# Patient Record
Sex: MALE | Race: WHITE | Employment: OTHER | URBAN - METROPOLITAN AREA
[De-identification: names, ages, dates, MRNs, and addresses within clinical notes are randomized per-mention and may not be internally consistent; named-entity substitution may affect disease eponyms.]

---

## 2017-03-07 ENCOUNTER — APPOINTMENT (OUTPATIENT)
Dept: CT IMAGING | Age: 77
End: 2017-03-07
Attending: PHYSICIAN ASSISTANT
Payer: MEDICARE

## 2017-03-07 ENCOUNTER — HOSPITAL ENCOUNTER (EMERGENCY)
Age: 77
Discharge: HOME | End: 2017-03-07
Attending: EMERGENCY MEDICINE | Admitting: EMERGENCY MEDICINE
Payer: MEDICARE

## 2017-03-07 VITALS
BODY MASS INDEX: 30.17 KG/M2 | OXYGEN SATURATION: 97 % | HEART RATE: 94 BPM | HEIGHT: 69 IN | SYSTOLIC BLOOD PRESSURE: 143 MMHG | WEIGHT: 203.71 LBS | TEMPERATURE: 97.4 F | DIASTOLIC BLOOD PRESSURE: 72 MMHG | RESPIRATION RATE: 16 BRPM

## 2017-03-07 DIAGNOSIS — F10.20 ALCOHOL DEPENDENCE, DAILY USE (HCC): ICD-10-CM

## 2017-03-07 DIAGNOSIS — R10.31 ABDOMINAL PAIN, RLQ: ICD-10-CM

## 2017-03-07 DIAGNOSIS — K85.90 ACUTE PANCREATITIS, UNSPECIFIED COMPLICATION STATUS, UNSPECIFIED PANCREATITIS TYPE: Primary | ICD-10-CM

## 2017-03-07 DIAGNOSIS — I71.40 ABDOMINAL AORTIC ANEURYSM (AAA) WITHOUT RUPTURE: ICD-10-CM

## 2017-03-07 LAB
ALBUMIN SERPL BCP-MCNC: 3.7 G/DL (ref 3.5–5)
ALBUMIN/GLOB SERPL: 1.2 {RATIO} (ref 1.1–2.2)
ALP SERPL-CCNC: 80 U/L (ref 45–117)
ALT SERPL-CCNC: 59 U/L (ref 12–78)
ANION GAP BLD CALC-SCNC: 8 MMOL/L (ref 5–15)
APPEARANCE UR: CLEAR
AST SERPL W P-5'-P-CCNC: 43 U/L (ref 15–37)
BACTERIA URNS QL MICRO: NEGATIVE /HPF
BASOPHILS # BLD AUTO: 0 K/UL (ref 0–0.1)
BASOPHILS # BLD: 0 % (ref 0–1)
BILIRUB SERPL-MCNC: 0.6 MG/DL (ref 0.2–1)
BILIRUB UR QL CFM: NEGATIVE
BUN SERPL-MCNC: 17 MG/DL (ref 6–20)
BUN/CREAT SERPL: 14 (ref 12–20)
CALCIUM SERPL-MCNC: 9 MG/DL (ref 8.5–10.1)
CHLORIDE SERPL-SCNC: 103 MMOL/L (ref 97–108)
CK MB CFR SERPL CALC: 1.4 % (ref 0–2.5)
CK MB SERPL-MCNC: 1.4 NG/ML (ref 5–25)
CK SERPL-CCNC: 102 U/L (ref 39–308)
CO2 SERPL-SCNC: 28 MMOL/L (ref 21–32)
COLOR UR: ABNORMAL
CREAT SERPL-MCNC: 1.25 MG/DL (ref 0.7–1.3)
EOSINOPHIL # BLD: 0.1 K/UL (ref 0–0.4)
EOSINOPHIL NFR BLD: 2 % (ref 0–7)
EPITH CASTS URNS QL MICRO: ABNORMAL /LPF
ERYTHROCYTE [DISTWIDTH] IN BLOOD BY AUTOMATED COUNT: 15.5 % (ref 11.5–14.5)
ETHANOL SERPL-MCNC: <10 MG/DL
GLOBULIN SER CALC-MCNC: 3.2 G/DL (ref 2–4)
GLUCOSE SERPL-MCNC: 196 MG/DL (ref 65–100)
GLUCOSE UR STRIP.AUTO-MCNC: 500 MG/DL
HCT VFR BLD AUTO: 38 % (ref 36.6–50.3)
HGB BLD-MCNC: 12.5 G/DL (ref 12.1–17)
HGB UR QL STRIP: NEGATIVE
KETONES UR QL STRIP.AUTO: ABNORMAL MG/DL
LACTATE SERPL-SCNC: 1.7 MMOL/L (ref 0.4–2)
LEUKOCYTE ESTERASE UR QL STRIP.AUTO: NEGATIVE
LIPASE SERPL-CCNC: 397 U/L (ref 73–393)
LYMPHOCYTES # BLD AUTO: 24 % (ref 12–49)
LYMPHOCYTES # BLD: 1.2 K/UL (ref 0.8–3.5)
MAGNESIUM SERPL-MCNC: 1.7 MG/DL (ref 1.6–2.4)
MCH RBC QN AUTO: 31.6 PG (ref 26–34)
MCHC RBC AUTO-ENTMCNC: 32.9 G/DL (ref 30–36.5)
MCV RBC AUTO: 96.2 FL (ref 80–99)
MONOCYTES # BLD: 0.8 K/UL (ref 0–1)
MONOCYTES NFR BLD AUTO: 15 % (ref 5–13)
NEUTS SEG # BLD: 2.9 K/UL (ref 1.8–8)
NEUTS SEG NFR BLD AUTO: 59 % (ref 32–75)
NITRITE UR QL STRIP.AUTO: NEGATIVE
PH UR STRIP: 5.5 [PH] (ref 5–8)
PLATELET # BLD AUTO: 155 K/UL (ref 150–400)
POTASSIUM SERPL-SCNC: 4.1 MMOL/L (ref 3.5–5.1)
PROT SERPL-MCNC: 6.9 G/DL (ref 6.4–8.2)
PROT UR STRIP-MCNC: NEGATIVE MG/DL
RBC # BLD AUTO: 3.95 M/UL (ref 4.1–5.7)
RBC #/AREA URNS HPF: ABNORMAL /HPF (ref 0–5)
SODIUM SERPL-SCNC: 139 MMOL/L (ref 136–145)
SP GR UR REFRACTOMETRY: 1.03 (ref 1–1.03)
TROPONIN I SERPL-MCNC: <0.04 NG/ML
UA: UC IF INDICATED,UAUC: ABNORMAL
UROBILINOGEN UR QL STRIP.AUTO: 1 EU/DL (ref 0.2–1)
WBC # BLD AUTO: 5 K/UL (ref 4.1–11.1)
WBC URNS QL MICRO: ABNORMAL /HPF (ref 0–4)

## 2017-03-07 PROCEDURE — 74011250636 HC RX REV CODE- 250/636: Performed by: PHYSICIAN ASSISTANT

## 2017-03-07 PROCEDURE — 93005 ELECTROCARDIOGRAM TRACING: CPT

## 2017-03-07 PROCEDURE — 82550 ASSAY OF CK (CPK): CPT | Performed by: PHYSICIAN ASSISTANT

## 2017-03-07 PROCEDURE — 74011250636 HC RX REV CODE- 250/636: Performed by: EMERGENCY MEDICINE

## 2017-03-07 PROCEDURE — 36415 COLL VENOUS BLD VENIPUNCTURE: CPT | Performed by: EMERGENCY MEDICINE

## 2017-03-07 PROCEDURE — 83605 ASSAY OF LACTIC ACID: CPT | Performed by: EMERGENCY MEDICINE

## 2017-03-07 PROCEDURE — 85025 COMPLETE CBC W/AUTO DIFF WBC: CPT | Performed by: EMERGENCY MEDICINE

## 2017-03-07 PROCEDURE — 96361 HYDRATE IV INFUSION ADD-ON: CPT

## 2017-03-07 PROCEDURE — 74176 CT ABD & PELVIS W/O CONTRAST: CPT

## 2017-03-07 PROCEDURE — 83735 ASSAY OF MAGNESIUM: CPT | Performed by: PHYSICIAN ASSISTANT

## 2017-03-07 PROCEDURE — 87086 URINE CULTURE/COLONY COUNT: CPT | Performed by: EMERGENCY MEDICINE

## 2017-03-07 PROCEDURE — 81001 URINALYSIS AUTO W/SCOPE: CPT | Performed by: EMERGENCY MEDICINE

## 2017-03-07 PROCEDURE — 80307 DRUG TEST PRSMV CHEM ANLYZR: CPT | Performed by: PHYSICIAN ASSISTANT

## 2017-03-07 PROCEDURE — 80053 COMPREHEN METABOLIC PANEL: CPT | Performed by: EMERGENCY MEDICINE

## 2017-03-07 PROCEDURE — 83690 ASSAY OF LIPASE: CPT | Performed by: PHYSICIAN ASSISTANT

## 2017-03-07 PROCEDURE — 74011636320 HC RX REV CODE- 636/320: Performed by: PHYSICIAN ASSISTANT

## 2017-03-07 PROCEDURE — 84484 ASSAY OF TROPONIN QUANT: CPT | Performed by: PHYSICIAN ASSISTANT

## 2017-03-07 PROCEDURE — 96374 THER/PROPH/DIAG INJ IV PUSH: CPT

## 2017-03-07 PROCEDURE — 99285 EMERGENCY DEPT VISIT HI MDM: CPT

## 2017-03-07 RX ORDER — ONDANSETRON 2 MG/ML
4 INJECTION INTRAMUSCULAR; INTRAVENOUS
Status: COMPLETED | OUTPATIENT
Start: 2017-03-07 | End: 2017-03-07

## 2017-03-07 RX ORDER — ONDANSETRON 4 MG/1
4 TABLET, ORALLY DISINTEGRATING ORAL
Qty: 10 TAB | Refills: 0 | Status: SHIPPED | OUTPATIENT
Start: 2017-03-07 | End: 2017-05-11

## 2017-03-07 RX ADMIN — DIATRIZOATE MEGLUMINE AND DIATRIZOATE SODIUM 30 ML: 600; 100 SOLUTION ORAL; RECTAL at 16:03

## 2017-03-07 RX ADMIN — ONDANSETRON HYDROCHLORIDE 4 MG: 2 INJECTION, SOLUTION INTRAMUSCULAR; INTRAVENOUS at 19:42

## 2017-03-07 RX ADMIN — SODIUM CHLORIDE 500 ML: 900 INJECTION, SOLUTION INTRAVENOUS at 16:03

## 2017-03-07 NOTE — ED PROVIDER NOTES
HPI Comments: Brissa Knight is a 67 y.o. male with PMhx significant for COPD, GERD, alcohol abuse, HTN, DM, shunt placement and rheumatoid arthritis who presents ambulatory to the ED with spouse cc of decreased appetite, increased fatigue and mid-RLQ abdominal pain x 5-6 days. He is also c/o of intermittent episodes of loose stools x several weeks but states only had 1 BM today and denies recent abx use. He notes his last BM was this AM, loose, non-bloody this morning and reports it was the first in two days. Pt's wife notes hx of chronic fatigue and states he is unable to stand for long periods of time chronically. He went to his PCP this morning, noted to have abd pain and his systolic BP was \"in the 37I\". He denies new meds or changes in his BP meds, and last took his BP meds last night as scheduled. He also states he fell asleep in his car prior to appointment with PCP. Per chart review, pt had colonoscopy and polypectomy in 01/2016 by Dr. Kayla Gill; 9 polops were removed and diverticula were present. Of note he was admitted 03/2016 for influenza, alcohol abuse, and elevated liver functions. Pt denies abd surgeries other than AV shunt placed. Pt denies any changes in medication or taking abx recently. Pt specifically denies any headache, visual changes, head injury, neck pain, chest pain, SOB, numbness/tingling or focal weakness, hematochezia or melena, vomiting, and myalgias. Social: (-) tobacco, (+) EtOH- per records drinks 1/2 gallon of bourbon a week  PCP: Gal Ding MD    There are no other complaints, changes or physical findings at this time. The history is provided by the patient and the spouse.         Past Medical History:   Diagnosis Date    Cancer ([facility]     r shoulder melenoma    COPD     Diabetes ([facility]     Diarrhea 1/22/2016    Emphysema lung (HCC)     GERD (gastroesophageal reflux disease)     Hydrocephalus 2014    shunt    Hypertension     Other ill-defined conditions(799.89)     colon polyps    RA (rheumatoid arthritis) (HCC)     Scarring     Stroke [facility])        Past Surgical History:   Procedure Laterality Date    HX HEENT      bilat cataract    HX OTHER SURGICAL      r shoulder melenoma excision    [facility] COLSC FLX W/RMVL OF TUMOR POLYP LESION SNARE TQ  4/28/2011         [facility] EGD TRANSORAL BIOPSY SINGLE/MULTIPLE  4/28/2011         VASCULAR SURGERY PROCEDURE UNLIST      stent femoral    VASCULAR SURGERY PROCEDURE UNLIST      right AV shunt         History reviewed. No pertinent family history. Social History     Social History    Marital status:      Spouse name: N/A    Number of children: N/A    Years of education: N/A     Occupational History    Not on file. Social History Main Topics    Smoking status: Former Smoker    Smokeless tobacco: Never Used    Alcohol use 3.5 oz/week     7 Shots of liquor per week    Drug use: No    Sexual activity: Not on file     Other Topics Concern    Not on file     Social History Narrative         ALLERGIES: Ceclor [cefaclor]; Floxin [ofloxacin]; and Ivp [fd and c blue no. 1]    Review of Systems   Constitutional: Positive for activity change (decreased energy) and appetite change (decreased ). Negative for chills, fatigue and unexpected weight change. Respiratory: Negative for cough, chest tightness, shortness of breath and wheezing. Cardiovascular: Negative. Negative for chest pain and palpitations. Gastrointestinal: Positive for abdominal pain (RLQ) and diarrhea. Negative for blood in stool, nausea and vomiting. Genitourinary: Negative. Negative for dysuria, flank pain, frequency and hematuria. Musculoskeletal: Negative. Negative for arthralgias, back pain, myalgias, neck pain and neck stiffness. Skin: Positive for color change. Negative for rash. Neurological: Positive for dizziness and weakness. Negative for numbness and headaches. Psychiatric/Behavioral: Negative.   Negative for confusion. All other systems reviewed and are negative. Patient Vitals for the past 12 hrs:   Temp Pulse Resp BP SpO2   03/07/17 1930 - - - 143/72 -   03/07/17 1915 - - - 139/70 -   03/07/17 1845 - - - 150/66 97 %   03/07/17 1830 - - - 144/67 -   03/07/17 1829 - - - - 98 %   03/07/17 1804 - - - - 98 %   03/07/17 1803 - - - 142/64 -   03/07/17 1710 - - - - 100 %   03/07/17 1700 - - - 169/82 -   03/07/17 1659 - - - - 100 %   03/07/17 1645 - - - 143/73 100 %   03/07/17 1630 - - - 136/81 100 %   03/07/17 1615 - - - 136/80 100 %   03/07/17 1600 - - - 130/68 100 %   03/07/17 1555 - - - 127/68 -   03/07/17 1240 97.4 °F (36.3 °C) 94 16 104/57 99 %            Physical Exam   Constitutional: He is oriented to person, place, and time. He appears well-developed and well-nourished. He is active. Non-toxic appearance. No distress. HENT:   Head: Normocephalic and atraumatic. Shunt visible and palpable to R parietal scalp, R lateral neck without bulging or tenderness. Eyes: Conjunctivae are normal. Pupils are equal, round, and reactive to light. Right eye exhibits no discharge. Left eye exhibits no discharge. Neck: Normal range of motion and full passive range of motion without pain. Neck supple. No tracheal tenderness present. Cardiovascular: Normal rate, regular rhythm, normal heart sounds, intact distal pulses and normal pulses. Exam reveals no gallop and no friction rub. No murmur heard. Pulmonary/Chest: Effort normal and breath sounds normal. No respiratory distress. He has no wheezes. He has no rales. He exhibits no tenderness. Abdominal: Soft. Bowel sounds are normal. He exhibits distension. There is no rebound, no guarding and no CVA tenderness. Small linear horizontal RUQ scar. RLQ tenderness with rebound. Musculoskeletal: Normal range of motion. He exhibits no edema or tenderness. Neurological: He is alert and oriented to person, place, and time. He has normal strength.  No cranial nerve deficit or sensory deficit. Coordination normal.   Strength 5/5 throughout   Skin: Skin is warm, dry and intact. No abrasion and no rash noted. He is not diaphoretic. No erythema. Psychiatric: He has a normal mood and affect. His speech is normal and behavior is normal. Cognition and memory are normal.   Nursing note and vitals reviewed. MDM  Number of Diagnoses or Management Options  Abdominal aortic aneurysm (AAA) without rupture [facility]):   Abdominal pain, RLQ:   Acute pancreatitis, unspecified complication status, unspecified pancreatitis type:   Alcohol dependence, daily use [facility]):   Diagnosis management comments: DDx: Small bowel obstruction, appendicitis, colitis, intra-abdominal mass       Amount and/or Complexity of Data Reviewed  Clinical lab tests: ordered and reviewed  Tests in the radiology section of CPT®: ordered and reviewed  Tests in the medicine section of CPT®: ordered and reviewed  Obtain history from someone other than the patient: yes (Spouse)  Review and summarize past medical records: yes  Independent visualization of images, tracings, or specimens: yes    Patient Progress  Patient progress: stable    ED Course       Procedures    PROGRESS NOTE:  2:55 PM  Spoke with Dr. Tayler Blanton, he agrees to CT abd/pelvis with oral contrast only. Written by Brian Lima, ED Scribe, as dictated by Kuponjo Camak. ED EKG interpretation: 15:02  Rhythm: normal sinus rhythm; and regular . Rate (approx.): 85; Axis: normal; TX Interval: normal; QRS interval: RBBB; ST/T wave: non-specific changes; This EKG was interpreted by Wilner Garvey MD,ED Provider. 6:18 PM  Updated patient and spouse on waiting for CT results. Still mild RLQ tenderness, no epigastric or RUQ TTP with re-exam. Tolerated oral contrast without problem and denies pain without palpation. Updated patient and spouse on all available labs / imaging that have resulted thus far.  Discussed clear liquid diet to slowly transition to bland diet as tolerated. Anti-nausea med encouraged if needed. Patient and spouse at bedside agree with care plan. Jamey Patton PA-C    PROGRESS NOTE:  7:16 PM  Discussed labs/imaging findings with Dr. Carol Shelley who agrees patient can f/u with PCP this upcoming week, vascular for follow-up and his GI specialist for further management of his non-emergent issues. I advised pt to decrease alcohol intake. I again instructed pt to do a clear liquid diet for a few days and slowly introduce denser liquids then a bland diet as tolerated. The patient and spouse state that they understand that there were additional findings and that they agree to follow-up as recommended. Written by ROZ Ocampo, as dictated by Kylee Dent. 7 PM  I have reviewed the additional findings with the patient and encouraged them to follow-up with a primary care provider for appropriate outpatient evaluation and treatment. I have encouraged them to see the official results in [facility]\" or to retrieve the specifics of their results from medical records. The patient states that they understand that there were additional findings and that they agree to follow-up as recommended. LABORATORY TESTS:  Recent Results (from the past 12 hour(s))   METABOLIC PANEL, COMPREHENSIVE    Collection Time: 03/07/17  1:22 PM   Result Value Ref Range    Sodium 139 136 - 145 mmol/L    Potassium 4.1 3.5 - 5.1 mmol/L    Chloride 103 97 - 108 mmol/L    CO2 28 21 - 32 mmol/L    Anion gap 8 5 - 15 mmol/L    Glucose 196 (H) 65 - 100 mg/dL    BUN 17 6 - 20 MG/DL    Creatinine 1.25 0.70 - 1.30 MG/DL    BUN/Creatinine ratio 14 12 - 20      GFR est AA >60 >60 ml/min/1.73m2    GFR est non-AA 56 (L) >60 ml/min/1.73m2    Calcium 9.0 8.5 - 10.1 MG/DL    Bilirubin, total 0.6 0.2 - 1.0 MG/DL    ALT (SGPT) 59 12 - 78 U/L    AST (SGOT) 43 (H) 15 - 37 U/L    Alk.  phosphatase 80 45 - 117 U/L    Protein, total 6.9 6.4 - 8.2 g/dL    Albumin 3.7 3.5 - 5.0 g/dL    Globulin 3.2 2.0 - 4.0 g/dL    A-G Ratio 1.2 1.1 - 2.2     CBC WITH AUTOMATED DIFF    Collection Time: 03/07/17  1:22 PM   Result Value Ref Range    WBC 5.0 4.1 - 11.1 K/uL    RBC 3.95 (L) 4.10 - 5.70 M/uL    HGB 12.5 12.1 - 17.0 g/dL    HCT 38.0 36.6 - 50.3 %    MCV 96.2 80.0 - 99.0 FL    MCH 31.6 26.0 - 34.0 PG    MCHC 32.9 30.0 - 36.5 g/dL    RDW 15.5 (H) 11.5 - 14.5 %    PLATELET 554 743 - 799 K/uL    NEUTROPHILS 59 32 - 75 %    LYMPHOCYTES 24 12 - 49 %    MONOCYTES 15 (H) 5 - 13 %    EOSINOPHILS 2 0 - 7 %    BASOPHILS 0 0 - 1 %    ABS. NEUTROPHILS 2.9 1.8 - 8.0 K/UL    ABS. LYMPHOCYTES 1.2 0.8 - 3.5 K/UL    ABS. MONOCYTES 0.8 0.0 - 1.0 K/UL    ABS. EOSINOPHILS 0.1 0.0 - 0.4 K/UL    ABS.  BASOPHILS 0.0 0.0 - 0.1 K/UL   EKG, 12 LEAD, INITIAL    Collection Time: 03/07/17  3:02 PM   Result Value Ref Range    Ventricular Rate 85 BPM    Atrial Rate 85 BPM    P-R Interval 140 ms    QRS Duration 128 ms    Q-T Interval 410 ms    QTC Calculation (Bezet) 487 ms    Calculated P Axis 2 degrees    Calculated R Axis 90 degrees    Calculated T Axis 52 degrees    Diagnosis       Normal sinus rhythm  Right bundle branch block  Abnormal ECG  When compared with ECG of 08-MAR-2016 03:16,  QRS axis shifted right  T wave inversion no longer evident in Inferior leads     URINALYSIS W/ REFLEX CULTURE    Collection Time: 03/07/17  3:29 PM   Result Value Ref Range    Color DARK YELLOW      Appearance CLEAR CLEAR      Specific gravity 1.029 1.003 - 1.030      pH (UA) 5.5 5.0 - 8.0      Protein NEGATIVE  NEG mg/dL    Glucose 500 (A) NEG mg/dL    Ketone TRACE (A) NEG mg/dL    Blood NEGATIVE  NEG      Urobilinogen 1.0 0.2 - 1.0 EU/dL    Nitrites NEGATIVE  NEG      Leukocyte Esterase NEGATIVE  NEG      WBC 5-10 0 - 4 /hpf    RBC 0-5 0 - 5 /hpf    Epithelial cells FEW FEW /lpf    Bacteria NEGATIVE  NEG /hpf    UA:UC IF INDICATED URINE CULTURE ORDERED (A) CNI     LACTIC ACID, PLASMA    Collection Time: 03/07/17  3:29 PM   Result Value Ref Range    Lactic acid 1.7 0.4 - 2.0 MMOL/L   TROPONIN I    Collection Time: 03/07/17  3:29 PM   Result Value Ref Range    Troponin-I, Qt. <0.04 <0.05 ng/mL   CK W/ CKMB & INDEX    Collection Time: 03/07/17  3:29 PM   Result Value Ref Range     39 - 308 U/L    CK - MB 1.4 <3.6 NG/ML    CK-MB Index 1.4 0 - 2.5     ETHYL ALCOHOL    Collection Time: 03/07/17  3:29 PM   Result Value Ref Range    ALCOHOL(ETHYL),SERUM <10 <10 MG/DL   MAGNESIUM    Collection Time: 03/07/17  3:29 PM   Result Value Ref Range    Magnesium 1.7 1.6 - 2.4 mg/dL   LIPASE    Collection Time: 03/07/17  3:29 PM   Result Value Ref Range    Lipase 397 (H) 73 - 393 U/L   BILIRUBIN, CONFIRM    Collection Time: 03/07/17  3:29 PM   Result Value Ref Range    Bilirubin UA, confirm NEGATIVE  NEG         IMAGING RESULTS:  CT ABD PELV WO CONT   Final Result   INDICATION: Right lower quadrant abdominal pain x1 month, diarrhea or  hypotension.     COMPARISON: 9/6/2014     TECHNIQUE:   Thin axial images were obtained through the abdomen and pelvis. Coronal and  sagittal reconstructions were generated. Oral contrast was administered. CT dose  reduction was achieved through use of a standardized protocol tailored for this  examination and automatic exposure control for dose modulation.      The absence of intravenous contrast material reduces the sensitivity for  evaluation of the solid parenchymal organs of the abdomen.      FINDINGS:   LUNG BASES: Clear. Slightly elevated left hemidiaphragm. INCIDENTALLY IMAGED HEART AND MEDIASTINUM: Coronary artery calcification. LIVER: Unremarkable  GALLBLADDER: Unremarkable. SPLEEN: Normal size. PANCREAS: Slightly bulbous head/uncinate process pancreas  ADRENALS: Unremarkable. KIDNEYS: Bilateral intrarenal nonobstructing calculi. No hydronephrosis. STOMACH: Unremarkable. SMALL BOWEL: Normal in caliber. COLON: Scattered sigmoid and left colonic diverticula. APPENDIX: Normal in configuration.   PERITONEUM: No ascites or pneumoperitoneum. RETROPERITONEUM: Atherosclerotic aorta with a 4.1 x 4.1 cm distal abdominal  aortic aneurysm. Atherosclerotic iliac arteries. REPRODUCTIVE ORGANS: Normal size prostate gland  URINARY BLADDER: Unremarkable. BONES: Slight scoliosis and degenerative change spine. No destructive bone  lesion. ADDITIONAL COMMENTS: Right  shunt which extends into the right upper pelvis     IMPRESSION  IMPRESSION:     4.1 x 4.1 cm aneurysm distal abdominal aorta.     Bilateral intrarenal nonobstructing calculi; no hydronephrosis     Sigmoid and left colonic diverticulosis; no ascites or inflammatory  infiltration.     Normal-appearing appendix. MEDICATIONS GIVEN:  Medications   sodium chloride 0.9 % bolus infusion 500 mL (0 mL IntraVENous IV Completed 3/7/17 1703)   diatrizoate meglumine-d.sodium (MD-GASTROVIEW,GASTROGRAFIN) 66-10 % contrast solution 30 mL (30 mL Oral Given 3/7/17 1603)   ondansetron (ZOFRAN) injection 4 mg (4 mg IntraVENous Given 3/7/17 1942)       IMPRESSION:  1. Acute pancreatitis, unspecified complication status, unspecified pancreatitis type    2. Abdominal pain, RLQ    3. Abdominal aortic aneurysm (AAA) without rupture ([facility]    4. Alcohol dependence, daily use ([facility]        PLAN:  1. Discharge home  Discharge Medication List as of 3/7/2017  7:32 PM      START taking these medications    Details   ondansetron (ZOFRAN ODT) 4 mg disintegrating tablet Take 1 Tab by mouth every eight (8) hours as needed for Nausea., Normal, Disp-10 Tab, R-0         CONTINUE these medications which have NOT CHANGED    Details   methotrexate (RHEUMATREX) 2.5 mg tablet Take 10 mg by mouth every Monday and Friday., Historical Med      adalimumab (HUMIRA) 40 mg/0.8 mL injection by SubCUTAneous route once., Historical Med      pravastatin (PRAVACHOL) 80 mg tablet Take 80 mg by mouth nightly. Indications: MIXED HYPERLIPIDEMIA, Historical Med      losartan (COZAAR) 50 mg tablet Take 50 mg by mouth daily. Indications: HYPERTENSION, Historical Med      folic acid (FOLVITE) 1 mg tablet Take 1 mg by mouth daily. , Historical Med      sitagliptin (JANUVIA) 25 mg tablet Take 100 mg by mouth daily. , Historical Med      esomeprazole (NEXIUM) 40 mg capsule Take 40 mg by mouth daily. , Historical Med      aspirin delayed-release 81 mg tablet Take 81 mg by mouth every other day., Historical Med      tiotropium (SPIRIVA WITH HANDIHALER) 18 mcg inhalation capsule Take 1 Cap by inhalation daily. , Historical Med           2. Follow-up Information     Follow up With Details Comments Contact Info    Adam Claire MD Schedule an appointment as soon as possible for a visit FOR FOLLOW-UP THIS WEEK. DO CLEAR LIQUID DIET FOR 24-48 HOURS, THEN MORE DENSE LIQUIDS FOR A FEW DAYS THEN SLOWLY INTRODUCE SOFT SOLIDS. [place] [id number]  [phone]      [facility] MD Flako Schedule an appointment as soon as possible for a visit in 1 week [address]ros[address]posito [phone] [facility] [address] Menard MD  YOUR GI SPECIALIST FOR FOLLOW-UP ON THE ACUTE PANCREATITIS AND FURTHER MANAGEMENT. [address] [place] [address] 83.  [phone]          Return to ED if worse     DISCHARGE NOTE:  7:13 PM  The patient is ready for discharge. The patients signs, symptoms, diagnosis, and instructions for discharge have been discussed and the pt has conveyed their understanding. The patient is to follow up as recommended with PCP or return to the ER should their symptoms worsen. Plan has been discussed and patient has conveyed their agreement. This note is prepared by Neptali Ogden, acting as Scribe for VIPAAR Cj. CASSIDY Valerio: The scribe's documentation has been prepared under my direction and personally reviewed by me in its entirety.  I confirm that the note above accurately reflects all work, treatment, procedures, and medical decision making performed by me.

## 2017-03-08 LAB
ATRIAL RATE: 85 BPM
CALCULATED P AXIS, ECG09: 2 DEGREES
CALCULATED R AXIS, ECG10: 90 DEGREES
CALCULATED T AXIS, ECG11: 52 DEGREES
DIAGNOSIS, 93000: NORMAL
P-R INTERVAL, ECG05: 140 MS
Q-T INTERVAL, ECG07: 410 MS
QRS DURATION, ECG06: 128 MS
QTC CALCULATION (BEZET), ECG08: 487 MS
VENTRICULAR RATE, ECG03: 85 BPM

## 2017-03-08 NOTE — DISCHARGE INSTRUCTIONS
Pancreatitis: Care Instructions  Your Care Instructions    The pancreas is an organ behind the stomach. It makes hormones and enzymes to help your body digest food. But if these enzymes attack the pancreas, it can get inflamed. This is called pancreatitis. Most cases are caused by gallstones or by heavy alcohol use. If you take care of yourself at home, it will help you get better. It will also help you avoid more problems with your pancreas. Follow-up care is a key part of your treatment and safety. Be sure to make and go to all appointments, and call your doctor if you are having problems. It's also a good idea to know your test results and keep a list of the medicines you take. How can you care for yourself at home? · Drink clear liquids and eat bland foods until you feel better. [place] foods include rice, dry toast, and crackers. They also include bananas and applesauce. · Eat a low-fat diet until your doctor says your pancreas is healed. · Do not drink alcohol. Tell your doctor if you need help to quit. Counseling, support groups, and sometimes medicines can help you stay sober. · Be safe with medicines. Read and follow all instructions on the label. ¨ If the doctor gave you a prescription medicine for pain, take it as prescribed. ¨ If you are not taking a prescription pain medicine, ask your doctor if you can take an over-the-counter medicine. · If your doctor prescribed antibiotics, take them as directed. Do not stop taking them just because you feel better. You need to take the full course of antibiotics. · Get extra rest until you feel better. To prevent future problems with your pancreas  · Do not drink alcohol. · Tell your doctors and pharmacist that you've had pancreatitis. They can help you avoid medicines that may cause this problem again. When should you call for help? Call your doctor now or seek immediate medical care if:  · You have new or severe belly pain.   · You have a new or higher fever. · You can't keep fluid or medicines down. Watch closely for changes in your health, and be sure to contact your doctor if:  · The symptoms you had when you first started feeling sick come back. · You do not get better as expected. · You need help to stop drinking alcohol. Where can you learn more? Go to http://yannick-helen.info/. Enter D656 in the search box to learn more about \"Pancreatitis: Care Instructions. \"  Current as of: August 9, 2016  Content Version: 11.1  © 4717-9559 [employer] ([employer]). Care instructions adapted under license by [employer] (which disclaims liability or warranty for this information). If you have questions about a medical condition or this instruction, always ask your healthcare professional. [address] any warranty or liability for your use of this information.

## 2017-03-08 NOTE — ED NOTES
Bedside and Verbal shift change report given to [address] (oncoming nurse) by Day Else (offgoing nurse). Report included the following information SBAR, ED Summary, Intake/Output, MAR and Recent Results.

## 2017-03-08 NOTE — ED NOTES
GILSON Mcfarlane has reviewed discharge instructions with the patient. The patient verbalized understanding. Patient brought to car in wheelchair by RN, appears in NAD.

## 2017-03-09 LAB
BACTERIA SPEC CULT: NORMAL
CC UR VC: NORMAL
SERVICE CMNT-IMP: NORMAL

## 2017-03-30 NOTE — PERIOP NOTES
[facility]  Ambulatory Surgery Unit  Pre-operative Instructions for Endo Procedures    Procedure Date  4/4/17            Tentative Arrival Time 0326      1. On the day of your procedure, please report to the Ambulatory Surgery Unit Registration Desk and sign in at your designated time. The Ambulatory Surgery Unit is located in [facility] on the [facility] side of the [facility] across from the [address] [place] [address]. Please have all of your health insurance cards and a photo ID. 2. You must have someone with you to drive you home, as you should not drive a car for 24 hours following anesthesia. Please make arrangements for a responsible adult friend or family member to stay with you for at least the first 24 hours after your procedure. 3. Do not have anything to eat or drink (including water, gum, mints, coffee, juice) after midnight   4/3/17. This may not apply to medications prescribed by your physician. (Please note below the special instructions with medications to take the morning of your procedure.)    4. If applicable, follow the clear liquid diet and bowel prep instructions provided by your physician's office. If you do not have this information, or have any questions, please contact your physician's office. 5. We recommend you do not drink any alcoholic beverages for 24 hours before and after your procedure. 6. Stop all Aspirin, non-steroidal anti-inflammatory drugs (i.e. Advil, Aleve), vitamins, and supplements as directed by your surgeon's office. **If you are currently taking Plavix, Coumadin, or other blood-thinning agents, contact your surgeon for instructions. **    7. In an effort to help prevent surgical site infection, we ask that you shower with an anti-bacterial soap (i.e. Dial or Safeguard) on the morning of your procedure. Do not apply any lotions, powders, or deodorants after showering. 8. Wear comfortable clothes. Wear glasses instead of contacts.  Do not bring any jewelry or money (other than copays or fees as instructed). Do not wear make-up, particularly mascara, the morning of your procedure. Wear your hair loose or down, no ponytails, buns, dante pins or clips. All body piercings must be removed. 9. You should understand that if you do not follow these instructions your procedure may be cancelled. If your physical condition changes (i.e. fever, cold or flu) please contact your surgeon as soon as possible. 10. It is important that you be on time. If a situation occurs where you may be late, or if you have any questions or problems, please call [phone]. 11. Your procedure time may be subject to change. You will receive a phone call the day prior to confirm your arrival time. Special Instructions:    MEDICATIONS TO TAKE THE MORNING OF SURGERY WITH A SIP OF WATER: inhaler      I understand a pre-operative phone call will be made to verify my procedure time. In the event that I am not available, I give permission for a message to be left on my answering service and/or with another person?       Yes     (instructions given verbally during phone assessment- pt voiced understanding)     ___________________      ___________________      ___________________  (Signature of Patient)          (Witness)                   (Date and Time)

## 2017-03-31 ENCOUNTER — HOSPITAL ENCOUNTER (OUTPATIENT)
Dept: GENERAL RADIOLOGY | Age: 77
Discharge: HOME | End: 2017-03-31
Attending: SPECIALIST
Payer: MEDICARE

## 2017-03-31 DIAGNOSIS — R15.9 INCONTINENCE OF FECES: ICD-10-CM

## 2017-03-31 DIAGNOSIS — R79.9 BLOOD CHEMISTRY ABNORMALITY: ICD-10-CM

## 2017-03-31 DIAGNOSIS — R19.7 DIARRHEA: ICD-10-CM

## 2017-03-31 DIAGNOSIS — R14.2 ERUCTATION: ICD-10-CM

## 2017-03-31 PROCEDURE — 74220 X-RAY XM ESOPHAGUS 1CNTRST: CPT

## 2017-04-03 ENCOUNTER — ANESTHESIA EVENT (OUTPATIENT)
Dept: SURGERY | Age: 77
End: 2017-04-03
Payer: MEDICARE

## 2017-04-04 ENCOUNTER — ANESTHESIA (OUTPATIENT)
Dept: SURGERY | Age: 77
End: 2017-04-04
Payer: MEDICARE

## 2017-04-04 ENCOUNTER — SURGERY (OUTPATIENT)
Age: 77
End: 2017-04-04

## 2017-04-04 ENCOUNTER — HOSPITAL ENCOUNTER (OUTPATIENT)
Age: 77
Setting detail: OUTPATIENT SURGERY
Discharge: HOME | End: 2017-04-04
Attending: SPECIALIST | Admitting: SPECIALIST
Payer: MEDICARE

## 2017-04-04 VITALS
TEMPERATURE: 98.6 F | DIASTOLIC BLOOD PRESSURE: 72 MMHG | SYSTOLIC BLOOD PRESSURE: 132 MMHG | HEART RATE: 69 BPM | RESPIRATION RATE: 15 BRPM | WEIGHT: 199.2 LBS | OXYGEN SATURATION: 100 % | HEIGHT: 69 IN | BODY MASS INDEX: 29.51 KG/M2

## 2017-04-04 PROBLEM — R13.10 DYSPHAGIA: Status: ACTIVE | Noted: 2017-04-04

## 2017-04-04 PROBLEM — R93.5 ABNORMAL X-RAY OF ABDOMEN: Status: ACTIVE | Noted: 2017-04-04

## 2017-04-04 LAB
GLUCOSE BLD STRIP.AUTO-MCNC: 169 MG/DL (ref 65–100)
H PYLORI FROM TISSUE: NEGATIVE
KIT LOT NO., HCLOLOT: NORMAL
NEGATIVE CONTROL: NEGATIVE
POSITIVE CONTROL: POSITIVE
SERVICE CMNT-IMP: ABNORMAL

## 2017-04-04 PROCEDURE — 76210000040 HC AMBSU PH I REC FIRST 0.5 HR: Performed by: SPECIALIST

## 2017-04-04 PROCEDURE — 87077 CULTURE AEROBIC IDENTIFY: CPT | Performed by: SPECIALIST

## 2017-04-04 PROCEDURE — 76210000046 HC AMBSU PH II REC FIRST 0.5 HR: Performed by: SPECIALIST

## 2017-04-04 PROCEDURE — 74011000250 HC RX REV CODE- 250

## 2017-04-04 PROCEDURE — 88305 TISSUE EXAM BY PATHOLOGIST: CPT | Performed by: SPECIALIST

## 2017-04-04 PROCEDURE — 77030019988 HC FCPS ENDOSC DISP BSC -B: Performed by: SPECIALIST

## 2017-04-04 PROCEDURE — 77030020255 HC SOL INJ LR 1000ML BG: Performed by: SPECIALIST

## 2017-04-04 PROCEDURE — 76030000002 HC AMB SURG OR TIME FIRST 0.: Performed by: SPECIALIST

## 2017-04-04 PROCEDURE — 74011250636 HC RX REV CODE- 250/636

## 2017-04-04 PROCEDURE — 82962 GLUCOSE BLOOD TEST: CPT

## 2017-04-04 PROCEDURE — 76060000073 HC AMB SURG ANES FIRST 0.5 HR: Performed by: SPECIALIST

## 2017-04-04 RX ORDER — SODIUM CHLORIDE, SODIUM LACTATE, POTASSIUM CHLORIDE, CALCIUM CHLORIDE 600; 310; 30; 20 MG/100ML; MG/100ML; MG/100ML; MG/100ML
25 INJECTION, SOLUTION INTRAVENOUS CONTINUOUS
Status: DISCONTINUED | OUTPATIENT
Start: 2017-04-04 | End: 2017-04-04 | Stop reason: HOSPADM

## 2017-04-04 RX ORDER — PROPOFOL 10 MG/ML
INJECTION, EMULSION INTRAVENOUS AS NEEDED
Status: DISCONTINUED | OUTPATIENT
Start: 2017-04-04 | End: 2017-04-04 | Stop reason: HOSPADM

## 2017-04-04 RX ORDER — LIDOCAINE HYDROCHLORIDE 20 MG/ML
INJECTION, SOLUTION EPIDURAL; INFILTRATION; INTRACAUDAL; PERINEURAL AS NEEDED
Status: DISCONTINUED | OUTPATIENT
Start: 2017-04-04 | End: 2017-04-04 | Stop reason: HOSPADM

## 2017-04-04 RX ORDER — SODIUM CHLORIDE 0.9 % (FLUSH) 0.9 %
5-10 SYRINGE (ML) INJECTION AS NEEDED
Status: DISCONTINUED | OUTPATIENT
Start: 2017-04-04 | End: 2017-04-04 | Stop reason: HOSPADM

## 2017-04-04 RX ORDER — DIPHENHYDRAMINE HYDROCHLORIDE 50 MG/ML
12.5 INJECTION, SOLUTION INTRAMUSCULAR; INTRAVENOUS AS NEEDED
Status: DISCONTINUED | OUTPATIENT
Start: 2017-04-04 | End: 2017-04-04 | Stop reason: HOSPADM

## 2017-04-04 RX ORDER — INSULIN LISPRO 100 [IU]/ML
INJECTION, SOLUTION INTRAVENOUS; SUBCUTANEOUS
COMMUNITY
End: 2017-05-11

## 2017-04-04 RX ORDER — FENTANYL CITRATE 50 UG/ML
25 INJECTION, SOLUTION INTRAMUSCULAR; INTRAVENOUS
Status: DISCONTINUED | OUTPATIENT
Start: 2017-04-04 | End: 2017-04-04 | Stop reason: HOSPADM

## 2017-04-04 RX ORDER — LIDOCAINE HYDROCHLORIDE 10 MG/ML
0.1 INJECTION, SOLUTION EPIDURAL; INFILTRATION; INTRACAUDAL; PERINEURAL AS NEEDED
Status: DISCONTINUED | OUTPATIENT
Start: 2017-04-04 | End: 2017-04-04 | Stop reason: HOSPADM

## 2017-04-04 RX ORDER — ONDANSETRON 2 MG/ML
4 INJECTION INTRAMUSCULAR; INTRAVENOUS AS NEEDED
Status: DISCONTINUED | OUTPATIENT
Start: 2017-04-04 | End: 2017-04-04 | Stop reason: HOSPADM

## 2017-04-04 RX ORDER — SODIUM CHLORIDE 0.9 % (FLUSH) 0.9 %
5-10 SYRINGE (ML) INJECTION EVERY 8 HOURS
Status: DISCONTINUED | OUTPATIENT
Start: 2017-04-04 | End: 2017-04-04 | Stop reason: HOSPADM

## 2017-04-04 RX ADMIN — LIDOCAINE HYDROCHLORIDE 40 MG: 20 INJECTION, SOLUTION EPIDURAL; INFILTRATION; INTRACAUDAL; PERINEURAL at 14:56

## 2017-04-04 RX ADMIN — PROPOFOL 100 MG: 10 INJECTION, EMULSION INTRAVENOUS at 15:02

## 2017-04-04 RX ADMIN — PROPOFOL 70 MG: 10 INJECTION, EMULSION INTRAVENOUS at 15:05

## 2017-04-04 RX ADMIN — PROPOFOL 100 MG: 10 INJECTION, EMULSION INTRAVENOUS at 14:56

## 2017-04-04 NOTE — PERIOP NOTES
[facility]  1940  [id number]    Situation:  Verbal report given from: NIRANJAN Sinclair CRNA and   RICK Gunderson RN  Procedure: Procedure(s):  ESOPHAGOGASTRODUODENOSCOPY (EGD)  ESOPHAGEAL DILATION WITH FLUOROSCOPY  ESOPHAGOGASTRODUODENAL (EGD) BIOPSY    Background:    Preoperative diagnosis: BELCHING     Postoperative diagnosis: Schatzi's Ring, Hiatal Hernia, Successful Dilation    :  Dr. Lincoln Collier    Assistant(s): Circ-1: Jonas Barton RN  Circ-2: Rosana Restrepo RN  Radiology Technician: Nicholas Menchaca RT  Scrub Tech-1: Pushpa Freeman    Specimens:   ID Type Source Tests Collected by Time Destination   1 : Mid-Esophagus Biopsy Preservative   James Hathaway MD 4/4/2017 1500 Pathology       Assessment:  Intra-procedure medications         Anesthesia gave intra-procedure sedation and medications, see anesthesia flow sheet     Intravenous fluids: LR@ KVO     Vital signs stable       Recommendation:    Permission to share finding with family or friend yes

## 2017-04-04 NOTE — PROCEDURES
Esophagogastroduodenoscopy    Indications:  Dysphagia  Abnormal barium esophagram    Medications:  See anesthesia form    Post procedure diagnosis:  Schatzi's Ring, Hiatal Hernia, Successful Dilation    Description of Procedure:    Prior to the procedure its objectives, risks, consequences and alternatives were discussed with the patient who then elected to proceed. The Olympus video endoscope was inserted under direct vision into the mouth and then into the esophagus. The esophagus looked normal to the z line. There was an intermittent Schatzki ring (acquired) at the z line. The z-line was located at 40cm. There was a sliding type hiatal hernia with the diaphragm pinch at 42 cm. There were no diagnostic abnormalities of the body, fundus, antrum, cardia and incisura of the stomach. This included direct and retroflexion examination. The first and second portion of the duodenum appeared normal.  I took biopsies of the stomach for hp rapid. I took biopsies of the mid esophagus. I placed a spring tipped guide wire in the lumen of the duodenum and confirmed it fluoroscopically. I removed the scope. I then passed an [employer] dilator size 54Fr over the wire. Using active fluoroscopy I saw that the widest portion of the dilator passed the radiographic G-E junction. A brief repeat egd showed a mucosal tear at the proximal esophagus, just below the crico.        Additional diagnostic FPGY([id number]) Radiiological supervision and interpretation         Complications: There were no apparent complications and the patient tolerated the procedure well.         Estimated Blood Loss:  none  Specimens Removed:  Stomach hp rapid  Esophagus, mid  Impressions:  Successful dilation to 47 FR  Acquired esophageal ring  Hiatal hernia      Signed By: Patrick Kelley MD                        April 4, 2017     3:11 PM

## 2017-04-04 NOTE — ANESTHESIA POSTPROCEDURE EVALUATION
Post-Anesthesia Evaluation and Assessment    Patient: Renetta Valencia MRN: [id number]  SSN: [id number]    YOB: 1940  Age: 67 y.o. Sex: male       Cardiovascular Function/Vital Signs  Visit Vitals    /72 (BP 1 Location: Right arm, BP Patient Position: At rest)    Pulse 69    Temp 37 °C (98.6 °F)    Resp 15    Ht 5' 9\" (1.753 m)    Wt 90.4 kg (199 lb 3.2 oz)    SpO2 100%    BMI 29.42 kg/m2       Patient is status post total IV anesthesia, MAC anesthesia for Procedure(s):  ESOPHAGOGASTRODUODENOSCOPY (EGD)  ESOPHAGEAL DILATION WITH FLUOROSCOPY  ESOPHAGOGASTRODUODENAL (EGD) BIOPSY. Nausea/Vomiting: None    Postoperative hydration reviewed and adequate. Pain:  Pain Scale 1: Numeric (0 - 10) (04/04/17 1540)  Pain Intensity 1: 0 (04/04/17 1540)   Managed    Neurological Status:   Neuro (WDL): Exceptions to WDL (04/04/17 1515)  Neuro  Neurologic State: Alert (04/04/17 1540)  LLE Motor Response: Numbness;Tingling (chronic left foot) (04/04/17 1247)  RLE Motor Response: Numbness;Tingling (chronic right foot) (04/04/17 1247)   At baseline    Mental Status and Level of Consciousness: Arousable    Pulmonary Status:   O2 Device: Room air (04/04/17 1540)   Adequate oxygenation and airway patent    Complications related to anesthesia: None    Post-anesthesia assessment completed.  No concerns    Signed By: Bhakti Rebollar MD     April 4, 2017

## 2017-04-04 NOTE — ANESTHESIA PREPROCEDURE EVALUATION
Anesthetic History   No history of anesthetic complications            Review of Systems / Medical History  Patient summary reviewed, nursing notes reviewed and pertinent labs reviewed    Pulmonary    COPD (no recent problems)               Neuro/Psych       CVA (left arm weakness)      Comments: hydrocephalus with right-sided shunt  Memory problems Cardiovascular    Hypertension ( taken off med)              Exercise tolerance: >4 METS     GI/Hepatic/Renal     GERD: well controlled           Endo/Other    Diabetes: using insulin    Arthritis (Rheumatoid; affects hips & neck) and cancer (melanoma)     Other Findings   Comments: H/o ETOH abuse           Physical Exam    Airway  Mallampati: III  TM Distance: 4 - 6 cm  Neck ROM: decreased range of motion   Mouth opening: Normal     Cardiovascular    Rhythm: regular  Rate: normal      Pertinent negatives: No murmur   Dental    Dentition: Upper partial plate and Full lower dentures     Pulmonary  Breath sounds clear to auscultation               Abdominal  GI exam deferred       Other Findings            Anesthetic Plan    ASA: 3  Anesthesia type: general and total IV anesthesia          Induction: Intravenous  Anesthetic plan and risks discussed with: Patient      preop glucose 169.

## 2017-04-04 NOTE — DISCHARGE INSTRUCTIONS
Darlene Sid  [id number]  1940              Procedure  Discharge Instructions:      Discomfort:  Redness at IV site- apply warm compress to area; if redness or soreness persist- contact your physician  There may be a slight amount of blood passed from the rectum  Gaseous discomfort- walking, belching will help relieve any discomfort  You may not operate a vehicle for 12 hours  You may not engage in an occupation involving machinery or appliances for rest of today  You may not drink alcoholic beverages for at least 12 hours  Avoid making any critical decisions for at least 24 hour  DIET:   You may resume your normal diet today. You should not overeat or \"feast\" today as your abdomen may become distended or uncomfortable. MEDICATIONS:   I reconciled this list from the list you gave us when you came today for the procedure. Please clarify with me, your primary care physician and the nurse who is discharging you if we have any discrepancies. Aspirin and or non-steroidal medication (Ibuprofen, Motrin, naproxen, etc.) is ok in limited quantities. ACTIVITY:  You may resume your normal daily activities it is recommended that you spend the remainder of the day resting -  avoid any strenuous activity. CALL M.D. ANY SIGN OF:  Increasing pain, nausea, vomiting  Abdominal distension (swelling)  New increased bleeding (oral or rectal)  Fever (chills)  Pain in chest area  Bloody discharge from nose or mouth  Shortness of breath          Follow-up Instructions:   Call Dr. Aria Castellano for the results of  biopsy in approximately one week  Telephone #  [phone]  Follow up visit as previously scheduled. You may be a little sore at the back of your throat for a day or so. Use some Maalox or chloroseptic. If it is bothersome, please call for a lidocaine swallow solution. Arabella Wynne MD  3:14 PM  4/4/2017   DO NOT TAKE TYLENOL/ACETAMINOPHEN WITH PERCOCET, [address], [address] [address] [facility].     TAKE NARCOTIC PAIN MEDICATIONS WITH FOOD   Narcotics tend to be constipating, we suggest taking a stool softener such as Colace or Miralax (follow package instructions). DO NOT DRIVE WHILE TAKING NARCOTIC PAIN MEDICATIONS. DO NOT TAKE SLEEPING MEDICATIONS OR ANTIANXIETY MEDICATIONS WHILE TAKING NARCOTIC PAIN MEDICATIONS,  ESPECIALLY THE NIGHT OF ANESTHESIA. CPAP PATIENTS BE SURE TO WEAR MACHINE WHENEVER NAPPING OR SLEEPING. DISCHARGE SUMMARY from Nurse    The following personal items collected during your admission are returned to you:   Dental Appliance: Dental Appliances: Uppers, Lowers, Partials (partial upper, lower plate in labeled cup in PACU)  Vision: Visual Aid: Glasses, At home  Hearing Aid:    Jewelry:    Clothing:    Other Valuables:    Valuables sent to safe:        PATIENT INSTRUCTIONS:    After General Anesthesia or Intravenous Sedation, for 24 hours or while taking prescription Narcotics:        Someone should be with you for the next 24 hours. For your own safety, a responsible adult must drive you home. · Limit your activities  · Recommended activity: Rest today, up with assistance today. Do not climb stairs or shower unattended for the next 24 hours. · Please start with a soft bland diet and advance as tolerated (no nausea) to regular diet. · If you have a sore throat you should try the following: fluids, warm salt water gargles, or throat lozenges. If it does not improve after several days please follow up with your primary physician. · Do not drive and operate hazardous machinery  · Do not make important personal or business decisions  · Do  not drink alcoholic beverages  · If you have not urinated within 8 hours after discharge, please contact your surgeon on call.     Report the following to your surgeon:  · Excessive pain, swelling, redness or odor of or around the surgical area  · Temperature over 100.5  · Nausea and vomiting lasting longer than 4 hours or if unable to take medications  · Any signs of decreased circulation or nerve impairment to extremity: change in color, persistent  numbness, tingling, coldness or increase pain      · You will receive a Post Operative Call from one of the Recovery Room Nurses on the day after your surgery to check on you. It is very important for us to know how you are recovering after your surgery. If you have an issue or need to speak with someone, please call your surgeon, do not wait for the post operative call. · You may receive an e-mail or letter in the mail from [facility] regarding your experience with us in the Ambulatory Surgery Unit. Your feedback is valuable to us and we appreciate your participation in the survey. · If the above instructions are not adequate, please contact Bailee Kingsley RN, [facility] anesthesia Nurse Manager or our Anesthesiologist, at [phone]. If you are having problems after your surgery, call the physician at his office number. · We wish you a speedy recovery ? What to do at Home:      *  Please give a list of your current medications to your Primary Care Provider. *  Please update this list whenever your medications are discontinued, doses are      changed, or new medications (including over-the-counter products) are added. *  Please carry medication information at all times in case of emergency situations. These are general instructions for a healthy lifestyle:    No smoking/ No tobacco products/ Avoid exposure to second hand smoke    Surgeon General's Warning:  Quitting smoking now greatly reduces serious risk to your health.     Obesity, smoking, and sedentary lifestyle greatly increases your risk for illness    A healthy diet, regular physical exercise & weight monitoring are important for maintaining a healthy lifestyle    You may be retaining fluid if you have a history of heart failure or if you experience any of the following symptoms:  Weight gain of 3 pounds or more overnight or 5 pounds in a week, increased swelling in our hands or feet or shortness of breath while lying flat in bed. Please call your doctor as soon as you notice any of these symptoms; do not wait until your next office visit. Recognize signs and symptoms of STROKE:    F-face looks uneven    A-arms unable to move or move even    S-speech slurred or non-existent    T-time-call 911 as soon as signs and symptoms begin-DO NOT go       Back to bed or wait to see if you get better-TIME IS BRAIN. If you have not received your influenza and/or pneumococcal vaccine, please follow up with your primary care physician. The discharge information has been reviewed with the patient and caregiver. The patient and caregiver verbalized understanding.

## 2017-04-04 NOTE — H&P
Pre-endoscopy H and P    The patient was seen and examined in the [facility] pre op. The airway was assessed and docuemented. The problem list, past medical history, and medications were reviewed. The history is:  Mr Naif Restrepo presented to Dr Elizabeth Shi office with anorexia and hypotension. He was seen in the office, sent to the ED and discharged with a dx of acute pancreatitis. He did a liquid diet for 72 hours and gradually increased to a soft diet. There was a little ruq pain. He still has gall bladder. He was drinking 1/2 gallon a week per the wife prior. NO etoh since march 7. The states he drank as much as he wanted (sometimes a little, sometimes a lot). he is not keeping a bottle in the vehicle or the garage at this time. diarrhea and incontinence persist.     His appetitie is poor. He does not eat the average \"small dinner\" per the wife. There have been stomach problems x years. Belching and burping. He fills up early. No weight loss. No dysphagia. Last egd 2011. Shatzki ring. Bas showed:       IMPRESSION  IMPRESSION: Distal esophageal dysmotility. Normal exam otherwise.       Imaging          Patient Active Problem List   Diagnosis Code    History of benign neoplasm of colon Z86.018    GERD (gastroesophageal reflux disease) K21.9    Colon polyp K63.5    Diverticulosis of colon K57.30    Internal hemorrhoid K64.8    Schatzki's ring K22.2    Hiatal hernia K44.9    Diarrhea R19.7    Sepsis ([address] A41.9    Influenza J11.1    Fall W19. Mamie Bread     Social History     Social History    Marital status:      Spouse name: N/A    Number of children: N/A    Years of education: N/A     Occupational History    Not on file.      Social History Main Topics    Smoking status: Former Smoker    Smokeless tobacco: Never Used    Alcohol use 3.5 oz/week     7 Shots of liquor per week    Drug use: No    Sexual activity: Not on file     Other Topics Concern    Not on file     Social History Narrative Past Medical History:   Diagnosis Date    Cancer ([facility]     r shoulder melanoma    COPD     Diabetes ([facility]     Diarrhea 1/22/2016    Emphysema lung ([facility]     GERD (gastroesophageal reflux disease)     Hydrocephalus 2014    shunt    Hypertension     no longer on meds    Other ill-defined conditions     colon polyps    RA (rheumatoid arthritis) ([facility]     Scarring     Stroke ([facility] 2014    L arm weakness     The patient has a family history of na    Prior to Admission Medications   Prescriptions Last Dose Informant Patient Reported? Taking? adalimumab (HUMIRA) 40 mg/0.8 mL injection 3/27/2017  Yes Yes   Sig: by SubCUTAneous route every fourteen (14) days. aspirin delayed-release 81 mg tablet 4/3/2017 at Unknown time  Yes Yes   Sig: Take 81 mg by mouth every other day.   esomeprazole (NEXIUM) 40 mg capsule 4/3/2017 at Unknown time  Yes Yes   Sig: Take 40 mg by mouth daily. folic acid (FOLVITE) 1 mg tablet 4/3/2017 at Unknown time  Yes Yes   Sig: Take 1 mg by mouth daily. losartan (COZAAR) 50 mg tablet Not Taking at Unknown time  Yes No   Sig: Take 50 mg by mouth daily. Indications: HYPERTENSION   methotrexate (RHEUMATREX) 2.5 mg tablet 4/3/2017 at Unknown time  Yes Yes   Sig: Take 10 mg by mouth every Monday and Friday. ondansetron (ZOFRAN ODT) 4 mg disintegrating tablet Not Taking at Unknown time  No No   Sig: Take 1 Tab by mouth every eight (8) hours as needed for Nausea. pravastatin (PRAVACHOL) 80 mg tablet Not Taking at Unknown time  Yes No   Sig: Take 80 mg by mouth nightly. Indications: MIXED HYPERLIPIDEMIA   sitagliptin (JANUVIA) 25 mg tablet Not Taking at Unknown time  Yes No   Sig: Take 100 mg by mouth daily. tiotropium (SPIRIVA WITH HANDIHALER) 18 mcg inhalation capsule 4/3/2017 at Unknown time  Yes Yes   Sig: Take 1 Cap by inhalation daily.       Facility-Administered Medications: None           The review of systems is:  negative for shortness of breath or chest pain      The heart, lungs, and mental status were satisfactory for the administration of anesthesia sedation and for the procedure. I discussed with the patient the objectives, risks, consequences and alternatives to the procedure.       Lara Oropeza MD  4/4/2017  12:52 PM

## 2017-04-04 NOTE — IP AVS SNAPSHOT
[address] [facility] 
[phone] Patient: Elvia Akbar MRN: [id number] NHW:[birth year] You are allergic to the following Allergen Reactions Ceclor (Cefaclor) Swelling Floxin (Ofloxacin) Hives Ivp (Fd And C Blue No.1) Itching Recent Documentation Height Weight BMI Smoking Status 1.753 m 90.4 kg 29.42 kg/m2 Former Smoker Emergency Contacts Name Discharge Info Relation Home Work [employer] [address] [employer] CAREGIVER [3] Spouse [3] [phone] Jennifer Aguilera0  Child [2]   [phone] About your hospitalization You were admitted on:  April 4, 2017 You last received care in the:  [facility] PACU You were discharged on:  April 4, 2017 Unit phone number:  [phone] Why you were hospitalized Your primary diagnosis was:  Not on File Your diagnoses also included:  Dysphagia, Abnormal X-Ray Of Abdomen Providers Seen During Your Hospitalizations Provider Role Specialty Primary office phone Winnie Mckenzie MD Attending Provider Gastroenterology [phone] Your Primary Care Physician (PCP) Primary Care Physician Office Phone Office Fax Rakanjoseph [place] [phone] Follow-up Information Follow up With Details Comments Contact Info Christopher De Jesus MD   [facility] [address] [facility] 
[phone] Current Discharge Medication List  
  
CONTINUE these medications which have NOT CHANGED Dose & Instructions Dispensing Information Comments Morning Noon Evening Bedtime  
 aspirin delayed-release 81 mg tablet Your last dose was: Your next dose is:    
   
   
 Dose:  81 mg Take 81 mg by mouth every other day. Refills:  0  
     
   
   
   
  
 folic acid 1 mg tablet Commonly known as:  Antonia Dixon  
   
 Your last dose was: Your next dose is:    
   
   
 Dose:  1 mg Take 1 mg by mouth daily. Refills:  0 HumaLOG 100 unit/mL injection Generic drug:  insulin lispro Your last dose was: Your next dose is:    
   
   
 by SubCUTAneous route. Indications: sliding scale Refills:  0  
     
   
   
   
  
 HUMIRA 40 mg/0.8 mL injection Generic drug:  adalimumab Your last dose was: Your next dose is:    
   
   
 by SubCUTAneous route every fourteen (14) days. Refills:  0 JANUVIA 25 mg tablet Generic drug:  SITagliptin Your last dose was: Your next dose is:    
   
   
 Dose:  100 mg Take 100 mg by mouth daily. Refills:  0  
     
   
   
   
  
 losartan 50 mg tablet Commonly known as:  COZAAR Your last dose was: Your next dose is:    
   
   
 Dose:  50 mg Take 50 mg by mouth daily. Indications: HYPERTENSION Refills:  0  
     
   
   
   
  
 methotrexate 2.5 mg tablet Commonly known as:  Vicci Reek Your last dose was: Your next dose is:    
   
   
 Dose:  10 mg Take 10 mg by mouth Every Thursday. Refills:  0 NexIUM 40 mg capsule Generic drug:  esomeprazole Your last dose was: Your next dose is:    
   
   
 Dose:  40 mg Take 40 mg by mouth daily. Refills:  0  
     
   
   
   
  
 ondansetron 4 mg disintegrating tablet Commonly known as:  ZOFRAN ODT Your last dose was: Your next dose is:    
   
   
 Dose:  4 mg Take 1 Tab by mouth every eight (8) hours as needed for Nausea. Quantity:  10 Tab Refills:  0  
     
   
   
   
  
 pravastatin 80 mg tablet Commonly known as:  PRAVACHOL Your last dose was: Your next dose is:    
   
   
 Dose:  80 mg Take 80 mg by mouth nightly. Indications: MIXED HYPERLIPIDEMIA Refills:  0 SPIRIVA WITH HANDIHALER 18 mcg inhalation capsule Generic drug:  tiotropium Your last dose was: Your next dose is:    
   
   
 Dose:  1 Cap Take 1 Cap by inhalation daily. Refills:  0 Discharge Instructions Aram Sullivan [id number] 
1940 Procedure  Discharge Instructions:   
 
Discomfort: 
Redness at IV site- apply warm compress to area; if redness or soreness persist- contact your physician There may be a slight amount of blood passed from the rectum Gaseous discomfort- walking, belching will help relieve any discomfort You may not operate a vehicle for 12 hours You may not engage in an occupation involving machinery or appliances for rest of today You may not drink alcoholic beverages for at least 12 hours Avoid making any critical decisions for at least 24 hour DIET: 
 You may resume your normal diet today. You should not overeat or \"feast\" today as your abdomen may become distended or uncomfortable. MEDICATIONS: 
 I reconciled this list from the list you gave us when you came today for the procedure. Please clarify with me, your primary care physician and the nurse who is discharging you if we have any discrepancies. Aspirin and or non-steroidal medication (Ibuprofen, Motrin, naproxen, etc.) is ok in limited quantities. ACTIVITY: 
You may resume your normal daily activities it is recommended that you spend the remainder of the day resting -  avoid any strenuous activity. CALL M.D. ANY SIGN OF: Increasing pain, nausea, vomiting Abdominal distension (swelling) New increased bleeding (oral or rectal) Fever (chills) Pain in chest area Bloody discharge from nose or mouth Shortness of breath Follow-up Instructions: 
 Call Dr. Jessica Mueller for the results of  biopsy in approximately one week Telephone #  [phone] Follow up visit as previously scheduled.   You may be a little sore at the back of your throat for a day or so. Use some Maalox or chloroseptic. If it is bothersome, please call for a lidocaine swallow solution. James Hathaway MD 
3:14 PM 
4/4/2017 DO NOT TAKE TYLENOL/ACETAMINOPHEN WITH PERCOCET, [facility], [address] TAKE NARCOTIC PAIN MEDICATIONS WITH FOOD Narcotics tend to be constipating, we suggest taking a stool softener such as Colace or Miralax (follow package instructions). DO NOT DRIVE WHILE TAKING NARCOTIC PAIN MEDICATIONS. DO NOT TAKE SLEEPING MEDICATIONS OR ANTIANXIETY MEDICATIONS WHILE TAKING NARCOTIC PAIN MEDICATIONS,  ESPECIALLY THE NIGHT OF ANESTHESIA. CPAP PATIENTS BE SURE TO WEAR MACHINE WHENEVER NAPPING OR SLEEPING. DISCHARGE SUMMARY from Nurse The following personal items collected during your admission are returned to you:  
Dental Appliance: Dental Appliances: Uppers, Lowers, Partials (partial upper, lower plate in labeled cup in PACU) Vision: Visual Aid: Glasses, At home Hearing Aid:   
Jewelry:   
Clothing:   
Other Valuables:   
Valuables sent to safe:   
 
 
PATIENT INSTRUCTIONS: 
 
 
F-face looks uneven A-arms unable to move or move even S-speech slurred or non-existent T-time-call 911 as soon as signs and symptoms begin-DO NOT go Back to bed or wait to see if you get better-TIME IS BRAIN. If you have not received your influenza and/or pneumococcal vaccine, please follow up with your primary care physician. The discharge information has been reviewed with the patient and caregiver. The patient and caregiver verbalized understanding. Discharge Orders None Introducing [facility]! Lorene Reyes introduces [employer] patient portal. Now you can access parts of your medical record, email your doctor's office, and request medication refills online. 1. In your internet browser, go to [email or web address] 2. Click on the First Time User? Click Here link in the Sign In box. You will see the New Member Sign Up page. 3. Enter your [employer] Access Code exactly as it appears below. You will not need to use this code after youve completed the sign-up process. If you do not sign up before the expiration date, you must request a new code. · [employer] Access Code: [email or web address] Expires: 6/5/2017  2:30 PM 
 
4.  Enter the last four digits of your Social Security Number (xxxx) and Date of Birth (mm/dd/yyyy) as indicated and click Submit. You will be taken to the next sign-up page. 5. Create a [employer] ID. This will be your [employer] login ID and cannot be changed, so think of one that is secure and easy to remember. 6. Create a [employer] password. You can change your password at any time. 7. Enter your Password Reset Question and Answer. This can be used at a later time if you forget your password. 8. Enter your e-mail address. You will receive e-mail notification when new information is available in [address] 9. Click Sign Up. You can now view and download portions of your medical record. 10. Click the Download Summary menu link to download a portable copy of your medical information. If you have questions, please visit the Frequently Asked Questions section of the [employer] website. Remember, [employer] is NOT to be used for urgent needs. For medical emergencies, dial 911. Now available from your iPhone and Android! General Information Please provide this summary of care documentation to your next provider. Patient Signature:  ____________________________________________________________ Date:  ____________________________________________________________  
  
Freida Mckeon Provider Signature:  ____________________________________________________________ Date:  ____________________________________________________________

## 2017-04-12 ENCOUNTER — HOSPITAL ENCOUNTER (EMERGENCY)
Age: 77
Discharge: HOME | End: 2017-04-12
Attending: EMERGENCY MEDICINE | Admitting: EMERGENCY MEDICINE
Payer: MEDICARE

## 2017-04-12 VITALS
DIASTOLIC BLOOD PRESSURE: 88 MMHG | TEMPERATURE: 98.3 F | HEART RATE: 86 BPM | RESPIRATION RATE: 18 BRPM | WEIGHT: 196.21 LBS | HEIGHT: 69 IN | BODY MASS INDEX: 29.06 KG/M2 | SYSTOLIC BLOOD PRESSURE: 166 MMHG | OXYGEN SATURATION: 96 %

## 2017-04-12 DIAGNOSIS — R19.7 DIARRHEA, UNSPECIFIED TYPE: ICD-10-CM

## 2017-04-12 DIAGNOSIS — R10.13 ABDOMINAL PAIN, EPIGASTRIC: Primary | ICD-10-CM

## 2017-04-12 LAB
ALBUMIN SERPL BCP-MCNC: 3.9 G/DL (ref 3.5–5)
ALBUMIN/GLOB SERPL: 1.1 {RATIO} (ref 1.1–2.2)
ALP SERPL-CCNC: 81 U/L (ref 45–117)
ALT SERPL-CCNC: 58 U/L (ref 12–78)
ANION GAP BLD CALC-SCNC: 9 MMOL/L (ref 5–15)
APPEARANCE UR: CLEAR
AST SERPL W P-5'-P-CCNC: 37 U/L (ref 15–37)
BACTERIA URNS QL MICRO: NEGATIVE /HPF
BASOPHILS # BLD AUTO: 0 K/UL (ref 0–0.1)
BASOPHILS # BLD: 0 % (ref 0–1)
BILIRUB SERPL-MCNC: 0.8 MG/DL (ref 0.2–1)
BILIRUB UR QL: NEGATIVE
BUN SERPL-MCNC: 13 MG/DL (ref 6–20)
BUN/CREAT SERPL: 13 (ref 12–20)
CALCIUM SERPL-MCNC: 9.1 MG/DL (ref 8.5–10.1)
CHLORIDE SERPL-SCNC: 103 MMOL/L (ref 97–108)
CK SERPL-CCNC: 89 U/L (ref 39–308)
CO2 SERPL-SCNC: 24 MMOL/L (ref 21–32)
COLOR UR: ABNORMAL
CREAT SERPL-MCNC: 0.98 MG/DL (ref 0.7–1.3)
DIFFERENTIAL METHOD BLD: ABNORMAL
EOSINOPHIL # BLD: 0 K/UL (ref 0–0.4)
EOSINOPHIL NFR BLD: 0 % (ref 0–7)
EPITH CASTS URNS QL MICRO: ABNORMAL /LPF
ERYTHROCYTE [DISTWIDTH] IN BLOOD BY AUTOMATED COUNT: 14.1 % (ref 11.5–14.5)
GLOBULIN SER CALC-MCNC: 3.7 G/DL (ref 2–4)
GLUCOSE SERPL-MCNC: 234 MG/DL (ref 65–100)
GLUCOSE UR STRIP.AUTO-MCNC: >1000 MG/DL
HCT VFR BLD AUTO: 38.5 % (ref 36.6–50.3)
HGB BLD-MCNC: 12.7 G/DL (ref 12.1–17)
HGB UR QL STRIP: ABNORMAL
HYALINE CASTS URNS QL MICRO: ABNORMAL /LPF (ref 0–5)
KETONES UR QL STRIP.AUTO: 40 MG/DL
LACTATE SERPL-SCNC: 2 MMOL/L (ref 0.4–2)
LEUKOCYTE ESTERASE UR QL STRIP.AUTO: NEGATIVE
LIPASE SERPL-CCNC: 175 U/L (ref 73–393)
LYMPHOCYTES # BLD AUTO: 6 % (ref 12–49)
LYMPHOCYTES # BLD: 0.6 K/UL (ref 0.8–3.5)
MCH RBC QN AUTO: 30.5 PG (ref 26–34)
MCHC RBC AUTO-ENTMCNC: 33 G/DL (ref 30–36.5)
MCV RBC AUTO: 92.3 FL (ref 80–99)
MONOCYTES # BLD: 0.8 K/UL (ref 0–1)
MONOCYTES NFR BLD AUTO: 8 % (ref 5–13)
NEUTS SEG # BLD: 8.7 K/UL (ref 1.8–8)
NEUTS SEG NFR BLD AUTO: 86 % (ref 32–75)
NITRITE UR QL STRIP.AUTO: NEGATIVE
PH UR STRIP: 5.5 [PH] (ref 5–8)
PLATELET # BLD AUTO: 179 K/UL (ref 150–400)
POTASSIUM SERPL-SCNC: 3.9 MMOL/L (ref 3.5–5.1)
PROT SERPL-MCNC: 7.6 G/DL (ref 6.4–8.2)
PROT UR STRIP-MCNC: 30 MG/DL
RBC # BLD AUTO: 4.17 M/UL (ref 4.1–5.7)
RBC #/AREA URNS HPF: ABNORMAL /HPF (ref 0–5)
RBC MORPH BLD: ABNORMAL
SODIUM SERPL-SCNC: 136 MMOL/L (ref 136–145)
SP GR UR REFRACTOMETRY: 1.02 (ref 1–1.03)
TROPONIN I SERPL-MCNC: <0.04 NG/ML
UROBILINOGEN UR QL STRIP.AUTO: 0.2 EU/DL (ref 0.2–1)
WBC # BLD AUTO: 10.1 K/UL (ref 4.1–11.1)
WBC URNS QL MICRO: ABNORMAL /HPF (ref 0–4)

## 2017-04-12 PROCEDURE — 82550 ASSAY OF CK (CPK): CPT | Performed by: PHYSICIAN ASSISTANT

## 2017-04-12 PROCEDURE — 83690 ASSAY OF LIPASE: CPT | Performed by: PHYSICIAN ASSISTANT

## 2017-04-12 PROCEDURE — 83605 ASSAY OF LACTIC ACID: CPT | Performed by: PHYSICIAN ASSISTANT

## 2017-04-12 PROCEDURE — 81001 URINALYSIS AUTO W/SCOPE: CPT | Performed by: EMERGENCY MEDICINE

## 2017-04-12 PROCEDURE — 84484 ASSAY OF TROPONIN QUANT: CPT | Performed by: PHYSICIAN ASSISTANT

## 2017-04-12 PROCEDURE — 99283 EMERGENCY DEPT VISIT LOW MDM: CPT

## 2017-04-12 PROCEDURE — 93005 ELECTROCARDIOGRAM TRACING: CPT

## 2017-04-12 PROCEDURE — 74011250636 HC RX REV CODE- 250/636: Performed by: PHYSICIAN ASSISTANT

## 2017-04-12 PROCEDURE — 96361 HYDRATE IV INFUSION ADD-ON: CPT

## 2017-04-12 PROCEDURE — 85025 COMPLETE CBC W/AUTO DIFF WBC: CPT | Performed by: EMERGENCY MEDICINE

## 2017-04-12 PROCEDURE — 80053 COMPREHEN METABOLIC PANEL: CPT | Performed by: EMERGENCY MEDICINE

## 2017-04-12 PROCEDURE — 36415 COLL VENOUS BLD VENIPUNCTURE: CPT | Performed by: EMERGENCY MEDICINE

## 2017-04-12 PROCEDURE — 96374 THER/PROPH/DIAG INJ IV PUSH: CPT

## 2017-04-12 RX ORDER — MORPHINE SULFATE 4 MG/ML
4 INJECTION, SOLUTION INTRAMUSCULAR; INTRAVENOUS ONCE
Status: COMPLETED | OUTPATIENT
Start: 2017-04-12 | End: 2017-04-12

## 2017-04-12 RX ORDER — SODIUM CHLORIDE 0.9 % (FLUSH) 0.9 %
5-10 SYRINGE (ML) INJECTION EVERY 8 HOURS
Status: DISCONTINUED | OUTPATIENT
Start: 2017-04-12 | End: 2017-04-12 | Stop reason: HOSPADM

## 2017-04-12 RX ORDER — DICYCLOMINE HYDROCHLORIDE 20 MG/1
20 TABLET ORAL EVERY 6 HOURS
Qty: 20 TAB | Refills: 0 | Status: SHIPPED | OUTPATIENT
Start: 2017-04-12 | End: 2017-04-17

## 2017-04-12 RX ORDER — SODIUM CHLORIDE 0.9 % (FLUSH) 0.9 %
5-10 SYRINGE (ML) INJECTION AS NEEDED
Status: DISCONTINUED | OUTPATIENT
Start: 2017-04-12 | End: 2017-04-12 | Stop reason: HOSPADM

## 2017-04-12 RX ADMIN — SODIUM CHLORIDE 1000 ML: 900 INJECTION, SOLUTION INTRAVENOUS at 19:08

## 2017-04-12 RX ADMIN — MORPHINE SULFATE 4 MG: 4 INJECTION, SOLUTION INTRAMUSCULAR; INTRAVENOUS at 19:08

## 2017-04-12 NOTE — ED PROVIDER NOTES
HPI Comments: Rhina Mauricio is a 67 y.o. male, pmhx significant for dementia, alcohol abuse, COPD, GERD, colon polyps, RA, HTN, stroke, and melanoma, who presents ambulatory with wife to the ED c/o constant 7/10 epigastric and RLQ pain with intermittent episodes of pain radiating to his back x 2 days. Pt additionally c/o intermittent episodes of loose stools and watery diarrhea x 1 week, but report no diarrhea today or recent abx use. Pt states that his pain is dull and cramping in nature, and similar to pain he had in 03/2017. Pt called Dr. Angely Perdomo office today, but no appointments were available and they were referred to the ED. Per wife, pt had a CT with oral contrast on 3/31/17 per Dr. Angely Perdomo orders. He then had an endoscopy on 4/4/17 where they stretched his esophagus, and took out some lesions were were benign. Wife notes that pt has been well hydrated, stating that last time they brought him to the ED, his urine had been \"the color of iced tea,\" but now his urine is clear. Pt denies urinary sxs, fever, chills, blood in stool, CP, SOB, nausea, or vomiting. PCP: Daniel Ibanez MD  Gastroenterology: Kendal Moss MD      PSHx: Significant for removal of polyp, stent in left femoral, right AV shunt  Social Hx: - tobacco (former), + EtOH (weekly), - Illicit Drugs    There are no other complaints, changes, or physical findings at this time. The history is provided by the patient and the spouse. No  was used.         Past Medical History:   Diagnosis Date    Cancer ([address]     r shoulder melanoma    COPD     Diabetes ([facility]     Diarrhea 1/22/2016    Dysphagia 4/4/2017    Emphysema lung (HCC)     GERD (gastroesophageal reflux disease)     Hydrocephalus 2014    shunt    Hypertension     no longer on meds    Ill-defined condition     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, arms    Other ill-defined conditions     colon polyps    RA (rheumatoid arthritis) [facility]     Scarring     Stroke ([facility] 2014    L arm weakness       Past Surgical History:   Procedure Laterality Date    HX HEENT      bilat cataract    HX OTHER SURGICAL      r shoulder melenoma excision    CT COLSC FLX W/RMVL OF TUMOR POLYP LESION SNARE TQ  4/28/2011         CT EGD TRANSORAL BIOPSY SINGLE/MULTIPLE  4/28/2011         VASCULAR SURGERY PROCEDURE UNLIST      stent femoral-left leg    VASCULAR SURGERY PROCEDURE UNLIST      right AV shunt         History reviewed. No pertinent family history. Social History     Social History    Marital status:      Spouse name: N/A    Number of children: N/A    Years of education: N/A     Occupational History    Not on file. Social History Main Topics    Smoking status: Former Smoker    Smokeless tobacco: Never Used    Alcohol use 3.5 oz/week     7 Shots of liquor per week    Drug use: No    Sexual activity: Not on file     Other Topics Concern    Not on file     Social History Narrative         ALLERGIES: Ceclor [cefaclor]; Contrast agent [iodine]; Floxin [ofloxacin]; and Ivp [fd and c blue no. 1]    Review of Systems   Constitutional: Negative. Negative for activity change, appetite change, chills, diaphoresis, fever and unexpected weight change. HENT: Negative for congestion, hearing loss, rhinorrhea, sinus pressure, sneezing, sore throat and trouble swallowing. Eyes: Negative for pain, redness, itching and visual disturbance. Respiratory: Negative for cough, shortness of breath and wheezing. Cardiovascular: Negative for chest pain, palpitations and leg swelling. Gastrointestinal: Positive for abdominal pain (epigastric) and diarrhea (loose, watery). Negative for blood in stool, constipation, nausea and vomiting. Genitourinary: Negative for dysuria. Musculoskeletal: Negative for arthralgias, back pain, gait problem and myalgias. Skin: Negative for color change, pallor, rash and wound.    Neurological: Negative for tremors, weakness, light-headedness, numbness and headaches. All other systems reviewed and are negative. Vitals:    04/12/17 1639 04/12/17 2023   BP: (!) 167/100 166/88   Pulse: 76 86   Resp: 18 18   Temp: 98.3 °F (36.8 °C)    SpO2: 97% 96%   Weight: 89 kg (196 lb 3.4 oz)    Height: 5' 9\" (1.753 m)             Physical Exam   Constitutional: He is oriented to person, place, and time. He appears well-developed and well-nourished. No distress. 67 y.o.  male in NAD  Communicates appropriately and in full sentences     HENT:   Head: Normocephalic and atraumatic. Right Ear: External ear normal.   Left Ear: External ear normal.   Mouth/Throat: Oropharynx is clear and moist. No oropharyngeal exudate. Eyes: Conjunctivae are normal. Pupils are equal, round, and reactive to light. Right eye exhibits no discharge. Left eye exhibits no discharge. No scleral icterus. Neck: Normal range of motion. Neck supple. No tracheal deviation present. Cardiovascular: Normal rate, regular rhythm, normal heart sounds and intact distal pulses. Exam reveals no gallop and no friction rub. No murmur heard. Pulmonary/Chest: Effort normal and breath sounds normal. No stridor. No respiratory distress. He has no wheezes. He has no rales. He exhibits no tenderness. Abdominal: Soft. Bowel sounds are normal. He exhibits no distension and no mass. There is no rebound and no guarding. Protuberant abdomen  Epigastric tenderness  Pain elicited with deep and light palpation  No pulsatile mass   No abdominal scars  Normoactive bowel sounds x 4  moderate tenderness in the epigastrium with light and deep palpation  minimal grimacing throughout entirety of abdominal exam  No rebound, guarding, or peritoneal signs     Musculoskeletal: Normal range of motion. He exhibits no edema, tenderness or deformity. No neurologic, motor, vascular, or compartment embarrassment observed on exam. No focal neurologic deficits.    Lymphadenopathy:     He has no cervical adenopathy. Neurological: He is alert and oriented to person, place, and time. He has normal strength. No cranial nerve deficit or sensory deficit. He exhibits normal muscle tone. Coordination normal. GCS eye subscore is 4. GCS verbal subscore is 5. GCS motor subscore is 6. Skin: Skin is warm and dry. No rash noted. He is not diaphoretic. No erythema. No pallor. Psychiatric: He has a normal mood and affect. Slow to answer questions, but does so appropriately   Nursing note and vitals reviewed. MDM  Number of Diagnoses or Management Options  Abdominal pain, epigastric:   Diarrhea, unspecified type:   Diagnosis management comments: DDx: hepatitis, pancreatitis, cholecystitis, appendicitis, diverticulitis, obstruction, UTI, pyelonephritis, gastroenteritis, gastritis, SBO, colitis, IBD, mesenteric ischemia, AAA, ureteral stone, constipation. Though some of these considerations can be excluded by history and physical exam, others may require additional testing such as laboratory and imaging. Will begin by assessing a CBC, CMP, UA and reevaluating patient to perform serial abdominal exams to determine whether a CT is indicated. Upon re-examination, the patient has no focal abdominal tenderness to palpation. Upon chart review, he has been diagnosed with a 4.4 cm abdominal aneurysm distally, but his pain is epigastricin nature. The patient has a normal WBC and signs and symptoms are consistent with a non-surgical cause of abdominal pain. The patient has been instructed to return to the ER if the abdominal pain has not improved within 24 hours or if they have any further change in condition for a CT scan of the abdomen and pelvis. The diagnosis, test results, medications, return instructions and follow up have been discussed with the patient. The patient has been given the opportunity to ask questions. The patient agrees and expresses understanding of the diagnosis, follow up and return instructions. The patient expresses understanding that although testing today is negative that a surgical issue could still develop and that follow up for a CT scan is essential if the symptoms have not improved in 24 hours. Amount and/or Complexity of Data Reviewed  Clinical lab tests: reviewed and ordered  Tests in the radiology section of CPT®: ordered and reviewed  Tests in the medicine section of CPT®: reviewed and ordered  Decide to obtain previous medical records or to obtain history from someone other than the patient: yes  Obtain history from someone other than the patient: yes (Wife)  Review and summarize past medical records: yes  Discuss the patient with other providers: yes (Attending)  Independent visualization of images, tracings, or specimens: yes    Patient Progress  Patient progress: stable    ED Course       Procedures    EKG interpretation: (Preliminary) 4:46 PM  Rhythm: normal sinus rhythm and RBBB; and regular . Rate (approx.): 67; Axis: normal; SC interval: normal; QRS interval: normal ; ST/T wave: normal  Written by Sky Zamudio ED Scribe, as dictated by Chad Chambers MD.     Progress Note:  5:16 PM  Discussed case with Dr. Jhon Talbot who agrees with plan to hydrate patient, provide analgesia, and to abstain from imaging currently. Will encourage close follow-up with Dr. Nisa Weller. Will continue to monitor. PROGRESS NOTE   7:08 PM  Pt has received their first dose of medications that was ordered for him. Will reevaluate the patient in 20-30 minutes to monitor their symptoms and the efficacy of the treatment they have received thus far. Written by ROZ Abreuibjoseph, as dictated by Ben Reyes PA-C. PROGRESS NOTE   7:56 PM  Spoke at length with pt and his wife regarding pt's labs results. Pt and wife expressed understanding and are requesting copies of the labs to bring to Dr. Trisha Escobar office for their f/u tomorrow.  Pt states that his pain is a 1/10 at this time. Upon re-examination, pt's abdominal exam remains benign. Written by Lacey Diamond ED Scribjoseph, as dictated by [email or web address].     DISCHARGE NOTE:  8:12 PM  Sintia Gonzalez's  results have been reviewed with him. He has been counseled regarding his diagnosis. He verbally conveys understanding and agreement of the signs, symptoms, diagnosis, treatment and prognosis and additionally agrees to follow up as recommended with Dr. Joel Honeycutt MD in 24 - 48 hours. He also agrees with the care-plan and conveys that all of his questions have been answered. I have also put together some discharge instructions for him that include: 1) educational information regarding their diagnosis, 2) how to care for their diagnosis at home, as well a 3) list of reasons why they would want to return to the ED prior to their follow-up appointment, should their condition change. He and/or family's questions have been answered. I have encouraged them to see the official results in [facility]\" or to retrieve the specifics of their results from medical records.        LABS COMPLETED AND REVIEWED:  Recent Results (from the past 12 hour(s))   EKG, 12 LEAD, INITIAL    Collection Time: 04/12/17  4:46 PM   Result Value Ref Range    Ventricular Rate 67 BPM    Atrial Rate 67 BPM    P-R Interval 142 ms    QRS Duration 130 ms    Q-T Interval 418 ms    QTC Calculation (Bezet) 441 ms    Calculated P Axis 57 degrees    Calculated R Axis -16 degrees    Calculated T Axis -3 degrees    Diagnosis       Normal sinus rhythm  Right bundle branch block  When compared with ECG of 07-MAR-2017 15:02,  Questionable change in QRS axis  T wave inversion now evident in Inferior leads     CBC WITH AUTOMATED DIFF    Collection Time: 04/12/17  4:56 PM   Result Value Ref Range    WBC 10.1 4.1 - 11.1 K/uL    RBC 4.17 4.10 - 5.70 M/uL    HGB 12.7 12.1 - 17.0 g/dL    HCT 38.5 36.6 - 50.3 %    MCV 92.3 80.0 - 99.0 FL    MCH 30.5 26.0 - 34.0 PG MCHC 33.0 30.0 - 36.5 g/dL    RDW 14.1 11.5 - 14.5 %    PLATELET 970 346 - 720 K/uL    NEUTROPHILS 86 (H) 32 - 75 %    LYMPHOCYTES 6 (L) 12 - 49 %    MONOCYTES 8 5 - 13 %    EOSINOPHILS 0 0 - 7 %    BASOPHILS 0 0 - 1 %    ABS. NEUTROPHILS 8.7 (H) 1.8 - 8.0 K/UL    ABS. LYMPHOCYTES 0.6 (L) 0.8 - 3.5 K/UL    ABS. MONOCYTES 0.8 0.0 - 1.0 K/UL    ABS. EOSINOPHILS 0.0 0.0 - 0.4 K/UL    ABS. BASOPHILS 0.0 0.0 - 0.1 K/UL    DF SMEAR SCANNED      RBC COMMENTS NORMOCYTIC, NORMOCHROMIC     METABOLIC PANEL, COMPREHENSIVE    Collection Time: 04/12/17  4:56 PM   Result Value Ref Range    Sodium 136 136 - 145 mmol/L    Potassium 3.9 3.5 - 5.1 mmol/L    Chloride 103 97 - 108 mmol/L    CO2 24 21 - 32 mmol/L    Anion gap 9 5 - 15 mmol/L    Glucose 234 (H) 65 - 100 mg/dL    BUN 13 6 - 20 MG/DL    Creatinine 0.98 0.70 - 1.30 MG/DL    BUN/Creatinine ratio 13 12 - 20      GFR est AA >60 >60 ml/min/1.73m2    GFR est non-AA >60 >60 ml/min/1.73m2    Calcium 9.1 8.5 - 10.1 MG/DL    Bilirubin, total 0.8 0.2 - 1.0 MG/DL    ALT (SGPT) 58 12 - 78 U/L    AST (SGOT) 37 15 - 37 U/L    Alk.  phosphatase 81 45 - 117 U/L    Protein, total 7.6 6.4 - 8.2 g/dL    Albumin 3.9 3.5 - 5.0 g/dL    Globulin 3.7 2.0 - 4.0 g/dL    A-G Ratio 1.1 1.1 - 2.2     LIPASE    Collection Time: 04/12/17  4:56 PM   Result Value Ref Range    Lipase 175 73 - 393 U/L   CK W/ REFLX CKMB    Collection Time: 04/12/17  4:56 PM   Result Value Ref Range    CK 89 39 - 308 U/L   TROPONIN I    Collection Time: 04/12/17  4:56 PM   Result Value Ref Range    Troponin-I, Qt. <0.04 <0.05 ng/mL   LACTIC ACID, PLASMA    Collection Time: 04/12/17  6:52 PM   Result Value Ref Range    Lactic acid 2.0 0.4 - 2.0 MMOL/L   URINALYSIS W/ RFLX MICROSCOPIC    Collection Time: 04/12/17  7:37 PM   Result Value Ref Range    Color YELLOW/STRAW      Appearance CLEAR CLEAR      Specific gravity 1.025 1.003 - 1.030      pH (UA) 5.5 5.0 - 8.0      Protein 30 (A) NEG mg/dL    Glucose >1000 (A) NEG mg/dL Ketone 40 (A) NEG mg/dL    Bilirubin NEGATIVE  NEG      Blood TRACE (A) NEG      Urobilinogen 0.2 0.2 - 1.0 EU/dL    Nitrites NEGATIVE  NEG      Leukocyte Esterase NEGATIVE  NEG      WBC 0-4 0 - 4 /hpf    RBC 0-5 0 - 5 /hpf    Epithelial cells FEW FEW /lpf    Bacteria NEGATIVE  NEG /hpf    Hyaline cast 0-2 0 - 5 /lpf       CLINICAL IMPRESSION:  1. Abdominal pain, epigastric    2. Diarrhea, unspecified type      Plan:  1. Return precautions  2. Medications as prescribed  3. Follow-ups as discussed    Discharge Medication List as of 4/12/2017  8:11 PM      CONTINUE these medications which have NOT CHANGED    Details   insulin lispro (HUMALOG) 100 unit/mL injection by SubCUTAneous route. Indications: sliding scale, Historical Med      methotrexate (RHEUMATREX) 2.5 mg tablet Take 10 mg by mouth Every Thursday., Historical Med      adalimumab (HUMIRA) 40 mg/0.8 mL injection by SubCUTAneous route every fourteen (14) days. , Historical Med      pravastatin (PRAVACHOL) 80 mg tablet Take 80 mg by mouth nightly. Indications: MIXED HYPERLIPIDEMIA, Historical Med      losartan (COZAAR) 50 mg tablet Take 50 mg by mouth daily. Indications: HYPERTENSION, Historical Med      folic acid (FOLVITE) 1 mg tablet Take 1 mg by mouth daily. , Historical Med      sitagliptin (JANUVIA) 25 mg tablet Take 100 mg by mouth daily. , Historical Med      esomeprazole (NEXIUM) 40 mg capsule Take 40 mg by mouth daily. , Historical Med      aspirin delayed-release 81 mg tablet Take 81 mg by mouth every other day., Historical Med      tiotropium (SPIRIVA WITH HANDIHALER) 18 mcg inhalation capsule Take 1 Cap by inhalation daily. , Historical Med      ondansetron (ZOFRAN ODT) 4 mg disintegrating tablet Take 1 Tab by mouth every eight (8) hours as needed for Nausea., Normal, Disp-10 Tab, R-0           Follow-up Information     Follow up With Details Comments Contact Info    Cathryn Patel MD Schedule an appointment as soon as possible for a visit in 2 days As needed, If symptoms worsen, Possible further evaluation and treatment [address] [facility].  [phone]      [facility] EMERGENCY DEPT Go to As needed, If symptoms worsen [address] [facility] [address]   [id number] Callum Gonzales MD Schedule an appointment as soon as possible for a visit in 1 day As needed, If symptoms worsen, Possible further evaluation and treatment [address] [place] [address] 21           Return to the closest emergency room or follow up sooner for any deterioration      This note will not be viewable in MyChart.

## 2017-04-12 NOTE — ED NOTES
Pt comes to ED for diffuse abd pain upper > lower x midnight last night Per wife pt with episodes of diarrhea as well. Pt reports 1 episode of vomiting around 1030 this am. Pt denies any other complaints. Pt with hx of esophageal stretching. Pt alert oriented x 4 Skin warm dry intact   Pt in POC on stretcher Updated on plan with pending evaluation by PA.

## 2017-04-13 ENCOUNTER — OFFICE VISIT (OUTPATIENT)
Dept: INTERNAL MEDICINE CLINIC | Age: 77
End: 2017-04-13

## 2017-04-13 VITALS
DIASTOLIC BLOOD PRESSURE: 76 MMHG | WEIGHT: 196 LBS | BODY MASS INDEX: 29.03 KG/M2 | HEART RATE: 112 BPM | HEIGHT: 69 IN | RESPIRATION RATE: 18 BRPM | OXYGEN SATURATION: 98 % | SYSTOLIC BLOOD PRESSURE: 135 MMHG | TEMPERATURE: 98 F

## 2017-04-13 DIAGNOSIS — G91.2 NPH (NORMAL PRESSURE HYDROCEPHALUS) (HCC): Chronic | ICD-10-CM

## 2017-04-13 DIAGNOSIS — E11.65 TYPE 2 DIABETES MELLITUS WITH HYPERGLYCEMIA, WITH LONG-TERM CURRENT USE OF INSULIN (HCC): Chronic | ICD-10-CM

## 2017-04-13 DIAGNOSIS — R10.84 GENERALIZED ABDOMINAL PAIN: ICD-10-CM

## 2017-04-13 DIAGNOSIS — R19.7 DIARRHEA, UNSPECIFIED TYPE: ICD-10-CM

## 2017-04-13 DIAGNOSIS — Z79.4 TYPE 2 DIABETES MELLITUS WITH HYPERGLYCEMIA, WITH LONG-TERM CURRENT USE OF INSULIN (HCC): Chronic | ICD-10-CM

## 2017-04-13 DIAGNOSIS — K86.89 MASS OF PANCREAS: Primary | ICD-10-CM

## 2017-04-13 PROBLEM — F10.90 ALCOHOL USE DISORDER: Chronic | Status: ACTIVE | Noted: 2017-04-13

## 2017-04-13 LAB
ATRIAL RATE: 67 BPM
CALCULATED P AXIS, ECG09: 57 DEGREES
CALCULATED R AXIS, ECG10: -16 DEGREES
CALCULATED T AXIS, ECG11: -3 DEGREES
DIAGNOSIS, 93000: NORMAL
P-R INTERVAL, ECG05: 142 MS
Q-T INTERVAL, ECG07: 418 MS
QRS DURATION, ECG06: 130 MS
QTC CALCULATION (BEZET), ECG08: 441 MS
VENTRICULAR RATE, ECG03: 67 BPM

## 2017-04-13 RX ORDER — TAMSULOSIN HYDROCHLORIDE 0.4 MG/1
0.4 CAPSULE ORAL DAILY
COMMUNITY
Start: 2017-03-10 | End: 2017-09-25 | Stop reason: SDUPTHER

## 2017-04-13 RX ORDER — ADALIMUMAB 40MG/0.8ML
KIT SUBCUTANEOUS
COMMUNITY
Start: 2017-03-01 | End: 2017-04-13 | Stop reason: SDUPTHER

## 2017-04-13 NOTE — MR AVS SNAPSHOT
Visit Information Date & Time Provider Department Dept. Phone Encounter #  
 4/13/2017  2:00 PM [facility], [address] [place] [address],[address] [phone] [id number] Follow-up Instructions Return in about 2 months (around 6/13/2017). Upcoming Health Maintenance Date Due DTaP/Tdap/Td series (1 - Tdap) 4/30/1960 ZOSTER VACCINE AGE 60> 4/30/1999 GLAUCOMA SCREENING Q2Y 4/30/2004 MEDICARE YEARLY EXAM 4/30/2004 Pneumococcal 65+ Low/Medium Risk (2 of 2 - PPSV23) 1/1/2021 Allergies as of 4/13/2017  Review Complete On: 4/13/2017 By: Luli Martinez III,  Severity Noted Reaction Type Reactions Ceclor [Cefaclor]  04/28/2011    Swelling Contrast Agent [Iodine]  04/12/2017    Unknown (comments) Floxin [Ofloxacin]  04/28/2011    Hives Ivp [Fd And C Blue No.1]  04/28/2011    Itching Current Immunizations  Reviewed on 3/8/2016 Name Date Influenza Vaccine 10/8/2015 Pneumococcal Vaccine (Unspecified Type) 1/1/2016 Not reviewed this visit You Were Diagnosed With   
  
 Codes Comments Mass of pancreas    -  Primary ICD-10-CM: K86.9 ICD-9-CM: 577.9 Generalized abdominal pain     ICD-10-CM: R10.84 ICD-9-CM: 789.07 Type 2 diabetes mellitus with hyperglycemia, with long-term current use of insulin (HCC)     ICD-10-CM: E11.65, Z79.4 ICD-9-CM: 250.00, 790.29, V58.67   
 NPH (normal pressure hydrocephalus)     ICD-10-CM: G91.2 ICD-9-CM: 331.5 Alcohol use disorder ([facility] [address]     ICD-10-CM: F10.99 ICD-9-CM: 305.00 Diarrhea, unspecified type     ICD-10-CM: R19.7 ICD-9-CM: 787.91 Vitals BP Pulse Temp Resp Height(growth percentile) Weight(growth percentile) 135/76 (BP 1 Location: Right arm, BP Patient Position: Sitting) (!) 112 98 °F (36.7 °C) (Oral) 18 5' 9\" (1.753 m) 196 lb (88.9 kg) SpO2 BMI Smoking Status 98% 28.94 kg/m2 Former Smoker Vitals History BMI and BSA Data Body Mass Index Body Surface Area  
 28.94 kg/m 2 2.08 m 2 Preferred Pharmacy Pharmacy Name Phone [address], [facility] [phone] Your Updated Medication List  
  
   
This list is accurate as of: 4/13/17  3:14 PM.  Always use your most recent med list.  
  
  
  
  
 aspirin delayed-release 81 mg tablet Take 81 mg by mouth every other day. dicyclomine 20 mg tablet Commonly known as:  BENTYL Take 1 Tab by mouth every six (6) hours for 20 doses. folic acid 1 mg tablet Commonly known as:  Google Take 1 mg by mouth daily. HumaLOG 100 unit/mL injection Generic drug:  insulin lispro  
by SubCUTAneous route. Indications: sliding scale HUMIRA 40 mg/0.8 mL injection Generic drug:  adalimumab  
by SubCUTAneous route every fourteen (14) days. losartan 50 mg tablet Commonly known as:  COZAAR Take 50 mg by mouth daily. Indications: HYPERTENSION  
  
 methotrexate 2.5 mg tablet Commonly known as:  Patricia Forge Take 10 mg by mouth Every Thursday. NexIUM 40 mg capsule Generic drug:  esomeprazole Take 40 mg by mouth daily. ondansetron 4 mg disintegrating tablet Commonly known as:  ZOFRAN ODT Take 1 Tab by mouth every eight (8) hours as needed for Nausea. pravastatin 80 mg tablet Commonly known as:  PRAVACHOL Take 80 mg by mouth nightly. Indications: MIXED HYPERLIPIDEMIA SPIRIVA WITH HANDIHALER 18 mcg inhalation capsule Generic drug:  tiotropium Take 1 Cap by inhalation daily. tamsulosin 0.4 mg capsule Commonly known as:  FLOMAX Follow-up Instructions Return in about 2 months (around 6/13/2017). To-Do List   
 04/13/2017 Imaging:  MRI ABD WO CONT Patient Instructions Learning About Diabetes Food Guidelines Your Care Instructions Meal planning is important to manage diabetes.  It helps keep your blood sugar at a target level (which you set with your doctor). You don't have to eat special foods. You can eat what your family eats, including sweets once in a while. But you do have to pay attention to how often you eat and how much you eat of certain foods. You may want to work with a dietitian or a certified diabetes educator (CDE) to help you plan meals and snacks. A dietitian or CDE can also help you lose weight if that is one of your goals. What should you know about eating carbs? Managing the amount of carbohydrate (carbs) you eat is an important part of healthy meals when you have diabetes. Carbohydrate is found in many foods. · Learn which foods have carbs. And learn the amounts of carbs in different foods. ¨ Bread, cereal, pasta, and rice have about 15 grams of carbs in a serving. A serving is 1 slice of bread (1 ounce), ½ cup of cooked cereal, or 1/3 cup of cooked pasta or rice. ¨ Fruits have 15 grams of carbs in a serving. A serving is 1 small fresh fruit, such as an apple or orange; ½ of a banana; ½ cup of cooked or canned fruit; ½ cup of fruit juice; 1 cup of melon or raspberries; or 2 tablespoons of dried fruit. ¨ Milk and no-sugar-added yogurt have 15 grams of carbs in a serving. A serving is 1 cup of milk or 2/3 cup of no-sugar-added yogurt. ¨ Starchy vegetables have 15 grams of carbs in a serving. A serving is ½ cup of mashed potatoes or sweet potato; 1 cup winter squash; ½ of a small baked potato; ½ cup of cooked beans; or ½ cup cooked corn or green peas. · Learn how much carbs to eat each day and at each meal. A dietitian or CDE can teach you how to keep track of the amount of carbs you eat. This is called carbohydrate counting. · If you are not sure how to count carbohydrate grams, use the Plate Method to plan meals. It is a good, quick way to make sure that you have a balanced meal. It also helps you spread carbs throughout the day. ¨ Divide your plate by types of foods. Put non-starchy vegetables on half the plate, meat or other protein food on one-quarter of the plate, and a grain or starchy vegetable in the final quarter of the plate. To this you can add a small piece of fruit and 1 cup of milk or yogurt, depending on how many carbs you are supposed to eat at a meal. 
· Try to eat about the same amount of carbs at each meal. Do not \"save up\" your daily allowance of carbs to eat at one meal. 
· Proteins have very little or no carbs per serving. Examples of proteins are beef, chicken, turkey, fish, eggs, tofu, cheese, cottage cheese, and peanut butter. A serving size of meat is 3 ounces, which is about the size of a deck of cards. Examples of meat substitute serving sizes (equal to 1 ounce of meat) are 1/4 cup of cottage cheese, 1 egg, 1 tablespoon of peanut butter, and ½ cup of tofu. How can you eat out and still eat healthy? · Learn to estimate the serving sizes of foods that have carbohydrate. If you measure food at home, it will be easier to estimate the amount in a serving of restaurant food. · If the meal you order has too much carbohydrate (such as potatoes, corn, or baked beans), ask to have a low-carbohydrate food instead. Ask for a salad or green vegetables. · If you use insulin, check your blood sugar before and after eating out to help you plan how much to eat in the future. · If you eat more carbohydrate at a meal than you had planned, take a walk or do other exercise. This will help lower your blood sugar. What else should you know? · Limit saturated fat, such as the fat from meat and dairy products. This is a healthy choice because people who have diabetes are at higher risk of heart disease. So choose lean cuts of meat and nonfat or low-fat dairy products. Use olive or canola oil instead of butter or shortening when cooking. · Don't skip meals.  Your blood sugar may drop too low if you skip meals and take insulin or certain medicines for diabetes. · Check with your doctor before you drink alcohol. Alcohol can cause your blood sugar to drop too low. Alcohol can also cause a bad reaction if you take certain diabetes medicines. Follow-up care is a key part of your treatment and safety. Be sure to make and go to all appointments, and call your doctor if you are having problems. It's also a good idea to know your test results and keep a list of the medicines you take. Where can you learn more? Go to http://yannick-helen.info/. Enter K286 in the search box to learn more about \"Learning About Diabetes Food Guidelines. \" Current as of: May 23, 2016 Content Version: 11.2 © 7128-4546 [employer]. Care instructions adapted under license by [employer] (which disclaims liability or warranty for this information). If you have questions about a medical condition or this instruction, always ask your healthcare professional. [address] any warranty or liability for your use of this information. Introducing \[facility] [place] [facility]\"" & HEALTH SERVICES! Phil Reed introduces SimilarSites.[email or web address] patient portal. Now you can access parts of your medical record, email your doctor's office, and request medication refills online. 1. In your internet browser, go to [email or web address]. [email or web address]/IDEA SPHERE 2. Click on the First Time User? Click Here link in the Sign In box. You will see the New Member Sign Up page. 3. Enter your Similar[employer]ites[employer] Access Code exactly as it appears below. You will not need to use this code after youve completed the sign-up process. If you do not sign up before the expiration date, you must request a new code. · [email or web address] Access Code: [email or web address] Expires: 6/5/2017  2:30 PM 
 
4. Enter the last four digits of your Social Security Number (xxxx) and Date of Birth (mm/dd/yyyy) as indicated and click Submit. You will be taken to the next sign-up page. 5. Create a Pumant ID. This will be your Pumant login ID and cannot be changed, so think of one that is secure and easy to remember. 6. Create a Pumant password. You can change your password at any time. 7. Enter your Password Reset Question and Answer. This can be used at a later time if you forget your password. 8. Enter your e-mail address. You will receive e-mail notification when new information is available in [address] 9. Click Sign Up. You can now view and download portions of your medical record. 10. Click the Download Summary menu link to download a portable copy of your medical information. If you have questions, please visit the Frequently Asked Questions section of the Pumant website. Remember, Pumant is NOT to be used for urgent needs. For medical emergencies, dial 911. Now available from your iPhone and Android! Please provide this summary of care documentation to your next provider. Your primary care clinician is listed as Dellia Bloch. If you have any questions after today's visit, please call [phone].

## 2017-04-13 NOTE — PROGRESS NOTES
Reviewed record in preparation for visit and have obtained necessary documentation. Identified pt with two pt identifiers(name and ). Chief Complaint   Patient presents with   [facility] Follow Up       Health Maintenance Due   Topic Date Due    DTaP/Tdap/Td series (1 - Tdap) 1960    ZOSTER VACCINE AGE 60>  1999    GLAUCOMA SCREENING Q2Y  2004    MEDICARE YEARLY EXAM  2004       Mr. Dwaine Kennedy has a reminder for a \"due or due soon\" health maintenance. I have asked that he discuss health maintenance topic(s) due with His  primary care provider. Coordination of Care Questionnaire:  :     1) Have you been to an emergency room, urgent care clinic since your last visit? yes   Hospitalized since your last visit? No              2) Have you seen or consulted any other health care providers outside of [address] [place] [address] since your last visit? no  (Include any pap smears or colon screenings in this section.)      Patient is accompanied by self I have received verbal consent from Doc Pierre to discuss any/all medical information while they are present in the room.

## 2017-04-13 NOTE — PATIENT INSTRUCTIONS

## 2017-04-13 NOTE — DISCHARGE INSTRUCTIONS
Abdominal Pain: Care Instructions  Your Care Instructions    Abdominal pain has many possible causes. Some aren't serious and get better on their own in a few days. Others need more testing and treatment. If your pain continues or gets worse, you need to be rechecked and may need more tests to find out what is wrong. You may need surgery to correct the problem. Don't ignore new symptoms, such as fever, nausea and vomiting, urination problems, pain that gets worse, and dizziness. These may be signs of a more serious problem. Your doctor may have recommended a follow-up visit in the next 8 to 12 hours. If you are not getting better, you may need more tests or treatment. The doctor has checked you carefully, but problems can develop later. If you notice any problems or new symptoms, get medical treatment right away. Follow-up care is a key part of your treatment and safety. Be sure to make and go to all appointments, and call your doctor if you are having problems. It's also a good idea to know your test results and keep a list of the medicines you take. How can you care for yourself at home? · Rest until you feel better. · To prevent dehydration, drink plenty of fluids, enough so that your urine is light yellow or clear like water. Choose water and other caffeine-free clear liquids until you feel better. If you have kidney, heart, or liver disease and have to limit fluids, talk with your doctor before you increase the amount of fluids you drink. · If your stomach is upset, eat mild foods, such as rice, dry toast or crackers, bananas, and applesauce. Try eating several small meals instead of two or three large ones. · Wait until 48 hours after all symptoms have gone away before you have spicy foods, alcohol, and drinks that contain caffeine. · Do not eat foods that are high in fat. · Avoid anti-inflammatory medicines such as aspirin, ibuprofen (Advil, Motrin), and naproxen (Aleve).  These can cause stomach upset. Talk to your doctor if you take daily aspirin for another health problem. When should you call for help? Call 911 anytime you think you may need emergency care. For example, call if:  · You passed out (lost consciousness). · You pass maroon or very bloody stools. · You vomit blood or what looks like coffee grounds. · You have new, severe belly pain. Call your doctor now or seek immediate medical care if:  · Your pain gets worse, especially if it becomes focused in one area of your belly. · You have a new or higher fever. · Your stools are black and look like tar, or they have streaks of blood. · You have unexpected vaginal bleeding. · You have symptoms of a urinary tract infection. These may include:  ¨ Pain when you urinate. ¨ Urinating more often than usual.  ¨ Blood in your urine. · You are dizzy or lightheaded, or you feel like you may faint. Watch closely for changes in your health, and be sure to contact your doctor if:  · You are not getting better after 1 day (24 hours). Where can you learn more? Go to http://yannickGrono.nethelen.info/. Enter L779 in the search box to learn more about \"Abdominal Pain: Care Instructions. \"  Current as of: May 27, 2016  Content Version: 11.2  © 5230-6909 [employer]. Care instructions adapted under license by [employer] (which disclaims liability or warranty for this information). If you have questions about a medical condition or this instruction, always ask your healthcare professional. [place] [address] any warranty or liability for your use of this information. Diarrhea: Care Instructions  Your Care Instructions    Diarrhea is loose, watery stools (bowel movements). The exact cause is often hard to find. Sometimes diarrhea is your body's way of getting rid of what caused an upset stomach. Viruses, food poisoning, and many medicines can cause diarrhea.  Some people get diarrhea in response to emotional stress, anxiety, or certain foods. Almost everyone has diarrhea now and then. It usually isn't serious, and your stools will return to normal soon. The important thing to do is replace the fluids you have lost, so you can prevent dehydration. The doctor has checked you carefully, but problems can develop later. If you notice any problems or new symptoms, get medical treatment right away. Follow-up care is a key part of your treatment and safety. Be sure to make and go to all appointments, and call your doctor if you are having problems. It's also a good idea to know your test results and keep a list of the medicines you take. How can you care for yourself at home? · Watch for signs of dehydration, which means your body has lost too much water. Dehydration is a serious condition and should be treated right away. Signs of dehydration are:  ¨ Increasing thirst and dry eyes and mouth. ¨ Feeling faint or lightheaded. ¨ Darker urine, and a smaller amount of urine than normal.  · To prevent dehydration, drink plenty of fluids, enough so that your urine is light yellow or clear like water. Choose water and other caffeine-free clear liquids until you feel better. If you have kidney, heart, or liver disease and have to limit fluids, talk with your doctor before you increase the amount of fluids you drink. · Begin eating small amounts of mild foods the next day, if you feel like it. ¨ Try yogurt that has live cultures of Lactobacillus. (Check the label.)  ¨ Avoid spicy foods, fruits, alcohol, and caffeine until 48 hours after all symptoms are gone. ¨ Avoid chewing gum that contains sorbitol. ¨ Avoid dairy products (except for yogurt with Lactobacillus) while you have diarrhea and for 3 days after symptoms are gone. · The doctor may recommend that you take over-the-counter medicine, such as loperamide (Imodium), if you still have diarrhea after 6 hours.  Read and follow all instructions on the label. Do not use this medicine if you have bloody diarrhea, a high fever, or other signs of serious illness. Call your doctor if you think you are having a problem with your medicine. When should you call for help? Call 911 anytime you think you may need emergency care. For example, call if:  · You passed out (lost consciousness). · Your stools are maroon or very bloody. Call your doctor now or seek immediate medical care if:  · You are dizzy or lightheaded, or you feel like you may faint. · Your stools are black and look like tar, or they have streaks of blood. · You have new or worse belly pain. · You have symptoms of dehydration, such as:  ¨ Dry eyes and a dry mouth. ¨ Passing only a little dark urine. ¨ Feeling thirstier than usual.  · You have a new or higher fever. Watch closely for changes in your health, and be sure to contact your doctor if:  · Your diarrhea is getting worse. · You see pus in the diarrhea. · You are not getting better after 2 days (48 hours). Where can you learn more? Go to http://yannick-helen.info/. Enter L009 in the search box to learn more about \"Diarrhea: Care Instructions. \"  Current as of: May 27, 2016  Content Version: 11.2  © 4687-0138 [employer]. Care instructions adapted under license by [employer] (which disclaims liability or warranty for this information). If you have questions about a medical condition or this instruction, always ask your healthcare professional. [place] [address] any warranty or liability for your use of this information.

## 2017-04-13 NOTE — PROGRESS NOTES
Wilmar Hassan is a 67 y.o. male who presents for evaluation of new pt visit. Used to see dr Robin Ashton, last saw him last month. Has had few visits to [facility] ed in past few weeks, including yesterday. Had ct abd/pelvis done march 17, ? Pancreatic mass. Had egd done April 4 with dilation by dr Skyla Trevino. Also had visit to [facility] ed on April 12 for abd pain. Has had only 2 etoh drinks in past few weeks. Continues to have loose stools, no blood. Not much interest in doing things any longer, no energy.       ROS:  Constitutional: negative for fevers, chills, anorexia and weight loss  Eyes:   negative for visual disturbance and irritation  ENT:   negative for tinnitus,sore throat,nasal congestion,ear pain,hoarseness  Respiratory:  negative for cough, hemoptysis, dyspnea,wheezing  CV:   negative for chest pain, palpitations, lower extremity edema  GI:   negative for nausea, vomiting, diarrhea, abdominal pain,melena  Genitourinary: negative for frequency, dysuria and hematuria  Musculoskel: negative for myalgias, arthralgias, back pain, muscle weakness, joint pain  Neurological:  negative for headaches, dizziness, focal weakness, numbness  Psychiatric:     Negative for depression or anxiety      Past Medical History:   Diagnosis Date    Cancer ([address]     r shoulder melanoma    COPD     Diabetes ([address]     Diarrhea 1/22/2016    Dysphagia 4/4/2017    Emphysema lung ([address]     GERD (gastroesophageal reflux disease)     Hydrocephalus 2014    shunt    Hypertension     no longer on meds    Ill-defined condition     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 No pertinent family history. Social History     Social History    Marital status:      Spouse name: N/A    Number of children: N/A    Years of education: N/A     Occupational History    Not on file. Social History Main Topics    Smoking status: Former Smoker    Smokeless tobacco: Never Used    Alcohol use 3.5 oz/week     7 Shots of liquor per week    Drug use: No    Sexual activity: Not on file     Other Topics Concern    Not on file     Social History Narrative            Visit Vitals    /76 (BP 1 Location: Right arm, BP Patient Position: Sitting)    Pulse (!) 112    Temp 98 °F (36.7 °C) (Oral)    Resp 18    Ht 5' 9\" (1.753 m)    Wt 196 lb (88.9 kg)    SpO2 98%    BMI 28.94 kg/m2       Physical Examination:   General - elderly appearing male. Sort of blank stare  HEENT - PERRL, TM no erythema/opacification, normal nasal turbinates, no oropharyngeal erythema or exudate, MMM  Neck - supple, no bruits, no thyroidomegaly, no lymphadenopathy  Pulm - clear to auscultation bilaterally  Cardio - RRR, normal S1 S2, no murmur  Abd - soft, nontender, no masses, no HSM  Extrem - no edema, +2 distal pulses  Neuro-  No focal deficits, CN intact     Assessment/Plan:    1.  ? Pancreatic mass--check mrcp. Already follows with dr Nicolasa Youssef. 2.  nph with shunt--has appt with [facility]sx at [facility] in a few weeks. Asked wife to call to see if he can be seen sooner. Probably needs some imaging. 3.  RA--on humira and mtx. Both lower resistance, but no f/c or other systemic symptoms, and wbc normal.  4.  Dm, type 2--on humalog with meals only now.  Debbrah Winthrop was stopped due to possibly causing gi issues (which have not gotten any better since stopping). 5.  Copd--on spiriva  6. bph--on flomax  7.   Hx regular and heavy etoh use--minimal recently, but cumulative effect could be causing some of his symptoms    Get records from dr Paula Iverson, recent a1c, urine micro, flp    rtc 2 months        Codyda [employer] III, DO

## 2017-04-13 NOTE — ED NOTES
Discharge instructions reviewed with pt. Discharge instructions given to pt per PA. Pt able to return/verbalize discharge instructions. Copy of discharge instructions given. Pt condition stable, respiratory status within normal limits, neuro status intact. Pt out of ER via [facility]ATH[facility] Rankin with RN, accompanied by wife.

## 2017-05-03 ENCOUNTER — HOSPITAL ENCOUNTER (OUTPATIENT)
Age: 77
Discharge: STILL ADMITTED OR ADMINISTRATIVE | End: 2017-05-10
Attending: PHYSICAL MEDICINE & REHABILITATION | Admitting: PHYSICAL MEDICINE & REHABILITATION

## 2017-05-03 VITALS — WEIGHT: 182 LBS | BODY MASS INDEX: 26.96 KG/M2 | HEIGHT: 69 IN

## 2017-05-03 LAB
APPEARANCE UR: CLEAR
BACTERIA URNS QL MICRO: NEGATIVE /HPF
BILIRUB UR QL: NEGATIVE
COLOR UR: ABNORMAL
EPITH CASTS URNS QL MICRO: ABNORMAL /LPF
GLUCOSE UR STRIP.AUTO-MCNC: NEGATIVE MG/DL
HGB UR QL STRIP: NEGATIVE
KETONES UR QL STRIP.AUTO: NEGATIVE MG/DL
LEUKOCYTE ESTERASE UR QL STRIP.AUTO: NEGATIVE
NITRITE UR QL STRIP.AUTO: NEGATIVE
PH UR STRIP: 7.5 [PH] (ref 5–8)
PROT UR STRIP-MCNC: ABNORMAL MG/DL
RBC #/AREA URNS HPF: ABNORMAL /HPF (ref 0–5)
SP GR UR REFRACTOMETRY: 1.02 (ref 1–1.03)
UROBILINOGEN UR QL STRIP.AUTO: 0.2 EU/DL (ref 0.2–1)
WBC URNS QL MICRO: ABNORMAL /HPF (ref 0–4)

## 2017-05-03 PROCEDURE — 87186 SC STD MICRODIL/AGAR DIL: CPT | Performed by: PHYSICAL MEDICINE & REHABILITATION

## 2017-05-03 PROCEDURE — 87077 CULTURE AEROBIC IDENTIFY: CPT | Performed by: PHYSICAL MEDICINE & REHABILITATION

## 2017-05-03 PROCEDURE — 81001 URINALYSIS AUTO W/SCOPE: CPT | Performed by: PHYSICAL MEDICINE & REHABILITATION

## 2017-05-03 PROCEDURE — 87086 URINE CULTURE/COLONY COUNT: CPT | Performed by: PHYSICAL MEDICINE & REHABILITATION

## 2017-05-03 PROCEDURE — 74011250636 HC RX REV CODE- 250/636: Performed by: PHYSICAL MEDICINE & REHABILITATION

## 2017-05-03 PROCEDURE — 74011250637 HC RX REV CODE- 250/637: Performed by: PHYSICAL MEDICINE & REHABILITATION

## 2017-05-03 RX ORDER — ONDANSETRON 4 MG/1
4 TABLET, ORALLY DISINTEGRATING ORAL
Status: DISCONTINUED | OUTPATIENT
Start: 2017-05-03 | End: 2017-05-10 | Stop reason: HOSPADM

## 2017-05-03 RX ORDER — LANOLIN ALCOHOL/MO/W.PET/CERES
3 CREAM (GRAM) TOPICAL
Status: DISCONTINUED | OUTPATIENT
Start: 2017-05-03 | End: 2017-05-10 | Stop reason: HOSPADM

## 2017-05-03 RX ORDER — INSULIN LISPRO 100 [IU]/ML
INJECTION, SOLUTION INTRAVENOUS; SUBCUTANEOUS
Status: DISCONTINUED | OUTPATIENT
Start: 2017-05-03 | End: 2017-05-10 | Stop reason: HOSPADM

## 2017-05-03 RX ORDER — ALBUTEROL SULFATE 0.83 MG/ML
2.5 SOLUTION RESPIRATORY (INHALATION)
Status: DISCONTINUED | OUTPATIENT
Start: 2017-05-03 | End: 2017-05-10 | Stop reason: HOSPADM

## 2017-05-03 RX ORDER — ACETAMINOPHEN 500 MG
500 TABLET ORAL
Status: DISCONTINUED | OUTPATIENT
Start: 2017-05-03 | End: 2017-05-06

## 2017-05-03 RX ORDER — ADHESIVE BANDAGE
30 BANDAGE TOPICAL DAILY PRN
Status: DISCONTINUED | OUTPATIENT
Start: 2017-05-03 | End: 2017-05-10 | Stop reason: HOSPADM

## 2017-05-03 RX ORDER — OXYCODONE HYDROCHLORIDE 5 MG/1
10 TABLET ORAL
Status: DISCONTINUED | OUTPATIENT
Start: 2017-05-03 | End: 2017-05-10 | Stop reason: HOSPADM

## 2017-05-03 RX ORDER — AMOXICILLIN 250 MG
1 CAPSULE ORAL 2 TIMES DAILY
Status: DISCONTINUED | OUTPATIENT
Start: 2017-05-03 | End: 2017-05-10

## 2017-05-03 RX ORDER — TAMSULOSIN HYDROCHLORIDE 0.4 MG/1
0.4 CAPSULE ORAL
Status: DISCONTINUED | OUTPATIENT
Start: 2017-05-03 | End: 2017-05-10

## 2017-05-03 RX ORDER — ACETAMINOPHEN 325 MG/1
650 TABLET ORAL
Status: DISCONTINUED | OUTPATIENT
Start: 2017-05-03 | End: 2017-05-10 | Stop reason: HOSPADM

## 2017-05-03 RX ORDER — DICYCLOMINE HYDROCHLORIDE 20 MG/1
20 TABLET ORAL
Status: DISCONTINUED | OUTPATIENT
Start: 2017-05-03 | End: 2017-05-10 | Stop reason: HOSPADM

## 2017-05-03 RX ORDER — DILTIAZEM HYDROCHLORIDE 120 MG/1
120 CAPSULE, COATED, EXTENDED RELEASE ORAL DAILY
Status: DISCONTINUED | OUTPATIENT
Start: 2017-05-04 | End: 2017-05-10 | Stop reason: HOSPADM

## 2017-05-03 RX ORDER — PRAVASTATIN SODIUM 40 MG/1
40 TABLET ORAL
Status: DISCONTINUED | OUTPATIENT
Start: 2017-05-03 | End: 2017-05-10 | Stop reason: HOSPADM

## 2017-05-03 RX ORDER — ENOXAPARIN SODIUM 100 MG/ML
40 INJECTION SUBCUTANEOUS EVERY 12 HOURS
Status: DISCONTINUED | OUTPATIENT
Start: 2017-05-03 | End: 2017-05-09

## 2017-05-03 RX ORDER — PANTOPRAZOLE SODIUM 40 MG/1
40 TABLET, DELAYED RELEASE ORAL
Status: DISCONTINUED | OUTPATIENT
Start: 2017-05-04 | End: 2017-05-10 | Stop reason: HOSPADM

## 2017-05-03 RX ORDER — OXYCODONE HYDROCHLORIDE 5 MG/1
5 TABLET ORAL
Status: DISCONTINUED | OUTPATIENT
Start: 2017-05-03 | End: 2017-05-10 | Stop reason: HOSPADM

## 2017-05-03 RX ORDER — FACIAL-BODY WIPES
10 EACH TOPICAL DAILY PRN
Status: DISCONTINUED | OUTPATIENT
Start: 2017-05-03 | End: 2017-05-10 | Stop reason: HOSPADM

## 2017-05-03 RX ORDER — NITROGLYCERIN 0.4 MG/1
0.4 TABLET SUBLINGUAL
Status: DISCONTINUED | OUTPATIENT
Start: 2017-05-03 | End: 2017-05-10 | Stop reason: HOSPADM

## 2017-05-03 RX ORDER — METHOTREXATE 2.5 MG/1
10 TABLET ORAL
Status: DISCONTINUED | OUTPATIENT
Start: 2017-05-11 | End: 2017-05-10

## 2017-05-03 RX ORDER — FOLIC ACID 1 MG/1
1 TABLET ORAL DAILY
Status: DISCONTINUED | OUTPATIENT
Start: 2017-05-04 | End: 2017-05-10 | Stop reason: HOSPADM

## 2017-05-03 RX ADMIN — DOCUSATE SODIUM AND SENNOSIDES 1 TABLET: 8.6; 5 TABLET, FILM COATED ORAL at 18:24

## 2017-05-03 RX ADMIN — TAMSULOSIN HYDROCHLORIDE 0.4 MG: 0.4 CAPSULE ORAL at 22:00

## 2017-05-03 RX ADMIN — ENOXAPARIN SODIUM 40 MG: 40 INJECTION SUBCUTANEOUS at 18:24

## 2017-05-03 RX ADMIN — PRAVASTATIN SODIUM 40 MG: 40 TABLET ORAL at 22:00

## 2017-05-04 LAB
ANION GAP BLD CALC-SCNC: 8 MMOL/L (ref 5–15)
BUN SERPL-MCNC: 9 MG/DL (ref 6–20)
BUN/CREAT SERPL: 12 (ref 12–20)
CALCIUM SERPL-MCNC: 8.2 MG/DL (ref 8.5–10.1)
CHLORIDE SERPL-SCNC: 108 MMOL/L (ref 97–108)
CO2 SERPL-SCNC: 24 MMOL/L (ref 21–32)
CREAT SERPL-MCNC: 0.74 MG/DL (ref 0.7–1.3)
ERYTHROCYTE [DISTWIDTH] IN BLOOD BY AUTOMATED COUNT: 15.4 % (ref 11.5–14.5)
GLUCOSE SERPL-MCNC: 165 MG/DL (ref 65–100)
HCT VFR BLD AUTO: 33.1 % (ref 36.6–50.3)
HGB BLD-MCNC: 10.3 G/DL (ref 12.1–17)
MCH RBC QN AUTO: 29.7 PG (ref 26–34)
MCHC RBC AUTO-ENTMCNC: 31.1 G/DL (ref 30–36.5)
MCV RBC AUTO: 95.4 FL (ref 80–99)
PLATELET # BLD AUTO: 151 K/UL (ref 150–400)
POTASSIUM SERPL-SCNC: 3.9 MMOL/L (ref 3.5–5.1)
RBC # BLD AUTO: 3.47 M/UL (ref 4.1–5.7)
SODIUM SERPL-SCNC: 140 MMOL/L (ref 136–145)
WBC # BLD AUTO: 4.9 K/UL (ref 4.1–11.1)

## 2017-05-04 PROCEDURE — 36415 COLL VENOUS BLD VENIPUNCTURE: CPT | Performed by: PHYSICAL MEDICINE & REHABILITATION

## 2017-05-04 PROCEDURE — 74011250637 HC RX REV CODE- 250/637: Performed by: PHYSICAL MEDICINE & REHABILITATION

## 2017-05-04 PROCEDURE — 85027 COMPLETE CBC AUTOMATED: CPT | Performed by: PHYSICAL MEDICINE & REHABILITATION

## 2017-05-04 PROCEDURE — 80048 BASIC METABOLIC PNL TOTAL CA: CPT | Performed by: PHYSICAL MEDICINE & REHABILITATION

## 2017-05-04 PROCEDURE — 74011250636 HC RX REV CODE- 250/636: Performed by: PHYSICAL MEDICINE & REHABILITATION

## 2017-05-04 RX ADMIN — ENOXAPARIN SODIUM 40 MG: 40 INJECTION SUBCUTANEOUS at 17:14

## 2017-05-04 RX ADMIN — TAMSULOSIN HYDROCHLORIDE 0.4 MG: 0.4 CAPSULE ORAL at 21:30

## 2017-05-04 RX ADMIN — DOCUSATE SODIUM AND SENNOSIDES 1 TABLET: 8.6; 5 TABLET, FILM COATED ORAL at 08:18

## 2017-05-04 RX ADMIN — ENOXAPARIN SODIUM 40 MG: 40 INJECTION SUBCUTANEOUS at 06:06

## 2017-05-04 RX ADMIN — DOCUSATE SODIUM AND SENNOSIDES 1 TABLET: 8.6; 5 TABLET, FILM COATED ORAL at 17:14

## 2017-05-04 RX ADMIN — DILTIAZEM HYDROCHLORIDE 120 MG: 120 CAPSULE, EXTENDED RELEASE ORAL at 08:17

## 2017-05-04 RX ADMIN — PRAVASTATIN SODIUM 40 MG: 40 TABLET ORAL at 21:29

## 2017-05-04 RX ADMIN — PANTOPRAZOLE SODIUM 40 MG: 40 TABLET, DELAYED RELEASE ORAL at 06:05

## 2017-05-04 RX ADMIN — UMECLIDINIUM 1 PUFF: 62.5 AEROSOL, POWDER ORAL at 08:18

## 2017-05-04 RX ADMIN — FOLIC ACID 1 MG: 1 TABLET ORAL at 08:18

## 2017-05-05 PROCEDURE — 74011250636 HC RX REV CODE- 250/636: Performed by: PHYSICAL MEDICINE & REHABILITATION

## 2017-05-05 PROCEDURE — 74011636637 HC RX REV CODE- 636/637: Performed by: PHYSICAL MEDICINE & REHABILITATION

## 2017-05-05 PROCEDURE — 74011250637 HC RX REV CODE- 250/637: Performed by: PHYSICAL MEDICINE & REHABILITATION

## 2017-05-05 RX ADMIN — DILTIAZEM HYDROCHLORIDE 120 MG: 120 CAPSULE, EXTENDED RELEASE ORAL at 09:32

## 2017-05-05 RX ADMIN — DOCUSATE SODIUM AND SENNOSIDES 1 TABLET: 8.6; 5 TABLET, FILM COATED ORAL at 17:12

## 2017-05-05 RX ADMIN — ENOXAPARIN SODIUM 40 MG: 40 INJECTION SUBCUTANEOUS at 05:50

## 2017-05-05 RX ADMIN — PANTOPRAZOLE SODIUM 40 MG: 40 TABLET, DELAYED RELEASE ORAL at 05:51

## 2017-05-05 RX ADMIN — DOCUSATE SODIUM AND SENNOSIDES 1 TABLET: 8.6; 5 TABLET, FILM COATED ORAL at 09:32

## 2017-05-05 RX ADMIN — FOLIC ACID 1 MG: 1 TABLET ORAL at 09:32

## 2017-05-05 RX ADMIN — TAMSULOSIN HYDROCHLORIDE 0.4 MG: 0.4 CAPSULE ORAL at 22:36

## 2017-05-05 RX ADMIN — INSULIN LISPRO 4 UNITS: 100 INJECTION, SOLUTION INTRAVENOUS; SUBCUTANEOUS at 13:19

## 2017-05-05 RX ADMIN — ENOXAPARIN SODIUM 40 MG: 40 INJECTION SUBCUTANEOUS at 17:12

## 2017-05-05 RX ADMIN — UMECLIDINIUM 1 PUFF: 62.5 AEROSOL, POWDER ORAL at 09:33

## 2017-05-05 RX ADMIN — PRAVASTATIN SODIUM 40 MG: 40 TABLET ORAL at 22:42

## 2017-05-06 LAB
BACTERIA SPEC CULT: ABNORMAL
BACTERIA SPEC CULT: ABNORMAL
CC UR VC: ABNORMAL
SERVICE CMNT-IMP: ABNORMAL

## 2017-05-06 PROCEDURE — 74011250636 HC RX REV CODE- 250/636: Performed by: PHYSICAL MEDICINE & REHABILITATION

## 2017-05-06 PROCEDURE — 74011250637 HC RX REV CODE- 250/637: Performed by: PHYSICAL MEDICINE & REHABILITATION

## 2017-05-06 RX ADMIN — ENOXAPARIN SODIUM 40 MG: 40 INJECTION SUBCUTANEOUS at 06:16

## 2017-05-06 RX ADMIN — FOLIC ACID 1 MG: 1 TABLET ORAL at 09:55

## 2017-05-06 RX ADMIN — ENOXAPARIN SODIUM 40 MG: 40 INJECTION SUBCUTANEOUS at 18:04

## 2017-05-06 RX ADMIN — PRAVASTATIN SODIUM 40 MG: 40 TABLET ORAL at 22:37

## 2017-05-06 RX ADMIN — TAMSULOSIN HYDROCHLORIDE 0.4 MG: 0.4 CAPSULE ORAL at 22:37

## 2017-05-06 RX ADMIN — PANTOPRAZOLE SODIUM 40 MG: 40 TABLET, DELAYED RELEASE ORAL at 06:16

## 2017-05-06 RX ADMIN — DILTIAZEM HYDROCHLORIDE 120 MG: 120 CAPSULE, EXTENDED RELEASE ORAL at 09:55

## 2017-05-06 RX ADMIN — UMECLIDINIUM 1 PUFF: 62.5 AEROSOL, POWDER ORAL at 09:55

## 2017-05-07 PROCEDURE — 74011250636 HC RX REV CODE- 250/636: Performed by: PHYSICAL MEDICINE & REHABILITATION

## 2017-05-07 PROCEDURE — 74011250637 HC RX REV CODE- 250/637: Performed by: PHYSICAL MEDICINE & REHABILITATION

## 2017-05-07 RX ADMIN — FOLIC ACID 1 MG: 1 TABLET ORAL at 08:36

## 2017-05-07 RX ADMIN — PANTOPRAZOLE SODIUM 40 MG: 40 TABLET, DELAYED RELEASE ORAL at 06:03

## 2017-05-07 RX ADMIN — DILTIAZEM HYDROCHLORIDE 120 MG: 120 CAPSULE, EXTENDED RELEASE ORAL at 08:35

## 2017-05-07 RX ADMIN — ENOXAPARIN SODIUM 40 MG: 40 INJECTION SUBCUTANEOUS at 06:03

## 2017-05-07 RX ADMIN — ENOXAPARIN SODIUM 40 MG: 40 INJECTION SUBCUTANEOUS at 17:35

## 2017-05-07 RX ADMIN — PRAVASTATIN SODIUM 40 MG: 40 TABLET ORAL at 21:53

## 2017-05-07 RX ADMIN — TAMSULOSIN HYDROCHLORIDE 0.4 MG: 0.4 CAPSULE ORAL at 21:53

## 2017-05-07 RX ADMIN — UMECLIDINIUM 1 PUFF: 62.5 AEROSOL, POWDER ORAL at 08:35

## 2017-05-08 PROCEDURE — 74011250637 HC RX REV CODE- 250/637: Performed by: PHYSICAL MEDICINE & REHABILITATION

## 2017-05-08 PROCEDURE — 74011250636 HC RX REV CODE- 250/636: Performed by: PHYSICAL MEDICINE & REHABILITATION

## 2017-05-08 RX ADMIN — FOLIC ACID 1 MG: 1 TABLET ORAL at 08:26

## 2017-05-08 RX ADMIN — DOCUSATE SODIUM AND SENNOSIDES 1 TABLET: 8.6; 5 TABLET, FILM COATED ORAL at 08:25

## 2017-05-08 RX ADMIN — PANTOPRAZOLE SODIUM 40 MG: 40 TABLET, DELAYED RELEASE ORAL at 05:51

## 2017-05-08 RX ADMIN — DILTIAZEM HYDROCHLORIDE 120 MG: 120 CAPSULE, EXTENDED RELEASE ORAL at 08:25

## 2017-05-08 RX ADMIN — PRAVASTATIN SODIUM 40 MG: 40 TABLET ORAL at 21:08

## 2017-05-08 RX ADMIN — MELATONIN 3 MG ORAL TABLET 3 MG: 3 TABLET ORAL at 21:08

## 2017-05-08 RX ADMIN — TAMSULOSIN HYDROCHLORIDE 0.4 MG: 0.4 CAPSULE ORAL at 21:05

## 2017-05-08 RX ADMIN — ENOXAPARIN SODIUM 40 MG: 40 INJECTION SUBCUTANEOUS at 05:52

## 2017-05-08 RX ADMIN — ENOXAPARIN SODIUM 40 MG: 40 INJECTION SUBCUTANEOUS at 17:04

## 2017-05-08 RX ADMIN — UMECLIDINIUM 1 PUFF: 62.5 AEROSOL, POWDER ORAL at 08:30

## 2017-05-09 PROCEDURE — 74011250636 HC RX REV CODE- 250/636: Performed by: PHYSICAL MEDICINE & REHABILITATION

## 2017-05-09 PROCEDURE — 74011250637 HC RX REV CODE- 250/637: Performed by: PHYSICAL MEDICINE & REHABILITATION

## 2017-05-09 RX ORDER — ENOXAPARIN SODIUM 100 MG/ML
40 INJECTION SUBCUTANEOUS DAILY
Status: DISCONTINUED | OUTPATIENT
Start: 2017-05-10 | End: 2017-05-10 | Stop reason: HOSPADM

## 2017-05-09 RX ADMIN — TAMSULOSIN HYDROCHLORIDE 0.4 MG: 0.4 CAPSULE ORAL at 20:55

## 2017-05-09 RX ADMIN — ENOXAPARIN SODIUM 40 MG: 40 INJECTION SUBCUTANEOUS at 05:57

## 2017-05-09 RX ADMIN — DOCUSATE SODIUM AND SENNOSIDES 1 TABLET: 8.6; 5 TABLET, FILM COATED ORAL at 08:04

## 2017-05-09 RX ADMIN — PANTOPRAZOLE SODIUM 40 MG: 40 TABLET, DELAYED RELEASE ORAL at 05:58

## 2017-05-09 RX ADMIN — DOCUSATE SODIUM AND SENNOSIDES 1 TABLET: 8.6; 5 TABLET, FILM COATED ORAL at 18:04

## 2017-05-09 RX ADMIN — DILTIAZEM HYDROCHLORIDE 120 MG: 120 CAPSULE, EXTENDED RELEASE ORAL at 08:04

## 2017-05-09 RX ADMIN — FOLIC ACID 1 MG: 1 TABLET ORAL at 08:04

## 2017-05-09 RX ADMIN — UMECLIDINIUM 1 PUFF: 62.5 AEROSOL, POWDER ORAL at 08:05

## 2017-05-09 RX ADMIN — PRAVASTATIN SODIUM 40 MG: 40 TABLET ORAL at 20:55

## 2017-05-10 ENCOUNTER — APPOINTMENT (OUTPATIENT)
Dept: GENERAL RADIOLOGY | Age: 77
End: 2017-05-10
Attending: PHYSICAL MEDICINE & REHABILITATION

## 2017-05-10 ENCOUNTER — APPOINTMENT (OUTPATIENT)
Dept: CT IMAGING | Age: 77
DRG: 481 | End: 2017-05-10
Attending: EMERGENCY MEDICINE
Payer: MEDICARE

## 2017-05-10 ENCOUNTER — HOSPITAL ENCOUNTER (INPATIENT)
Age: 77
LOS: 5 days | Discharge: NURSING, REHAB OR LONG-TERM CARE | DRG: 481 | End: 2017-05-16
Attending: EMERGENCY MEDICINE | Admitting: INTERNAL MEDICINE
Payer: MEDICARE

## 2017-05-10 DIAGNOSIS — S72.002A CLOSED LEFT HIP FRACTURE, INITIAL ENCOUNTER (HCC): Primary | ICD-10-CM

## 2017-05-10 LAB
ALBUMIN SERPL BCP-MCNC: 2.9 G/DL (ref 3.5–5)
ALBUMIN/GLOB SERPL: 0.7 {RATIO} (ref 1.1–2.2)
ALP SERPL-CCNC: 122 U/L (ref 45–117)
ALT SERPL-CCNC: 37 U/L (ref 12–78)
ANION GAP BLD CALC-SCNC: 7 MMOL/L (ref 5–15)
APPEARANCE UR: CLEAR
APTT PPP: 25.8 SEC (ref 22.1–32.5)
AST SERPL W P-5'-P-CCNC: 40 U/L (ref 15–37)
BACTERIA URNS QL MICRO: NEGATIVE /HPF
BASOPHILS # BLD AUTO: 0 K/UL (ref 0–0.1)
BASOPHILS # BLD: 0 % (ref 0–1)
BILIRUB SERPL-MCNC: 0.4 MG/DL (ref 0.2–1)
BILIRUB UR QL CFM: NEGATIVE
BUN SERPL-MCNC: 9 MG/DL (ref 6–20)
BUN/CREAT SERPL: 10 (ref 12–20)
CALCIUM SERPL-MCNC: 8.5 MG/DL (ref 8.5–10.1)
CHLORIDE SERPL-SCNC: 104 MMOL/L (ref 97–108)
CO2 SERPL-SCNC: 27 MMOL/L (ref 21–32)
COLOR UR: ABNORMAL
CREAT SERPL-MCNC: 0.91 MG/DL (ref 0.7–1.3)
EOSINOPHIL # BLD: 0.1 K/UL (ref 0–0.4)
EOSINOPHIL NFR BLD: 2 % (ref 0–7)
EPITH CASTS URNS QL MICRO: ABNORMAL /LPF
ERYTHROCYTE [DISTWIDTH] IN BLOOD BY AUTOMATED COUNT: 14.2 % (ref 11.5–14.5)
GLOBULIN SER CALC-MCNC: 4 G/DL (ref 2–4)
GLUCOSE SERPL-MCNC: 189 MG/DL (ref 65–100)
GLUCOSE UR STRIP.AUTO-MCNC: NEGATIVE MG/DL
HCT VFR BLD AUTO: 33.4 % (ref 36.6–50.3)
HGB BLD-MCNC: 11 G/DL (ref 12.1–17)
HGB UR QL STRIP: NEGATIVE
INR PPP: 1.1 (ref 0.9–1.1)
KETONES UR QL STRIP.AUTO: NEGATIVE MG/DL
LEUKOCYTE ESTERASE UR QL STRIP.AUTO: NEGATIVE
LYMPHOCYTES # BLD AUTO: 25 % (ref 12–49)
LYMPHOCYTES # BLD: 2 K/UL (ref 0.8–3.5)
MCH RBC QN AUTO: 30.4 PG (ref 26–34)
MCHC RBC AUTO-ENTMCNC: 32.9 G/DL (ref 30–36.5)
MCV RBC AUTO: 92.3 FL (ref 80–99)
MONOCYTES # BLD: 0.9 K/UL (ref 0–1)
MONOCYTES NFR BLD AUTO: 11 % (ref 5–13)
NEUTS SEG # BLD: 5 K/UL (ref 1.8–8)
NEUTS SEG NFR BLD AUTO: 62 % (ref 32–75)
NITRITE UR QL STRIP.AUTO: NEGATIVE
PH UR STRIP: 5.5 [PH] (ref 5–8)
PLATELET # BLD AUTO: 114 K/UL (ref 150–400)
POTASSIUM SERPL-SCNC: 3.7 MMOL/L (ref 3.5–5.1)
PROT SERPL-MCNC: 6.9 G/DL (ref 6.4–8.2)
PROT UR STRIP-MCNC: ABNORMAL MG/DL
PROTHROMBIN TIME: 11.3 SEC (ref 9–11.1)
RBC # BLD AUTO: 3.62 M/UL (ref 4.1–5.7)
RBC #/AREA URNS HPF: ABNORMAL /HPF (ref 0–5)
SODIUM SERPL-SCNC: 138 MMOL/L (ref 136–145)
SP GR UR REFRACTOMETRY: 1.02 (ref 1–1.03)
THERAPEUTIC RANGE,PTTT: NORMAL SECS (ref 58–77)
UROBILINOGEN UR QL STRIP.AUTO: 0.2 EU/DL (ref 0.2–1)
WBC # BLD AUTO: 8 K/UL (ref 4.1–11.1)
WBC URNS QL MICRO: ABNORMAL /HPF (ref 0–4)
YEAST URNS QL MICRO: PRESENT

## 2017-05-10 PROCEDURE — 74011250637 HC RX REV CODE- 250/637: Performed by: PHYSICAL MEDICINE & REHABILITATION

## 2017-05-10 PROCEDURE — 87077 CULTURE AEROBIC IDENTIFY: CPT | Performed by: PHYSICAL MEDICINE & REHABILITATION

## 2017-05-10 PROCEDURE — 73560 X-RAY EXAM OF KNEE 1 OR 2: CPT

## 2017-05-10 PROCEDURE — 81001 URINALYSIS AUTO W/SCOPE: CPT | Performed by: PHYSICAL MEDICINE & REHABILITATION

## 2017-05-10 PROCEDURE — 99283 EMERGENCY DEPT VISIT LOW MDM: CPT

## 2017-05-10 PROCEDURE — 74011250636 HC RX REV CODE- 250/636: Performed by: PHYSICAL MEDICINE & REHABILITATION

## 2017-05-10 PROCEDURE — 70450 CT HEAD/BRAIN W/O DYE: CPT

## 2017-05-10 PROCEDURE — 96375 TX/PRO/DX INJ NEW DRUG ADDON: CPT

## 2017-05-10 PROCEDURE — 73502 X-RAY EXAM HIP UNI 2-3 VIEWS: CPT

## 2017-05-10 PROCEDURE — 85730 THROMBOPLASTIN TIME PARTIAL: CPT | Performed by: EMERGENCY MEDICINE

## 2017-05-10 PROCEDURE — 85610 PROTHROMBIN TIME: CPT | Performed by: EMERGENCY MEDICINE

## 2017-05-10 PROCEDURE — 87186 SC STD MICRODIL/AGAR DIL: CPT | Performed by: PHYSICAL MEDICINE & REHABILITATION

## 2017-05-10 PROCEDURE — 36415 COLL VENOUS BLD VENIPUNCTURE: CPT | Performed by: EMERGENCY MEDICINE

## 2017-05-10 PROCEDURE — 80053 COMPREHEN METABOLIC PANEL: CPT | Performed by: EMERGENCY MEDICINE

## 2017-05-10 PROCEDURE — 96374 THER/PROPH/DIAG INJ IV PUSH: CPT

## 2017-05-10 PROCEDURE — 87086 URINE CULTURE/COLONY COUNT: CPT | Performed by: PHYSICAL MEDICINE & REHABILITATION

## 2017-05-10 PROCEDURE — 85025 COMPLETE CBC W/AUTO DIFF WBC: CPT | Performed by: EMERGENCY MEDICINE

## 2017-05-10 RX ORDER — AMOXICILLIN 250 MG
1 CAPSULE ORAL DAILY
Status: DISCONTINUED | OUTPATIENT
Start: 2017-05-10 | End: 2017-05-10

## 2017-05-10 RX ORDER — AMOXICILLIN 250 MG
1 CAPSULE ORAL
Status: DISCONTINUED | OUTPATIENT
Start: 2017-05-10 | End: 2017-05-10 | Stop reason: HOSPADM

## 2017-05-10 RX ORDER — ONDANSETRON 2 MG/ML
4 INJECTION INTRAMUSCULAR; INTRAVENOUS
Status: COMPLETED | OUTPATIENT
Start: 2017-05-10 | End: 2017-05-11

## 2017-05-10 RX ORDER — TAMSULOSIN HYDROCHLORIDE 0.4 MG/1
0.4 CAPSULE ORAL DAILY
Status: DISCONTINUED | OUTPATIENT
Start: 2017-05-11 | End: 2017-05-10 | Stop reason: HOSPADM

## 2017-05-10 RX ORDER — AMOXICILLIN 250 MG
1 CAPSULE ORAL
Status: DISCONTINUED | OUTPATIENT
Start: 2017-05-10 | End: 2017-05-10

## 2017-05-10 RX ORDER — MORPHINE SULFATE 2 MG/ML
2 INJECTION, SOLUTION INTRAMUSCULAR; INTRAVENOUS
Status: COMPLETED | OUTPATIENT
Start: 2017-05-10 | End: 2017-05-11

## 2017-05-10 RX ORDER — METHOTREXATE 2.5 MG/1
10 TABLET ORAL
Status: DISCONTINUED | OUTPATIENT
Start: 2017-05-10 | End: 2017-05-10

## 2017-05-10 RX ORDER — METHOTREXATE 2.5 MG/1
10 TABLET ORAL
Status: DISCONTINUED | OUTPATIENT
Start: 2017-05-10 | End: 2017-05-10 | Stop reason: HOSPADM

## 2017-05-10 RX ADMIN — ENOXAPARIN SODIUM 40 MG: 40 INJECTION SUBCUTANEOUS at 09:40

## 2017-05-10 RX ADMIN — METHOTREXATE 10 MG: 2.5 TABLET ORAL at 14:45

## 2017-05-10 RX ADMIN — ACETAMINOPHEN 650 MG: 325 TABLET ORAL at 20:28

## 2017-05-10 RX ADMIN — PANTOPRAZOLE SODIUM 40 MG: 40 TABLET, DELAYED RELEASE ORAL at 06:17

## 2017-05-10 RX ADMIN — FOLIC ACID 1 MG: 1 TABLET ORAL at 09:40

## 2017-05-10 RX ADMIN — UMECLIDINIUM 1 PUFF: 62.5 AEROSOL, POWDER ORAL at 09:41

## 2017-05-10 RX ADMIN — DILTIAZEM HYDROCHLORIDE 120 MG: 120 CAPSULE, EXTENDED RELEASE ORAL at 09:40

## 2017-05-10 NOTE — IP AVS SNAPSHOT
[address] [facility] 
[phone] Patient: Carol Wen MRN: [id number] GIJ:4/12/2208 You are allergic to the following Allergen Reactions Ceclor (Cefaclor) Swelling Contrast Agent (Iodine) Unknown (comments) Floxin (Ofloxacin) Hives Ivp (Fd And C Blue No.1) Itching Recent Documentation Height Weight BMI Smoking Status 1.753 m 81.6 kg 26.58 kg/m2 Former Smoker Emergency Contacts Name Discharge Info Relation Home Work Mobile [address] CAREGIVER [3] Spouse [3] [phone] Jennifer Aguilera0  Child [2]   [phone] About your hospitalization You were admitted on:  May 11, 2017 You last received care in the:  hospitals 3 ORTHOPEDICS You were discharged on:  May 16, 2017 Unit phone number:  [phone] Why you were hospitalized Your primary diagnosis was:  Not on File Your diagnoses also included:  Hip Fracture (Hcc) Providers Seen During Your Hospitalizations Provider Role Specialty Primary office phone Hayder Fraser MD Attending Provider Emergency Medicine [phone] Dejah Grant MD Attending Provider Internal Medicine [phone] Helena Contreras MD Attending Provider Hospitalist [phone] Your Primary Care Physician (PCP) Primary Care Physician Office Phone Office Fax Joaquín RANGEL [phone] Follow-up Information Follow up With Details Comments Contact Info Dominic Wick DO In 2 weeks  [facility] [address] [facility] 7 [id number] [phone] Tori Tavares DO In 1 week  [address] [facility] [address] [facility] 
[phone] Your Appointments Wednesday June 14, 2017 11:30 AM EDT ROUTINE CARE with Livier Soto III, DO  
 [facility]) [address] [address] [place]  
[phone] Current Discharge Medication List  
  
START taking these medications Dose & Instructions Dispensing Information Comments Morning Noon Evening Bedtime  
 enoxaparin 40 mg/0.4 mL Commonly known as:  LOVENOX Your last dose was: Your next dose is:    
   
   
 Dose:  40 mg  
0.4 mL by SubCUTAneous route every twenty-four (24) hours for 16 days. Quantity:  6.4 mL Refills:  0  
     
   
   
   
  
 oxyCODONE IR 5 mg immediate release tablet Commonly known as:  Lulu Carrow Your last dose was: Your next dose is:    
   
   
 Dose:  2.5-5 mg Take 0.5-1 Tabs by mouth every four (4) hours as needed. Max Daily Amount: 30 mg.  
 Quantity:  10 Tab Refills:  0  
     
   
   
   
  
 polyethylene glycol 17 gram packet Commonly known as:  Clora Oddi Your last dose was: Your next dose is:    
   
   
 Dose:  17 g Take 1 Packet by mouth daily as needed (constipation) for up to 15 days. Quantity:  15 Packet Refills:  0  
     
   
   
   
  
 senna-docusate 8.6-50 mg per tablet Commonly known as:  Guillaume Bumps Your last dose was: Your next dose is:    
   
   
 Dose:  1 Tab Take 1 Tab by mouth daily. Quantity:  30 Tab Refills:  0 CONTINUE these medications which have NOT CHANGED Dose & Instructions Dispensing Information Comments Morning Noon Evening Bedtime  
 acetaminophen 500 mg tablet Commonly known as:  TYLENOL Your last dose was: Your next dose is:    
   
   
 Dose:  500 mg Take 500 mg by mouth every six (6) hours as needed for Pain. Refills:  0  
     
   
   
   
  
 albuterol sulfate 2.5 mg/0.5 mL Nebu nebulizer solution Commonly known as:  PROVENTIL;VENTOLIN Your last dose was: Your next dose is: Dose:  2.5 mg  
2.5 mg by Nebulization route every four (4) hours as needed for Wheezing. Refills:  0  
     
   
   
   
  
 dilTIAZem  mg XR capsule Commonly known as:  DILACOR XR Your last dose was: Your next dose is:    
   
   
 Dose:  120 mg Take 120 mg by mouth daily. Refills:  0  
     
   
   
   
  
 folic acid 1 mg tablet Commonly known as:  Google Your last dose was: Your next dose is:    
   
   
 Dose:  1 mg Take 1 mg by mouth daily. Refills:  0  
     
   
   
   
  
 HUMIRA 40 mg/0.8 mL injection Generic drug:  adalimumab Your last dose was: Your next dose is:    
   
   
 by SubCUTAneous route every fourteen (14) days. Refills:  0  
     
   
   
   
  
 methotrexate 2.5 mg tablet Commonly known as:  [place] Your last dose was: Your next dose is:    
   
   
 Dose:  10 mg Take 10 mg by mouth Every Thursday. Refills:  0 NexIUM 40 mg capsule Generic drug:  esomeprazole Your last dose was: Your next dose is:    
   
   
 Dose:  40 mg Take 40 mg by mouth daily. Refills:  0 PRAVACHOL 40 mg tablet Generic drug:  pravastatin Your last dose was: Your next dose is:    
   
   
 Dose:  40 mg Take 40 mg by mouth nightly. Refills:  0 SPIRIVA WITH HANDIHALER 18 mcg inhalation capsule Generic drug:  tiotropium Your last dose was: Your next dose is:    
   
   
 Dose:  1 Cap Take 1 Cap by inhalation daily. Refills:  0  
     
   
   
   
  
 tamsulosin 0.4 mg capsule Commonly known as:  FLOMAX Your last dose was: Your next dose is:    
   
   
  Refills:  0 STOP taking these medications   
 dicyclomine 20 mg tablet Commonly known as:  BENTYL Where to Get Your Medications Information on where to get these meds will be given to you by the nurse or doctor. ! Ask your nurse or doctor about these medications  
  enoxaparin 40 mg/0.4 mL  
 oxyCODONE IR 5 mg immediate release tablet  
 polyethylene glycol 17 gram packet  
 senna-docusate 8.6-50 mg per tablet Discharge Instructions Follow up appointments Call Mike Singer at [phone] to schedule follow up appt with Dr. Martha Hernandez in  10 - 14 days. When to call your Orthopaedic Surgeon: 
-unrelieved pain 
-Signs of infection-if your incision is red; continues to have drainage; drainage has a foul odor or if you have a persistent fever over 101 degrees 
-Signs of a blood clot in your leg-calf pain, tenderness, redness, swelling of lower leg When to call your Primary Care Physician: 
-Concerns about medical conditions such as diabetes, high blood pressure, asthma, congestive heart failure 
-Call if blood sugars are elevated, persistent headache or dizziness, coughing or congestion, constipation or diarrhea, burning with urination, abnormal heart rate When to call [phone] go to the nearest emergency room 
-acute onset of chest pain, shortness of breath, difficulty breathing Activity - may weight bear as tolerated on left leg Incision Care - keep dressing clean and dry, change daily, may remove and shower in 3 days Preventing blood clots - Lovenox 40 mg daily for 21 days after surgery. Also continue home medication of Aspirin every other day Pain management 
-take pain medication as prescribed; decrease the amount you use as your pain lessens 
-avoid alcoholic beverages while taking pain medication 
-You may place an ice bag on your affected extremity Diet 
-resume usual diet; drink plenty of fluids; eat foods high in fiber 
-you may want to take a stool softener (such as Senokot-S or Colace) to prevent constipation while you are taking pain medication.   If constipation occurs, take a laxative (such as Dulcolax tablets, Milk of Magnesia, or a suppository) Physical Therapy-per physicians order Weight bearing status: Weight  Bearing as tolerated Physical Therapy 
-assessment and evaluation-bed mobility; functional transfers (bed, chair, bathroom, stairs); ambulation with equipment, safety and ability to get out of house in the event of an emergency 
-review and maintain weight bearing status 
-discuss pain management 
-review how to do ADLs HOSPITALIST DISCHARGE INSTRUCTIONS 
 
NAME: Safia Wilder :  1940 MRN:  [id number] Date/Time:  2017 1:12 PM 
 
ADMIT DATE: 5/10/2017 DISCHARGE DATE: 2017 Attending Physician: Keith Najera MD 
 
DISCHARGE DIAGNOSIS: 
 
Left hip fracture s/p repair  
Hematuria- improved BPH with urine retention - watkins in place. Voiding trial as able per SNF. Urology saw while inpatient. Constipation- 2/2 narcotics. Continue bowel regimen. Suppository today. May need enema if no BM with suppository. Hx Kidney stones Thrombocytopenia -stopped ASA, remains on lovenox for dvt prophylaxis per ortho Hx A flutter -continue cardizem. Holding ASA due to hematuria. DM (HgA1c 6.8)-SSI prn. COPD-Continue spiriva and prn nebs RA -holding MTX. He has been off humira since hospitalization in April. continue folate Moderate malnutrition-related to recent prolonged hospitalization for sepsis / ruptured GB 
S/P  shunt for NPH 
AAA-per CT (3/2017) 4.1 x 4.1 cm aneurysm distal abdominal aorta. OP follow up Medications: Per above medication reconciliation. Pain Management: per above medications Recommended diet: Diabetic Diet Recommended activity: PT/OT Eval and Treat Wound care: See surgical/procedure care instructions Indwelling devices: Watkins catheter, voiding trial per SNF with care per routine Supplemental Oxygen: None Required Lab work: Per SNF routine and Weekly CBC Glucose management:  Accucheck ACHS with sliding scale per SNF protocol Code status: Full Outside physician follow up: Follow-up Information Follow up With Details Comments Contact Info Jaziel Daniels, DO In 2 weeks  [address] [place] [address] 
[phone] Mildred Wagoner, DO In 1 week  [address] [facility] 
[phone] Skilled nursing facility/ SNF MD responsible for above on discharge. Information obtained by : 
I understand that if any problems occur once I am at home I am to contact my physician. I understand and acknowledge receipt of the instructions indicated above. Physician's or R.N.'s Signature                                                                  Date/Time Patient or Repres Discharge Orders None Introducing hospitals & [facility] SERVICES! Steffi Minaya introduces [employer] patient portal. Now you can access parts of your medical record, email your doctor's office, and request medication refills online. 1. In your internet browser, go to [email or web address] 2. Click on the First Time User? Click Here link in the Sign In box. You will see the New Member Sign Up page. 3. Enter your [employer] Access Code exactly as it appears below. You will not need to use this code after youve completed the sign-up process. If you do not sign up before the expiration date, you must request a new code. · [employer] Access Code: [email or web address] Expires: 6/5/2017  2:30 PM 
 
 4. Enter the last four digits of your Social Security Number (xxxx) and Date of Birth (mm/dd/yyyy) as indicated and click Submit. You will be taken to the next sign-up page. 5. Create a [employer] ID. This will be your [employer] login ID and cannot be changed, so think of one that is secure and easy to remember. 6. Create a [employer] password. You can change your password at any time. 7. Enter your Password Reset Question and Answer. This can be used at a later time if you forget your password. 8. Enter your e-mail address. You will receive e-mail notification when new information is available in [address] 9. Click Sign Up. You can now view and download portions of your medical record. 10. Click the Download Summary menu link to download a portable copy of your medical information. If you have questions, please visit the Frequently Asked Questions section of the [employer] website. Remember, [employer] is NOT to be used for urgent needs. For medical emergencies, dial 911. Now available from your iPhone and Android! General Information Please provide this summary of care documentation to your next provider. Patient Signature:  ____________________________________________________________ Date:  ____________________________________________________________  
  
Elio Search Provider Signature:  ____________________________________________________________ Date:  ____________________________________________________________

## 2017-05-10 NOTE — IP AVS SNAPSHOT
Summary of Care Report The Summary of Care report has been created to help improve care coordination. Users with access to [email or web address] or [employer] [facility] (Web-based application) may access additional patient information including the Discharge Summary. If you are not currently a [address] user and need more information, please call the number listed below in the [place] section and ask to be connected with Medical Records. Facility Information Name Address Phone [address] [address] [phone] Patient Information Patient Name Sex  Rehana Brennan ([id number]) Male 1940 Discharge Information Admitting Provider Service Area Unit Lakshmi Barnett MD / [phone] [address] [facility] 3 Orthopedics  [phone] Discharge Provider Discharge Date/Time Discharge Disposition Destination (none) 2017 (Pending) ALEXANDRA (none) Patient Language Language ENGLISH [13] Hospital Problems as of 2017  Reviewed: 2017 12:50 PM by Jeff Leija MD  
  
  
  
 Class Noted - Resolved Last Modified POA Active Problems Hip fracture ([facility] [address]  2017 - Present 2017 by Lakshmi Barnett MD Unknown Entered by Lakshmi Barnett MD  
  
Non-Hospital Problems as of 2017  Reviewed: 2017 12:50 PM by Jeff Leija MD  
  
  
  
 Class Noted - Resolved Last Modified Active Problems History of benign neoplasm of colon  2011 - Present 2016 by Destini Quinteros MD  
  Entered by Destini Quinteros MD  
  Overview Signed 2016  9:48 AM by Destini Quinteros MD  
   Tubular adenoma  GERD (gastroesophageal reflux disease)  2011 - Present 2011 Entered by Destini Quinteros MD  
  Colon polyp  2011 - Present 2011   Entered by Destini Quinteros MD  
 Overview Signed 4/28/2011  9:04 AM by Gonzalo Plata MD  
   Colonoscopy 4/28/2011; due repeat 4/2016 Diverticulosis of colon  4/28/2011 - Present 4/28/2011 Entered by Gonzalo Plata MD  
  Internal hemorrhoid  4/28/2011 - Present 4/28/2011 Entered by Gonzalo Plata MD  
  Schatzki's ring  4/28/2011 - Present 4/28/2011 Entered by Gonzalo , MD  
  Hiatal hernia  4/28/2011 - Present 4/28/2011 Entered by Gonzalo , MD  
  Diarrhea  1/22/2016 - Present 1/22/2016 by Gonzalo , MD  
  Entered by Gonzalo , MD  
  Influenza  3/8/2016 - Present 3/9/2016 by Arville Krabbe, MD  
  Entered by Arville Krabbe, MD  
  Fall  3/8/2016 - Present 3/8/2016 by Arville Krabbe, MD  
  Entered by Arville Krabbe, MD  
  Dysphagia  4/4/2017 - Present 4/4/2017 by Gonzalo Plata, MD  
  Entered by Gonzalo Plata MD  
  Abnormal x-ray of abdomen  4/4/2017 - Present 4/4/2017 by Gonzalo Plata, MD  
  Entered by Gonzalo Plata MD  
  Overview Signed 4/4/2017  1:32 PM by Gonzalo Plata MD  
   IMPRESSION IMPRESSION: Distal esophageal dysmotility. Normal exam otherwise.  
   
Imaging Type 2 diabetes mellitus with hyperglycemia, with long-term current use of insulin ([address] (Chronic)  4/13/2017 - Present 4/13/2017 by Darrius Erica III, DO Entered by Darrius Maldonado III, DO  
  NPH (normal pressure hydrocephalus) (Chronic)  4/13/2017 - Present 4/13/2017 by Darrius Maldonado III, DO Entered by Darrius Maldonado III, DO Alcohol use disorder ([address] (Chronic)  4/13/2017 - Present 4/13/2017 by Darrius Erica III, DO Entered by Darrius Erica III, DO You are allergic to the following Allergen Reactions Ceclor (Cefaclor) Swelling Contrast Agent (Iodine) Unknown (comments) Floxin (Ofloxacin) Hives Ivp (Fd And C Blue No.1) Itching Current Discharge Medication List  
  
START taking these medications Dose & Instructions Dispensing Information Comments  
 enoxaparin 40 mg/0.4 mL Commonly known as:  LOVENOX Dose:  40 mg  
0.4 mL by SubCUTAneous route every twenty-four (24) hours for 16 days. Quantity:  6.4 mL Refills:  0  
   
 oxyCODONE IR 5 mg immediate release tablet Commonly known as:  Surya Duke Dose:  2.5-5 mg Take 0.5-1 Tabs by mouth every four (4) hours as needed. Max Daily Amount: 30 mg.  
 Quantity:  10 Tab Refills:  0  
   
 polyethylene glycol 17 gram packet Commonly known as:  Esequiel Aus Dose:  17 g Take 1 Packet by mouth daily as needed (constipation) for up to 15 days. Quantity:  15 Packet Refills:  0  
   
 senna-docusate 8.6-50 mg per tablet Commonly known as:  Yinka Fang Dose:  1 Tab Take 1 Tab by mouth daily. Quantity:  30 Tab Refills:  0 CONTINUE these medications which have NOT CHANGED Dose & Instructions Dispensing Information Comments  
 acetaminophen 500 mg tablet Commonly known as:  TYLENOL Dose:  500 mg Take 500 mg by mouth every six (6) hours as needed for Pain. Refills:  0  
   
 albuterol sulfate 2.5 mg/0.5 mL Nebu nebulizer solution Commonly known as:  PROVENTIL;VENTOLIN Dose:  2.5 mg  
2.5 mg by Nebulization route every four (4) hours as needed for Wheezing. Refills:  0  
   
 dilTIAZem  mg XR capsule Commonly known as:  DILACOR XR Dose:  120 mg Take 120 mg by mouth daily. Refills:  0  
   
 folic acid 1 mg tablet Commonly known as:  [place] Dose:  1 mg Take 1 mg by mouth daily. Refills:  0  
   
 HUMIRA 40 mg/0.8 mL injection Generic drug:  adalimumab  
 by SubCUTAneous route every fourteen (14) days. Refills:  0  
   
 methotrexate 2.5 mg tablet Commonly known as:  Laurel Kimmy Dose:  10 mg Take 10 mg by mouth Every Thursday. Refills:  0 NexIUM 40 mg capsule Generic drug:  esomeprazole Dose:  40 mg Take 40 mg by mouth daily. Refills:  0 PRAVACHOL 40 mg tablet Generic drug:  pravastatin Dose:  40 mg Take 40 mg by mouth nightly. Refills:  0 SPIRIVA WITH HANDIHALER 18 mcg inhalation capsule Generic drug:  tiotropium Dose:  1 Cap Take 1 Cap by inhalation daily. Refills:  0  
   
 tamsulosin 0.4 mg capsule Commonly known as:  FLOMAX Refills:  0 STOP taking these medications Comments  
 dicyclomine 20 mg tablet Commonly known as:  BENTYL Current Immunizations Name Date Influenza Vaccine 10/8/2015 Pneumococcal Vaccine (Unspecified Type) 1/1/2016 Surgery Information ID Date/Time Status Primary Surgeon All Procedures Location [id number] 5/11/2017 Gustavo 37 HitchcockDO FEMORAL INTERTROCHANTERIC NAIL INSERTION [facility] MAIN OR    
 FEMORAL INTERTROCHANTERIC NAIL INSERTION:  left hip intramedullary nailing (Synthes TFNA; fx table, fluro) Follow-up Information Follow up With Details Comments Contact Info Puneet Myers, DO In 2 weeks  [facility] [address] [facility] 7 [address] 
[phone] Kenyatta Jarrett DO In 1 week  [address] [place] [address] [address] [facility] 
[phone] Discharge Instructions Follow up appointments Call Mike  at [phone] to schedule follow up appt with Dr. Petra Dillon in  10 - 14 days. When to call your Orthopaedic Surgeon: 
-unrelieved pain 
-Signs of infection-if your incision is red; continues to have drainage; drainage has a foul odor or if you have a persistent fever over 101 degrees 
-Signs of a blood clot in your leg-calf pain, tenderness, redness, swelling of lower leg When to call your Primary Care Physician: 
-Concerns about medical conditions such as diabetes, high blood pressure, asthma, congestive heart failure 
-Call if blood sugars are elevated, persistent headache or dizziness, coughing or congestion, constipation or diarrhea, burning with urination, abnormal heart rate When to call 911and go to the nearest emergency room 
-acute onset of chest pain, shortness of breath, difficulty breathing Activity - may weight bear as tolerated on left leg Incision Care - keep dressing clean and dry, change daily, may remove and shower in 3 days Preventing blood clots - Lovenox 40 mg daily for 21 days after surgery. Also continue home medication of Aspirin every other day Pain management 
-take pain medication as prescribed; decrease the amount you use as your pain lessens 
-avoid alcoholic beverages while taking pain medication 
-You may place an ice bag on your affected extremity Diet 
-resume usual diet; drink plenty of fluids; eat foods high in fiber 
-you may want to take a stool softener (such as Senokot-S or Colace) to prevent constipation while you are taking pain medication. If constipation occurs, take a laxative (such as Dulcolax tablets, Milk of Magnesia, or a suppository) Physical Therapy-per physicians order Weight bearing status: Weight  Bearing as tolerated Physical Therapy 
-assessment and evaluation-bed mobility; functional transfers (bed, chair, bathroom, stairs); ambulation with equipment, safety and ability to get out of house in the event of an emergency 
-review and maintain weight bearing status 
-discuss pain management 
-review how to do ADLs HOSPITALIST DISCHARGE INSTRUCTIONS 
 
NAME: [place] :  1940 MRN:  [id number] Date/Time:  2017 1:12 PM 
 
ADMIT DATE: 5/10/2017 DISCHARGE DATE: 2017 Attending Physician: Alanna Nelson MD 
 
DISCHARGE DIAGNOSIS: 
 
Left hip fracture s/p repair  
Hematuria- improved BPH with urine retention - watkins in place. Voiding trial as able per SNF. Urology saw while inpatient. Constipation- 2/2 narcotics. Continue bowel regimen. Suppository today. May need enema if no BM with suppository. Hx Kidney stones Thrombocytopenia -stopped ASA, remains on lovenox for dvt prophylaxis per ortho Hx A flutter -continue cardizem. Holding ASA due to hematuria. DM (HgA1c 6.8)-SSI prn. COPD-Continue spiriva and prn nebs RA -holding MTX. He has been off humira since hospitalization in April. continue folate Moderate malnutrition-related to recent prolonged hospitalization for sepsis / ruptured GB 
S/P  shunt for NPH 
AAA-per CT (3/2017) 4.1 x 4.1 cm aneurysm distal abdominal aorta. OP follow up Medications: Per above medication reconciliation. Pain Management: per above medications Recommended diet: Diabetic Diet Recommended activity: PT/OT Eval and Treat Wound care: See surgical/procedure care instructions Indwelling devices: Lawrence catheter, voiding trial per SNF with care per routine Supplemental Oxygen: None Required Lab work: Per SNF routine and Weekly CBC Glucose management:  Accucheck ACHS with sliding scale per SNF protocol Code status: Full Outside physician follow up: Follow-up Information Follow up With Details Comments Contact Info Balaji Kilpatrickew, DO In 2 weeks  [address] [facility] [address] 
[phone] Georgina Romero, DO In 1 week  1966 [facility] [address] [facility] [address] [phone] Skilled nursing facility/ SNF MD responsible for above on discharge. Information obtained by : 
I understand that if any problems occur once I am at home I am to contact my physician. I understand and acknowledge receipt of the instructions indicated above. Physician's or R.N.'s Signature                                                                  Date/Time Patient or Repres Chart Review Routing History Recipient Method Report Sent By Luz Waite MD  
Fax: [phone] Phone: [phone] Fax IP Auto Routed Sal Garcia MD [[id number]] 2/3/2016  4:23 PM 02/03/2016 Wellington Waite MD  
Fax: [phone] Phone: [phone] Fax Justin Oates MD NOTES AUTO ROUTING REPORT Brenda Lee MD [[id number]] 3/8/2016 12:42 PM 03/08/2016 Wellington Waite MD  
Fax: [phone] Phone: [phone] Fax Justin Oates MD NOTES AUTO ROUTING REPORT Brenda Lee MD [[id number]] 3/9/2016  4:10 AM 03/09/2016 Wellington Waite MD  
Fax: [phone] Phone: [phone] Fax Justin Oates MD NOTES AUTO ROUTING REPORT Batool Burgess MD [[id number]] 3/20/2016 11:46 PM 03/20/2016 Jared Guy III, DO Phone: [phone] In Basket IP Auto Routed Notes Khari Ruff MD [[id number]] 5/11/2017  1:46 AM 05/11/2017

## 2017-05-10 NOTE — IP AVS SNAPSHOT
Current Discharge Medication List  
  
START taking these medications Dose & Instructions Dispensing Information Comments Morning Noon Evening Bedtime  
 enoxaparin 40 mg/0.4 mL Commonly known as:  LOVENOX Your last dose was: Your next dose is:    
   
   
 Dose:  40 mg  
0.4 mL by SubCUTAneous route every twenty-four (24) hours for 16 days. Quantity:  6.4 mL Refills:  0  
     
   
   
   
  
 oxyCODONE IR 5 mg immediate release tablet Commonly known as:  Thressa Chucky Your last dose was: Your next dose is:    
   
   
 Dose:  2.5-5 mg Take 0.5-1 Tabs by mouth every four (4) hours as needed. Max Daily Amount: 30 mg.  
 Quantity:  10 Tab Refills:  0  
     
   
   
   
  
 polyethylene glycol 17 gram packet Commonly known as:  Azell Beans Your last dose was: Your next dose is:    
   
   
 Dose:  17 g Take 1 Packet by mouth daily as needed (constipation) for up to 15 days. Quantity:  15 Packet Refills:  0  
     
   
   
   
  
 senna-docusate 8.6-50 mg per tablet Commonly known as:  Lyndsey Shea Your last dose was: Your next dose is:    
   
   
 Dose:  1 Tab Take 1 Tab by mouth daily. Quantity:  30 Tab Refills:  0 CONTINUE these medications which have NOT CHANGED Dose & Instructions Dispensing Information Comments Morning Noon Evening Bedtime  
 acetaminophen 500 mg tablet Commonly known as:  TYLENOL Your last dose was: Your next dose is:    
   
   
 Dose:  500 mg Take 500 mg by mouth every six (6) hours as needed for Pain. Refills:  0  
     
   
   
   
  
 albuterol sulfate 2.5 mg/0.5 mL Nebu nebulizer solution Commonly known as:  PROVENTIL;VENTOLIN Your last dose was: Your next dose is:    
   
   
 Dose:  2.5 mg  
2.5 mg by Nebulization route every four (4) hours as needed for Wheezing. Refills:  0 dilTIAZem  mg XR capsule Commonly known as:  DILACOR XR Your last dose was: Your next dose is:    
   
   
 Dose:  120 mg Take 120 mg by mouth daily. Refills:  0  
     
   
   
   
  
 folic acid 1 mg tablet Commonly known as:  Google Your last dose was: Your next dose is:    
   
   
 Dose:  1 mg Take 1 mg by mouth daily. Refills:  0  
     
   
   
   
  
 HUMIRA 40 mg/0.8 mL injection Generic drug:  adalimumab Your last dose was: Your next dose is:    
   
   
 by SubCUTAneous route every fourteen (14) days. Refills:  0  
     
   
   
   
  
 methotrexate 2.5 mg tablet Commonly known as:  Valencia Boothe Your last dose was: Your next dose is:    
   
   
 Dose:  10 mg Take 10 mg by mouth Every Thursday. Refills:  0 NexIUM 40 mg capsule Generic drug:  esomeprazole Your last dose was: Your next dose is:    
   
   
 Dose:  40 mg Take 40 mg by mouth daily. Refills:  0 PRAVACHOL 40 mg tablet Generic drug:  pravastatin Your last dose was: Your next dose is:    
   
   
 Dose:  40 mg Take 40 mg by mouth nightly. Refills:  0 SPIRIVA WITH HANDIHALER 18 mcg inhalation capsule Generic drug:  tiotropium Your last dose was: Your next dose is:    
   
   
 Dose:  1 Cap Take 1 Cap by inhalation daily. Refills:  0  
     
   
   
   
  
 tamsulosin 0.4 mg capsule Commonly known as:  FLOMAX Your last dose was: Your next dose is:    
   
   
  Refills:  0 STOP taking these medications   
 dicyclomine 20 mg tablet Commonly known as:  BENTYL Where to Get Your Medications Information on where to get these meds will be given to you by the nurse or doctor. !  Ask your nurse or doctor about these medications  
  enoxaparin 40 mg/0.4 mL  
 oxyCODONE IR 5 mg immediate release tablet  
 polyethylene glycol 17 gram packet  
 senna-docusate 8.6-50 mg per tablet

## 2017-05-11 ENCOUNTER — ANESTHESIA (OUTPATIENT)
Dept: SURGERY | Age: 77
DRG: 481 | End: 2017-05-11
Payer: MEDICARE

## 2017-05-11 ENCOUNTER — APPOINTMENT (OUTPATIENT)
Dept: GENERAL RADIOLOGY | Age: 77
DRG: 481 | End: 2017-05-11
Attending: ORTHOPAEDIC SURGERY
Payer: MEDICARE

## 2017-05-11 ENCOUNTER — ANESTHESIA EVENT (OUTPATIENT)
Dept: SURGERY | Age: 77
DRG: 481 | End: 2017-05-11
Payer: MEDICARE

## 2017-05-11 PROBLEM — S72.009A HIP FRACTURE (HCC): Status: ACTIVE | Noted: 2017-05-11

## 2017-05-11 LAB
ATRIAL RATE: 78 BPM
CALCULATED P AXIS, ECG09: 87 DEGREES
CALCULATED R AXIS, ECG10: -29 DEGREES
CALCULATED T AXIS, ECG11: 13 DEGREES
DIAGNOSIS, 93000: NORMAL
GLUCOSE BLD STRIP.AUTO-MCNC: 173 MG/DL (ref 65–100)
P-R INTERVAL, ECG05: 152 MS
Q-T INTERVAL, ECG07: 406 MS
QRS DURATION, ECG06: 122 MS
QTC CALCULATION (BEZET), ECG08: 462 MS
SERVICE CMNT-IMP: ABNORMAL
VENTRICULAR RATE, ECG03: 78 BPM

## 2017-05-11 PROCEDURE — 0QS736Z REPOSITION LEFT UPPER FEMUR WITH INTRAMEDULLARY INTERNAL FIXATION DEVICE, PERCUTANEOUS APPROACH: ICD-10-PCS | Performed by: ORTHOPAEDIC SURGERY

## 2017-05-11 PROCEDURE — 74011250637 HC RX REV CODE- 250/637: Performed by: INTERNAL MEDICINE

## 2017-05-11 PROCEDURE — 77030028224 HC PDNG CST BSNM -A: Performed by: ORTHOPAEDIC SURGERY

## 2017-05-11 PROCEDURE — 74011250636 HC RX REV CODE- 250/636

## 2017-05-11 PROCEDURE — 74011000272 HC RX REV CODE- 272: Performed by: ORTHOPAEDIC SURGERY

## 2017-05-11 PROCEDURE — 93005 ELECTROCARDIOGRAM TRACING: CPT

## 2017-05-11 PROCEDURE — 77030018846 HC SOL IRR STRL H20 ICUM -A: Performed by: ORTHOPAEDIC SURGERY

## 2017-05-11 PROCEDURE — 82962 GLUCOSE BLOOD TEST: CPT

## 2017-05-11 PROCEDURE — 77030026438 HC STYL ET INTUB CARD -A: Performed by: ANESTHESIOLOGY

## 2017-05-11 PROCEDURE — 65270000029 HC RM PRIVATE

## 2017-05-11 PROCEDURE — 77030019908 HC STETH ESOPH SIMS -A: Performed by: ANESTHESIOLOGY

## 2017-05-11 PROCEDURE — 77030020061 HC IV BLD WRMR ADMIN SET 3M -B: Performed by: ANESTHESIOLOGY

## 2017-05-11 PROCEDURE — 77030031139 HC SUT VCRL2 J&J -A: Performed by: ORTHOPAEDIC SURGERY

## 2017-05-11 PROCEDURE — 77030035168: Performed by: ORTHOPAEDIC SURGERY

## 2017-05-11 PROCEDURE — 77030008467 HC STPLR SKN COVD -B: Performed by: ORTHOPAEDIC SURGERY

## 2017-05-11 PROCEDURE — 74011000250 HC RX REV CODE- 250: Performed by: ORTHOPAEDIC SURGERY

## 2017-05-11 PROCEDURE — 74011250636 HC RX REV CODE- 250/636: Performed by: ORTHOPAEDIC SURGERY

## 2017-05-11 PROCEDURE — C1713 ANCHOR/SCREW BN/BN,TIS/BN: HCPCS | Performed by: ORTHOPAEDIC SURGERY

## 2017-05-11 PROCEDURE — 76010000153 HC OR TIME 1.5 TO 2 HR: Performed by: ORTHOPAEDIC SURGERY

## 2017-05-11 PROCEDURE — 77030032490 HC SLV COMPR SCD KNE COVD -B: Performed by: ORTHOPAEDIC SURGERY

## 2017-05-11 PROCEDURE — 77030021678 HC GLIDESCP STAT DISP VERT -B: Performed by: ANESTHESIOLOGY

## 2017-05-11 PROCEDURE — 74011000250 HC RX REV CODE- 250

## 2017-05-11 PROCEDURE — 77030018836 HC SOL IRR NACL ICUM -A: Performed by: ORTHOPAEDIC SURGERY

## 2017-05-11 PROCEDURE — 77030013079 HC BLNKT BAIR HGGR 3M -A: Performed by: ANESTHESIOLOGY

## 2017-05-11 PROCEDURE — 76001 XR FLUOROSCOPY OVER 60 MINUTES: CPT

## 2017-05-11 PROCEDURE — 73502 X-RAY EXAM HIP UNI 2-3 VIEWS: CPT

## 2017-05-11 PROCEDURE — 77030020788: Performed by: ORTHOPAEDIC SURGERY

## 2017-05-11 PROCEDURE — 76060000034 HC ANESTHESIA 1.5 TO 2 HR: Performed by: ORTHOPAEDIC SURGERY

## 2017-05-11 PROCEDURE — 77030008684 HC TU ET CUF COVD -B: Performed by: ANESTHESIOLOGY

## 2017-05-11 PROCEDURE — 74011000258 HC RX REV CODE- 258: Performed by: INTERNAL MEDICINE

## 2017-05-11 PROCEDURE — 77030014405 HC GD ROD RMR SYNT -C: Performed by: ORTHOPAEDIC SURGERY

## 2017-05-11 PROCEDURE — 74011250636 HC RX REV CODE- 250/636: Performed by: EMERGENCY MEDICINE

## 2017-05-11 PROCEDURE — C1769 GUIDE WIRE: HCPCS | Performed by: ORTHOPAEDIC SURGERY

## 2017-05-11 PROCEDURE — 77030011640 HC PAD GRND REM COVD -A: Performed by: ORTHOPAEDIC SURGERY

## 2017-05-11 PROCEDURE — 76210000000 HC OR PH I REC 2 TO 2.5 HR: Performed by: ORTHOPAEDIC SURGERY

## 2017-05-11 RX ORDER — ONDANSETRON 4 MG/1
4 TABLET, ORALLY DISINTEGRATING ORAL
Status: CANCELLED | OUTPATIENT
Start: 2017-05-11

## 2017-05-11 RX ORDER — LIDOCAINE HYDROCHLORIDE 10 MG/ML
0.1 INJECTION, SOLUTION EPIDURAL; INFILTRATION; INTRACAUDAL; PERINEURAL AS NEEDED
Status: CANCELLED | OUTPATIENT
Start: 2017-05-11

## 2017-05-11 RX ORDER — FENTANYL CITRATE 50 UG/ML
INJECTION, SOLUTION INTRAMUSCULAR; INTRAVENOUS AS NEEDED
Status: DISCONTINUED | OUTPATIENT
Start: 2017-05-11 | End: 2017-05-11 | Stop reason: HOSPADM

## 2017-05-11 RX ORDER — DILTIAZEM HYDROCHLORIDE 120 MG/1
120 CAPSULE, EXTENDED RELEASE ORAL DAILY
COMMUNITY
End: 2017-09-20 | Stop reason: ALTCHOICE

## 2017-05-11 RX ORDER — DICYCLOMINE HYDROCHLORIDE 20 MG/1
20 TABLET ORAL EVERY 6 HOURS
COMMUNITY
End: 2017-05-16

## 2017-05-11 RX ORDER — AMOXICILLIN 250 MG
1 CAPSULE ORAL 2 TIMES DAILY
Status: CANCELLED | OUTPATIENT
Start: 2017-05-11

## 2017-05-11 RX ORDER — VANCOMYCIN/0.9 % SOD CHLORIDE 1.5G/250ML
1500 PLASTIC BAG, INJECTION (ML) INTRAVENOUS ONCE
Status: COMPLETED | OUTPATIENT
Start: 2017-05-12 | End: 2017-05-12

## 2017-05-11 RX ORDER — ENOXAPARIN SODIUM 100 MG/ML
40 INJECTION SUBCUTANEOUS DAILY
Status: CANCELLED | OUTPATIENT
Start: 2017-05-12

## 2017-05-11 RX ORDER — ONDANSETRON 2 MG/ML
4 INJECTION INTRAMUSCULAR; INTRAVENOUS
Status: DISCONTINUED | OUTPATIENT
Start: 2017-05-11 | End: 2017-05-16 | Stop reason: HOSPADM

## 2017-05-11 RX ORDER — ONDANSETRON 2 MG/ML
4 INJECTION INTRAMUSCULAR; INTRAVENOUS
Status: CANCELLED | OUTPATIENT
Start: 2017-05-11 | End: 2017-05-12

## 2017-05-11 RX ORDER — TAMSULOSIN HYDROCHLORIDE 0.4 MG/1
0.4 CAPSULE ORAL DAILY
Status: DISCONTINUED | OUTPATIENT
Start: 2017-05-11 | End: 2017-05-16 | Stop reason: HOSPADM

## 2017-05-11 RX ORDER — ALBUTEROL SULFATE 2.5 MG/.5ML
2.5 SOLUTION RESPIRATORY (INHALATION)
COMMUNITY

## 2017-05-11 RX ORDER — SODIUM CHLORIDE 0.9 % (FLUSH) 0.9 %
5-10 SYRINGE (ML) INJECTION AS NEEDED
Status: DISCONTINUED | OUTPATIENT
Start: 2017-05-11 | End: 2017-05-16 | Stop reason: HOSPADM

## 2017-05-11 RX ORDER — FACIAL-BODY WIPES
10 EACH TOPICAL DAILY PRN
Status: CANCELLED | OUTPATIENT
Start: 2017-05-13

## 2017-05-11 RX ORDER — FERROUS SULFATE, DRIED 160(50) MG
1 TABLET, EXTENDED RELEASE ORAL
Status: CANCELLED | OUTPATIENT
Start: 2017-05-12

## 2017-05-11 RX ORDER — NALOXONE HYDROCHLORIDE 0.4 MG/ML
0.2 INJECTION, SOLUTION INTRAMUSCULAR; INTRAVENOUS; SUBCUTANEOUS ONCE
Status: COMPLETED | OUTPATIENT
Start: 2017-05-11 | End: 2017-05-11

## 2017-05-11 RX ORDER — ALBUTEROL SULFATE 0.83 MG/ML
2.5 SOLUTION RESPIRATORY (INHALATION)
Status: DISCONTINUED | OUTPATIENT
Start: 2017-05-11 | End: 2017-05-16 | Stop reason: HOSPADM

## 2017-05-11 RX ORDER — SODIUM CHLORIDE 0.9 % (FLUSH) 0.9 %
5-10 SYRINGE (ML) INJECTION AS NEEDED
Status: CANCELLED | OUTPATIENT
Start: 2017-05-11

## 2017-05-11 RX ORDER — ACETAMINOPHEN 500 MG
500 TABLET ORAL
COMMUNITY

## 2017-05-11 RX ORDER — SODIUM CHLORIDE 9 MG/ML
125 INJECTION, SOLUTION INTRAVENOUS CONTINUOUS
Status: DISPENSED | OUTPATIENT
Start: 2017-05-11 | End: 2017-05-12

## 2017-05-11 RX ORDER — NALOXONE HYDROCHLORIDE 0.4 MG/ML
0.4 INJECTION, SOLUTION INTRAMUSCULAR; INTRAVENOUS; SUBCUTANEOUS AS NEEDED
Status: CANCELLED | OUTPATIENT
Start: 2017-05-11

## 2017-05-11 RX ORDER — DILTIAZEM HYDROCHLORIDE 120 MG/1
120 CAPSULE, COATED, EXTENDED RELEASE ORAL DAILY
Status: DISCONTINUED | OUTPATIENT
Start: 2017-05-11 | End: 2017-05-16 | Stop reason: HOSPADM

## 2017-05-11 RX ORDER — PRAVASTATIN SODIUM 40 MG/1
40 TABLET ORAL
Status: DISCONTINUED | OUTPATIENT
Start: 2017-05-11 | End: 2017-05-16 | Stop reason: HOSPADM

## 2017-05-11 RX ORDER — POLYETHYLENE GLYCOL 3350 17 G/17G
17 POWDER, FOR SOLUTION ORAL DAILY
Status: CANCELLED | OUTPATIENT
Start: 2017-05-12

## 2017-05-11 RX ORDER — MORPHINE SULFATE 10 MG/ML
2 INJECTION, SOLUTION INTRAMUSCULAR; INTRAVENOUS
Status: DISCONTINUED | OUTPATIENT
Start: 2017-05-11 | End: 2017-05-11 | Stop reason: HOSPADM

## 2017-05-11 RX ORDER — MIDAZOLAM HYDROCHLORIDE 1 MG/ML
INJECTION, SOLUTION INTRAMUSCULAR; INTRAVENOUS AS NEEDED
Status: DISCONTINUED | OUTPATIENT
Start: 2017-05-11 | End: 2017-05-11 | Stop reason: HOSPADM

## 2017-05-11 RX ORDER — SODIUM CHLORIDE 0.9 % (FLUSH) 0.9 %
5-10 SYRINGE (ML) INJECTION EVERY 8 HOURS
Status: CANCELLED | OUTPATIENT
Start: 2017-05-11

## 2017-05-11 RX ORDER — SODIUM CHLORIDE 0.9 % (FLUSH) 0.9 %
5-10 SYRINGE (ML) INJECTION EVERY 8 HOURS
Status: DISCONTINUED | OUTPATIENT
Start: 2017-05-11 | End: 2017-05-16 | Stop reason: HOSPADM

## 2017-05-11 RX ORDER — PRAVASTATIN SODIUM 40 MG/1
40 TABLET ORAL
COMMUNITY
End: 2017-10-04

## 2017-05-11 RX ORDER — PROPOFOL 10 MG/ML
INJECTION, EMULSION INTRAVENOUS AS NEEDED
Status: DISCONTINUED | OUTPATIENT
Start: 2017-05-11 | End: 2017-05-11 | Stop reason: HOSPADM

## 2017-05-11 RX ORDER — LIDOCAINE HYDROCHLORIDE 20 MG/ML
INJECTION, SOLUTION EPIDURAL; INFILTRATION; INTRACAUDAL; PERINEURAL AS NEEDED
Status: DISCONTINUED | OUTPATIENT
Start: 2017-05-11 | End: 2017-05-11 | Stop reason: HOSPADM

## 2017-05-11 RX ORDER — ACETAMINOPHEN 325 MG/1
650 TABLET ORAL 4 TIMES DAILY
Status: DISCONTINUED | OUTPATIENT
Start: 2017-05-11 | End: 2017-05-16 | Stop reason: HOSPADM

## 2017-05-11 RX ORDER — SODIUM CHLORIDE 0.9 % (FLUSH) 0.9 %
5-10 SYRINGE (ML) INJECTION EVERY 8 HOURS
Status: CANCELLED | OUTPATIENT
Start: 2017-05-12

## 2017-05-11 RX ORDER — NALOXONE HYDROCHLORIDE 0.4 MG/ML
INJECTION, SOLUTION INTRAMUSCULAR; INTRAVENOUS; SUBCUTANEOUS
Status: COMPLETED
Start: 2017-05-11 | End: 2017-05-11

## 2017-05-11 RX ORDER — OXYCODONE HYDROCHLORIDE 5 MG/1
2.5 TABLET ORAL
Status: CANCELLED | OUTPATIENT
Start: 2017-05-11

## 2017-05-11 RX ORDER — ONDANSETRON 2 MG/ML
INJECTION INTRAMUSCULAR; INTRAVENOUS AS NEEDED
Status: DISCONTINUED | OUTPATIENT
Start: 2017-05-11 | End: 2017-05-11 | Stop reason: HOSPADM

## 2017-05-11 RX ORDER — SODIUM CHLORIDE, SODIUM LACTATE, POTASSIUM CHLORIDE, CALCIUM CHLORIDE 600; 310; 30; 20 MG/100ML; MG/100ML; MG/100ML; MG/100ML
25 INJECTION, SOLUTION INTRAVENOUS CONTINUOUS
Status: DISCONTINUED | OUTPATIENT
Start: 2017-05-11 | End: 2017-05-11 | Stop reason: HOSPADM

## 2017-05-11 RX ORDER — HYDROMORPHONE HYDROCHLORIDE 1 MG/ML
.2-.5 INJECTION, SOLUTION INTRAMUSCULAR; INTRAVENOUS; SUBCUTANEOUS
Status: DISCONTINUED | OUTPATIENT
Start: 2017-05-11 | End: 2017-05-11 | Stop reason: HOSPADM

## 2017-05-11 RX ORDER — FOLIC ACID 1 MG/1
1 TABLET ORAL DAILY
Status: DISCONTINUED | OUTPATIENT
Start: 2017-05-11 | End: 2017-05-16 | Stop reason: HOSPADM

## 2017-05-11 RX ORDER — SODIUM CHLORIDE 450 MG/100ML
100 INJECTION, SOLUTION INTRAVENOUS CONTINUOUS
Status: DISCONTINUED | OUTPATIENT
Start: 2017-05-11 | End: 2017-05-12

## 2017-05-11 RX ORDER — SODIUM CHLORIDE, SODIUM LACTATE, POTASSIUM CHLORIDE, CALCIUM CHLORIDE 600; 310; 30; 20 MG/100ML; MG/100ML; MG/100ML; MG/100ML
INJECTION, SOLUTION INTRAVENOUS
Status: DISCONTINUED | OUTPATIENT
Start: 2017-05-11 | End: 2017-05-11 | Stop reason: HOSPADM

## 2017-05-11 RX ORDER — MORPHINE SULFATE 10 MG/ML
INJECTION, SOLUTION INTRAMUSCULAR; INTRAVENOUS AS NEEDED
Status: DISCONTINUED | OUTPATIENT
Start: 2017-05-11 | End: 2017-05-11 | Stop reason: HOSPADM

## 2017-05-11 RX ORDER — SODIUM CHLORIDE 0.9 % (FLUSH) 0.9 %
5-10 SYRINGE (ML) INJECTION AS NEEDED
Status: DISCONTINUED | OUTPATIENT
Start: 2017-05-11 | End: 2017-05-11 | Stop reason: HOSPADM

## 2017-05-11 RX ORDER — SUCCINYLCHOLINE CHLORIDE 20 MG/ML
INJECTION INTRAMUSCULAR; INTRAVENOUS AS NEEDED
Status: DISCONTINUED | OUTPATIENT
Start: 2017-05-11 | End: 2017-05-11 | Stop reason: HOSPADM

## 2017-05-11 RX ORDER — DOCUSATE SODIUM 100 MG/1
100 CAPSULE, LIQUID FILLED ORAL 2 TIMES DAILY
Status: DISCONTINUED | OUTPATIENT
Start: 2017-05-11 | End: 2017-05-16 | Stop reason: HOSPADM

## 2017-05-11 RX ORDER — OXYCODONE HYDROCHLORIDE 5 MG/1
5 TABLET ORAL
Status: CANCELLED | OUTPATIENT
Start: 2017-05-11

## 2017-05-11 RX ORDER — FENTANYL CITRATE 50 UG/ML
25 INJECTION, SOLUTION INTRAMUSCULAR; INTRAVENOUS
Status: DISCONTINUED | OUTPATIENT
Start: 2017-05-11 | End: 2017-05-11 | Stop reason: HOSPADM

## 2017-05-11 RX ORDER — ROCURONIUM BROMIDE 10 MG/ML
INJECTION, SOLUTION INTRAVENOUS AS NEEDED
Status: DISCONTINUED | OUTPATIENT
Start: 2017-05-11 | End: 2017-05-11 | Stop reason: HOSPADM

## 2017-05-11 RX ORDER — SODIUM CHLORIDE, SODIUM LACTATE, POTASSIUM CHLORIDE, CALCIUM CHLORIDE 600; 310; 30; 20 MG/100ML; MG/100ML; MG/100ML; MG/100ML
25 INJECTION, SOLUTION INTRAVENOUS CONTINUOUS
Status: CANCELLED | OUTPATIENT
Start: 2017-05-11 | End: 2017-05-12

## 2017-05-11 RX ORDER — ACETAMINOPHEN 325 MG/1
650 TABLET ORAL EVERY 6 HOURS
Status: CANCELLED | OUTPATIENT
Start: 2017-05-11 | End: 2017-05-25

## 2017-05-11 RX ORDER — DIPHENHYDRAMINE HYDROCHLORIDE 50 MG/ML
12.5 INJECTION, SOLUTION INTRAMUSCULAR; INTRAVENOUS
Status: DISCONTINUED | OUTPATIENT
Start: 2017-05-11 | End: 2017-05-11 | Stop reason: HOSPADM

## 2017-05-11 RX ORDER — MORPHINE SULFATE 2 MG/ML
2 INJECTION, SOLUTION INTRAMUSCULAR; INTRAVENOUS
Status: DISCONTINUED | OUTPATIENT
Start: 2017-05-11 | End: 2017-05-16 | Stop reason: HOSPADM

## 2017-05-11 RX ADMIN — MORPHINE SULFATE 3 MG: 10 INJECTION, SOLUTION INTRAMUSCULAR; INTRAVENOUS at 19:18

## 2017-05-11 RX ADMIN — Medication 10 ML: at 05:31

## 2017-05-11 RX ADMIN — MIDAZOLAM HYDROCHLORIDE 1 MG: 1 INJECTION, SOLUTION INTRAMUSCULAR; INTRAVENOUS at 18:24

## 2017-05-11 RX ADMIN — SODIUM CHLORIDE 125 ML/HR: 900 INJECTION, SOLUTION INTRAVENOUS at 22:37

## 2017-05-11 RX ADMIN — PROPOFOL 150 MG: 10 INJECTION, EMULSION INTRAVENOUS at 18:28

## 2017-05-11 RX ADMIN — MIDAZOLAM HYDROCHLORIDE 1 MG: 1 INJECTION, SOLUTION INTRAMUSCULAR; INTRAVENOUS at 18:28

## 2017-05-11 RX ADMIN — ONDANSETRON HYDROCHLORIDE 4 MG: 2 INJECTION, SOLUTION INTRAMUSCULAR; INTRAVENOUS at 00:09

## 2017-05-11 RX ADMIN — Medication 10 ML: at 13:28

## 2017-05-11 RX ADMIN — SODIUM CHLORIDE 100 ML/HR: 450 INJECTION, SOLUTION INTRAVENOUS at 05:31

## 2017-05-11 RX ADMIN — DOCUSATE SODIUM 100 MG: 100 CAPSULE, LIQUID FILLED ORAL at 09:21

## 2017-05-11 RX ADMIN — MORPHINE SULFATE 3 MG: 10 INJECTION, SOLUTION INTRAMUSCULAR; INTRAVENOUS at 19:43

## 2017-05-11 RX ADMIN — SODIUM CHLORIDE, SODIUM LACTATE, POTASSIUM CHLORIDE, CALCIUM CHLORIDE: 600; 310; 30; 20 INJECTION, SOLUTION INTRAVENOUS at 19:53

## 2017-05-11 RX ADMIN — ACETAMINOPHEN 650 MG: 325 TABLET, FILM COATED ORAL at 09:21

## 2017-05-11 RX ADMIN — Medication 2 MG: at 00:13

## 2017-05-11 RX ADMIN — FENTANYL CITRATE 100 MCG: 50 INJECTION, SOLUTION INTRAMUSCULAR; INTRAVENOUS at 18:28

## 2017-05-11 RX ADMIN — FOLIC ACID 1 MG: 1 TABLET ORAL at 09:22

## 2017-05-11 RX ADMIN — SODIUM CHLORIDE, SODIUM LACTATE, POTASSIUM CHLORIDE, CALCIUM CHLORIDE: 600; 310; 30; 20 INJECTION, SOLUTION INTRAVENOUS at 18:27

## 2017-05-11 RX ADMIN — LIDOCAINE HYDROCHLORIDE 40 MG: 20 INJECTION, SOLUTION EPIDURAL; INFILTRATION; INTRACAUDAL; PERINEURAL at 18:28

## 2017-05-11 RX ADMIN — Medication 2 MG: at 07:02

## 2017-05-11 RX ADMIN — NALOXONE HYDROCHLORIDE 0.2 MG: 0.4 INJECTION, SOLUTION INTRAMUSCULAR; INTRAVENOUS; SUBCUTANEOUS at 21:58

## 2017-05-11 RX ADMIN — UMECLIDINIUM 1 PUFF: 62.5 AEROSOL, POWDER ORAL at 09:22

## 2017-05-11 RX ADMIN — ONDANSETRON 4 MG: 2 INJECTION INTRAMUSCULAR; INTRAVENOUS at 19:42

## 2017-05-11 RX ADMIN — MORPHINE SULFATE 4 MG: 10 INJECTION, SOLUTION INTRAMUSCULAR; INTRAVENOUS at 19:47

## 2017-05-11 RX ADMIN — TAMSULOSIN HYDROCHLORIDE 0.4 MG: 0.4 CAPSULE ORAL at 09:21

## 2017-05-11 RX ADMIN — DILTIAZEM HYDROCHLORIDE 120 MG: 120 CAPSULE, COATED, EXTENDED RELEASE ORAL at 09:21

## 2017-05-11 RX ADMIN — ROCURONIUM BROMIDE 10 MG: 10 INJECTION, SOLUTION INTRAVENOUS at 18:28

## 2017-05-11 RX ADMIN — SUCCINYLCHOLINE CHLORIDE 140 MG: 20 INJECTION INTRAMUSCULAR; INTRAVENOUS at 18:28

## 2017-05-11 RX ADMIN — SODIUM CHLORIDE 100 ML/HR: 450 INJECTION, SOLUTION INTRAVENOUS at 14:42

## 2017-05-11 RX ADMIN — ACETAMINOPHEN 650 MG: 325 TABLET, FILM COATED ORAL at 13:28

## 2017-05-11 NOTE — PROGRESS NOTES
Pt c/o 10/10 pain with transfer/repotitioning. Patient noted to have tylenol 650 po qid. Hospitalist on call paged for prn pain med/one time order for pain med. Deferred to orthopedics for pain med order. Will attempt to page oncall ortho md for pain med order.

## 2017-05-11 NOTE — PROGRESS NOTES
Patient discussed with Dr. Leandra Sanford. Should be npo after midnight planning for L hip cephalomedullary nailing on 5/11. Bedrest for now. Will follow medical clearance. Formal consult to follow.     Starr Montano DO

## 2017-05-11 NOTE — ED NOTES
Patient medicated for left hip pain 2/10. He remains alert and oriented x 4. Will continue to monitor.

## 2017-05-11 NOTE — H&P
Hospitalist Admission Note    NAME: Darlene Lau   :  1940   MRN:  [id number]     Date/Time:  2017 12:28 AM    Patient PCP: Joel Honeycutt MD  ________________________________________________________________________    My assessment of this patient's clinical condition and my plan of care is as follows. Assessment / Plan:  Preoperative cardiac evaluation in setting of L hip fracture: fall when getting out of wheelchair at [facility]. Recent surgery at [facility] for perforated GB with abscess  complicated by post-op aflutter (resolved) and prolonged intubation in setting of infection (from abscess)  - xray L hip with acute intertrochanteric left proximal femur fracture  - EKG sinus, RBBB. No h/o CHF or CAD per Pt and his wife. - Pre-op cardiac risk assessment:  Pt evaluated using revised cardiac risk index and is felt to be low cardiovascular risk for intermediate risk surgery with a 0.4% risk for major complications based on these criteria. This risk has been discussed with the patient and his wife and pt wishes to proceed. Plan for surgery without further cardiac testing if this risk is acceptable per surgery and anesthesia. Further risk reduction will involve medical management of other comorbid conditions in the perioperative period. - tylenol ordered for pain mgmt.   Further meds and anticoagulation per orthopedics.  - IVF ordered  Rheumatoid arthritis:  - received MTX yesterday, will hold until cleared to continue by surgery  - has been off humira since hospitalization in April  - con't folate  COPD without acute exacerbation:  - con't outpt spiriva  - nebs prn  BPH: con't flomax  Hyperlipidemia: con't statin  Atrial flutter: per notes isolated post-op in setting of perforated GB with abscess  - con't diltiazem  - not on anticoagulation per records  Moderate protein calorie malnutrition: 20lb weight loss per Pt's wife due to abdominal pain and recent hospitalization  - will need supplements when cleared for po    Code Status: Full  Surrogate Decision Maker: wife  DVT Prophylaxis: per orthopedic surgery    Baseline: debilitated by recent prolonged hospitalization but ambulated with walker x 100 feet the day prior to this admission        Subjective:   CHIEF COMPLAINT: fall    HISTORY OF PRESENT ILLNESS:     Justin Vinson is a 65 y.o.  male who presents with above. Pt brought over from [address] [place] [address] after a fall. Pt apparently was rolled back to his room after dinner. He needed to go to the bathroom and tried to get out of the wheelchair unassisted but wheels were not locked so when he stood up he slid and fell. Pt with severe pain with movement so brought to the ER. Pt admitted to [facility] on 5/3 after prolonged hospitalization at [facility] 4/14-5/3 for perforated gallbladder with abscess. His shunt for hydrocephalus had to be relocated due to his intraabdominal infection. Additionally, he was intubated and had aflutter post op. Pt denies recent fever, cough or URI sx. He denies CP or SOB. He has been eating better since going to [facility] (wife reports 20 lbs weight loss over the last couple months before the GB issue was discovered). Pt denies constipation. No new edema. No focal weakness before his fall tonight. We were asked to admit for work up and evaluation of the above problems.      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, arms    Other ill-defined conditions     colon polyps    RA (rheumatoid arthritis) ([address]     Scarring     Stroke ([address] 2014    L arm weakness        Past Surgical History:   Procedure Laterality Date    HX CHOLECYSTECTOMY  04/18/2017    HX HEENT bilat cataract    HX OTHER SURGICAL      r shoulder melenoma excision    [place] COLSC FLX W/RMVL OF TUMOR POLYP LESION SNARE TQ  4/28/2011         [place] EGD TRANSORAL BIOPSY SINGLE/MULTIPLE  4/28/2011         VASCULAR SURGERY PROCEDURE UNLIST      stent femoral-left leg    VASCULAR SURGERY PROCEDURE UNLIST      right AV shunt       Social History   Substance Use Topics    Smoking status: Former Smoker    Smokeless tobacco: Never Used    Alcohol use 3.5 oz/week     7 Shots of liquor per week        History reviewed. Pt grew up in foster care and says that he does not know any medical information about his family. Allergies   Allergen Reactions    Ceclor [Cefaclor] Swelling    Contrast Agent [Iodine] Unknown (comments)    Floxin [Ofloxacin] Hives    Ivp [Fd And C Blue No.1] Itching        Prior to Admission medications    Medication Sig Start Date End Date Taking? Authorizing Provider   tamsulosin (FLOMAX) 0.4 mg capsule  3/10/17   Historical Provider   insulin lispro (HUMALOG) 100 unit/mL injection by SubCUTAneous route. Indications: sliding scale    Historical Provider   ondansetron (ZOFRAN ODT) 4 mg disintegrating tablet Take 1 Tab by mouth every eight (8) hours as needed for Nausea. 3/7/17   Buddy Kline PA-C   methotrexate (RHEUMATREX) 2.5 mg tablet Take 10 mg by mouth Every Thursday. Historical Provider   adalimumab (HUMIRA) 40 mg/0.8 mL injection by SubCUTAneous route every fourteen (14) days. Historical Provider   pravastatin (PRAVACHOL) 80 mg tablet Take 80 mg by mouth nightly. Indications: MIXED HYPERLIPIDEMIA    Historical Provider   folic acid (FOLVITE) 1 mg tablet Take 1 mg by mouth daily. Historical Provider   esomeprazole (NEXIUM) 40 mg capsule Take 40 mg by mouth daily. Historical Provider   aspirin delayed-release 81 mg tablet Take 81 mg by mouth every other day.     Historical Provider   tiotropium (SPIRIVA WITH HANDIHALER) 18 mcg inhalation capsule Take 1 Cap by inhalation daily. Historical Provider       REVIEW OF SYSTEMS:     I am not able to complete the review of systems because: The patient is intubated and sedated    The patient has altered mental status due to his acute medical problems    The patient has baseline aphasia from prior stroke(s)    The patient has baseline dementia and is not reliable historian    The patient is in acute medical distress and unable to provide information           Total of 12 systems reviewed as follows:       POSITIVE= underlined text  Negative = text not underlined  General:  fever, chills, sweats, generalized weakness, weight loss/gain,      loss of appetite   Eyes:    blurred vision, eye pain, loss of vision, double vision  ENT:    rhinorrhea, pharyngitis   Respiratory:   cough, sputum production, SOB, SILVER, wheezing, pleuritic pain   Cardiology:   chest pain, palpitations, orthopnea, PND, edema, syncope   Gastrointestinal:  abdominal pain , N/V, diarrhea, dysphagia, constipation, bleeding   Genitourinary:  frequency, urgency, dysuria, hematuria, incontinence   Muskuloskeletal :  arthralgia, myalgia, back pain  Hematology:  easy bruising, nose or gum bleeding, lymphadenopathy   Dermatological: rash, ulceration, pruritis, color change / jaundice  Endocrine:   hot flashes or polydipsia   Neurological:  headache, dizziness, confusion, focal weakness, paresthesia,     Speech difficulties, memory loss, gait difficulty  Psychological: Feelings of anxiety, depression, agitation    Objective:   VITALS:    Visit Vitals    /56    Pulse 84    Temp 98.4 °F (36.9 °C)    Resp 16    Ht 5' 9\" (1.753 m)    Wt 81.6 kg (180 lb)    SpO2 95%    BMI 26.58 kg/m2       PHYSICAL EXAM:    General:    Alert, cooperative, no distress, appears stated age.      HEENT: Atraumatic, anicteric sclerae, pink conjunctivae     No oral ulcers, mucosa moist, throat clear, dentition fair  Neck:  Supple, symmetrical,  thyroid: non tender  Lungs:   Clear to auscultation bilaterally. No Wheezing or Rhonchi. No rales. Chest wall:  No tenderness  No Accessory muscle use. Heart:   Regular  rhythm,  No  murmur   No edema  Abdomen:   Soft, non-tender. Not distended. Bowel sounds normal  Extremities: No cyanosis. No clubbing, LLE externally rotated    Skin turgor normal, Capillary refill normal, Radial dial pulse 2+  Skin:     Pale. Not Jaundiced  No rashes   Psych:  Fair insight. Not depressed. Not anxious or agitated. Neurologic: EOMs intact. No facial asymmetry. No aphasia or slurred speech. Cannot assess strength due to fracture. Sensation grossly intact. Alert and oriented X 4.     _______________________________________________________________________  Care Plan discussed with:    Comments   Patient x    Family  x wife   RN x    Care Manager                    Consultant:      _______________________________________________________________________  Expected  Disposition:   Home with Family    HH/PT/OT/RN    SNF/LTC    AMBAR x   ________________________________________________________________________  TOTAL TIME:  54 Minutes    Critical Care Provided     Minutes non procedure based      Comments    x Reviewed previous records   >50% of visit spent in counseling and coordination of care x Discussion with patient and/or family and questions answered       ________________________________________________________________________  Signed: Durene Winsted, MD    Procedures: see electronic medical records for all procedures/Xrays and details which were not copied into this note but were reviewed prior to creation of Plan.     LAB DATA REVIEWED:    Recent Results (from the past 24 hour(s))   URINALYSIS W/MICROSCOPIC    Collection Time: 05/10/17  2:54 PM   Result Value Ref Range    Color DARK YELLOW      Appearance CLEAR CLEAR      Specific gravity 1.021 1.003 - 1.030      pH (UA) 5.5 5.0 - 8.0      Protein TRACE (A) NEG mg/dL    Glucose NEGATIVE  NEG mg/dL    Ketone NEGATIVE NEG mg/dL    Blood NEGATIVE  NEG      Urobilinogen 0.2 0.2 - 1.0 EU/dL    Nitrites NEGATIVE  NEG      Leukocyte Esterase NEGATIVE  NEG      WBC 0-4 0 - 4 /hpf    RBC 5-10 0 - 5 /hpf    Epithelial cells FEW FEW /lpf    Bacteria NEGATIVE  NEG /hpf    Yeast PRESENT (A) NEG     BILIRUBIN, CONFIRM    Collection Time: 05/10/17  2:54 PM   Result Value Ref Range    Bilirubin UA, confirm NEGATIVE  NEG     CBC WITH AUTOMATED DIFF    Collection Time: 05/10/17  9:54 PM   Result Value Ref Range    WBC 8.0 4.1 - 11.1 K/uL    RBC 3.62 (L) 4.10 - 5.70 M/uL    HGB 11.0 (L) 12.1 - 17.0 g/dL    HCT 33.4 (L) 36.6 - 50.3 %    MCV 92.3 80.0 - 99.0 FL    MCH 30.4 26.0 - 34.0 PG    MCHC 32.9 30.0 - 36.5 g/dL    RDW 14.2 11.5 - 14.5 %    PLATELET 239 (L) 237 - 400 K/uL    NEUTROPHILS 62 32 - 75 %    LYMPHOCYTES 25 12 - 49 %    MONOCYTES 11 5 - 13 %    EOSINOPHILS 2 0 - 7 %    BASOPHILS 0 0 - 1 %    ABS. NEUTROPHILS 5.0 1.8 - 8.0 K/UL    ABS. LYMPHOCYTES 2.0 0.8 - 3.5 K/UL    ABS. MONOCYTES 0.9 0.0 - 1.0 K/UL    ABS. EOSINOPHILS 0.1 0.0 - 0.4 K/UL    ABS. BASOPHILS 0.0 0.0 - 0.1 K/UL   METABOLIC PANEL, COMPREHENSIVE    Collection Time: 05/10/17  9:54 PM   Result Value Ref Range    Sodium 138 136 - 145 mmol/L    Potassium 3.7 3.5 - 5.1 mmol/L    Chloride 104 97 - 108 mmol/L    CO2 27 21 - 32 mmol/L    Anion gap 7 5 - 15 mmol/L    Glucose 189 (H) 65 - 100 mg/dL    BUN 9 6 - 20 MG/DL    Creatinine 0.91 0.70 - 1.30 MG/DL    BUN/Creatinine ratio 10 (L) 12 - 20      GFR est AA >60 >60 ml/min/1.73m2    GFR est non-AA >60 >60 ml/min/1.73m2    Calcium 8.5 8.5 - 10.1 MG/DL    Bilirubin, total 0.4 0.2 - 1.0 MG/DL    ALT (SGPT) 37 12 - 78 U/L    AST (SGOT) 40 (H) 15 - 37 U/L    Alk.  phosphatase 122 (H) 45 - 117 U/L    Protein, total 6.9 6.4 - 8.2 g/dL    Albumin 2.9 (L) 3.5 - 5.0 g/dL    Globulin 4.0 2.0 - 4.0 g/dL    A-G Ratio 0.7 (L) 1.1 - 2.2     PROTHROMBIN TIME + INR    Collection Time: 05/10/17  9:54 PM   Result Value Ref Range    INR 1.1 0.9 - 1.1      Prothrombin time 11.3 (H) 9.0 - 11.1 sec   PTT    Collection Time: 05/10/17  9:54 PM   Result Value Ref Range    aPTT 25.8 22.1 - 32.5 sec    aPTT, therapeutic range     58.0 - 77.0 SECS

## 2017-05-11 NOTE — ED NOTES
Changed patient'd depends as he was incontinent of urine. Dr. Inge Man in to speak with patient and his wife.

## 2017-05-11 NOTE — ANESTHESIA PREPROCEDURE EVALUATION
Anesthetic History   No history of anesthetic complications            Review of Systems / Medical History  Patient summary reviewed, nursing notes reviewed and pertinent labs reviewed    Pulmonary    COPD (no recent problems): mild      Smoker      Comments: Former smoker   Neuro/Psych       CVA (left arm weakness)  TIA    Comments: hydrocephalus with right-sided shunt  Memory problems Cardiovascular    Hypertension ( taken off med): well controlled              Exercise tolerance: <4 METS     GI/Hepatic/Renal     GERD: well controlled           Endo/Other    Diabetes: well controlled, type 2, using insulin    Arthritis (Rheumatoid; affects hips & neck), cancer (melanoma) and anemia     Other Findings   Comments: H/o ETOH abuse         Physical Exam    Airway  Mallampati: III  TM Distance: 4 - 6 cm  Neck ROM: decreased range of motion   Mouth opening: Normal     Cardiovascular    Rhythm: regular  Rate: normal      Pertinent negatives: No murmur   Dental    Dentition: Upper partial plate and Full lower dentures     Pulmonary  Breath sounds clear to auscultation               Abdominal  GI exam deferred       Other Findings            Anesthetic Plan    ASA: 3  Anesthesia type: general    Monitoring Plan: BIS      Induction: Intravenous  Anesthetic plan and risks discussed with: Patient      preop glucose 169.

## 2017-05-11 NOTE — ROUTINE PROCESS
Patient: Aram Sullivan MRN: [id number]  SSN: [id number]   YOB: 1940  Age: 65 y.o. Sex: male     Patient is status post Procedure(s) with comments:  FEMORAL INTERTROCHANTERIC NAIL INSERTION - left hip intramedullary nailing (Synthes TFNA; fx table, fluro). Surgeon(s) and Role:     Altaf Velazquez DO - Primary                      Peripheral IV 05/10/17 Left Hand (Active)   Site Assessment Clean, dry, & intact 5/11/2017  4:28 PM   Phlebitis Assessment 0 5/11/2017  4:28 PM   Infiltration Assessment 0 5/11/2017  4:28 PM   Dressing Status Clean, dry, & intact 5/11/2017  4:28 PM   Dressing Type Tape;Transparent 5/11/2017  4:28 PM   Hub Color/Line Status Pink; Infusing;Flushed 5/11/2017  4:28 PM   Action Taken Blood drawn 5/11/2017  3:09 AM            Airway - Endotracheal Tube Oral (Active)                   Dressing/Packing:  Wound Chest Lateral;Right;Upper-DRESSING TYPE: Adhesive wound closure strips (Steri-Strips) (05/11/17 0905)  Wound Hip Left-DRESSING TYPE:  (HONEYCOMB) (05/11/17 1919)  Splint/Cast:  ]

## 2017-05-11 NOTE — ED NOTES
Assumed care of patient from [place] as he was brought to ED via stretcher from [address] [place] [address] post fall earlier today. Patient alert and oriented x 4. He reports pain in left hip 1/10. He has a confirmed hip fracture from fall. He was getting up out of wheelchair which was not locked causing him to fall. He has steri strips present to right collar bone area and right rib area from relocation of his ventricular cephalic shunt. He has healing abdominal scar from recent cholecystectomy. VSS on bedside monitor x 3. Wife at bedside and call bell in reach. Will continue to monitor.

## 2017-05-11 NOTE — CONSULTS
ORTHOPEDIC CONSULT    Subjective:     Date of Consultation:  May 11, 2017    Referring Physician:  Jaclyn Yady is a 65 y.o.  male who presented to the ER with L hip pain after a fall in rehab. He was found to have a left IT hip fx. Denies any other injuries. Describes sharp aching pain with movement of hip. Unable to wb.     Patient Active Problem List    Diagnosis Date Noted    Hip fracture ([facility] [address] 05/11/2017    Type 2 diabetes mellitus with hyperglycemia, with long-term current use of insulin ([address] 04/13/2017    NPH (normal pressure hydrocephalus) 04/13/2017    Alcohol use disorder ([address] 04/13/2017    Dysphagia 04/04/2017    Abnormal x-ray of abdomen 04/04/2017    Influenza 03/08/2016    Fall 03/08/2016    Diarrhea 01/22/2016    History of benign neoplasm of colon 04/28/2011    GERD (gastroesophageal reflux disease) 04/28/2011    Colon polyp 04/28/2011    Diverticulosis of colon 04/28/2011    Internal hemorrhoid 04/28/2011    Schatzki's ring 04/28/2011    Hiatal hernia 04/28/2011     Family History   Problem Relation Age of Onset    Adopted: Yes      Social History   Substance Use Topics    Smoking status: Former Smoker    Smokeless tobacco: Never Used    Alcohol use 3.5 oz/week     7 Shots of liquor per week     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, arms    Other ill-defined conditions     colon polyps    RA (rheumatoid arthritis) ([address]     Scarring     Stroke ([address] 2014    L arm weakness      Past Surgical History:   Procedure Laterality Date    HX CHOLECYSTECTOMY  04/18/2017    HX HEENT      bilat cataract    HX OTHER SURGICAL      r shoulder melenoma excision    [place] COLSC FLX W/RMVL OF TUMOR POLYP LESION SNARE TQ  4/28/2011         [place] EGD TRANSORAL BIOPSY SINGLE/MULTIPLE  4/28/2011         VASCULAR SURGERY PROCEDURE UNLIST      stent femoral-left leg    VASCULAR SURGERY PROCEDURE UNLIST      right AV shunt      Prior to Admission medications    Medication Sig Start Date End Date Taking? Authorizing Provider   acetaminophen (TYLENOL) 500 mg tablet Take 500 mg by mouth every six (6) hours as needed for Pain. Yes Historical Provider   albuterol sulfate (PROVENTIL;VENTOLIN) 2.5 mg/0.5 mL nebu nebulizer solution 2.5 mg by Nebulization route every four (4) hours as needed for Wheezing. Yes Historical Provider   dicyclomine (BENTYL) 20 mg tablet Take 20 mg by mouth every six (6) hours. Indications: Irritable Bowel Syndrome   Yes Historical Provider   dilTIAZem XR (DILACOR XR) 120 mg XR capsule Take 120 mg by mouth daily. Yes Historical Provider   pravastatin (PRAVACHOL) 40 mg tablet Take 40 mg by mouth nightly. Yes Historical Provider   tamsulosin (FLOMAX) 0.4 mg capsule  3/10/17  Yes Historical Provider   methotrexate (RHEUMATREX) 2.5 mg tablet Take 10 mg by mouth Every Thursday. Yes Historical Provider   folic acid (FOLVITE) 1 mg tablet Take 1 mg by mouth daily. Yes Historical Provider   esomeprazole (NEXIUM) 40 mg capsule Take 40 mg by mouth daily. Yes Historical Provider   tiotropium (SPIRIVA WITH HANDIHALER) 18 mcg inhalation capsule Take 1 Cap by inhalation daily. Yes Historical Provider   adalimumab (HUMIRA) 40 mg/0.8 mL injection by SubCUTAneous route every fourteen (14) days.     Historical Provider     Current Facility-Administered Medications   Medication Dose Route Frequency    acetaminophen (TYLENOL) tablet 650 mg  650 mg Oral QID    albuterol (PROVENTIL VENTOLIN) nebulizer solution 2.5 mg  2.5 mg Nebulization Q4H PRN    dilTIAZem CD (CARDIZEM CD) capsule 120 mg  120 mg Oral DAILY    folic acid (FOLVITE) tablet 1 mg  1 mg Oral DAILY    pravastatin (PRAVACHOL) tablet 40 mg  40 mg Oral QHS    tamsulosin (FLOMAX) capsule 0.4 mg  0.4 mg Oral DAILY    umeclidinium (INCRUSE ELLIPTA) 62.5 mcg/actuation  1 Puff Inhalation DAILY    sodium chloride (NS) flush 5-10 mL  5-10 mL IntraVENous Q8H    sodium chloride (NS) flush 5-10 mL  5-10 mL IntraVENous PRN    ondansetron (ZOFRAN) injection 4 mg  4 mg IntraVENous Q4H PRN    docusate sodium (COLACE) capsule 100 mg  100 mg Oral BID    0.45% sodium chloride infusion  100 mL/hr IntraVENous CONTINUOUS    morphine injection 2 mg  2 mg IntraVENous Q3H PRN     Allergies   Allergen Reactions    Ceclor [Cefaclor] Swelling    Contrast Agent [Iodine] Unknown (comments)    Floxin [Ofloxacin] Hives    Ivp [Fd And C Blue No.1] Itching        Review of Systems: denies f/c/n/v/cp/sob    Objective:     Patient Vitals for the past 8 hrs:   BP Temp Pulse Resp SpO2   17 0410 134/60 97.8 °F (36.6 °C) 73 16 93 %   17 0300 135/62 - 78 16 92 %   17 0215 121/54 - 77 16 93 %   17 0001 126/56 - - 15 95 %     Temp (24hrs), Av.1 °F (36.7 °C), Min:97.8 °F (36.6 °C), Max:98.4 °F (36.9 °C)        Physical Exam:  General:     Alert and oriented. No acute distress. Chest:     Respirations unlabored. Abdomen:     Extremities:   Soft, non-tender. No evidence of cyanosis. Pulses palpable in both upper and lower extremities. Destiny's sign negative in bilateral lower extremities. Moving all extremities but has pain with attempted ROM of left hip.  +logroll and stinchfield       Neurologic:  No motor deficits. Sensation stable. Neurovascular exam within normal limits.                  Data Review   Recent Results (from the past 24 hour(s))   URINALYSIS W/MICROSCOPIC    Collection Time: 05/10/17  2:54 PM   Result Value Ref Range    Color DARK YELLOW      Appearance CLEAR CLEAR      Specific gravity 1.021 1.003 - 1.030      pH (UA) 5.5 5.0 - 8.0      Protein TRACE (A) NEG mg/dL    Glucose NEGATIVE  NEG mg/dL    Ketone NEGATIVE  NEG mg/dL    Blood NEGATIVE  NEG      Urobilinogen 0.2 0.2 - 1.0 EU/dL    Nitrites NEGATIVE  NEG      Leukocyte Esterase NEGATIVE  NEG      WBC 0-4 0 - 4 /hpf    RBC 5-10 0 - 5 /hpf    Epithelial cells FEW FEW /lpf    Bacteria NEGATIVE  NEG /hpf    Yeast PRESENT (A) NEG     BILIRUBIN, CONFIRM    Collection Time: 05/10/17  2:54 PM   Result Value Ref Range    Bilirubin UA, confirm NEGATIVE  NEG     CBC WITH AUTOMATED DIFF    Collection Time: 05/10/17  9:54 PM   Result Value Ref Range    WBC 8.0 4.1 - 11.1 K/uL    RBC 3.62 (L) 4.10 - 5.70 M/uL    HGB 11.0 (L) 12.1 - 17.0 g/dL    HCT 33.4 (L) 36.6 - 50.3 %    MCV 92.3 80.0 - 99.0 FL    MCH 30.4 26.0 - 34.0 PG    MCHC 32.9 30.0 - 36.5 g/dL    RDW 14.2 11.5 - 14.5 %    PLATELET 846 (L) 169 - 400 K/uL    NEUTROPHILS 62 32 - 75 %    LYMPHOCYTES 25 12 - 49 %    MONOCYTES 11 5 - 13 %    EOSINOPHILS 2 0 - 7 %    BASOPHILS 0 0 - 1 %    ABS. NEUTROPHILS 5.0 1.8 - 8.0 K/UL    ABS. LYMPHOCYTES 2.0 0.8 - 3.5 K/UL    ABS. MONOCYTES 0.9 0.0 - 1.0 K/UL    ABS. EOSINOPHILS 0.1 0.0 - 0.4 K/UL    ABS. BASOPHILS 0.0 0.0 - 0.1 K/UL   METABOLIC PANEL, COMPREHENSIVE    Collection Time: 05/10/17  9:54 PM   Result Value Ref Range    Sodium 138 136 - 145 mmol/L    Potassium 3.7 3.5 - 5.1 mmol/L    Chloride 104 97 - 108 mmol/L    CO2 27 21 - 32 mmol/L    Anion gap 7 5 - 15 mmol/L    Glucose 189 (H) 65 - 100 mg/dL    BUN 9 6 - 20 MG/DL    Creatinine 0.91 0.70 - 1.30 MG/DL    BUN/Creatinine ratio 10 (L) 12 - 20      GFR est AA >60 >60 ml/min/1.73m2    GFR est non-AA >60 >60 ml/min/1.73m2    Calcium 8.5 8.5 - 10.1 MG/DL    Bilirubin, total 0.4 0.2 - 1.0 MG/DL    ALT (SGPT) 37 12 - 78 U/L    AST (SGOT) 40 (H) 15 - 37 U/L    Alk.  phosphatase 122 (H) 45 - 117 U/L    Protein, total 6.9 6.4 - 8.2 g/dL    Albumin 2.9 (L) 3.5 - 5.0 g/dL    Globulin 4.0 2.0 - 4.0 g/dL    A-G Ratio 0.7 (L) 1.1 - 2.2     PROTHROMBIN TIME + INR    Collection Time: 05/10/17  9:54 PM Result Value Ref Range    INR 1.1 0.9 - 1.1      Prothrombin time 11.3 (H) 9.0 - 11.1 sec   PTT    Collection Time: 05/10/17  9:54 PM   Result Value Ref Range    aPTT 25.8 22.1 - 32.5 sec    aPTT, therapeutic range     58.0 - 77.0 SECS   EKG, 12 LEAD, INITIAL    Collection Time: 05/11/17  1:27 AM   Result Value Ref Range    Ventricular Rate 78 BPM    Atrial Rate 78 BPM    P-R Interval 152 ms    QRS Duration 122 ms    Q-T Interval 406 ms    QTC Calculation (Bezet) 462 ms    Calculated P Axis 87 degrees    Calculated R Axis -29 degrees    Calculated T Axis 13 degrees    Diagnosis       Normal sinus rhythm  Right bundle branch block  Abnormal ECG  When compared with ECG of 12-APR-2017 16:46,  No significant change was found           Assessment/Plan:     66 yo male with L intertroch hip fx  -pain control  -bedrest  -npo today for L hip IMN  -admitted by medical team for optimization and clearance        Kerry Deluna DO

## 2017-05-11 NOTE — BRIEF OP NOTE
BRIEF OPERATIVE NOTE    Date of Procedure: 5/11/2017   Preoperative Diagnosis: left hip fracture  Postoperative Diagnosis: left hip fracture    Procedure(s): FEMORAL INTERTROCHANTERIC NAIL INSERTION  Surgeon(s) and Role:     * Medina Mejia DO - Primary         Assistant Staff:       Surgical Staff:  Circ-1: Alfredo Branch RN  Circ-Relief: Elana Maurer RN  Scrub Tech-1: Lj Bryan  Scrub Tech-Relief: Jordon Jones  Surg Asst-1: [facility]iel [facility]  [facility] Staff: Crystal Lan RN  Event Time In   Incision Start 1859   Incision Close      Anesthesia: General   Estimated Blood Loss: 200cc  Specimens: * No specimens in log *   Findings: it fx   Complications: none  Implants:   Implant Name Type Inv.  Item Serial No.  Lot No. LRB No. Used Action   11MM/130 DEG TI ANGEL TFNA 400MM/LEFT   [id number] DEPUY ORTHO [id number] Left 1 Implanted   TFNA HELICAL BLADE [id number]      [id number] DEPUY ORTHO [id number] Left 1 Implanted

## 2017-05-11 NOTE — ED NOTES
Attempted to call report to Ortho. Receiving RN, Usman Cunningham, will call back. Patient asleep at this time with respirations even and non-labored. VSS.

## 2017-05-11 NOTE — PROGRESS NOTES
Bedside and Verbal shift change report given to Moses Heath (oncoming nurse) by Marcos Mahmood (offgoing nurse). Report included the following information SBAR, Kardex, ED Summary, Procedure Summary, Intake/Output, MAR, Accordion, Recent Results and Cardiac Rhythm +bbb.

## 2017-05-11 NOTE — ED PROVIDER NOTES
HPI Comments: Laura Vick, 65 y.o. male, presents via EMS to ED [facility] ED with cc of acute onset of L hip pain s/p fall out of his unlocked wheelchair x [phone]. He states he is not in pain when he remains still. Patient states he hit the back of his head when he fell. He denies any other injury, neck pain, or LOC. He reports he last took PO around 1730. Patient is currently in rehabilitation s/p shunt revision last month. He does not currently take blood thinning medications other than a daily aspirin but was on blood thinners during his last admission. PCP: Ani Bedolla MD    PMHx significant for: COPD, GERD, colon polyps, DM, RA, diarrhea, emphysema, HTN, dysphagia, cancer, hydrocephalus, stroke  PSHx significant for: bilateral cataracts, femoral stent, R shoulder melanoma excision, R AV shunt, cholecystectomy, colonoscopy with tumor polyp removal  Social history significant for: - (former) Tobacco, + EtOH, - Illicit Drug Use    There are no other complaints, changes, or physical findings at this time. Written by ROZ Renteria, as dictated by Marcos Leiva. Graciela Campbell MD.     The history is provided by the patient, the spouse and a relative. No  was used.         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, arms    Other ill-defined conditions     colon polyps    RA (rheumatoid arthritis) ([address]     Scarring     Stroke ([address] 2014    L arm weakness       Past Surgical History:   Procedure Laterality Date    HX CHOLECYSTECTOMY  04/18/2017    HX HEENT      bilat cataract    HX OTHER SURGICAL      r shoulder melenoma excision    [place] COLSC FLX W/RMVL OF TUMOR POLYP LESION SNARE TQ  4/28/2011         [place] EGD TRANSORAL BIOPSY SINGLE/MULTIPLE  4/28/2011         VASCULAR SURGERY PROCEDURE UNLIST      stent femoral-left leg    VASCULAR SURGERY PROCEDURE UNLIST      right AV shunt         History reviewed. No pertinent family history. Social History     Social History    Marital status:      Spouse name: N/A    Number of children: N/A    Years of education: N/A     Occupational History    Not on file. Social History Main Topics    Smoking status: Former Smoker    Smokeless tobacco: Never Used    Alcohol use 3.5 oz/week     7 Shots of liquor per week    Drug use: No    Sexual activity: Not on file     Other Topics Concern    Not on file     Social History Narrative         ALLERGIES: Ceclor [cefaclor]; Contrast agent [iodine]; Floxin [ofloxacin]; and Ivp [fd and c blue no. 1]    Review of Systems   Constitutional: Negative. Negative for activity change, appetite change, chills, fatigue, fever and unexpected weight change. HENT: Negative. Negative for congestion, hearing loss, rhinorrhea, sneezing and voice change. Eyes: Negative. Negative for pain and visual disturbance. Respiratory: Negative. Negative for apnea, cough, choking, chest tightness and shortness of breath. Cardiovascular: Negative. Negative for chest pain and palpitations. Gastrointestinal: Negative. Negative for abdominal distention, abdominal pain, blood in stool, diarrhea, nausea and vomiting. Genitourinary: Negative. Negative for difficulty urinating, flank pain, frequency and urgency. No discharge   Musculoskeletal: Positive for arthralgias. Negative for back pain, myalgias, neck pain and neck stiffness. Positive for fall, head trauma    Skin: Negative. Negative for color change and rash. Neurological: Negative. Negative for dizziness, seizures, syncope, speech difficulty, weakness, numbness and headaches.         Negative for LOC   Hematological: Negative for adenopathy. Psychiatric/Behavioral: Negative. Negative for agitation, behavioral problems, dysphoric mood and suicidal ideas. The patient is not nervous/anxious. Patient Vitals for the past 12 hrs:   Temp Pulse Resp BP SpO2   05/11/17 0001 - - - 126/56 95 %   05/10/17 2315 - - - 118/51 97 %   05/10/17 2230 - - - 139/70 94 %   05/10/17 2145 98.4 °F (36.9 °C) - - 145/60 96 %   05/10/17 2140 - - - 137/64 -   05/10/17 2129 - 84 16 141/72 96 %        Physical Exam   Nursing note and vital signs reviewed. Physical Examination: General appearance - WDWN, in no apparent distress  Head - NC/AT  Eyes - pupils equal, round  and reactive, extraocular eye movements intact, conj/sclera clear, anicteric  Mouth - mucous membranes moist, pharynx normal without lesions  Nose/Ears - nares clear, Tms & canals clear  Neck - supple, no significant adenopathy, trachea midline, no crepitus, c spine diffusely non-tender, no step offs  Chest - Normal respiratory effort, clear to auscultation bilaterally, no wheezes/rales/rhonchi  Heart - normal rate and regular rhythm, S1 and S2 normal, no murmurs, gallops, or rubs  Abdomen - soft, nontender, nondistended, nabs, no masses, guarding, rebound or rigidity  Neurological - alert, oriented, normal speech, cranial nerves intact, no focal motor findings, motor & sensory diffusely intact  Extremities/MS - peripheral pulses normal, no pedal edema, shortened and externally rotated L hip with tenderness, spine diffusely non-tender, extremities warm to touch  Skin - normal coloration and turgor, no rashes, no lesions or lacerations  Written by ROZ Renteriaibjoseph, as dictated by Kianna Sterling.  Graciela Campbell MD.       MDM  Number of Diagnoses or Management Options  Closed left hip fracture, initial encounter [facility]):   Diagnosis management comments:   Ddx: hip fx, head injury, bleed       Amount and/or Complexity of Data Reviewed  Clinical lab tests: ordered and reviewed  Tests in the radiology section of CPT®: ordered and reviewed  Review and summarize past medical records: yes  Discuss the patient with other providers: yes (Orthopedics, hospitalist)    Patient Progress  Patient progress: stable    ED Course       Procedures    Progress Note:  11:41 PM  [facility] states they are not taking consults for new patients and is suggesting a call to [facility]. Written by ROZ Greer, as dictated by Curtis Damon. Linda Moreno MD.     Consult Note:  11:50 PM  Nanette Moreno MD spoke with Dr. Balta Lozada,  Specialty: hospitalist  Discussed pt's hx, disposition, and available diagnostic and imaging results. Reviewed care plans. Consultant agrees with plans as outlined. Dr. Eveline Dangelo accepts the patient for hospitalization and is requesting a call to ortho. Written by ROZ Greeribjoseph, as dictated by Curtis Damon. Linda Moreno MD.     Consult Note:  11:57 PM  Nanette Moreno MD spoke with Dr. Roney Ghosh,   Specialty: orthopedics  Discussed pt's hx, disposition, and available diagnostic and imaging results. Reviewed care plans. Consultant agrees with plans as outlined. Dr. Roney Ghosh states he will consult the patient in the morning to discuss the treatment plan. Written by ROZ Greeribjoseph, as dictated by Crutis Damon. Linda Moreno MD.    Progress Note:  11:59 PM  Updated patient and family members on plans for admission and consults. Addressed questions. Written by ROZ Greer, as dictated by Curtis Damon.  Linda Moreno MD.     LABORATORY TESTS:  Recent Results (from the past 12 hour(s))   URINALYSIS W/MICROSCOPIC    Collection Time: 05/10/17  2:54 PM   Result Value Ref Range    Color DARK YELLOW      Appearance CLEAR CLEAR      Specific gravity 1.021 1.003 - 1.030      pH (UA) 5.5 5.0 - 8.0      Protein TRACE (A) NEG mg/dL    Glucose NEGATIVE  NEG mg/dL    Ketone NEGATIVE  NEG mg/dL    Blood NEGATIVE  NEG      Urobilinogen 0.2 0.2 - 1.0 EU/dL Nitrites NEGATIVE  NEG      Leukocyte Esterase NEGATIVE  NEG      WBC 0-4 0 - 4 /hpf    RBC 5-10 0 - 5 /hpf    Epithelial cells FEW FEW /lpf    Bacteria NEGATIVE  NEG /hpf    Yeast PRESENT (A) NEG     BILIRUBIN, CONFIRM    Collection Time: 05/10/17  2:54 PM   Result Value Ref Range    Bilirubin UA, confirm NEGATIVE  NEG     CBC WITH AUTOMATED DIFF    Collection Time: 05/10/17  9:54 PM   Result Value Ref Range    WBC 8.0 4.1 - 11.1 K/uL    RBC 3.62 (L) 4.10 - 5.70 M/uL    HGB 11.0 (L) 12.1 - 17.0 g/dL    HCT 33.4 (L) 36.6 - 50.3 %    MCV 92.3 80.0 - 99.0 FL    MCH 30.4 26.0 - 34.0 PG    MCHC 32.9 30.0 - 36.5 g/dL    RDW 14.2 11.5 - 14.5 %    PLATELET 322 (L) 422 - 400 K/uL    NEUTROPHILS 62 32 - 75 %    LYMPHOCYTES 25 12 - 49 %    MONOCYTES 11 5 - 13 %    EOSINOPHILS 2 0 - 7 %    BASOPHILS 0 0 - 1 %    ABS. NEUTROPHILS 5.0 1.8 - 8.0 K/UL    ABS. LYMPHOCYTES 2.0 0.8 - 3.5 K/UL    ABS. MONOCYTES 0.9 0.0 - 1.0 K/UL    ABS. EOSINOPHILS 0.1 0.0 - 0.4 K/UL    ABS. BASOPHILS 0.0 0.0 - 0.1 K/UL   METABOLIC PANEL, COMPREHENSIVE    Collection Time: 05/10/17  9:54 PM   Result Value Ref Range    Sodium 138 136 - 145 mmol/L    Potassium 3.7 3.5 - 5.1 mmol/L    Chloride 104 97 - 108 mmol/L    CO2 27 21 - 32 mmol/L    Anion gap 7 5 - 15 mmol/L    Glucose 189 (H) 65 - 100 mg/dL    BUN 9 6 - 20 MG/DL    Creatinine 0.91 0.70 - 1.30 MG/DL    BUN/Creatinine ratio 10 (L) 12 - 20      GFR est AA >60 >60 ml/min/1.73m2    GFR est non-AA >60 >60 ml/min/1.73m2    Calcium 8.5 8.5 - 10.1 MG/DL    Bilirubin, total 0.4 0.2 - 1.0 MG/DL    ALT (SGPT) 37 12 - 78 U/L    AST (SGOT) 40 (H) 15 - 37 U/L    Alk.  phosphatase 122 (H) 45 - 117 U/L    Protein, total 6.9 6.4 - 8.2 g/dL    Albumin 2.9 (L) 3.5 - 5.0 g/dL    Globulin 4.0 2.0 - 4.0 g/dL    A-G Ratio 0.7 (L) 1.1 - 2.2     PROTHROMBIN TIME + INR    Collection Time: 05/10/17  9:54 PM   Result Value Ref Range    INR 1.1 0.9 - 1.1      Prothrombin time 11.3 (H) 9.0 - 11.1 sec   PTT    Collection Time: 05/10/17  9:54 PM   Result Value Ref Range    aPTT 25.8 22.1 - 32.5 sec    aPTT, therapeutic range     58.0 - 77.0 SECS       IMAGING RESULTS:  CT HEAD WO CONT   Final Result   CT Results  (Last 48 hours)               05/10/17 2133  CT HEAD WO CONT Final result    Impression:  IMPRESSION: No acute findings. Sinus disease. Narrative:  EXAM:  CT HEAD WO CONT       INDICATION:   head trauma, fall       COMPARISON: 2016. TECHNIQUE: Unenhanced CT of the head was performed using 5 mm images. Brain and   bone windows were generated. CT dose reduction was achieved through use of a   standardized protocol tailored for this examination and automatic exposure   control for dose modulation. FINDINGS:   The ventricles and sulci are enlarged. The ventriculoperitoneal shunt is   unchanged. There is no significant white matter disease. There is no   intracranial hemorrhage, extra-axial collection, mass, mass effect or midline   shift. The basilar cisterns are open. No acute infarct is identified. The bone   windows demonstrate no abnormalities. There is mucosal thickening of the   sphenoid sinuses and ethmoid sinuses. MEDICATIONS GIVEN:  Medications   morphine injection 2 mg (2 mg IntraVENous Given 5/11/17 0013)   ondansetron (ZOFRAN) injection 4 mg (4 mg IntraVENous Given 5/11/17 0009)       IMPRESSION:  1. Closed left hip fracture, initial encounter ([facility]        PLAN:  1. Admit to Dr. Jennifer Ayon, hospitalist.    Admit Note:  11:59 PM  Pt is being admitted by Dr. Jennifer Ayon, hospitalist. The results of their tests and reason(s) for their admission have been discussed with pt and/or available family. They convey agreement and understanding for the need to be admitted and for admission diagnosis. This note is prepared by Ruby Villanueva, acting as a Scribe for [employer]. Opal Gomez, [address] [place] [address].  Opal Gomez MD: The scribe's documentation has been prepared under my direction and personally reviewed by me in its entirety. I confirm that the notes above accurately reflects all work, treatment, procedures, and medical decision making performed by me.

## 2017-05-11 NOTE — ED NOTES
Assisted patient with use of urinal.     TRANSFER - OUT REPORT:    Verbal report given to Giuseppe Aguilar RN on Rhina Mauricio  being transferred to Ortho (unit) for routine progression of care       Report consisted of patients Situation, Background, Assessment and   Recommendations(SBAR). Information from the following report(s) SBAR, ED Summary, Procedure Summary, Intake/Output, MAR and Recent Results was reviewed with the receiving nurse. Lines:       Opportunity for questions and clarification was provided.       Patient transported with:   [place]

## 2017-05-11 NOTE — PROGRESS NOTES
Primary Nurse Klaudia Tinoco and Kalee Etienne RN performed a dual skin assessment on this patient No impairment noted  Patrick score is 18

## 2017-05-11 NOTE — PROGRESS NOTES
Reviewed history and plan of care as outlined by Dr Eveline Dangelo. VS stable this AM.  No new complaints. For surgery later today.

## 2017-05-12 LAB
ANION GAP BLD CALC-SCNC: 7 MMOL/L (ref 5–15)
APPEARANCE UR: ABNORMAL
BACTERIA SPEC CULT: ABNORMAL
BACTERIA SPEC CULT: ABNORMAL
BACTERIA URNS QL MICRO: NEGATIVE /HPF
BILIRUB UR QL: NEGATIVE
BUN SERPL-MCNC: 6 MG/DL (ref 6–20)
BUN/CREAT SERPL: 9 (ref 12–20)
CALCIUM SERPL-MCNC: 8.4 MG/DL (ref 8.5–10.1)
CC UR VC: ABNORMAL
CHLORIDE SERPL-SCNC: 104 MMOL/L (ref 97–108)
CO2 SERPL-SCNC: 25 MMOL/L (ref 21–32)
COLOR UR: ABNORMAL
CREAT SERPL-MCNC: 0.69 MG/DL (ref 0.7–1.3)
EPITH CASTS URNS QL MICRO: ABNORMAL /LPF
ERYTHROCYTE [DISTWIDTH] IN BLOOD BY AUTOMATED COUNT: 14.3 % (ref 11.5–14.5)
GLUCOSE BLD STRIP.AUTO-MCNC: 160 MG/DL (ref 65–100)
GLUCOSE BLD STRIP.AUTO-MCNC: 175 MG/DL (ref 65–100)
GLUCOSE BLD STRIP.AUTO-MCNC: 183 MG/DL (ref 65–100)
GLUCOSE BLD STRIP.AUTO-MCNC: 214 MG/DL (ref 65–100)
GLUCOSE SERPL-MCNC: 190 MG/DL (ref 65–100)
GLUCOSE UR STRIP.AUTO-MCNC: 100 MG/DL
HCT VFR BLD AUTO: 33.4 % (ref 36.6–50.3)
HGB BLD-MCNC: 10.2 G/DL (ref 12.1–17)
HGB UR QL STRIP: NEGATIVE
KETONES UR QL STRIP.AUTO: NEGATIVE MG/DL
LEUKOCYTE ESTERASE UR QL STRIP.AUTO: NEGATIVE
MCH RBC QN AUTO: 28.8 PG (ref 26–34)
MCHC RBC AUTO-ENTMCNC: 30.5 G/DL (ref 30–36.5)
MCV RBC AUTO: 94.4 FL (ref 80–99)
NITRITE UR QL STRIP.AUTO: NEGATIVE
PH UR STRIP: 5.5 [PH] (ref 5–8)
PLATELET # BLD AUTO: 148 K/UL (ref 150–400)
POTASSIUM SERPL-SCNC: 4.7 MMOL/L (ref 3.5–5.1)
PROT UR STRIP-MCNC: NEGATIVE MG/DL
RBC # BLD AUTO: 3.54 M/UL (ref 4.1–5.7)
RBC #/AREA URNS HPF: ABNORMAL /HPF (ref 0–5)
SERVICE CMNT-IMP: ABNORMAL
SODIUM SERPL-SCNC: 136 MMOL/L (ref 136–145)
SP GR UR REFRACTOMETRY: 1.02 (ref 1–1.03)
UA: UC IF INDICATED,UAUC: ABNORMAL
UROBILINOGEN UR QL STRIP.AUTO: 0.2 EU/DL (ref 0.2–1)
WBC # BLD AUTO: 11.2 K/UL (ref 4.1–11.1)
WBC URNS QL MICRO: ABNORMAL /HPF (ref 0–4)
YEAST URNS QL MICRO: PRESENT

## 2017-05-12 PROCEDURE — 74011250636 HC RX REV CODE- 250/636: Performed by: ORTHOPAEDIC SURGERY

## 2017-05-12 PROCEDURE — 65270000029 HC RM PRIVATE

## 2017-05-12 PROCEDURE — G8979 MOBILITY GOAL STATUS: HCPCS

## 2017-05-12 PROCEDURE — 82962 GLUCOSE BLOOD TEST: CPT

## 2017-05-12 PROCEDURE — 74011250637 HC RX REV CODE- 250/637: Performed by: INTERNAL MEDICINE

## 2017-05-12 PROCEDURE — 97165 OT EVAL LOW COMPLEX 30 MIN: CPT | Performed by: OCCUPATIONAL THERAPIST

## 2017-05-12 PROCEDURE — 87086 URINE CULTURE/COLONY COUNT: CPT | Performed by: NURSE PRACTITIONER

## 2017-05-12 PROCEDURE — 36415 COLL VENOUS BLD VENIPUNCTURE: CPT | Performed by: INTERNAL MEDICINE

## 2017-05-12 PROCEDURE — 85027 COMPLETE CBC AUTOMATED: CPT | Performed by: INTERNAL MEDICINE

## 2017-05-12 PROCEDURE — 74011250637 HC RX REV CODE- 250/637: Performed by: NURSE PRACTITIONER

## 2017-05-12 PROCEDURE — 77030034849

## 2017-05-12 PROCEDURE — 97530 THERAPEUTIC ACTIVITIES: CPT

## 2017-05-12 PROCEDURE — 81001 URINALYSIS AUTO W/SCOPE: CPT | Performed by: NURSE PRACTITIONER

## 2017-05-12 PROCEDURE — G8978 MOBILITY CURRENT STATUS: HCPCS

## 2017-05-12 PROCEDURE — 51798 US URINE CAPACITY MEASURE: CPT

## 2017-05-12 PROCEDURE — 74011636637 HC RX REV CODE- 636/637: Performed by: INTERNAL MEDICINE

## 2017-05-12 PROCEDURE — 77010033678 HC OXYGEN DAILY

## 2017-05-12 PROCEDURE — G8988 SELF CARE GOAL STATUS: HCPCS | Performed by: OCCUPATIONAL THERAPIST

## 2017-05-12 PROCEDURE — 97162 PT EVAL MOD COMPLEX 30 MIN: CPT

## 2017-05-12 PROCEDURE — 97535 SELF CARE MNGMENT TRAINING: CPT | Performed by: OCCUPATIONAL THERAPIST

## 2017-05-12 PROCEDURE — 80048 BASIC METABOLIC PNL TOTAL CA: CPT | Performed by: INTERNAL MEDICINE

## 2017-05-12 PROCEDURE — G8987 SELF CARE CURRENT STATUS: HCPCS | Performed by: OCCUPATIONAL THERAPIST

## 2017-05-12 RX ORDER — INSULIN LISPRO 100 [IU]/ML
INJECTION, SOLUTION INTRAVENOUS; SUBCUTANEOUS
Status: DISCONTINUED | OUTPATIENT
Start: 2017-05-12 | End: 2017-05-16 | Stop reason: HOSPADM

## 2017-05-12 RX ORDER — ENOXAPARIN SODIUM 100 MG/ML
40 INJECTION SUBCUTANEOUS EVERY 24 HOURS
Status: DISCONTINUED | OUTPATIENT
Start: 2017-05-13 | End: 2017-05-16 | Stop reason: HOSPADM

## 2017-05-12 RX ORDER — OXYCODONE HYDROCHLORIDE 5 MG/1
2.5-5 TABLET ORAL
Status: DISCONTINUED | OUTPATIENT
Start: 2017-05-12 | End: 2017-05-16 | Stop reason: HOSPADM

## 2017-05-12 RX ORDER — MAGNESIUM SULFATE 100 %
4 CRYSTALS MISCELLANEOUS AS NEEDED
Status: DISCONTINUED | OUTPATIENT
Start: 2017-05-12 | End: 2017-05-16 | Stop reason: HOSPADM

## 2017-05-12 RX ORDER — DEXTROSE 50 % IN WATER (D50W) INTRAVENOUS SYRINGE
12.5-25 AS NEEDED
Status: DISCONTINUED | OUTPATIENT
Start: 2017-05-12 | End: 2017-05-15

## 2017-05-12 RX ADMIN — DILTIAZEM HYDROCHLORIDE 120 MG: 120 CAPSULE, COATED, EXTENDED RELEASE ORAL at 10:11

## 2017-05-12 RX ADMIN — ACETAMINOPHEN 650 MG: 325 TABLET, FILM COATED ORAL at 10:09

## 2017-05-12 RX ADMIN — INSULIN LISPRO 3 UNITS: 100 INJECTION, SOLUTION INTRAVENOUS; SUBCUTANEOUS at 12:57

## 2017-05-12 RX ADMIN — VANCOMYCIN HYDROCHLORIDE 1500 MG: 10 INJECTION, POWDER, LYOPHILIZED, FOR SOLUTION INTRAVENOUS at 05:28

## 2017-05-12 RX ADMIN — INSULIN LISPRO 2 UNITS: 100 INJECTION, SOLUTION INTRAVENOUS; SUBCUTANEOUS at 16:57

## 2017-05-12 RX ADMIN — OXYCODONE HYDROCHLORIDE 5 MG: 5 TABLET ORAL at 10:41

## 2017-05-12 RX ADMIN — DOCUSATE SODIUM 100 MG: 100 CAPSULE, LIQUID FILLED ORAL at 18:03

## 2017-05-12 RX ADMIN — Medication 10 ML: at 15:37

## 2017-05-12 RX ADMIN — DOCUSATE SODIUM 100 MG: 100 CAPSULE, LIQUID FILLED ORAL at 10:11

## 2017-05-12 RX ADMIN — OXYCODONE HYDROCHLORIDE 2.5 MG: 5 TABLET ORAL at 21:17

## 2017-05-12 RX ADMIN — FOLIC ACID 1 MG: 1 TABLET ORAL at 10:11

## 2017-05-12 RX ADMIN — ACETAMINOPHEN 650 MG: 325 TABLET, FILM COATED ORAL at 15:34

## 2017-05-12 RX ADMIN — SODIUM CHLORIDE 125 ML/HR: 900 INJECTION, SOLUTION INTRAVENOUS at 19:41

## 2017-05-12 RX ADMIN — SODIUM CHLORIDE 125 ML/HR: 900 INJECTION, SOLUTION INTRAVENOUS at 11:42

## 2017-05-12 RX ADMIN — UMECLIDINIUM 1 PUFF: 62.5 AEROSOL, POWDER ORAL at 10:13

## 2017-05-12 RX ADMIN — PRAVASTATIN SODIUM 40 MG: 40 TABLET ORAL at 21:17

## 2017-05-12 RX ADMIN — TAMSULOSIN HYDROCHLORIDE 0.4 MG: 0.4 CAPSULE ORAL at 10:11

## 2017-05-12 RX ADMIN — ACETAMINOPHEN 650 MG: 325 TABLET, FILM COATED ORAL at 21:17

## 2017-05-12 NOTE — OP NOTES
[address], [facility]   OP NOTE       Name:  Patel Zheng   MR#:  [id number]   :  1940   Account #:  [de-identified]    Surgery Date:  2017   Date of Adm:  05/10/2017       PREOPERATIVE DIAGNOSIS:  Left intertrochanteric hip fracture. POSTOPERATIVE DIAGNOSIS:  Left intertrochanteric hip fracture. PROCEDURES PERFORMED:  Left hip cephalomedullary nailing   using a Synthes TFNA device. ESTIMATED BLOOD LOSS: 200 mL. SPECIMENS REMOVED: None. ANESTHESIA:  General.    SURGEON: Mani Le MD    ASSISTANT: None. COMPLICATIONS: None. DISPOSITION: Stable to PACU. INDICATIONS FOR PROCEDURE: The patient is a 26-year-old male   who presented to [facility] ER after a fall at a rehab   facility on 05/10/2017. Workup in the ER revealed evidence of a   intertrochanteric left fracture. He was unable to bear weight and had   significant left hip pain. Orthopedics was consulted for management of   this fracture, while Medicine admitted the patient to the hospital for   optimization and clearance. We discussed risks and benefits of left hip   cephalomedullary nailing with the patient and family. Risks of infection,   blood loss, neurovascular injury, anesthesia risk, and risks secondary   to the patient's comorbidities were explained. Informed consent was   obtained. The patient was medically optimized and cleared prior to   surgery. The patient was seen in the preoperative holding area on   2017. He had been n.p.o. and kept at bed rest. His operative   extremity was marked by Orthopedics. DESCRIPTION OF PROCEDURE: The patient was taken to the   operating room, and transferred to the [facility] table after he was given   sedation and intubated by Anesthesia. Once then positioned on the   [place] table, all prominences were carefully padded.  We used x-ray to   confirm appropriate positioning and reduction of the fracture. Once   appropriate reduction of the fracture was obtained on this table, we   performed sterile prepping and draping. After sterile prepping and   draping, a time-out was called. The patient was identified by name,   date of birth, operative extremity, and operative procedure. After all   were in agreement that the left hip was the operative extremity, an   incision was made just proximal to the tip of the greater trochanter. Careful dissection down to the tip of the greater trochanter was   performed. We placed a guide pin on AP and lateral x-ray views for   guidance to confirm appropriate entry into the femoral canal from the   tip of the greater trochanter. Once the guide pin was adequately   positioned, an opening reamer was used to breech the cortex starting   at the tip of the greater trochanter and going into the canal. We placed   a ball-tipped guidewire down the length of the femur and confirmed on   AP and lateral x-rays centered positioning. We then measured the   appropriate length, 400 mm nail would be chosen. We then began by   reaming, starting with a size 8.5 reamer and reaming up to a size 12.5. This gave us adequate chatter, therefore, a size 11 nail was chosen. The size 11 nail x 400 mm was inserted over the ball-tipped guidewire. We confirmed its positioning on AP and lateral x-rays. We then placed   the aiming arm and made an incision at the lateral border of the thigh   for placement of the helical blade. The guide pin was initially placed on   AP and lateral views to confirm a tip to apex distance of less than 25   mm. Once this was confirmed with guidewire positioning, we over-  drilled and over-reamed. We measured the helical blade leg to be 100   mm in length. This helical blade was placed without difficulty and   confirmed a tip to apex distance of less than 25 mm on AP and lateral   views. We then performed final x-rays.  As this was a simple   intertrochanteric hip fracture, there was no need for distal locking   screws. We locked the helical blade into place and removed the aiming   arm. Thorough irrigation of the wound was performed. We used a   combination of 0 Vicryl, 2-0 Vicryl, and staples to close the wounds. Sterile dressings were applied, and the patient was awakened by   Anesthesia. He was transferred to the PACU in stable condition. He will be followed closely in the hospital. He will likely be returning to   rehab versus nursing facility after his hospital stay.         Darell Shepard DO DD / Percy.Ser   D:  05/11/2017   19:56   T:  05/11/2017   23:53   Job #:  [id number]

## 2017-05-12 NOTE — ANESTHESIA POSTPROCEDURE EVALUATION
Post-Anesthesia Evaluation and Assessment    Patient: Darlene Lau MRN: [id number]  SSN: [id number]    YOB: 1940  Age: 65 y.o. Sex: male       Cardiovascular Function/Vital Signs  Visit Vitals    /66 (BP 1 Location: Right arm, BP Patient Position: At rest)    Pulse (!) 122    Temp 36.9 °C (98.5 °F)    Resp 24    Ht 5' 9\" (1.753 m)    Wt 81.6 kg (180 lb)    SpO2 97%    BMI 26.58 kg/m2       Patient is status post general anesthesia for Procedure(s): FEMORAL INTERTROCHANTERIC NAIL INSERTION. Nausea/Vomiting: None    Postoperative hydration reviewed and adequate. Pain:  Pain Scale 1: Visual (05/11/17 2115)  Pain Intensity 1: 0 (05/11/17 2115)   Managed    Neurological Status:   Neuro (WDL): Exceptions to WDL (05/11/17 2015)  Neuro  Neurologic State: Drowsy; Eyes open to stimulus (05/11/17 2015)  Orientation Level: Unable to verbalize (05/11/17 2015)  Cognition: No command following (05/11/17 2015)  Speech: Other (comment) (not verbalizing) (05/11/17 2015)  Assessment L Pupil: Round;Pinpointed (05/11/17 2015)  Size L Pupil (mm): 2 (05/11/17 2015)  Assessment R Pupil: Pinpointed;Round (05/11/17 2015)  Size R Pupil (mm): 2 (05/11/17 2015)  LUE Motor Response: Weak (05/11/17 2015)  LLE Motor Response: Weak (05/11/17 2015)  RUE Motor Response: Weak (05/11/17 2015)  RLE Motor Response: Weak (05/11/17 2015)   At baseline    Mental Status and Level of Consciousness: Arousable    Pulmonary Status:   O2 Device: Non-rebreather mask (05/11/17 2115)   Adequate oxygenation and airway patent    Complications related to anesthesia: None    Post-anesthesia assessment completed.  No concerns    Signed By: Pam King MD     May 11, 2017

## 2017-05-12 NOTE — PERIOP NOTES
Handoff Report from Operating Room to PACU    Report received from YOHAN Godinez and Benson Suggs CRNA regarding Randolph Juan. Surgeon(s):  Jaziel Daniels DO  And Procedure(s) (LRB):  FEMORAL INTERTROCHANTERIC NAIL INSERTION (Left)  confirmed   with allergies and dressings discussed. Anesthesia type, drugs, patient history, complications, estimated blood loss, vital signs, intake and output, and last pain medication, lines, reversal medications and temperature were reviewed.

## 2017-05-12 NOTE — PERIOP NOTES
Dr. Beka Abreu asked to come to PACU and assess patient secondary to continued decreased command following and responsiveness    2155  Dr. Beka Abreu reports okay to give 0.2 milligrams IV narcan    2200  Patient more responsive, able to state name and follow commands    2220  TRANSFER - OUT REPORT:    Verbal report given to Frank Tang on Skyla Garcia  being transferred to [facility], [phone] for routine post - op       Report consisted of patients Situation, Background, Assessment and   Recommendations(SBAR). Information from the following report(s) SBAR, Kardex, OR Summary, Intake/Output, MAR and Cardiac Rhythm sinus arrhythmia was reviewed with the receiving nurse. Lines:   Peripheral IV 05/10/17 Left Hand (Active)   Site Assessment Clean, dry, & intact 5/11/2017 10:34 PM   Phlebitis Assessment 0 5/11/2017 10:20 PM   Infiltration Assessment 0 5/11/2017 10:20 PM   Dressing Status Clean, dry, & intact 5/11/2017 10:20 PM   Dressing Type Tape;Transparent 5/11/2017 10:20 PM   Hub Color/Line Status Pink; Infusing 5/11/2017 10:20 PM   Action Taken Blood drawn 5/11/2017  3:09 AM        Opportunity for questions and clarification was provided.       Patient transported with:   Monitor  O2 @ 5 liters  Registered Nurse     Patient's family in patient's room

## 2017-05-12 NOTE — PROGRESS NOTES
Hospitalist Progress Note    NAME: Pepper Organ   :  1940   MRN:  [id number]     Interim Hospital Summary: 65 y.o. male whom presented on 5/10/2017 with      Assessment / Plan:    Hx A flutter  -some post op tachycardia, now resolved  -continue cardizem. Restart baby ASA every other day once okay with ortho    DM  -Will follow BG with SSI. COPD  -stable. Continue spiriva and prn nebs    RA  -holding MTX. He has been off humira since hospitalization in April  -continue folate    BPH with urine retention   -continue flomax.   -watkins inserted today  (1000cc)    Moderate malnutrition with recent weight loss and hypoalbuminemia  -related to recent prolonged hospitalization for sepsis / ruptured GB  -BMI 26  -supplements / dietary consult    S/P  shunt for NPH    Left hip fracture  -s/p femoral intertrochanteric nail insertion 17 Dr Hilary Manning  -DVT prophylaxis : Lovenox  -PT OT eval and treat         Subjective:     Chief Complaint / Reason for Physician Visit  Follow up of RA, COPD, aflutter, BPH  Chart reviewed in detail. Discussed with RN events overnight. Retained urine today (1000cc reported). Met wife at bedside    Review of Systems:  Symptom Y/N Comments  Symptom Y/N Comments   Fever/Chills n   Chest Pain n    Poor Appetite    Edema     Cough    Abdominal Pain n    Sputum    Joint Pain     SOB/SILVER n   Pruritis/Rash     Nausea/vomit    Tolerating PT/OT     Diarrhea    Tolerating Diet     Constipation    Other       Could NOT obtain due to:      PO intake: No data found. Objective:     VITALS:   Last 24hrs VS reviewed since prior progress note.  Most recent are:  Patient Vitals for the past 24 hrs:   Temp Pulse Resp BP SpO2   17 0755 98.7 °F (37.1 °C) 97 20 150/81 98 %   17 0239 - (!) 105 20 138/69 97 %   17 0055 98.3 °F (36.8 °C) (!) 110 20 136/77 97 %   17 0030 - (!) 112 20 130/72 98 %   17 0000 - (!) 111 20 113/79 97 %   05/11/17 2300 - (!) 107 20 146/78 97 %   05/11/17 2235 97.7 °F (36.5 °C) 90 20 131/73 96 %   05/11/17 2224 98.2 °F (36.8 °C) (!) 109 20 - 96 %   05/11/17 2215 - (!) 114 23 132/62 91 %   05/11/17 2200 - (!) 122 25 148/67 99 %   05/11/17 2145 - (!) 125 22 152/64 99 %   05/11/17 2130 - (!) 126 25 155/67 99 %   05/11/17 2115 - (!) 122 24 159/66 97 %   05/11/17 2100 - (!) 115 23 154/76 98 %   05/11/17 2045 - (!) 120 14 124/70 97 %   05/11/17 2030 - (!) 111 16 127/61 99 %   05/11/17 2025 - (!) 111 16 120/60 100 %   05/11/17 2020 - (!) 111 16 127/56 100 %   05/11/17 2015 - (!) 112 17 138/55 100 %   05/11/17 2010 - (!) 108 17 140/55 100 %   05/11/17 2004 98.5 °F (36.9 °C) (!) 107 16 133/53 100 %   05/11/17 1626 97.5 °F (36.4 °C) 92 18 135/63 94 %   05/11/17 1552 97.6 °F (36.4 °C) 84 18 141/67 98 %   05/11/17 1111 98.6 °F (37 °C) 82 16 137/72 97 %       Intake/Output Summary (Last 24 hours) at 05/12/17 1016  Last data filed at 05/12/17 0900   Gross per 24 hour   Intake             1000 ml   Output              450 ml   Net              550 ml        PHYSICAL EXAM:  General: WD, WN., cooperative, no acute distress    EENT:  EOMI. Anicteric sclerae. MMM  Resp:  CTA bilaterally, no wheezing or rales. No accessory muscle use  CV:  Regular  rhythm,  trace edema  GI:  Soft, Non distended, Non tender.  +Bowel sounds  Neurologic:  Alert and follows simple commands, normal speech,  Grossly intact sensation   Psych:   Fair insight. Not anxious nor agitated  Skin:  No rashes.   No jaundice    Reviewed most current lab test results and cultures  YES  Reviewed most current radiology test results   YES  Review and summation of old records today    NO  Reviewed patient's current orders and MAR    YES  PMH/SH reviewed - no change compared to H&P  ________________________________________________________________________  Care Plan discussed with:    Comments   Patient x    Family      RN     Care Manager     Consultant Multidiciplinary team rounds were held today with , nursing, pharmacist and clinical coordinator. Patient's plan of care was discussed; medications were reviewed and discharge planning was addressed. ________________________________________________________________________  Total NON critical care TIME:  25   Minutes    Total CRITICAL CARE TIME Spent:   Minutes non procedure based      Comments   >50% of visit spent in counseling and coordination of care x     This includes time during multidisciplinary rounds if indicated above   ________________________________________________________________________  Deepa Carney MD     Procedures: see electronic medical records for all procedures/Xrays and details which were not copied into this note but were reviewed prior to creation of Plan. LABS:  I reviewed today's most current labs and imaging studies.   Pertinent labs include:  Recent Labs      05/12/17   0413  05/10/17   2154   WBC  11.2*  8.0   HGB  10.2*  11.0*   HCT  33.4*  33.4*   PLT  148*  114*     Recent Labs      05/12/17   0413  05/10/17   2154   NA  136  138   K  4.7  3.7   CL  104  104   CO2  25  27   GLU  190*  189*   BUN  6  9   CREA  0.69*  0.91   CA  8.4*  8.5   ALB   --   2.9*   TBILI   --   0.4   SGOT   --   40*   ALT   --   37   INR   --   1.1

## 2017-05-12 NOTE — PROGRESS NOTES
Ortho / Neurosurgery NP Note    POD# 1  s/p FEMORAL INTERTROCHANTERIC NAIL INSERTION     Pt resting in bed. Wife at bedside. She is an excellent historian. History - : Ruptured infected gall bladder; treated at [facility] April 2017 with surgical removal and abx. Had  shunt that required rerouting from abd to chest due to abd infection during [place] [facility] Has been recovering at [facility] since 5/6. On 5/11 he fell out of wheelchair in his room and fractured his left hip. Unwitnessed fall. Today patient with complaints of \"feeling wiped\". He states he is generally weak, denies, CP, SOB, dizziness, lightheadedness. Wife reporting increased weakness/ lethargy since his admission at [facility]. She also reports 20 lb weight loss since April. VSS Afebrile. Mild tachycardia overnight is improving. Voiding status: Voiding small amounts (drops) in urinary or frequent urge. Some incontinence in brief reported overnight, but of course unable to measure volume voided. No UA on admission. No watkins this admission. Wife reports [place] wat[place] [facility]. Bladder scan shows >996. Watkins inserted with immediate return of 1000 cc. Watkins clamped and will unclamp un 1 hour. Urine reflex obtained at this time. Labs  Lab Results   Component Value Date/Time    HGB 10.2 05/12/2017 04:13 AM      Lab Results   Component Value Date/Time    INR 1.1 05/10/2017 09:54 PM        Body mass index is 26.58 kg/(m^2). : A BMI >30 is classified as Obesity. Left hip Opsite Dressing c.d.i  Cryotherapy in place over incision  Calves soft and supple; No pain with passive stretch  Sensation and motor intact  SCDs for mechanical DVT proph while in bed     PLAN:  1) PT BID, WBAT; OT eval ordered as well  2) Lovenox for DVT Prophylaxis; pt with hx of CVA and was taking ASA 81 every other day. Dr. Sushma Saldana ok with resumption of that - Dr. Armando Topete aware and will order. 3) Pain control - low dose Oxy 2.5-5 mg PO ordered.     4) Weakess/ mentation - could be related to immediate post-op state. Dr. Demarco Amador aware and may order MRI if persists over the weekend. 5) DM - SSI ordered. DM Diet  6) Urinary retention:  Inserted indwelling watkins at 10 am on 5/12. Clamped after 1000 cc return, nurse aware to unclamp in one hour. Will leave watkins in until more mobile. Maybe d/c and repeat void trial in a couple of days (Sat vs Sunday). Urine reflex ordered  7) Plan d/c back to [facility] when medically stable.     Calros Burt NP

## 2017-05-12 NOTE — PROGRESS NOTES
Initial Nutrition Assessment:    INTERVENTIONS/RECOMMENDATIONS:   · Continue current diet  · Glucerna TID  · Encourage protein and ONS consumption     ASSESSMENT:   Mr. Krystal Ramos, 64 yo male medically noted for s/p hip fracture repair, DM, COPD. Pt has experienced an 8% wt loss in the past month (severe) due to recent prolonged hospitalization for ruptured gallbladder. Pt wife was present during visit. We discussed the importance of nutrition, especially protein, for healing. Will send glucerna TID. Diet Order: Consistent carb  % Eaten:  No data found. Pertinent Medications: [x]Reviewed []Other (NS, colace, folic acid, insulin)  Pertinent Labs: [x]Reviewed []Other ()  Food Allergies: [x]None []Other   Last BM: 5/10   []Active     []Hyperactive  [x]Hypoactive       [] Absent BS  Skin:    [] Intact   [x] Incision  [] Breakdown  [] Other:    Anthropometrics:   Height: 5' 9\" (175.3 cm) Weight: 81.6 kg (180 lb)   IBW (%IBW):   ( ) UBW (%UBW):   (  %)   Last Weight Metrics:  Weight Loss Metrics 5/10/2017 5/3/2017 4/13/2017 4/12/2017 4/4/2017 3/7/2017 9/8/2016   Today's Wt 180 lb 182 lb 196 lb 196 lb 3.4 oz 199 lb 3.2 oz 203 lb 11.3 oz 196 lb   BMI 26.58 kg/m2 26.88 kg/m2 28.94 kg/m2 28.98 kg/m2 29.42 kg/m2 30.08 kg/m2 28.94 kg/m2       BMI: Body mass index is 26.58 kg/(m^2). This BMI is indicative of:   []Underweight    []Normal    [x]Overweight    [] Obesity   [] Extreme Obesity (BMI>40)     Estimated Nutrition Needs (Based on):   1975 Kcals/day (MSJ: 1525 x 1.3) , 105 g (1.3 g/kg) Protein  Carbohydrate:  At Least 130 g/day  Fluids: 1975 mL/day (1ml/kcal)    NUTRITION DIAGNOSES:   Problem:  Inadequate oral food/beverage intake Increased protein needs    Etiology: related to prolong hospitalization from ruptured galbladder wound healing    Signs/Symptoms: as evidenced by 8% wt loss in one month hip fracture s/p repair    NUTRITION INTERVENTIONS:  Meals/Snacks: General/healthful diet   Supplements: Commercial supplement              GOAL:   consume >75% of meals and ONS in 2-4 days    LEARNING NEEDS (Diet, Food/Nutrient-Drug Interaction):    [] None Identified   [x] Identified and Education Provided/Documented   [] Identified and Pt declined/was not appropriate     Cultureal, Yazdanism, OR Ethnic Dietary Needs:    [x] None Identified   [] Identified and Addressed     [x] Interdisciplinary Care Plan Reviewed/Documented    [x] Discharge Planning:  Consistent carb diet with adequate protein     MONITORING /EVALUATION:      Food/Nutrient Intake Outcomes:  Total energy intake  Physical Signs/Symptoms Outcomes: Weight/weight change, Electrolyte and renal profile, Glucose profile, GI    NUTRITION RISK:    [x] High     to         [x] Moderate           []  Low  []  Minimal/Uncompromised    PT SEEN FOR:    [x]  MD Consult: []Calorie Count      []Diabetic Diet Education        []Diet Education     []Electrolyte Management     [x]General Nutrition Management and Supplements     []Management of Tube Feeding     []TPN Recommendations    []  RN Referral:  []MST score >=2     []Enteral/Parenteral Nutrition PTA     []Pregnant: Gestational DM or Multigestation     []Pressure Ulcer/Wound Care needs        []  Low BMI  []  DTR Referral       Harris Kinney RDN  Pager [phone]  Weekend Pager [phone]

## 2017-05-12 NOTE — PROGRESS NOTES
Interdisciplinary Rounds:    Patient's course of treatment, hospital stay and discharge planning were discussed by the interdisciplinary team which includes, nursing, nurse manager, nurse practitioner, care management, rehab therapy representative, rehab liason, and pharmacy. Daily Pt goes, Geometric LOS, Discharge plan and barriers discussed. S/P gallbladder surgery with complications, admitted from [facility] after fall out of wheelchair resulting shelly fx hip. Patient having urinary retention and watkins placed. Dietary consulted for low albumin and poor intake.

## 2017-05-12 NOTE — DISCHARGE INSTRUCTIONS
Follow up appointments  Call Mike Singer at [phone] to schedule follow up appt with Dr. Martha Hernandez in  10 - 14 days. When to call your Orthopaedic Surgeon:  -unrelieved pain  -Signs of infection-if your incision is red; continues to have drainage; drainage has a foul odor or if you have a persistent fever over 101 degrees  -Signs of a blood clot in your leg-calf pain, tenderness, redness, swelling of lower leg  When to call your Primary Care Physician:  -Concerns about medical conditions such as diabetes, high blood pressure, asthma, congestive heart failure  -Call if blood sugars are elevated, persistent headache or dizziness, coughing or congestion, constipation or diarrhea, burning with urination, abnormal heart rate  When to call 911and go to the nearest emergency room  -acute onset of chest pain, shortness of breath, difficulty breathing    Activity - may weight bear as tolerated on left leg    Incision Care - keep dressing clean and dry, change daily, may remove and shower in 3 days    Preventing blood clots - Lovenox 40 mg daily for 21 days after surgery. Also continue home medication of Aspirin every other day    Pain management  -take pain medication as prescribed; decrease the amount you use as your pain lessens  -avoid alcoholic beverages while taking pain medication  -You may place an ice bag on your affected extremity     Diet  -resume usual diet; drink plenty of fluids; eat foods high in fiber  -you may want to take a stool softener (such as Senokot-S or Colace) to prevent constipation while you are taking pain medication.   If constipation occurs, take a laxative (such as Dulcolax tablets, Milk of Magnesia, or a suppository)      Physical Therapy-per physicians order  Weight bearing status: Weight  Bearing as tolerated      Physical Therapy  -assessment and evaluation-bed mobility; functional transfers (bed, chair, bathroom, stairs); ambulation with equipment, safety and ability to get out of house in the event of an emergency  -review and maintain weight bearing status  -discuss pain management  -review how to do ADLs                            HOSPITALIST DISCHARGE INSTRUCTIONS    NAME: Remer Kocher   :  1940   MRN:  [id number]     Date/Time:  2017 1:12 PM    ADMIT DATE: 5/10/2017   DISCHARGE DATE: 2017     Attending Physician: Alanis Sprague MD    DISCHARGE DIAGNOSIS:    Left hip fracture s/p repair   Hematuria- improved  BPH with urine retention - watkins in place. Voiding trial as able per SNF. Urology saw while inpatient. Constipation- 2/2 narcotics. Continue bowel regimen. Suppository today. May need enema if no BM with suppository. Hx Kidney stones  Thrombocytopenia -stopped ASA, remains on lovenox for dvt prophylaxis per ortho  Hx A flutter -continue cardizem. Holding ASA due to hematuria. DM (HgA1c 6.8)-SSI prn. COPD-Continue spiriva and prn nebs  RA -holding MTX. He has been off humira since hospitalization in April. 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 OP follow up        Medications: Per above medication reconciliation. Pain Management: per above medications    Recommended diet: Diabetic Diet    Recommended activity: PT/OT Eval and Treat    Wound care: See surgical/procedure care instructions    Indwelling devices: Watkins catheter, voiding trial per SNF with care per routine    Supplemental Oxygen: None    Required Lab work: Per SNF routine and Weekly CBC    Glucose management:  Accucheck ACHS with sliding scale per SNF protocol    Code status: Full        Outside physician follow up:     Follow-up Information     Follow up With Details Comments Contact Info    Behzad Sullivan, DO In 2 weeks  Ann Mcdermott [address] III, DO In 1 week [address]  [phone]                 Skilled nursing facility/ SNF MD responsible for above on discharge. Information obtained by :  I understand that if any problems occur once I am at home I am to contact my physician. I understand and acknowledge receipt of the instructions indicated above.                                                                                                                                            Physician's or R.N.'s Signature                                                                  Date/Time                                                                                                                                              Patient or Repres

## 2017-05-12 NOTE — PROGRESS NOTES
Problem: Mobility Impaired (Adult and Pediatric)  Goal: *Acute Goals and Plan of Care (Insert Text)  Physical Therapy Goals  Initiated 2017  1. Patient will move from supine to sit and sit to supine in bed with minimal assistance/contact guard assist within 7 day(s). 2. Patient will transfer from bed to chair and chair to bed with maximal assistance using the least restrictive device within 7 day(s). 3. Patient will perform sit to stand with minimal assistance within 7 day(s). 4. Patient will ambulate with moderate assistance for 10 feet with the least restrictive device within 7 day(s). PHYSICAL THERAPY EVALUATION  Patient: Kash Dee (34 y.o. male)  Date: 2017  Primary Diagnosis: Hip fracture (HCC)  left hip fracture  Procedure(s) (LRB):  FEMORAL INTERTROCHANTERIC NAIL INSERTION (Left) 1 Day Post-Op   Precautions:   Fall, WBAT      ASSESSMENT :  Based on the objective data described below, the patient presents with increased drowsiness, generalized weakness, decreased activity tolerance, increased pain, impaired balance. PTA pt was at [facility] and fell out of wheelchair and returned to hospital with fx. Pt reported that he was doing well with therapy and using a RW. He was received in supine and cleared by nursing to mobilize, pt on 2L of supplemental oxygen and VSS. He required mod A to come to EOB and scooting. He needed several cues and manual assistance for rocking technique for transfers. Noted slight posterior lean at times. Sit<>stand was performed 1x with RW and then 1x with HHA and mod A x 2 for both or max A x1. He needed his feet to be blocked and encouragement to ease his anxiety of falling. BP dropped and pt felt dizzy. He was returned to supine and scooted up in bed. Nursing aware of session. Recommending return to IP at discharge.       Supine:154/80  Sittin/69  Standin/82  Supine:143/73        Patient will benefit from skilled intervention to address the above impairments. Patients rehabilitation potential is considered to be Good  Factors which may influence rehabilitation potential include:   [ ]         None noted  [ ]         Mental ability/status  [ ]         Medical condition  [ ]         Home/family situation and support systems  [ ]         Safety awareness  [X]         Pain tolerance/management  [ ]         Other:        PLAN :  Recommendations and Planned Interventions:  [X]           Bed Mobility Training             [ ]    Neuromuscular Re-Education  [X]           Transfer Training                   [ ]    Orthotic/Prosthetic Training  [X]           Gait Training                         [ ]    Modalities  [X]           Therapeutic Exercises           [ ]    Edema Management/Control  [X]           Therapeutic Activities            [X]    Patient and Family Training/Education  [ ]           Other (comment):     Frequency/Duration: Patient will be followed by physical therapy  daily to address goals. Discharge Recommendations: Inpatient Rehab  Further Equipment Recommendations for Discharge: TBD       SUBJECTIVE:   Patient stated I was doing so well over there. 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, arms    Other ill-defined conditions       colon polyps    RA (rheumatoid arthritis) ([facility]      Scarring      Stroke ([facility] 2014     L arm weakness     Past Surgical History:   Procedure Laterality Date    HX CHOLECYSTECTOMY   04/18/2017    HX HEENT         bilat cataract    HX OTHER SURGICAL         r shoulder melenoma excision    MI COLSC FLX W/RMVL OF TUMOR POLYP LESION SNARE TQ   4/28/2011  [place] EGD TRANSORAL BIOPSY SINGLE/MULTIPLE   4/28/2011          VASCULAR SURGERY PROCEDURE UNLIST         stent femoral-left leg    VASCULAR SURGERY PROCEDURE UNLIST         right AV shunt     Prior Level of Function/Home Situation: came from [facility] where he was walking with RW  Personal factors and/or comorbidities impacting plan of care:      Home Situation  Home Environment: Rehabilitation facility [facility])  # Steps to Enter: 0  One/Two Story Residence: One story  Living Alone: No  Support Systems: Spouse/Significant Other/Partner  Patient Expects to be Discharged to[de-identified] Unknown  Current DME Used/Available at Home: None     EXAMINATION/PRESENTATION/DECISION MAKING:   Critical Behavior:  Neurologic State: Alert, Confused  Orientation Level: Oriented to person  Cognition: Impaired decision making, Decreased attention/concentration, Follows commands  Safety/Judgement: Decreased awareness of need for safety  Hearing: Auditory  Auditory Impairment: None  Skin:  Dressing intact  Edema: WDL  Range Of Motion:  AROM: Generally decreased, functional (grossly nonfunction for LLE 2/2 pain)                       Strength:    Strength: Generally decreased, functional (grossly nonfunction for LLE 2/2 pain)                    Tone & Sensation:   Tone: Normal              Sensation: Intact               Coordination:  Coordination: Generally decreased, functional  Vision:   Acuity: Within Defined Limits  Functional Mobility:  Bed Mobility:     Supine to Sit: Minimum assistance  Sit to Supine: Moderate assistance  Scooting: Moderate assistance  Transfers:  Sit to Stand: Maximum assistance; Additional time;Assist x1 (pulling on stabilized RW)  Stand to Sit: Moderate assistance                       Balance:   Sitting: Impaired  Sitting - Static: Good (unsupported)  Sitting - Dynamic: Fair (occasional)  Standing: Impaired  Standing - Static: Poor  Ambulation/Gait Training:   unable at this time                       Functional Measure:  Barthel Index:      Bathin  Bladder: 0  Bowels: 5  Groomin  Dressin  Feedin  Mobility: 0  Stairs: 0  Toilet Use: 0  Transfer (Bed to Chair and Back): 5  Total: 10         Barthel and G-code impairment scale:  Percentage of impairment CH  0% CI  1-19% CJ  20-39% CK  40-59% CL  60-79% CM  80-99% CN  100%   Barthel Score 0-100 100 99-80 79-60 59-40 20-39 1-19    0   Barthel Score 0-20 20 17-19 13-16 9-12 5-8 1-4 0      The Barthel ADL Index: Guidelines  1. The index should be used as a record of what a patient does, not as a record of what a patient could do. 2. The main aim is to establish degree of independence from any help, physical or verbal, however minor and for whatever reason. 3. The need for supervision renders the patient not independent. 4. A patient's performance should be established using the best available evidence. Asking the patient, friends/relatives and nurses are the usual sources, but direct observation and common sense are also important. However direct testing is not needed. 5. Usually the patient's performance over the preceding 24-48 hours is important, but occasionally longer periods will be relevant. 6. Middle categories imply that the patient supplies over 50 per cent of the effort. 7. Use of aids to be independent is allowed. Latrell Clarke., BarthelDAVID. ([id number]). Functional evaluation: the Barthel Index. [address] [facility] (14)2. XOCHITL Murguia, Jimenez Arthur., Zaire Pearce., [place], [address] [place] [address] (). Measuring the change indisability after inpatient rehabilitation; comparison of the responsiveness of the Barthel Index and Functional [place] Measure. Journal of Neurology, Neurosurgery, and Psychiatry, 66(4), 900-544. Lyle Danielle, N.J.A, ERIKA Damian, & Kiel Nelson MPaulinoA. (2004.) Assessment of post-stroke quality of life in cost-effectiveness studies: The usefulness of the Barthel Index and the EuroQoL-5D.  Quality of Life Research, 13, 427-43            G codes: In compliance with CMSs Claims Based Outcome Reporting, the following G-code set was chosen for this patient based on their primary functional limitation being treated: The outcome measure chosen to determine the severity of the functional limitation was the barthel with a score of 10/100 which was correlated with the impairment scale. · Mobility - Walking and Moving Around:               - CURRENT STATUS:    CM - 80%-99% impaired, limited or restricted               - GOAL STATUS:           CL - 60%-79% impaired, limited or restricted               - D/C STATUS:                       ---------------To be determined---------------      Physical Therapy Evaluation Charge Determination   History Examination Presentation Decision-Making   MEDIUM  Complexity : 1-2 comorbidities / personal factors will impact the outcome/ POC  HIGH Complexity : 4+ Standardized tests and measures addressing body structure, function, activity limitation and / or participation in recreation  MEDIUM Complexity : Evolving with changing characteristics  HIGH Complexity : FOTO score of 1- 25       Based on the above components, the patient evaluation is determined to be of the following complexity level: MEDIUM     Pain:  Pain Scale 1: Visual  Pain Intensity 1: 0  Pain Location 1: Hip  Pain Orientation 1: Left  Pain Description 1: Aching  Pain Intervention(s) 1: Medication (see MAR); Cold pack; Distraction;Ice;Rest;Repositioned  Activity Tolerance:   fair  Please refer to the flowsheet for vital signs taken during this treatment.   After treatment:   [ ]         Patient left in no apparent distress sitting up in chair  [X]         Patient left in no apparent distress in bed  [X]         Call bell left within reach  [X]         Nursing notified  [ ]         Caregiver present  [ ]         Bed alarm activated      COMMUNICATION/EDUCATION:   The patients plan of care was discussed with: Occupational Therapist and Registered Nurse.  [X]         Fall prevention education was provided and the patient/caregiver indicated understanding. [ ]         Patient/family have participated as able in goal setting and plan of care. [X]         Patient/family agree to work toward stated goals and plan of care. [ ]         Patient understands intent and goals of therapy, but is neutral about his/her participation. [ ]         Patient is unable to participate in goal setting and plan of care.      Thank you for this referral.  Ivan iL, PT, DPT   Time Calculation: 31 mins

## 2017-05-12 NOTE — PROGRESS NOTES
Occupational Therapy Goals  Initiated 5/12/2017  1. Patient will perform grooming standing with minimal assistance/contact guard assist within 7 day(s). 2.  Patient will perform upper body dressing with supervision/set-up, in sitting, within 7 day(s). 3.  Patient will perform lower body dressing with moderate assistance, using AE PRN,  within 7 day(s). 4.  Patient will perform toilet/BSC transfers with minimal assistance/contact guard assist and support of RW, within 7 day(s). 5.  Patient will perform all aspects of toileting with moderate assistance  within 7 day(s). 6.  Patient will perform sponge bathing with minimal assistance/contact guard assist within 7 day(s). Occupational Therapy EVALUATION  Patient: Meek Boyer (45 y.o. male)  Date: 5/12/2017  Primary Diagnosis: Hip fracture (HCC)  left hip fracture  Procedure(s) (LRB):  FEMORAL INTERTROCHANTERIC NAIL INSERTION (Left) 1 Day Post-Op   Precautions:  Fall, WBAT    ASSESSMENT :  Based on the objective data described below, the patient presents with mild confusion, fear of falling, L hip pain and decreased ROM, GW, decreased activity tolerance, decreased safety awareness and decreased balance which is impairing his functional independence. He was independent prior to 3/2017, but has experienced a steady functional decline since becoming ill, [place] [facility] [place] for a perforated gall bladder, complicated by sepsis and respiratory failure requiring a stay in the ICU on a ventilator. [place] [facility] [facility] for rehab, where he fell attempting to get up by himself and sustained the L hip fracture. He is now at a min A to total A level for ADLs, and is min to max A for functional mobility. Patient will benefit from skilled intervention to address the above impairments, and will need to return to [facility] for further rehab after discharge.    Patients rehabilitation potential is considered to be Good  Factors which may influence rehabilitation potential include:   []             None noted  []             Mental ability/status  []             Medical condition  []             Home/family situation and support systems  [x]             Safety awareness  []             Pain tolerance/management  []             Other:      PLAN :  Recommendations and Planned Interventions:  [x]               Self Care Training                  []        Therapeutic Activities  [x]               Functional Mobility Training    []        Cognitive Retraining  [x]               Therapeutic Exercises           [x]        Endurance Activities  [x]               Balance Training                   []        Neuromuscular Re-Education  []               Visual/Perceptual Training     [x]   Home Safety Training  [x]               Patient Education                 [x]        Family Training/Education  []               Other (comment):    Frequency/Duration: Patient will be followed by occupational therapy 5 times a week to address goals.   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, arms    Other ill-defined conditions     colon polyps    RA (rheumatoid arthritis) ([facility]     Scarring     Stroke ([facility] 2014    L arm weakness     Past Surgical History:   Procedure Laterality Date    HX CHOLECYSTECTOMY  04/18/2017    HX HEENT      bilat cataract    HX OTHER SURGICAL      r shoulder melenoma excision    VT COLSC FLX W/RMVL OF TUMOR POLYP LESION SNARE TQ  4/28/2011         VT EGD TRANSORAL BIOPSY SINGLE/MULTIPLE  4/28/2011         VASCULAR SURGERY PROCEDURE UNLIST      stent femoral-left leg    VASCULAR SURGERY PROCEDURE UNLIST      right AV shunt     Prior Level of Function/Home Situation: He was independent prior to 3/2017, but has experienced a steady functional decline since becoming ill, [place] [facility] [place] for a perforated gall bladder, complicated by sepsis and respiratory failure requiring a stay in the ICU on a ventilator. [place] [facility] [place] [facility] [place]    Expanded or extensive additional review of patient history:     Home Situation  Home Environment: Private residence  One/Two Story Residence: One story  Living Alone: No  Support Systems: Spouse/Significant Other/Partner  Patient Expects to be Discharged to[de-identified] Unknown  Current DME Used/Available at Home: None  [x]  Right hand dominant   []  Left hand dominant  Cognitive/Behavioral Status:  Neurologic State: Alert;Confused  Orientation Level: Oriented to person  Cognition: Impaired decision making;Decreased attention/concentration; Follows commands        Safety/Judgement: Decreased awareness of need for safety    Vision/Perceptual:                           Acuity: Within Defined Limits       Range of Motion:  AROM: Generally decreased, functional (grossly nonfunction for LLE 2/2 pain)     Strength:  Strength: Generally decreased, functional (grossly nonfunction for LLE 2/2 pain)  Coordination:  Coordination: Generally decreased, functional          Tone & Sensation:  Tone: Normal  Balance:  Sitting: Impaired  Sitting - Static: Good (unsupported)  Sitting - Dynamic: Fair (occasional)  Standing: Impaired  Standing - Static: Poor  Functional Mobility and Transfers for ADLs:  Bed Mobility:     Supine to Sit: Minimum assistance  Sit to Supine: Moderate assistance  Scooting: Moderate assistance  Transfers:  Sit to Stand: Maximum assistance; Additional time;Assist x1 (pulling on stabilized RW) ADL Assessment:  Feeding: Minimum assistance    Oral Facial Hygiene/Grooming: Minimum assistance    Bathing: Moderate assistance    Upper Body Dressing: Minimum assistance    Lower Body Dressing: Total assistance    Toileting: Total assistance                Functional Measure:  Barthel Index:    Bathin  Bladder: 0  Bowels: 5  Groomin  Dressin  Feedin  Mobility: 0  Stairs: 0  Toilet Use: 0  Transfer (Bed to Chair and Back): 5  Total: 10       Barthel and G-code impairment scale:  Percentage of impairment CH  0% CI  1-19% CJ  20-39% CK  40-59% CL  60-79% CM  80-99% CN  100%   Barthel Score 0-100 100 99-80 79-60 59-40 20-39 1-19   0   Barthel Score 0-20 20 17-19 13-16 9-12 5-8 1-4 0      The Barthel ADL Index: Guidelines  1. The index should be used as a record of what a patient does, not as a record of what a patient could do. 2. The main aim is to establish degree of independence from any help, physical or verbal, however minor and for whatever reason. 3. The need for supervision renders the patient not independent. 4. A patient's performance should be established using the best available evidence. Asking the patient, friends/relatives and nurses are the usual sources, but direct observation and common sense are also important. However direct testing is not needed. 5. Usually the patient's performance over the preceding 24-48 hours is important, but occasionally longer periods will be relevant. 6. Middle categories imply that the patient supplies over 50 per cent of the effort. 7. Use of aids to be independent is allowed. Nikki Romero., Barthel, D.W. (7287). Functional evaluation: the Barthel Index. [facility] (14)2. XOCHITL Crawley, Jonathan Langley., Tammi Roberson., Counts include 234 beds at the [facility], [address] [place] [address] (). Measuring the change indisability after inpatient rehabilitation; comparison of the responsiveness of the Barthel Index and Functional [place] Measure.  Journal of Neurology, Neurosurgery, and Psychiatry, 664), 783-793. VANESSA Lewis, ERIKA Damian, & Nadege Cardona M.A. (2004.) Assessment of post-stroke quality of life in cost-effectiveness studies: The usefulness of the Barthel Index and the EuroQoL-5D. Quality of Life Research, 13, 427-43       In compliance with CMSs Claims Based Outcome Reporting, the following G-code set was chosen for this patient based on their primary functional limitation being treated: The outcome measure chosen to determine the severity of the functional limitation was the Barthel Index with a score of 10/100 which was correlated with the impairment scale. ? Self Care:     - CURRENT STATUS: CM - 80%-99% impaired, limited or restricted    - GOAL STATUS:  CL - 60%-79% impaired, limited or restricted    - D/C STATUS:  ---------------To be determined---------------       ADL Intervention and task modifications:  Initiated bed mobility, dressing ADL, seated grooming, transfer and safety training. Pain:  Pain Scale 1: Visual  Pain Intensity 1: 0  Pain Location 1: Hip  Pain Orientation 1: Left  Pain Description 1: Aching  Pain Intervention(s) 1: Medication (see MAR); Cold pack; Distraction;Ice;Rest;Repositioned    After treatment:   [] Patient left in no apparent distress sitting up in chair  [x] Patient left in no apparent distress in bed  [x] Call bell left within reach  [x] Nursing notified  [] Caregiver present  [] Bed alarm activated    COMMUNICATION/EDUCATION:   The patients plan of care was discussed with: Physical Therapist and Registered Nurse. [x] Home safety education was provided and the patient/caregiver indicated understanding. [x] Patient/family have participated as able in goal setting and plan of care. [x] Patient/family agree to work toward stated goals and plan of care. [] Patient understands intent and goals of therapy, but is neutral about his/her participation.   [] Patient is unable to participate in goal setting and plan of care. This patients plan of care is appropriate for delegation to LUCILLE.     Thank you for this referral.  Blayne Cramer, OTR/L  Time Calculation: 36 mins

## 2017-05-12 NOTE — PROGRESS NOTES
Bedside and Verbal shift change report given to Ivett Edwards RN (oncoming nurse) by Viridiana Maciel (offgoing nurse). Report included the following information SBAR, Kardex, OR Summary, Intake/Output, MAR, Accordion and Recent Results.

## 2017-05-12 NOTE — PROGRESS NOTES
POD 1 Day Post-Op    Procedure:  Procedure(s) with comments:  FEMORAL INTERTROCHANTERIC NAIL INSERTION - left hip intramedullary nailing (Synthes TFNA; fx table, fluro)    Subjective:     Patient doing well, complaining of appropriate post-op pain. Working with pt/ot this afternoon. Objective:     Blood pressure (P) 143/73, pulse (P) 97, temperature 97.9 °F (36.6 °C), resp. rate 20, height 5' 9\" (1.753 m), weight 81.6 kg (180 lb), SpO2 (P) 96 %. Temp (24hrs), Av.1 °F (36.7 °C), Min:97.5 °F (36.4 °C), Max:98.7 °F (37.1 °C)      Physical Exam:  Examination of the left hip reveals that the incision is clean, dry and intact. Sensation is intact to light touch.  mild swelling. No calf pain.   NVSI    Labs:   Lab Results   Component Value Date/Time    HGB 10.2 2017 04:13 AM         Assessment:     Active Problems:    Hip fracture ([address] (2017)        Procedure(s) with comments:  FEMORAL INTERTROCHANTERIC NAIL INSERTION - left hip intramedullary nailing (Synthes TFNA; fx table, fluro)    Plan/Recommendations/Medical Decision Making:     - pain control - oxy  - ice   - pt/ot - wbat LLE  - dvt prophylaxis - lovenox   - d/c planning - likely snf vs rehab        Sanaz Quijano DO

## 2017-05-13 LAB
EST. AVERAGE GLUCOSE BLD GHB EST-MCNC: 148 MG/DL
GLUCOSE BLD STRIP.AUTO-MCNC: 173 MG/DL (ref 65–100)
GLUCOSE BLD STRIP.AUTO-MCNC: 174 MG/DL (ref 65–100)
GLUCOSE BLD STRIP.AUTO-MCNC: 184 MG/DL (ref 65–100)
GLUCOSE BLD STRIP.AUTO-MCNC: 207 MG/DL (ref 65–100)
HBA1C MFR BLD: 6.8 % (ref 4.2–6.3)
SERVICE CMNT-IMP: ABNORMAL

## 2017-05-13 PROCEDURE — 97530 THERAPEUTIC ACTIVITIES: CPT

## 2017-05-13 PROCEDURE — 74011250636 HC RX REV CODE- 250/636: Performed by: ORTHOPAEDIC SURGERY

## 2017-05-13 PROCEDURE — 74011250637 HC RX REV CODE- 250/637: Performed by: INTERNAL MEDICINE

## 2017-05-13 PROCEDURE — 74011250637 HC RX REV CODE- 250/637: Performed by: NURSE PRACTITIONER

## 2017-05-13 PROCEDURE — 82962 GLUCOSE BLOOD TEST: CPT

## 2017-05-13 PROCEDURE — 83036 HEMOGLOBIN GLYCOSYLATED A1C: CPT | Performed by: INTERNAL MEDICINE

## 2017-05-13 PROCEDURE — 77010033678 HC OXYGEN DAILY

## 2017-05-13 PROCEDURE — 65270000029 HC RM PRIVATE

## 2017-05-13 PROCEDURE — 97110 THERAPEUTIC EXERCISES: CPT

## 2017-05-13 PROCEDURE — 74011636637 HC RX REV CODE- 636/637: Performed by: INTERNAL MEDICINE

## 2017-05-13 PROCEDURE — 36415 COLL VENOUS BLD VENIPUNCTURE: CPT | Performed by: INTERNAL MEDICINE

## 2017-05-13 RX ORDER — GUAIFENESIN 100 MG/5ML
81 LIQUID (ML) ORAL
Status: DISCONTINUED | OUTPATIENT
Start: 2017-05-13 | End: 2017-05-14

## 2017-05-13 RX ADMIN — TAMSULOSIN HYDROCHLORIDE 0.4 MG: 0.4 CAPSULE ORAL at 08:03

## 2017-05-13 RX ADMIN — INSULIN LISPRO 3 UNITS: 100 INJECTION, SOLUTION INTRAVENOUS; SUBCUTANEOUS at 17:40

## 2017-05-13 RX ADMIN — DILTIAZEM HYDROCHLORIDE 120 MG: 120 CAPSULE, COATED, EXTENDED RELEASE ORAL at 08:03

## 2017-05-13 RX ADMIN — FOLIC ACID 1 MG: 1 TABLET ORAL at 08:04

## 2017-05-13 RX ADMIN — ACETAMINOPHEN 650 MG: 325 TABLET, FILM COATED ORAL at 17:40

## 2017-05-13 RX ADMIN — INSULIN LISPRO 2 UNITS: 100 INJECTION, SOLUTION INTRAVENOUS; SUBCUTANEOUS at 08:02

## 2017-05-13 RX ADMIN — DOCUSATE SODIUM 100 MG: 100 CAPSULE, LIQUID FILLED ORAL at 08:04

## 2017-05-13 RX ADMIN — UMECLIDINIUM 1 PUFF: 62.5 AEROSOL, POWDER ORAL at 09:50

## 2017-05-13 RX ADMIN — Medication 10 ML: at 15:39

## 2017-05-13 RX ADMIN — OXYCODONE HYDROCHLORIDE 2.5 MG: 5 TABLET ORAL at 11:44

## 2017-05-13 RX ADMIN — PRAVASTATIN SODIUM 40 MG: 40 TABLET ORAL at 23:21

## 2017-05-13 RX ADMIN — ACETAMINOPHEN 650 MG: 325 TABLET, FILM COATED ORAL at 23:21

## 2017-05-13 RX ADMIN — DOCUSATE SODIUM 100 MG: 100 CAPSULE, LIQUID FILLED ORAL at 17:40

## 2017-05-13 RX ADMIN — ASPIRIN 81 MG: 81 TABLET, CHEWABLE ORAL at 08:03

## 2017-05-13 RX ADMIN — Medication 10 ML: at 23:21

## 2017-05-13 RX ADMIN — INSULIN LISPRO 2 UNITS: 100 INJECTION, SOLUTION INTRAVENOUS; SUBCUTANEOUS at 12:42

## 2017-05-13 RX ADMIN — ACETAMINOPHEN 650 MG: 325 TABLET, FILM COATED ORAL at 12:43

## 2017-05-13 RX ADMIN — ENOXAPARIN SODIUM 40 MG: 40 INJECTION SUBCUTANEOUS at 08:02

## 2017-05-13 RX ADMIN — ACETAMINOPHEN 650 MG: 325 TABLET, FILM COATED ORAL at 08:03

## 2017-05-13 NOTE — PROGRESS NOTES
Problem: Mobility Impaired (Adult and Pediatric)  Goal: *Acute Goals and Plan of Care (Insert Text)  Physical Therapy Goals  Initiated 5/12/2017  1. Patient will move from supine to sit and sit to supine in bed with minimal assistance/contact guard assist within 7 day(s). 2. Patient will transfer from bed to chair and chair to bed with maximal assistance using the least restrictive device within 7 day(s). 3. Patient will perform sit to stand with minimal assistance within 7 day(s). 4. Patient will ambulate with moderate assistance for 10 feet with the least restrictive device within 7 day(s). PHYSICAL THERAPY TREATMENT  Patient: Kael Holly (55 y.o. male)  Date: 5/13/2017  Diagnosis: Hip fracture ([address]  left hip fracture <principal problem not specified>  Procedure(s) (LRB):  FEMORAL INTERTROCHANTERIC NAIL INSERTION (Left) 2 Days Post-Op  Precautions: Fall, WBAT  Chart, physical therapy assessment, plan of care and goals were reviewed. ASSESSMENT:  Pt waiting for mobility team to transfer him back and becoming upset. Pt required total assist to transfer chair>bed via SPT. On initial stand pt holding onto chair and we could not complete transfer. No initiation of movement from pt. Pt assisted into bed and positioned for comfort. Recommend IPR VS SNF upon discharge,   Progression toward goals:  [ ]      Improving appropriately and progressing toward goals  [X]      Improving slowly and progressing toward goals  [ ]      Not making progress toward goals and plan of care will be adjusted       PLAN:  Patient continues to benefit from skilled intervention to address the above impairments. Continue treatment per established plan of care.   Discharge Recommendations:  Inpatient Rehab VS Skilled Nursing Facility  Further Equipment Recommendations for Discharge:  rolling walker       SUBJECTIVE:   Patient stated .      OBJECTIVE DATA SUMMARY:   Critical Behavior:  Neurologic State: Alert, Confused  Orientation Level: Oriented to person, Oriented to place, Oriented to situation, Disoriented to time  Cognition: Follows commands  Safety/Judgement: Decreased awareness of need for safety  Functional Mobility Training:  Bed Mobility:     Supine to Sit: Moderate assistance;Assist x2     Scooting: Moderate assistance;Assist x1                    Transfers:  Sit to Stand: Maximum assistance;Assist x2  Stand to Sit: Maximum assistance;Assist x2        Bed to Chair: Maximum assistance;Assist x2                    Balance:  Sitting: Impaired  Sitting - Static: Good (unsupported)  Sitting - Dynamic: Fair (occasional)  Standing: Impaired  Standing - Static: Poor  Standing - Dynamic : Poor  Ambulation/Gait Training:                 Pain:  Pain Scale 1: Numeric (0 - 10)  Pain Intensity 1: 2              Activity Tolerance:   poor  Please refer to the flowsheet for vital signs taken during this treatment.   After treatment:   [ ] Patient left in no apparent distress sitting up in chair  [X] Patient left in no apparent distress in bed  [X] Call bell left within reach  [X] Nursing notified  [ ] Caregiver present  [ ] Bed alarm activated      COMMUNICATION/COLLABORATION:   The patients plan of care was discussed with: Registered Nurse     Damien Kulkarni PTA   Time Calculation: 10 mins

## 2017-05-13 NOTE — PROGRESS NOTES
Hospitalist Progress Note    NAME: Amber Rubio   :  1940   MRN:  [id number]     Interim Hospital Summary: 65 y.o. male whom presented on 5/10/2017 with      Assessment / Plan:    Hx A flutter  -continue cardizem. Restart baby ASA every other day. BP and HR stable    DM  -SSI prn. A1c pending     COPD  -some mild rhonchi. Not SOB. Continue spiriva and prn nebs    RA  -holding MTX. He has been off humira since hospitalization in April  -continue folate    BPH with urine retention   -continue flomax.   -watkins in place. Voiding trial once more mobile or per ortho    Moderate malnutrition with recent weight loss and hypoalbuminemia  -related to recent prolonged hospitalization for sepsis / ruptured GB  -BMI 26  -Glucerna supplements / dietary input appreciated    S/P  shunt for NPH    Left hip fracture  -s/p femoral intertrochanteric nail insertion 17 Dr Darien Slater  -DVT prophylaxis : Lovenox  -PT OT eval and treat. DC planning to SNF vs IR         Subjective:     Chief Complaint / Reason for Physician Visit  Follow up of RA, COPD, aflutter, BPH  Chart reviewed in detail. Discussed with RN events overnight. Feels okay. No new complaints    Review of Systems:  Symptom Y/N Comments  Symptom Y/N Comments   Fever/Chills n   Chest Pain n    Poor Appetite    Edema     Cough    Abdominal Pain n    Sputum    Joint Pain     SOB/SILVER n   Pruritis/Rash     Nausea/vomit    Tolerating PT/OT     Diarrhea    Tolerating Diet     Constipation    Other       Could NOT obtain due to:      PO intake: No data found. Objective:     VITALS:   Last 24hrs VS reviewed since prior progress note.  Most recent are:  Patient Vitals for the past 24 hrs:   Temp Pulse Resp BP SpO2   17 0430 97.9 °F (36.6 °C) 92 18 118/68 99 %   17 1918 98.8 °F (37.1 °C) 93 18 109/60 97 %   17 1537 - - - - 95 %   17 1514 97.6 °F (36.4 °C) 93 18 139/61 95 %   17 1210 - 97 - 143/73 96 %   05/12/17 1148 - - - - 96 %   05/12/17 1141 97.9 °F (36.6 °C) 96 20 110/60 97 %   05/12/17 0755 98.7 °F (37.1 °C) 97 20 150/81 98 %       Intake/Output Summary (Last 24 hours) at 05/13/17 8794  Last data filed at 05/12/17 1538   Gross per 24 hour   Intake                0 ml   Output              975 ml   Net             -975 ml        PHYSICAL EXAM:  General: WD, WN., cooperative, no acute distress    EENT:  EOMI. Anicteric sclerae. MMM  Resp:  Mild anterior rhonchi No accessory muscle use  CV:  Regular  rhythm,  trace edema  GI:  Soft, Non distended, Non tender.  +Bowel sounds  Neurologic:  Alert and follows simple commands, normal speech,  Grossly intact sensation   Psych:   Fair insight. Not anxious nor agitated  Skin:  No rashes. No jaundice    Reviewed most current lab test results and cultures  YES  Reviewed most current radiology test results   YES  Review and summation of old records today    NO  Reviewed patient's current orders and MAR    YES  PMH/SH reviewed - no change compared to H&P  ________________________________________________________________________  Care Plan discussed with:    Comments   Patient x    Family      RN     Care Manager     Consultant                        Multidiciplinary team rounds were held today with , nursing, pharmacist and clinical coordinator. Patient's plan of care was discussed; medications were reviewed and discharge planning was addressed.      ________________________________________________________________________  Total NON critical care TIME:  15   Minutes    Total CRITICAL CARE TIME Spent:   Minutes non procedure based      Comments   >50% of visit spent in counseling and coordination of care x     This includes time during multidisciplinary rounds if indicated above   ________________________________________________________________________  Melina Omalley MD     Procedures: see electronic medical records for all procedures/Xrays and details which were not copied into this note but were reviewed prior to creation of Plan. LABS:  I reviewed today's most current labs and imaging studies.   Pertinent labs include:  Recent Labs      05/12/17   0413  05/10/17   2154   WBC  11.2*  8.0   HGB  10.2*  11.0*   HCT  33.4*  33.4*   PLT  148*  114*     Recent Labs      05/12/17   0413  05/10/17   2154   NA  136  138   K  4.7  3.7   CL  104  104   CO2  25  27   GLU  190*  189*   BUN  6  9   CREA  0.69*  0.91   CA  8.4*  8.5   ALB   --   2.9*   TBILI   --   0.4   SGOT   --   40*   ALT   --   37   INR   --   1.1

## 2017-05-13 NOTE — PROGRESS NOTES
Problem: Mobility Impaired (Adult and Pediatric)  Goal: *Acute Goals and Plan of Care (Insert Text)  Physical Therapy Goals  Initiated 5/12/2017  1. Patient will move from supine to sit and sit to supine in bed with minimal assistance/contact guard assist within 7 day(s). 2. Patient will transfer from bed to chair and chair to bed with maximal assistance using the least restrictive device within 7 day(s). 3. Patient will perform sit to stand with minimal assistance within 7 day(s). 4. Patient will ambulate with moderate assistance for 10 feet with the least restrictive device within 7 day(s). PHYSICAL THERAPY TREATMENT  Patient: Pepper Angel (26 y.o. male)  Date: 5/13/2017  Diagnosis: Hip fracture ([address]  left hip fracture <principal problem not specified>  Procedure(s) (LRB):  FEMORAL INTERTROCHANTERIC NAIL INSERTION (Left) 2 Days Post-Op  Precautions: Fall, WBAT  Chart, physical therapy assessment, plan of care and goals were reviewed. ASSESSMENT:  Pt slow to progress with mobility. Pt required mod assist for bed mobility and max assist x 2 to stand. attempted steps towards chair but pt unable to advance legs. Pt transferred bed>chair via SPT max assist x 2. Pt remained in chair with ice applied to left hip. Recommend mobility team to assist pt back to bed. Pt will need rehab upon discharge. Progression toward goals:  [ ]      Improving appropriately and progressing toward goals  [X]      Improving slowly and progressing toward goals  [ ]      Not making progress toward goals and plan of care will be adjusted       PLAN:  Patient continues to benefit from skilled intervention to address the above impairments. Continue treatment per established plan of care.   Discharge Recommendations:  Inpatient Rehab VS Skilled Nursing Facility  Further Equipment Recommendations for Discharge:  rolling walker       SUBJECTIVE:   Patient stated I don;t know what is going on.      OBJECTIVE DATA SUMMARY: Critical Behavior:  Neurologic State: Alert, Confused  Orientation Level: Oriented to person, Oriented to place, Oriented to situation, Disoriented to time  Cognition: Follows commands  Safety/Judgement: Decreased awareness of need for safety  Functional Mobility Training:  Bed Mobility:     Supine to Sit: Moderate assistance;Assist x2     Scooting:  Moderate assistance;Assist x1                    Transfers:  Sit to Stand: Maximum assistance;Assist x2  Stand to Sit: Maximum assistance;Assist x2        Bed to Chair: Maximum assistance;Assist x2                    Balance:  Sitting: Impaired  Sitting - Static: Good (unsupported)  Sitting - Dynamic: Fair (occasional)  Standing: Impaired  Standing - Static: Poor  Standing - Dynamic : Poor  Ambulation/Gait Training:   unable               Therapeutic Exercises:   SUPINE  EXERCISES   Sets   Reps   Active Active Assist   Passive Self ROM   Comments   Ankle Pumps 1 10 [X]                                  [ ]                                  [ ]                                  [ ]                                      Quad Sets 1 10 [X]                                  [ ]                                  [ ]                                  [ ]                                      Heel Slides 1 8 [ ]                                  [X]                                  [ ]                                  [ ]                                      Hip Abduction 1 8 [ ]                                  [X]                                  [ ]                                  [ ]                                      Hip External Rotation 1 8 [ ]                                  [X]                                  [ ]                                  [ ]                                      Glut Sets 1 10 [X]                                  [ ]                                  [ ]                                  [ ]                                            [ ] [ ]                                  [ ]                                  [ ]                                            [ ]                                  [ ]                                  [ ]                                  [ ]                                            Pain:  Pain Scale 1: Numeric (0 - 10)  Pain Intensity 1: 2              Activity Tolerance:   Poor     Please refer to the flowsheet for vital signs taken during this treatment.   After treatment:   [X] Patient left in no apparent distress sitting up in chair  [ ] Patient left in no apparent distress in bed  [X] Call bell left within reach  [X] Nursing notified  [ ] Caregiver present  [X] Bed alarm activated      COMMUNICATION/COLLABORATION:   The patients plan of care was discussed with: Registered Nurse     Lia Beard PTA   Time Calculation: 10 mins

## 2017-05-13 NOTE — PROGRESS NOTES
Bedside and Verbal shift change report given to Nisha Lopez RN (oncoming nurse) by Shameka Duncan RN (offgoing nurse). Report included the following information SBAR, Kardex, Intake/Output and MAR.

## 2017-05-13 NOTE — PROGRESS NOTES
POD 2 Days Post-Op    Procedure:  Procedure(s) with comments:  FEMORAL INTERTROCHANTERIC NAIL INSERTION - left hip intramedullary nailing (Synthes TFNA; fx table, fluro)    Subjective:     Patient doing well, complaining of appropriate post-op pain    Objective:     Blood pressure 130/62, pulse 93, temperature 98.8 °F (37.1 °C), resp. rate 18, height 5' 9\" (1.753 m), weight 81.6 kg (180 lb), SpO2 98 %. Temp (24hrs), Av.2 °F (36.8 °C), Min:97.6 °F (36.4 °C), Max:98.8 °F (37.1 °C)      Physical Exam:  Examination of the left hip reveals that the incision is clean, dry and intact. Sensation is intact to light touch.  mild swelling. No calf pain.   NVSI    Labs:   Lab Results   Component Value Date/Time    HGB 10.2 2017 04:13 AM         Assessment:     Active Problems:    Hip fracture ([address] (2017)        Procedure(s) with comments:  FEMORAL INTERTROCHANTERIC NAIL INSERTION - left hip intramedullary nailing (Synthes TFNA; fx table, fluro)    Plan/Recommendations/Medical Decision Making:     - pain control - oxy  - ice   - pt/ot - wbat  - dvt prophylaxis - lovenox  - d/c planning - rehab vs snf         Moni Quintana DO

## 2017-05-14 LAB
BACTERIA SPEC CULT: NORMAL
CC UR VC: NORMAL
GLUCOSE BLD STRIP.AUTO-MCNC: 165 MG/DL (ref 65–100)
GLUCOSE BLD STRIP.AUTO-MCNC: 177 MG/DL (ref 65–100)
GLUCOSE BLD STRIP.AUTO-MCNC: 182 MG/DL (ref 65–100)
GLUCOSE BLD STRIP.AUTO-MCNC: 184 MG/DL (ref 65–100)
SERVICE CMNT-IMP: ABNORMAL
SERVICE CMNT-IMP: NORMAL

## 2017-05-14 PROCEDURE — 74011250637 HC RX REV CODE- 250/637: Performed by: NURSE PRACTITIONER

## 2017-05-14 PROCEDURE — 74011250636 HC RX REV CODE- 250/636: Performed by: ORTHOPAEDIC SURGERY

## 2017-05-14 PROCEDURE — 74011250637 HC RX REV CODE- 250/637: Performed by: INTERNAL MEDICINE

## 2017-05-14 PROCEDURE — 65270000029 HC RM PRIVATE

## 2017-05-14 PROCEDURE — 82962 GLUCOSE BLOOD TEST: CPT

## 2017-05-14 PROCEDURE — 74011636637 HC RX REV CODE- 636/637: Performed by: INTERNAL MEDICINE

## 2017-05-14 PROCEDURE — 97530 THERAPEUTIC ACTIVITIES: CPT

## 2017-05-14 PROCEDURE — 77010033678 HC OXYGEN DAILY

## 2017-05-14 PROCEDURE — 97116 GAIT TRAINING THERAPY: CPT

## 2017-05-14 RX ADMIN — Medication 10 ML: at 08:10

## 2017-05-14 RX ADMIN — INSULIN LISPRO 2 UNITS: 100 INJECTION, SOLUTION INTRAVENOUS; SUBCUTANEOUS at 08:09

## 2017-05-14 RX ADMIN — DOCUSATE SODIUM 100 MG: 100 CAPSULE, LIQUID FILLED ORAL at 08:08

## 2017-05-14 RX ADMIN — OXYCODONE HYDROCHLORIDE 2.5 MG: 5 TABLET ORAL at 23:36

## 2017-05-14 RX ADMIN — ACETAMINOPHEN 650 MG: 325 TABLET, FILM COATED ORAL at 17:21

## 2017-05-14 RX ADMIN — OXYCODONE HYDROCHLORIDE 5 MG: 5 TABLET ORAL at 08:08

## 2017-05-14 RX ADMIN — UMECLIDINIUM 1 PUFF: 62.5 AEROSOL, POWDER ORAL at 08:10

## 2017-05-14 RX ADMIN — FOLIC ACID 1 MG: 1 TABLET ORAL at 08:09

## 2017-05-14 RX ADMIN — ACETAMINOPHEN 650 MG: 325 TABLET, FILM COATED ORAL at 08:08

## 2017-05-14 RX ADMIN — TAMSULOSIN HYDROCHLORIDE 0.4 MG: 0.4 CAPSULE ORAL at 08:08

## 2017-05-14 RX ADMIN — ACETAMINOPHEN 650 MG: 325 TABLET, FILM COATED ORAL at 12:34

## 2017-05-14 RX ADMIN — PRAVASTATIN SODIUM 40 MG: 40 TABLET ORAL at 23:18

## 2017-05-14 RX ADMIN — DILTIAZEM HYDROCHLORIDE 120 MG: 120 CAPSULE, COATED, EXTENDED RELEASE ORAL at 08:08

## 2017-05-14 RX ADMIN — Medication 10 ML: at 23:18

## 2017-05-14 RX ADMIN — Medication 10 ML: at 15:41

## 2017-05-14 RX ADMIN — ACETAMINOPHEN 650 MG: 325 TABLET, FILM COATED ORAL at 23:18

## 2017-05-14 RX ADMIN — INSULIN LISPRO 2 UNITS: 100 INJECTION, SOLUTION INTRAVENOUS; SUBCUTANEOUS at 17:21

## 2017-05-14 RX ADMIN — INSULIN LISPRO 2 UNITS: 100 INJECTION, SOLUTION INTRAVENOUS; SUBCUTANEOUS at 12:33

## 2017-05-14 RX ADMIN — DOCUSATE SODIUM 100 MG: 100 CAPSULE, LIQUID FILLED ORAL at 17:21

## 2017-05-14 RX ADMIN — ENOXAPARIN SODIUM 40 MG: 40 INJECTION SUBCUTANEOUS at 08:09

## 2017-05-14 NOTE — PROGRESS NOTES
Hospitalist Progress Note    NAME: Uma Castro   :  1940   MRN:  [id number]     Interim Hospital Summary: 65 y.o. male whom presented on 5/10/2017 with      Assessment / Plan:    Hematuria  BPH with urine retention   -continue flomax.   -watkins in place. Voiding trial once more mobile or per ortho  -will hold ASA for now    Hx A flutter  -continue cardizem. Hold ASA due to hematuria. BP and HR stable    DM  -SSI prn. A1c pending     COPD  -some mild rhonchi. Not SOB. Continue spiriva and prn nebs    RA  -holding MTX. He has been off humira since hospitalization in April  -continue folate    Moderate malnutrition with recent weight loss and hypoalbuminemia  -related to recent prolonged hospitalization for sepsis / ruptured GB  -BMI 26  -Glucerna supplements / dietary input appreciated    S/P  shunt for NPH    Left hip fracture  -s/p femoral intertrochanteric nail insertion 17 Dr Petra Dillon  -DVT prophylaxis : Lovenox  -PT OT eval and treat. DC planning to SNF vs IR         Subjective:     Chief Complaint / Reason for Physician Visit  Follow up of RA, COPD, aflutter, BPH  Chart reviewed in detail. Discussed with RN events overnight. Feels okay. No new complaints. Blood tinged urine in bag    Review of Systems:  Symptom Y/N Comments  Symptom Y/N Comments   Fever/Chills n   Chest Pain n    Poor Appetite    Edema     Cough    Abdominal Pain n    Sputum    Joint Pain     SOB/SILVER n   Pruritis/Rash     Nausea/vomit    Tolerating PT/OT     Diarrhea    Tolerating Diet     Constipation    Other       Could NOT obtain due to:      PO intake: No data found. Objective:     VITALS:   Last 24hrs VS reviewed since prior progress note.  Most recent are:  Patient Vitals for the past 24 hrs:   Temp Pulse Resp BP SpO2   17 1141 97.5 °F (36.4 °C) 87 18 133/61 100 %   17 0754 97.8 °F (36.6 °C) 95 18 143/69 97 %   17 0331 97.7 °F (36.5 °C) 83 18 127/63 99 %   05/13/17 2325 97.4 °F (36.3 °C) 85 18 135/59 99 %   05/13/17 2033 97.8 °F (36.6 °C) 86 18 115/53 99 %   05/13/17 1539 97.7 °F (36.5 °C) 94 18 119/68 94 %   05/13/17 1407 - (!) 108 19 107/59 99 %       Intake/Output Summary (Last 24 hours) at 05/14/17 1239  Last data filed at 05/14/17 2467   Gross per 24 hour   Intake                0 ml   Output             2100 ml   Net            -2100 ml        PHYSICAL EXAM:  General: WD, WN., cooperative, no acute distress    EENT:  EOMI. Anicteric sclerae. MMM  Resp:  Mild anterior rhonchi No accessory muscle use  CV:  Regular  rhythm,  trace edema  GI:  Soft, Non distended, Non tender.  +Bowel sounds  Neurologic:  Alert and follows simple commands, normal speech,  Grossly intact sensation   Psych:   Fair insight. Not anxious nor agitated  Skin:  No rashes. No jaundice    Reviewed most current lab test results and cultures  YES  Reviewed most current radiology test results   YES  Review and summation of old records today    NO  Reviewed patient's current orders and MAR    YES  PMH/SH reviewed - no change compared to H&P  ________________________________________________________________________  Care Plan discussed with:    Comments   Patient x    Family      RN x    Care Manager     Consultant                        Multidiciplinary team rounds were held today with , nursing, pharmacist and clinical coordinator. Patient's plan of care was discussed; medications were reviewed and discharge planning was addressed.      ________________________________________________________________________  Total NON critical care TIME:  15   Minutes    Total CRITICAL CARE TIME Spent:   Minutes non procedure based      Comments   >50% of visit spent in counseling and coordination of care x     This includes time during multidisciplinary rounds if indicated above   ________________________________________________________________________  Jonathan Dash MD     Procedures: see electronic medical records for all procedures/Xrays and details which were not copied into this note but were reviewed prior to creation of Plan. LABS:  I reviewed today's most current labs and imaging studies.   Pertinent labs include:  Recent Labs      05/12/17 0413   WBC  11.2*   HGB  10.2*   HCT  33.4*   PLT  148*     Recent Labs      05/12/17 0413   NA  136   K  4.7   CL  104   CO2  25   GLU  190*   BUN  6   CREA  0.69*   CA  8.4*

## 2017-05-14 NOTE — PROGRESS NOTES
Bedside shift change report given to Essence (oncoming nurse) by Timothy Camara (offgoing nurse). Report included the following information SBAR, Procedure Summary, Intake/Output, MAR, Accordion and Recent Results.

## 2017-05-14 NOTE — PROGRESS NOTES
Problem: Mobility Impaired (Adult and Pediatric)  Goal: *Acute Goals and Plan of Care (Insert Text)  Physical Therapy Goals  Initiated 5/12/2017  1. Patient will move from supine to sit and sit to supine in bed with minimal assistance/contact guard assist within 7 day(s). 2. Patient will transfer from bed to chair and chair to bed with maximal assistance using the least restrictive device within 7 day(s). 3. Patient will perform sit to stand with minimal assistance within 7 day(s). 4. Patient will ambulate with moderate assistance for 10 feet with the least restrictive device within 7 day(s). PHYSICAL THERAPY TREATMENT  Patient: Rehana Fritz (49 y.o. male)  Date: 5/14/2017  Diagnosis: Hip fracture ([address]  left hip fracture <principal problem not specified>  Procedure(s) (LRB):  FEMORAL INTERTROCHANTERIC NAIL INSERTION (Left) 3 Days Post-Op  Precautions: Fall, WBAT      ASSESSMENT:  Patient making slow gains, limited in large by inability to bear weight through LLE to allow any movement of RLE for functional activity. While his bed mobility remains at a max A to roll and scoot LLE appropriately to come to EOB, he did improve to min-mod A of 2 for repeated sit<>stands with improved midline orientation and appropriate weight shift forward to allow for transfer with minimal cueing. Patient then attempted stepping/pivoting with RLE x3 attempts with max multi-modal cueing provided to teach pivot/scoot/step of RLE; he is able to side-step with LLE when bearing weight through non-operative RLE, but once required to bear weight through operative LLE is unable to do so in a manner that would allow movement of RLE even with RW usage. Thus, performed stand pivot to R side with max A with patient able to reach for chair support and assist. Concluded in chair with all needs met and RN notified to utilize abdiel lift harness for safe return to supine.  Given current functioning, he may not be able to tolerate 3hrs of therapy at [place] and do well with a greater overall duration of therapy (weeks) than an IPR setting may provide. Thus, SNF may be appropriate at [place] pending improvements ahead. Progression toward goals:  [ ]    Improving appropriately and progressing toward goals  [X]    Improving slowly and progressing toward goals  [ ]    Not making progress toward goals and plan of care will be adjusted       PLAN:  Patient continues to benefit from skilled intervention to address the above impairments. Continue treatment per established plan of care. Discharge Recommendations:  IP Rehab and Skilled Nursing Facility  Further Equipment Recommendations for Discharge:  none       SUBJECTIVE:   Patient stated I just can't.       OBJECTIVE DATA SUMMARY:   Critical Behavior:  Neurologic State: Alert  Orientation Level: Disoriented to time  Cognition: Follows commands  Safety/Judgement: Decreased awareness of need for safety  Functional Mobility Training:  Bed Mobility:  Rolling: Maximum assistance;Assist x1;Additional time  Supine to Sit: Maximum assistance;Assist x1;Additional time     Scooting: Maximum assistance;Assist x1;Additional time        Transfers:  Sit to Stand: Minimum assistance; Moderate assistance;Assist x2  Stand to Sit: Minimum assistance;Assist x2  Stand Pivot Transfers: Maximum assistance (x2 to R )                          Balance:  Sitting: Intact; Without support  Standing: Impaired; With support  Standing - Static: Fair;Poor;Constant support  Standing - Dynamic : Poor                 Pain:  Pain Scale 1: Numeric (0 - 10)  Pain Intensity 1: 2              Activity Tolerance:   POOR 2/2 LLE pain in weight bearing      Please refer to the flowsheet for vital signs taken during this treatment.   After treatment:   [X]    Patient left in no apparent distress sitting up in chair  [ ]    Patient left in no apparent distress in bed  [X]    Call bell left within reach  [X]    Nursing notified  [ ]    Caregiver present  [ ]    Bed alarm activated      COMMUNICATION/COLLABORATION:   The patients plan of care was discussed with: Registered Nurse     Wanda Shay PT, DPT     Time Calculation: 24 mins

## 2017-05-14 NOTE — ROUTINE PROCESS
Bedside and Verbal shift change report given to Yarely Bowers RN (oncoming nurse) by Christopher Neal (offgoing nurse). Report included the following information SBAR, Kardex, Intake/Output, MAR and Accordion.

## 2017-05-14 NOTE — ROUTINE PROCESS
Bedside and Verbal shift change report given to Georges Canales RN (oncoming nurse) by Moody Zhu (offgoing nurse). Report included the following information SBAR, Kardex, Intake/Output, MAR and Recent Results.

## 2017-05-15 LAB
ANION GAP BLD CALC-SCNC: 12 MMOL/L (ref 5–15)
BASOPHILS # BLD AUTO: 0 K/UL (ref 0–0.1)
BASOPHILS # BLD: 0 % (ref 0–1)
BUN SERPL-MCNC: 5 MG/DL (ref 6–20)
BUN/CREAT SERPL: 9 (ref 12–20)
CALCIUM SERPL-MCNC: 7.4 MG/DL (ref 8.5–10.1)
CHLORIDE SERPL-SCNC: 110 MMOL/L (ref 97–108)
CO2 SERPL-SCNC: 18 MMOL/L (ref 21–32)
CREAT SERPL-MCNC: 0.54 MG/DL (ref 0.7–1.3)
EOSINOPHIL # BLD: 0.2 K/UL (ref 0–0.4)
EOSINOPHIL NFR BLD: 5 % (ref 0–7)
ERYTHROCYTE [DISTWIDTH] IN BLOOD BY AUTOMATED COUNT: 14.4 % (ref 11.5–14.5)
GLUCOSE BLD STRIP.AUTO-MCNC: 142 MG/DL (ref 65–100)
GLUCOSE BLD STRIP.AUTO-MCNC: 158 MG/DL (ref 65–100)
GLUCOSE BLD STRIP.AUTO-MCNC: 180 MG/DL (ref 65–100)
GLUCOSE BLD STRIP.AUTO-MCNC: 206 MG/DL (ref 65–100)
GLUCOSE SERPL-MCNC: 147 MG/DL (ref 65–100)
HCT VFR BLD AUTO: 25.8 % (ref 36.6–50.3)
HGB BLD-MCNC: 8.2 G/DL (ref 12.1–17)
LYMPHOCYTES # BLD AUTO: 31 % (ref 12–49)
LYMPHOCYTES # BLD: 1.3 K/UL (ref 0.8–3.5)
MCH RBC QN AUTO: 29.3 PG (ref 26–34)
MCHC RBC AUTO-ENTMCNC: 31.8 G/DL (ref 30–36.5)
MCV RBC AUTO: 92.1 FL (ref 80–99)
MONOCYTES # BLD: 0.5 K/UL (ref 0–1)
MONOCYTES NFR BLD AUTO: 11 % (ref 5–13)
NEUTS SEG # BLD: 2.3 K/UL (ref 1.8–8)
NEUTS SEG NFR BLD AUTO: 53 % (ref 32–75)
PLATELET # BLD AUTO: 110 K/UL (ref 150–400)
POTASSIUM SERPL-SCNC: 3.7 MMOL/L (ref 3.5–5.1)
RBC # BLD AUTO: 2.8 M/UL (ref 4.1–5.7)
SERVICE CMNT-IMP: ABNORMAL
SODIUM SERPL-SCNC: 140 MMOL/L (ref 136–145)
WBC # BLD AUTO: 4.3 K/UL (ref 4.1–11.1)

## 2017-05-15 PROCEDURE — 85025 COMPLETE CBC W/AUTO DIFF WBC: CPT | Performed by: INTERNAL MEDICINE

## 2017-05-15 PROCEDURE — 65270000029 HC RM PRIVATE

## 2017-05-15 PROCEDURE — 74011250636 HC RX REV CODE- 250/636: Performed by: ORTHOPAEDIC SURGERY

## 2017-05-15 PROCEDURE — 74011250637 HC RX REV CODE- 250/637: Performed by: INTERNAL MEDICINE

## 2017-05-15 PROCEDURE — 97535 SELF CARE MNGMENT TRAINING: CPT | Performed by: OCCUPATIONAL THERAPIST

## 2017-05-15 PROCEDURE — 97530 THERAPEUTIC ACTIVITIES: CPT | Performed by: OCCUPATIONAL THERAPIST

## 2017-05-15 PROCEDURE — 97530 THERAPEUTIC ACTIVITIES: CPT

## 2017-05-15 PROCEDURE — 82962 GLUCOSE BLOOD TEST: CPT

## 2017-05-15 PROCEDURE — 74011250637 HC RX REV CODE- 250/637: Performed by: NURSE PRACTITIONER

## 2017-05-15 PROCEDURE — 36415 COLL VENOUS BLD VENIPUNCTURE: CPT | Performed by: INTERNAL MEDICINE

## 2017-05-15 PROCEDURE — 74011636637 HC RX REV CODE- 636/637: Performed by: INTERNAL MEDICINE

## 2017-05-15 PROCEDURE — 80048 BASIC METABOLIC PNL TOTAL CA: CPT | Performed by: INTERNAL MEDICINE

## 2017-05-15 RX ORDER — DEXTROSE MONOHYDRATE 100 MG/ML
125-250 INJECTION, SOLUTION INTRAVENOUS AS NEEDED
Status: DISCONTINUED | OUTPATIENT
Start: 2017-05-15 | End: 2017-05-16 | Stop reason: HOSPADM

## 2017-05-15 RX ADMIN — INSULIN LISPRO 3 UNITS: 100 INJECTION, SOLUTION INTRAVENOUS; SUBCUTANEOUS at 21:34

## 2017-05-15 RX ADMIN — ENOXAPARIN SODIUM 40 MG: 40 INJECTION SUBCUTANEOUS at 08:35

## 2017-05-15 RX ADMIN — Medication 10 ML: at 21:34

## 2017-05-15 RX ADMIN — ACETAMINOPHEN 650 MG: 325 TABLET, FILM COATED ORAL at 13:12

## 2017-05-15 RX ADMIN — INSULIN LISPRO 2 UNITS: 100 INJECTION, SOLUTION INTRAVENOUS; SUBCUTANEOUS at 08:34

## 2017-05-15 RX ADMIN — ACETAMINOPHEN 650 MG: 325 TABLET, FILM COATED ORAL at 08:35

## 2017-05-15 RX ADMIN — DOCUSATE SODIUM 100 MG: 100 CAPSULE, LIQUID FILLED ORAL at 18:07

## 2017-05-15 RX ADMIN — OXYCODONE HYDROCHLORIDE 5 MG: 5 TABLET ORAL at 03:32

## 2017-05-15 RX ADMIN — OXYCODONE HYDROCHLORIDE 5 MG: 5 TABLET ORAL at 13:12

## 2017-05-15 RX ADMIN — TAMSULOSIN HYDROCHLORIDE 0.4 MG: 0.4 CAPSULE ORAL at 08:35

## 2017-05-15 RX ADMIN — INSULIN LISPRO 2 UNITS: 100 INJECTION, SOLUTION INTRAVENOUS; SUBCUTANEOUS at 13:11

## 2017-05-15 RX ADMIN — Medication 10 ML: at 06:57

## 2017-05-15 RX ADMIN — UMECLIDINIUM 1 PUFF: 62.5 AEROSOL, POWDER ORAL at 08:40

## 2017-05-15 RX ADMIN — ACETAMINOPHEN 650 MG: 325 TABLET, FILM COATED ORAL at 18:07

## 2017-05-15 RX ADMIN — ACETAMINOPHEN 650 MG: 325 TABLET, FILM COATED ORAL at 21:34

## 2017-05-15 RX ADMIN — FOLIC ACID 1 MG: 1 TABLET ORAL at 08:35

## 2017-05-15 RX ADMIN — PRAVASTATIN SODIUM 40 MG: 40 TABLET ORAL at 21:34

## 2017-05-15 RX ADMIN — Medication 10 ML: at 13:12

## 2017-05-15 RX ADMIN — DILTIAZEM HYDROCHLORIDE 120 MG: 120 CAPSULE, COATED, EXTENDED RELEASE ORAL at 08:35

## 2017-05-15 RX ADMIN — DOCUSATE SODIUM 100 MG: 100 CAPSULE, LIQUID FILLED ORAL at 08:35

## 2017-05-15 NOTE — PROGRESS NOTES
Occupational Therapy Goals  Initiated 5/12/2017  1. Patient will perform grooming standing with minimal assistance/contact guard assist within 7 day(s). 2. Patient will perform upper body dressing with supervision/set-up, in sitting, within 7 day(s). 3. Patient will perform lower body dressing with moderate assistance, using AE PRN,  within 7 day(s). 4. Patient will perform toilet/BSC transfers with minimal assistance/contact guard assist and support of RW, within 7 day(s). 5. Patient will perform all aspects of toileting with moderate assistance  within 7 day(s). 6. Patient will perform sponge bathing with minimal assistance/contact guard assist within 7 day(s). Occupational Therapy TREATMENT  Patient: Julio Darnell (89 y.o. male)  Date: 5/15/2017  Diagnosis: Hip fracture ([address]  left hip fracture <principal problem not specified>  Procedure(s) (LRB):  FEMORAL INTERTROCHANTERIC NAIL INSERTION (Left) 4 Days Post-Op  Precautions: Fall, WBAT    ASSESSMENT:  Patient presented alert and agreeable to work with OT, but was confused and distractible t/o session. Frequent cueing needed for attention, sequencing and problem solving during ADLs and functional mobility. He was overall mod A for seated dressing ADLs using AE for LB, was min to mod A for bed mobility, and was max A for sit to stand. Patient has a tendency to lean posteriorly in sitting and standing, which worsens as he fatigues. He demonstrated poor activity tolerance during this session, requiring significant time and effort for limited activity. At this point he appears to be most appropriate for SNF rehab after discharge. Progression toward goals:  []       Improving appropriately and progressing toward goals  [x]       Improving slowly and progressing toward goals  []       Not making progress toward goals and plan of care will be adjusted     PLAN:  Patient continues to benefit from skilled intervention to address the above impairments. Continue treatment per established plan of care. Discharge Recommendations:  [facility]  Further Equipment Recommendations for Discharge:  TBD in rehab       OBJECTIVE DATA SUMMARY:   Cognitive/Behavioral Status:  Neurologic State: Alert;Confused  Orientation Level: Oriented to person;Oriented to place;Oriented to situation;Disoriented to time  Cognition: Follows commands;Decreased attention/concentration;Poor safety awareness        Safety/Judgement: Decreased awareness of need for safety  Functional Mobility and Transfers for ADLs:  Bed Mobility:     Supine to Sit: Minimum assistance  Sit to Supine: Minimum assistance  Scooting: Moderate assistance  Transfers:  Sit to Stand: Maximum assistance of 1. Balance:  Sitting: Impaired  Sitting - Static: Good (unsupported)  Sitting - Dynamic: Fair (occasional)  Standing: Impaired  Standing - Static: Poor;Constant support  Standing - Dynamic : Poor  ADL Intervention:  Upper Body Dressing Assistance  Shirt simulation with hospital gown: Moderate assistance (seated EOB)  Cues: Tactile cues provided;Verbal cues provided;Visual cues provided    Lower Body Dressing Assistance  Socks: Moderate assistance; Compensatory technique training  Position Performed: Seated edge of bed  Cues: Don;Tactile cues provided;Verbal cues provided;Visual cues provided  Adaptive Equipment Used: Sock aid    Cognitive Retraining  Problem Solving: Deductive reason;General alternative solution; Identifying the problem  Organizing/Sequencing: Breaking task down; Two step sequence  Attention to Task: Distractibility; Single task  Following Commands: Follows one step commands/directions  Safety/Judgement: Decreased awareness of need for safety  Cues: Tactile cues provided;Verbal cues provided;Visual cues provided  Activity Tolerance:   Overall poor for limited functional activity. Please refer to the flowsheet for vital signs taken during this treatment.     After treatment:   [x] Patient left in no apparent distress sitting up in chair  [] Patient left in no apparent distress in bed  [x] Call bell left within reach  [x] Nursing notified  [] Caregiver present  [] Bed alarm activated    COMMUNICATION/COLLABORATION:   The patients plan of care was discussed with: Physical Therapist    Abilio Bolivar OTR/L  Time Calculation: 34 mins

## 2017-05-15 NOTE — PROGRESS NOTES
Ortho / Neurosurgery NP Note    POD# 4  s/p FEMORAL INTERTROCHANTERIC NAIL INSERTION     Pt resting in bed. Family friend at bedside. Pt c/o pain in hip with movement    History: Ruptured infected gall bladder; treated at [facility] April 2017 with surgical removal and abx. Had  shunt that required rerouting from abd to chest due to abd infection during this same admission to [facility]. Has been recovering at [address] since 5/6. On 5/11 he fell out of wheelchair in his room and fractured his left hip. Unwitnessed fall. VSS Afebrile. Voiding status: watkins reinserted 5/12 for retention. Had some hematuria over the weekend (possibly related to trauma from Watkins insertion. Urine had cleared this morning, then became blood tinged again later. Pt also with no BM yet. Constipated and working on bowel regimen. Labs  Lab Results   Component Value Date/Time    HGB 8.2 05/15/2017 04:21 AM      Lab Results   Component Value Date/Time    INR 1.1 05/10/2017 09:54 PM       today - Dr. Leia Del Rio aware and will recheck tomorrow. Body mass index is 26.58 kg/(m^2). : A BMI >30 is classified as Obesity. Left hip Opsite Dressing c.d.i  Cryotherapy in place over incision  Calves soft and supple; No pain with passive stretch  Sensation and motor intact  SCDs for mechanical DVT proph while in bed     PLAN:  1) PT BID, WBAT; OT eval ordered as well  2) Lovenox for DVT Prophylaxis; pt with hx of CVA and was taking ASA 81 every other day. That had been resumed but stopped over the weekend due to hematuria  3) Pain control - low dose Oxy 2.5-5 mg PO ordered. 4) Hematuria / Urinary retention:  Inserted indwelling watkins at 10 am on 5/12. Clamped after 1000 cc return, Urine reflex ordered - no UTI; now hematuria - Dr. Adrien [facility]. Will check PLT in AM as well as work on bowel regimen and decide on watkins removal thereafter. 7) Plan d/c back to [address] when medically stable - hopefully tomorrow.     Suzi Dietz, BOZENA

## 2017-05-15 NOTE — PROGRESS NOTES
Patient refuses to be turned anymore says that he has been hurting since the last time he was turned and does not care if he gets a bedsore.  Patient is A&Ox4

## 2017-05-15 NOTE — PROGRESS NOTES
Hospitalist Progress Note    NAME: Pepper Organ   :  1940   MRN:  [id number]     Interim Hospital Summary: 65 y.o. male whom presented on 5/10/2017 with      Assessment / Plan:    Hematuria  BPH with urine retention   Hx Kidney stones  -continue flomax.     -watkins in place. Hold on voiding trial as patient is still having difficulty with constipation  -stopped ASA. Hematuria started , improved but now recurred again.  -consult Urology    Thrombocytopenia  -stopped ASA. 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 Will repeat CBC in AM to establish trend. This can be followed at rehab while on lovenox. If it drops much lower than 100, then should consider HIT type 2, switch to arixtra and send Heparin antibody. Discussed with ortho team.      Hx A flutter  -continue cardizem. Holding ASA due to hematuria. BP and HR stable    DM  (HgA1c 6.8)  -SSI prn. COPD  -Continue spiriva and prn nebs    RA  -holding MTX. He has been off humira since hospitalization in April  -continue folate    Moderate malnutrition with recent weight loss and hypoalbuminemia  -related to recent prolonged hospitalization for sepsis / ruptured GB  -BMI 26  -Glucerna supplements / dietary input appreciated    S/P  shunt for NPH     AAA  -per CT (3/2017) 4.1 x 4.1 cm aneurysm distal abdominal aorta. OP follow up    Left hip fracture  -s/p femoral intertrochanteric nail insertion 17 Dr Hilary Manning  -DVT prophylaxis : Lovenox  -PT OT eval and treat. DC planning to SNF vs IR         Subjective:     Chief Complaint / Reason for Physician Visit  Follow up of RA, COPD, aflutter, BPH  Chart reviewed in detail. Discussed with RN events overnight. Feels okay. Hematuria cleared yesterday and now recurred.    Trying to move on a bed pan this AM.    Review of Systems:  Symptom Y/N Comments  Symptom Y/N Comments   Fever/Chills n   Chest Pain n    Poor Appetite    Edema Cough    Abdominal Pain n    Sputum    Joint Pain     SOB/SILVER n   Pruritis/Rash     Nausea/vomit    Tolerating PT/OT     Diarrhea    Tolerating Diet     Constipation    Other       Could NOT obtain due to:      PO intake: No data found. Objective:     VITALS:   Last 24hrs VS reviewed since prior progress note. Most recent are:  Patient Vitals for the past 24 hrs:   Temp Pulse Resp BP SpO2   05/15/17 0829 97.6 °F (36.4 °C) 96 18 122/73 95 %   05/15/17 0335 97.6 °F (36.4 °C) 87 18 140/64 98 %   05/14/17 2320 97.7 °F (36.5 °C) 85 18 116/60 96 %   05/14/17 1958 98.4 °F (36.9 °C) 89 18 118/57 98 %   05/14/17 1539 98.9 °F (37.2 °C) 94 18 130/56 96 %   05/14/17 1141 97.5 °F (36.4 °C) 87 18 133/61 100 %       Intake/Output Summary (Last 24 hours) at 05/15/17 0913  Last data filed at 05/15/17 2389   Gross per 24 hour   Intake                0 ml   Output             2100 ml   Net            -2100 ml        PHYSICAL EXAM:  General: WD, WN., cooperative, no acute distress    EENT:  EOMI. Anicteric sclerae. MMM  Resp:  Mild anterior rhonchi No accessory muscle use  CV:  Regular  rhythm,  trace edema  GI:  Soft, Non distended, Non tender.  +Bowel sounds  Neurologic:  Alert and follows simple commands, normal speech,  Grossly intact sensation   Psych:   Fair insight. Not anxious nor agitated  Skin:  No rashes. No jaundice    Reviewed most current lab test results and cultures  YES  Reviewed most current radiology test results   YES  Review and summation of old records today    NO  Reviewed patient's current orders and MAR    YES  PMH/SH reviewed - no change compared to H&P  ________________________________________________________________________  Care Plan discussed with:    Comments   Patient x    Family      RN x    Care Manager x    Consultant                       x Multidiciplinary team rounds were held today with , nursing, pharmacist and clinical coordinator.   Patient's plan of care was discussed; medications were reviewed and discharge planning was addressed. ________________________________________________________________________  Total NON critical care TIME:  15   Minutes    Total CRITICAL CARE TIME Spent:   Minutes non procedure based      Comments   >50% of visit spent in counseling and coordination of care x     This includes time during multidisciplinary rounds if indicated above   ________________________________________________________________________  Aure Haskins MD     Procedures: see electronic medical records for all procedures/Xrays and details which were not copied into this note but were reviewed prior to creation of Plan. LABS:  I reviewed today's most current labs and imaging studies.   Pertinent labs include:  Recent Labs      05/15/17   0421   WBC  4.3   HGB  8.2*   HCT  25.8*   PLT  110*     Recent Labs      05/15/17   0421   NA  140   K  3.7   CL  110*   CO2  18*   GLU  147*   BUN  5*   CREA  0.54*   CA  7.4*

## 2017-05-15 NOTE — CONSULTS
Requesting Provider: Quentin Martin MD              Patient: Aram Sullivan MRN: [id number]  SSN: [id number]    YOB: 1940  Age: 65 y.o. Sex: male     Location: [address] [place] [address]       Code Status: Full Code   PCP: Duc Shelby III,   - [phone]   Emergency Contact:  Primary Emergency Contact: [facility], [place] Phone: [phone]   Race/Scientology/Language: WHITE OR Lu Deaner / Nadia Doherty / Von Abler   Payor: Payor: Allan Nic / Plan: Airam Nicky / Product Type: Medicare /    Prior Admission Data: 5/10/17 [facility] ARMS [address] [place] [address] Day: 6 - Admitted 5/10/2017  9:12 PM   POD # 4 Days Post-Op Procedure(s): FEMORAL INTERTROCHANTERIC NAIL INSERTION by Cristian Rice DO - Blood Loss: 400 ml 1 Hr 35 Min 40 Sec     CONSULTANTS  IP CONSULT TO ORTHOPEDIC SURGERY  IP CONSULT TO UROLOGY   ADMISSION DIAGNOSES    ICD-10-CM ICD-9-CM   1. [facility] left [facility] S72.002A 820.8         Assessment/Plan:     · Gross hematuria - mild. Suspect cath/mild low plts/Lovenox/ASA/Heparin related. Should resolve with time, outpatient workup  · Urinary Retention - continue Flomax. denies pre-existing urinary complaints, failed post-op void trial. Would leave watkins through discharge. Can have void trial and PVR checks at discretion of [facility] rehab when doing better. Contact information has been added to e-discharge materials for outpatient followup. Follow-up Appointments   Procedures    FOLLOW UP VISIT Appointment in: [facility] - [phone]     [facility] - [phone]     Standing Status:   Standing     Number of Occurrences:   1     Order Specific Question:   Appointment in     Answer:   One Month             CC: Fall (Hit back of head after falling when getting up out of unlocked wheelchair) and Fracture (left hip from fall)   HPI: He is a 65 y.o. male s/p fall while attempting voiding, and s/p hip nail. Post-op retention with failed void trial, some gross hematuria. Current Antimicrobial Therapy     None        Diet: DIET DIABETIC CONSISTENT CARB Regular  DIET NUTRITIONAL SUPPLEMENTS Breakfast, Lunch, Dinner; Glucerna Shake -    Urinary Status: Lawrence     Labs     Lab Results   Component Value Date/Time    WBC 4.3 05/15/2017 04:21 AM    HCT 25.8 05/15/2017 04:21 AM    PLATELET 917 30/45/0507 04:21 AM    Sodium 140 05/15/2017 04:21 AM    Potassium 3.7 05/15/2017 04:21 AM    Chloride 110 05/15/2017 04:21 AM    CO2 18 05/15/2017 04:21 AM    BUN 5 05/15/2017 04:21 AM    Creatinine 0.54 05/15/2017 04:21 AM    Glucose 147 05/15/2017 04:21 AM    Calcium 7.4 05/15/2017 04:21 AM    Magnesium 1.7 03/07/2017 03:29 PM    INR 1.1 05/10/2017 09:54 PM     UA: Lab Results   Component Value Date/Time    Color YELLOW/STRAW 05/12/2017 11:30 AM    Appearance CLOUDY 05/12/2017 11:30 AM    Specific gravity 1.016 05/12/2017 11:30 AM    Specific gravity 1.025 04/12/2017 07:37 PM    pH (UA) 5.5 05/12/2017 11:30 AM    Protein NEGATIVE  05/12/2017 11:30 AM    Glucose 100 05/12/2017 11:30 AM    Ketone NEGATIVE  05/12/2017 11:30 AM    Bilirubin NEGATIVE  05/12/2017 11:30 AM    Urobilinogen 0.2 05/12/2017 11:30 AM    Nitrites NEGATIVE  05/12/2017 11:30 AM    Leukocyte Esterase NEGATIVE  05/12/2017 11:30 AM    Epithelial cells FEW 05/12/2017 11:30 AM    Bacteria NEGATIVE  05/12/2017 11:30 AM    WBC 5-10 05/12/2017 11:30 AM    RBC 5-10 05/12/2017 11:30 AM     Imaging   US: US Results (most recent):    Results from Hospital Encounter encounter on 06/06/13   US THYROID/PARATHYROID/SOFT TISS   Narrative **Final Report**      ICD Codes / Adm. Diagnosis: 240.9  401.1 / Goiter, unspecified    Examination:  US THYROID PARATHYROID  - [id number] - Jun 6 2013 10:08AM  Accession No:  [id number]  Reason:  thyromegaly      REPORT:  Ultrasonography of the thyroid gland demonstrates that the right lobe   measures 4.8x1. 7x1.7 cm. The left lobe measures 4.6x1. 9x1.5 cm. The isthmus   measures 3 mm. The thyroid echotexture is diffusely heterogeneous. Within   the left lobe of the thyroid, there is a 4.1 mm hypoechoic nodule in the   lower pole. IMPRESSION: Heterogeneous thyroid with tiny left lobe nodule. No dominant   nodule identified. Signing/Reading Doctor: Regan Oreilly ([id number])    Approved: Regan Oreilly ([id number])  Jun 6 2013 10:31AM                                   CT:   Results for orders placed during the hospital encounter of 05/10/17   CT HEAD WO CONT    Narrative EXAM:  CT HEAD WO CONT    INDICATION:   head trauma, fall    COMPARISON: 2016. TECHNIQUE: Unenhanced CT of the head was performed using 5 mm images. Brain and  bone windows were generated. CT dose reduction was achieved through use of a  standardized protocol tailored for this examination and automatic exposure  control for dose modulation. FINDINGS:  The ventricles and sulci are enlarged. The ventriculoperitoneal shunt is  unchanged. There is no significant white matter disease. There is no  intracranial hemorrhage, extra-axial collection, mass, mass effect or midline  shift. The basilar cisterns are open. No acute infarct is identified. The bone  windows demonstrate no abnormalities. There is mucosal thickening of the  sphenoid sinuses and ethmoid sinuses. Impression IMPRESSION: No acute findings. Sinus disease.          Cultures     All Micro Results     Procedure Component Value Units Date/Time    CULTURE, URINE [[id number]] Collected:  05/12/17 1130    Order Status:  Completed Specimen:  Urine Updated:  05/14/17 0739     Special Requests: --        NO SPECIAL REQUESTS  Reflexed from [id number]       Gobler Count <1,000 COLONIES/mL        Culture result: NO GROWTH 2 DAYS              Past History: (Complete 2+/3 areas)     Allergies   Allergen Reactions    Ceclor [Cefaclor] Swelling    Contrast Agent [Iodine] Unknown (comments)    Floxin [Ofloxacin] Hives    Ivp [Fd And MINERVA Blue No.1] Itching      Current Facility-Administered Medications   Medication Dose Route Frequency    enoxaparin (LOVENOX) injection 40 mg  40 mg SubCUTAneous Q24H    oxyCODONE IR (ROXICODONE) tablet 2.5-5 mg  2.5-5 mg Oral Q4H PRN    insulin lispro (HUMALOG) injection   SubCUTAneous AC&HS    glucose chewable tablet 16 g  4 Tab Oral PRN    dextrose (D50W) injection syrg 12.5-25 g  12.5-25 g IntraVENous PRN    glucagon (GLUCAGEN) injection 1 mg  1 mg IntraMUSCular PRN    acetaminophen (TYLENOL) tablet 650 mg  650 mg Oral QID    albuterol (PROVENTIL VENTOLIN) nebulizer solution 2.5 mg  2.5 mg Nebulization Q4H PRN    dilTIAZem CD (CARDIZEM CD) capsule 120 mg  120 mg Oral DAILY    folic acid (FOLVITE) tablet 1 mg  1 mg Oral DAILY    pravastatin (PRAVACHOL) tablet 40 mg  40 mg Oral QHS    tamsulosin (FLOMAX) capsule 0.4 mg  0.4 mg Oral DAILY    umeclidinium (INCRUSE ELLIPTA) 62.5 mcg/actuation  1 Puff Inhalation DAILY    sodium chloride (NS) flush 5-10 mL  5-10 mL IntraVENous Q8H    sodium chloride (NS) flush 5-10 mL  5-10 mL IntraVENous PRN    ondansetron (ZOFRAN) injection 4 mg  4 mg IntraVENous Q4H PRN    docusate sodium (COLACE) capsule 100 mg  100 mg Oral BID    morphine injection 2 mg  2 mg IntraVENous Q3H PRN    Prior to Admission medications    Medication Sig Start Date End Date Taking? Authorizing Provider   acetaminophen (TYLENOL) 500 mg tablet Take 500 mg by mouth every six (6) hours as needed for Pain. Yes Historical Provider   albuterol sulfate (PROVENTIL;VENTOLIN) 2.5 mg/0.5 mL nebu nebulizer solution 2.5 mg by Nebulization route every four (4) hours as needed for Wheezing. Yes Historical Provider   dicyclomine (BENTYL) 20 mg tablet Take 20 mg by mouth every six (6) hours. Indications: Irritable Bowel Syndrome   Yes Historical Provider   dilTIAZem XR (DILACOR XR) 120 mg XR capsule Take 120 mg by mouth daily.    Yes Historical Provider   pravastatin (PRAVACHOL) 40 mg tablet Take 40 mg by mouth nightly. Yes Historical Provider   tamsulosin (FLOMAX) 0.4 mg capsule  3/10/17  Yes Historical Provider   methotrexate (RHEUMATREX) 2.5 mg tablet Take 10 mg by mouth Every Thursday. Yes Historical Provider   folic acid (FOLVITE) 1 mg tablet Take 1 mg by mouth daily. Yes Historical Provider   esomeprazole (NEXIUM) 40 mg capsule Take 40 mg by mouth daily. Yes Historical Provider   tiotropium (SPIRIVA WITH HANDIHALER) 18 mcg inhalation capsule Take 1 Cap by inhalation daily. Yes Historical Provider   adalimumab (HUMIRA) 40 mg/0.8 mL injection by SubCUTAneous route every fourteen (14) days. Historical Provider        PMHx:  has a past medical history of Cancer ([facility] COPD; Diabetes ([facility] Diarrhea (1/22/2016); Dysphagia (4/4/2017); Emphysema lung ([facility] GERD (gastroesophageal reflux disease); Hydrocephalus (2014); Hypertension; Ill-defined condition; Other ill-defined conditions; RA (rheumatoid arthritis) ([facility] Scarring; and Stroke ([facility] (2014). He also has no past medical history of Skin cancer. PSurgHx:  has a past surgical history that includes egd transoral biopsy single/multiple (4/28/2011); colsc flx w/rmvl of tumor polyp lesion snare tq (4/28/2011); heent; other surgical; vascular surgery procedure unlist; vascular surgery procedure unlist; and cholecystectomy (04/18/2017). PSocHx:  reports that he has quit smoking. He has never used smokeless tobacco. He reports that he drinks about 3.5 oz of alcohol per week  He reports that he does not use illicit drugs.    ROS:  (Complete - 10 systems) - positives are bolded : Weightloss (Constitutional), Dry mouth (ENMT), Chest pain (CV), SOB (Respiratory), Constipation (GI), Weakness (MS), Pallor (Skin), TIA Sx (Neuro), Confusion (Psych), Easy bruising (Heme)    Physical Exam: (Comprehesive - 8+ 1995 Systems)     (1) Constitutional:  FIO2:   on SpO2: O2 Sat (%): 100 %  O2 Device: Room air O2 Flow Rate (L/min): 1 l/min  Patient Vitals for the past 24 hrs:   BP Temp Pulse Resp SpO2   05/15/17 1148 119/65 98 °F (36.7 °C) 92 18 100 %   05/15/17 0829 122/73 97.6 °F (36.4 °C) 96 18 95 %   05/15/17 0335 140/64 97.6 °F (36.4 °C) 87 18 98 %   05/14/17 2320 116/60 97.7 °F (36.5 °C) 85 18 96 %   05/14/17 1958 118/57 98.4 °F (36.9 °C) 89 18 98 %   05/14/17 1539 130/56 98.9 °F (37.2 °C) 94 18 96 %         Date 05/14/17 0700 - 05/15/17 0659 05/15/17 0700 - 05/16/17 0659   Shift 6628-2313 1216-2950 24 Hour Total 3259-3558 9354-4159 24 Hour Total   I  N  T  A  K  E   Shift Total  (mL/kg)         O  U  T  P  U  T   Urine  (mL/kg/hr) 800  (0.8) 400  (0.4) 1200  (0.6) 900  900      Urine Output (mL) (Urinary Catheter 05/12/17 Lawrence)  900  900    Shift Total  (mL/kg) 800  (9.8) 400  (4.9) 1200  (14.7) 900  (11)  900  (11)   NET -800 -400 -1200 -900  -900   Weight (kg) 81.6 81.6 81.6 81.6 81.6 81.6      (2) ENMT:   moist mucous membranes   (3) Respiratory:  breathing easily   (4) GI:  no abdominal masses, tenderness, no hepatosplenomegaly, no ventral hernia, stool for occult blood not indicated as urologist.   (5) :   normal   (6) Lymphatic:  no adenopathy   (7) Muscloskeletal:  no gross deformity   (8) Skin:  no rash   (9) Neuro:  no focal deficits      Signed By: Kristopher Wise MD  - May 15, 2017

## 2017-05-15 NOTE — PROGRESS NOTES
Problem: Mobility Impaired (Adult and Pediatric)  Goal: *Acute Goals and Plan of Care (Insert Text)  Physical Therapy Goals  Initiated 5/12/2017  1. Patient will move from supine to sit and sit to supine in bed with minimal assistance/contact guard assist within 7 day(s). 2. Patient will transfer from bed to chair and chair to bed with maximal assistance using the least restrictive device within 7 day(s). 3. Patient will perform sit to stand with minimal assistance within 7 day(s). 4. Patient will ambulate with moderate assistance for 10 feet with the least restrictive device within 7 day(s). PHYSICAL THERAPY TREATMENT  Patient: Mikhail López (84 y.o. male)  Date: 5/15/2017  Diagnosis: Hip fracture ([address]  left hip fracture <principal problem not specified>  Procedure(s) (LRB):  FEMORAL INTERTROCHANTERIC NAIL INSERTION (Left) 4 Days Post-Op  Precautions: Fall, WBAT      ASSESSMENT:  Patient received supine in bed and agreeable to therapy. Patient reported minimal pain upon arrival to room. Patient required min assist to complete supine to sit and additional time to complete. Patient requires max verbal cues for hand placement to maximize mobility. Patient required mod assist to scoot forward to allow bilateral feet to be placed flat on the floor. Patient completed sit<>stand with max assist x 2, x 3 trials with RW. On last attempt, patient only able to clear buttocks 1-2 inches. Patient unable to weight shift to advance each LE to completed stand pivot today. Patient assisted back to supine position with max assist. Placed bed into chair position. Patient will continue to benefit from PT to progress mobility as tolerated. D/C rec: SNF.   Progression toward goals:  [ ]    Improving appropriately and progressing toward goals  [X]    Improving slowly and progressing toward goals  [ ]    Not making progress toward goals and plan of care will be adjusted       PLAN:  Patient continues to benefit from skilled intervention to address the above impairments. Continue treatment per established plan of care. Discharge Recommendations:  Skilled Nursing Facility  Further Equipment Recommendations for Discharge:  TBD at SNF       SUBJECTIVE:   Patient stated I don't know what is wrong with me.       OBJECTIVE DATA SUMMARY:   Critical Behavior:  Neurologic State: Alert  Orientation Level: Oriented X4  Cognition: Follows commands  Safety/Judgement: Decreased awareness of need for safety  Functional Mobility Training:  Bed Mobility:     Supine to Sit: Minimum assistance  Sit to Supine: Moderate assistance;Assist x2  Scooting: Minimum assistance        Transfers:  Sit to Stand: Maximum assistance;Assist x2 (x 3 trials)  Stand to Sit: Maximum assistance;Assist x2           Balance:  Sitting: Impaired  Sitting - Static: Good (unsupported)  Sitting - Dynamic: Fair (occasional)  Standing: Impaired  Standing - Static: Poor;Constant support  Standing - Dynamic : Poor  Ambulation/Gait Training:        Pain:  Pain Scale 1: Numeric (0 - 10)  Pain Intensity 1: 1  Pain Location 1: Hip  Pain Orientation 1: Left  Pain Description 1: Aching;Sore;Constant  Pain Intervention(s) 1: Medication (see MAR)  Activity Tolerance:   Fair. VSS  Please refer to the flowsheet for vital signs taken during this treatment.   After treatment:   [ ]    Patient left in no apparent distress sitting up in chair  [X]    Patient left in no apparent distress in bed  [X]    Call bell left within reach  [X]    Nursing notified  [ ]    Caregiver present  [ ]    Bed alarm activated      COMMUNICATION/COLLABORATION:   The patients plan of care was discussed with: Occupational Therapist and Registered Nurse     Renae Horner PT, DPT   Time Calculation: 25 mins

## 2017-05-16 ENCOUNTER — HOSPITAL ENCOUNTER (OUTPATIENT)
Age: 77
Discharge: OTHER INSTITUTION | End: 2017-06-16
Attending: PHYSICAL MEDICINE & REHABILITATION | Admitting: PHYSICAL MEDICINE & REHABILITATION

## 2017-05-16 VITALS
RESPIRATION RATE: 18 BRPM | SYSTOLIC BLOOD PRESSURE: 132 MMHG | DIASTOLIC BLOOD PRESSURE: 76 MMHG | HEIGHT: 69 IN | OXYGEN SATURATION: 100 % | BODY MASS INDEX: 26.66 KG/M2 | TEMPERATURE: 97.4 F | HEART RATE: 91 BPM | WEIGHT: 180 LBS

## 2017-05-16 DIAGNOSIS — R40.4 TRANSIENT ALTERATION OF AWARENESS: ICD-10-CM

## 2017-05-16 LAB
ANION GAP BLD CALC-SCNC: 7 MMOL/L (ref 5–15)
BASOPHILS # BLD AUTO: 0 K/UL (ref 0–0.1)
BASOPHILS # BLD: 0 % (ref 0–1)
BUN SERPL-MCNC: 6 MG/DL (ref 6–20)
BUN/CREAT SERPL: 10 (ref 12–20)
CALCIUM SERPL-MCNC: 8.6 MG/DL (ref 8.5–10.1)
CHLORIDE SERPL-SCNC: 104 MMOL/L (ref 97–108)
CO2 SERPL-SCNC: 26 MMOL/L (ref 21–32)
CREAT SERPL-MCNC: 0.6 MG/DL (ref 0.7–1.3)
EOSINOPHIL # BLD: 0.3 K/UL (ref 0–0.4)
EOSINOPHIL NFR BLD: 7 % (ref 0–7)
ERYTHROCYTE [DISTWIDTH] IN BLOOD BY AUTOMATED COUNT: 14.4 % (ref 11.5–14.5)
GLUCOSE BLD STRIP.AUTO-MCNC: 133 MG/DL (ref 65–100)
GLUCOSE BLD STRIP.AUTO-MCNC: 146 MG/DL (ref 65–100)
GLUCOSE BLD STRIP.AUTO-MCNC: 158 MG/DL (ref 65–100)
GLUCOSE SERPL-MCNC: 148 MG/DL (ref 65–100)
HCT VFR BLD AUTO: 28.4 % (ref 36.6–50.3)
HGB BLD-MCNC: 8.9 G/DL (ref 12.1–17)
LYMPHOCYTES # BLD AUTO: 32 % (ref 12–49)
LYMPHOCYTES # BLD: 1.4 K/UL (ref 0.8–3.5)
MCH RBC QN AUTO: 28.7 PG (ref 26–34)
MCHC RBC AUTO-ENTMCNC: 31.3 G/DL (ref 30–36.5)
MCV RBC AUTO: 91.6 FL (ref 80–99)
MONOCYTES # BLD: 0.6 K/UL (ref 0–1)
MONOCYTES NFR BLD AUTO: 14 % (ref 5–13)
NEUTS SEG # BLD: 2 K/UL (ref 1.8–8)
NEUTS SEG NFR BLD AUTO: 47 % (ref 32–75)
PLATELET # BLD AUTO: 141 K/UL (ref 150–400)
POTASSIUM SERPL-SCNC: 3.9 MMOL/L (ref 3.5–5.1)
RBC # BLD AUTO: 3.1 M/UL (ref 4.1–5.7)
SERVICE CMNT-IMP: ABNORMAL
SODIUM SERPL-SCNC: 137 MMOL/L (ref 136–145)
WBC # BLD AUTO: 4.3 K/UL (ref 4.1–11.1)

## 2017-05-16 PROCEDURE — 80048 BASIC METABOLIC PNL TOTAL CA: CPT | Performed by: INTERNAL MEDICINE

## 2017-05-16 PROCEDURE — 36415 COLL VENOUS BLD VENIPUNCTURE: CPT | Performed by: INTERNAL MEDICINE

## 2017-05-16 PROCEDURE — 74011250637 HC RX REV CODE- 250/637: Performed by: INTERNAL MEDICINE

## 2017-05-16 PROCEDURE — 82962 GLUCOSE BLOOD TEST: CPT

## 2017-05-16 PROCEDURE — 74011250636 HC RX REV CODE- 250/636: Performed by: ORTHOPAEDIC SURGERY

## 2017-05-16 PROCEDURE — 85025 COMPLETE CBC W/AUTO DIFF WBC: CPT | Performed by: INTERNAL MEDICINE

## 2017-05-16 PROCEDURE — 74011250637 HC RX REV CODE- 250/637: Performed by: PHYSICAL MEDICINE & REHABILITATION

## 2017-05-16 PROCEDURE — 74011636637 HC RX REV CODE- 636/637: Performed by: INTERNAL MEDICINE

## 2017-05-16 PROCEDURE — 74011636637 HC RX REV CODE- 636/637: Performed by: PHYSICAL MEDICINE & REHABILITATION

## 2017-05-16 PROCEDURE — 74011250637 HC RX REV CODE- 250/637: Performed by: NURSE PRACTITIONER

## 2017-05-16 RX ORDER — OXYCODONE HYDROCHLORIDE 5 MG/1
10 TABLET ORAL
Status: DISCONTINUED | OUTPATIENT
Start: 2017-05-16 | End: 2017-06-16 | Stop reason: HOSPADM

## 2017-05-16 RX ORDER — POLYETHYLENE GLYCOL 3350 17 G/17G
17 POWDER, FOR SOLUTION ORAL
Qty: 15 PACKET | Refills: 0 | Status: SHIPPED | OUTPATIENT
Start: 2017-05-16 | End: 2017-05-31

## 2017-05-16 RX ORDER — FACIAL-BODY WIPES
10 EACH TOPICAL DAILY PRN
Status: DISCONTINUED | OUTPATIENT
Start: 2017-05-18 | End: 2017-05-16 | Stop reason: HOSPADM

## 2017-05-16 RX ORDER — DEXTROSE MONOHYDRATE 100 MG/ML
125-250 INJECTION, SOLUTION INTRAVENOUS AS NEEDED
Status: DISCONTINUED | OUTPATIENT
Start: 2017-05-16 | End: 2017-06-16 | Stop reason: HOSPADM

## 2017-05-16 RX ORDER — OXYCODONE HYDROCHLORIDE 5 MG/1
5 TABLET ORAL
Status: DISCONTINUED | OUTPATIENT
Start: 2017-05-16 | End: 2017-06-16 | Stop reason: HOSPADM

## 2017-05-16 RX ORDER — ACETAMINOPHEN 325 MG/1
650 TABLET ORAL
Status: DISCONTINUED | OUTPATIENT
Start: 2017-05-16 | End: 2017-06-16 | Stop reason: HOSPADM

## 2017-05-16 RX ORDER — DOCUSATE SODIUM 100 MG/1
100 CAPSULE, LIQUID FILLED ORAL 2 TIMES DAILY
Status: DISCONTINUED | OUTPATIENT
Start: 2017-05-17 | End: 2017-06-16 | Stop reason: HOSPADM

## 2017-05-16 RX ORDER — ONDANSETRON 4 MG/1
4 TABLET, ORALLY DISINTEGRATING ORAL
Status: DISCONTINUED | OUTPATIENT
Start: 2017-05-16 | End: 2017-06-16 | Stop reason: HOSPADM

## 2017-05-16 RX ORDER — FACIAL-BODY WIPES
10 EACH TOPICAL DAILY PRN
Status: DISCONTINUED | OUTPATIENT
Start: 2017-05-16 | End: 2017-06-16 | Stop reason: HOSPADM

## 2017-05-16 RX ORDER — FOLIC ACID 1 MG/1
1 TABLET ORAL DAILY
Status: DISCONTINUED | OUTPATIENT
Start: 2017-05-17 | End: 2017-06-16 | Stop reason: HOSPADM

## 2017-05-16 RX ORDER — ENOXAPARIN SODIUM 100 MG/ML
40 INJECTION SUBCUTANEOUS DAILY
Status: DISCONTINUED | OUTPATIENT
Start: 2017-05-17 | End: 2017-05-20

## 2017-05-16 RX ORDER — ACETAMINOPHEN 325 MG/1
650 TABLET ORAL 4 TIMES DAILY
Status: DISCONTINUED | OUTPATIENT
Start: 2017-05-16 | End: 2017-06-16 | Stop reason: HOSPADM

## 2017-05-16 RX ORDER — AMOXICILLIN 250 MG
1 CAPSULE ORAL DAILY
Qty: 30 TAB | Refills: 0 | Status: SHIPPED | OUTPATIENT
Start: 2017-05-16 | End: 2017-09-20 | Stop reason: ALTCHOICE

## 2017-05-16 RX ORDER — PRAVASTATIN SODIUM 40 MG/1
40 TABLET ORAL
Status: DISCONTINUED | OUTPATIENT
Start: 2017-05-16 | End: 2017-06-16 | Stop reason: HOSPADM

## 2017-05-16 RX ORDER — ENOXAPARIN SODIUM 100 MG/ML
40 INJECTION SUBCUTANEOUS EVERY 24 HOURS
Qty: 6.4 ML | Refills: 0 | Status: SHIPPED
Start: 2017-05-16 | End: 2017-06-01

## 2017-05-16 RX ORDER — DILTIAZEM HYDROCHLORIDE 120 MG/1
120 CAPSULE, COATED, EXTENDED RELEASE ORAL DAILY
Status: DISCONTINUED | OUTPATIENT
Start: 2017-05-17 | End: 2017-06-16 | Stop reason: HOSPADM

## 2017-05-16 RX ORDER — ADHESIVE BANDAGE
30 BANDAGE TOPICAL DAILY PRN
Status: DISCONTINUED | OUTPATIENT
Start: 2017-05-16 | End: 2017-06-16 | Stop reason: HOSPADM

## 2017-05-16 RX ORDER — ZOLPIDEM TARTRATE 5 MG/1
5 TABLET ORAL
Status: DISCONTINUED | OUTPATIENT
Start: 2017-05-16 | End: 2017-06-16 | Stop reason: HOSPADM

## 2017-05-16 RX ORDER — MAGNESIUM SULFATE 100 %
16 CRYSTALS MISCELLANEOUS AS NEEDED
Status: DISCONTINUED | OUTPATIENT
Start: 2017-05-16 | End: 2017-06-16 | Stop reason: HOSPADM

## 2017-05-16 RX ORDER — INSULIN LISPRO 100 [IU]/ML
INJECTION, SOLUTION INTRAVENOUS; SUBCUTANEOUS
Status: DISCONTINUED | OUTPATIENT
Start: 2017-05-16 | End: 2017-06-01

## 2017-05-16 RX ORDER — FACIAL-BODY WIPES
EACH TOPICAL
Status: DISCONTINUED
Start: 2017-05-16 | End: 2017-05-16 | Stop reason: HOSPADM

## 2017-05-16 RX ORDER — TAMSULOSIN HYDROCHLORIDE 0.4 MG/1
0.4 CAPSULE ORAL DAILY
Status: DISCONTINUED | OUTPATIENT
Start: 2017-05-17 | End: 2017-06-16 | Stop reason: HOSPADM

## 2017-05-16 RX ORDER — OXYCODONE HYDROCHLORIDE 5 MG/1
2.5-5 TABLET ORAL
Qty: 10 TAB | Refills: 0 | Status: SHIPPED
Start: 2017-05-16 | End: 2017-09-25 | Stop reason: ALTCHOICE

## 2017-05-16 RX ADMIN — Medication 10 ML: at 14:26

## 2017-05-16 RX ADMIN — INSULIN LISPRO 2 UNITS: 100 INJECTION, SOLUTION INTRAVENOUS; SUBCUTANEOUS at 23:41

## 2017-05-16 RX ADMIN — ACETAMINOPHEN 650 MG: 325 TABLET, FILM COATED ORAL at 17:58

## 2017-05-16 RX ADMIN — ACETAMINOPHEN 650 MG: 325 TABLET, FILM COATED ORAL at 14:25

## 2017-05-16 RX ADMIN — UMECLIDINIUM 1 PUFF: 62.5 AEROSOL, POWDER ORAL at 10:19

## 2017-05-16 RX ADMIN — Medication 10 ML: at 06:51

## 2017-05-16 RX ADMIN — FOLIC ACID 1 MG: 1 TABLET ORAL at 08:39

## 2017-05-16 RX ADMIN — ACETAMINOPHEN 650 MG: 325 TABLET, FILM COATED ORAL at 23:32

## 2017-05-16 RX ADMIN — DOCUSATE SODIUM 100 MG: 100 CAPSULE, LIQUID FILLED ORAL at 08:39

## 2017-05-16 RX ADMIN — ENOXAPARIN SODIUM 40 MG: 40 INJECTION SUBCUTANEOUS at 08:40

## 2017-05-16 RX ADMIN — INSULIN LISPRO 2 UNITS: 100 INJECTION, SOLUTION INTRAVENOUS; SUBCUTANEOUS at 08:40

## 2017-05-16 RX ADMIN — INSULIN LISPRO 2 UNITS: 100 INJECTION, SOLUTION INTRAVENOUS; SUBCUTANEOUS at 17:58

## 2017-05-16 RX ADMIN — DILTIAZEM HYDROCHLORIDE 120 MG: 120 CAPSULE, COATED, EXTENDED RELEASE ORAL at 08:39

## 2017-05-16 RX ADMIN — ACETAMINOPHEN 650 MG: 325 TABLET, FILM COATED ORAL at 08:39

## 2017-05-16 RX ADMIN — DOCUSATE SODIUM 100 MG: 100 CAPSULE, LIQUID FILLED ORAL at 17:57

## 2017-05-16 RX ADMIN — PRAVASTATIN SODIUM 40 MG: 40 TABLET ORAL at 23:32

## 2017-05-16 RX ADMIN — TAMSULOSIN HYDROCHLORIDE 0.4 MG: 0.4 CAPSULE ORAL at 08:38

## 2017-05-16 RX ADMIN — BISACODYL 10 MG: 10 SUPPOSITORY RECTAL at 11:34

## 2017-05-16 RX ADMIN — SODIUM PHOSPHATE, DIBASIC AND SODIUM PHOSPHATE, MONOBASIC 118 ML: 7; 19 ENEMA RECTAL at 11:35

## 2017-05-16 NOTE — PROGRESS NOTES
POD 5 Days Post-Op    Procedure:  Procedure(s) with comments:  FEMORAL INTERTROCHANTERIC NAIL INSERTION - left hip intramedullary nailing (Synthes TFNA; fx table, fluro)    Subjective:     Patient doing well, complaining of appropriate post-op pain. Slow with pt/ot. Seen by urology. Objective:     Blood pressure 141/69, pulse 88, temperature 97.8 °F (36.6 °C), resp. rate 20, height 5' 9\" (1.753 m), weight 81.6 kg (180 lb), SpO2 98 %. Temp (24hrs), Av.2 °F (36.8 °C), Min:97.5 °F (36.4 °C), Max:99.8 °F (37.7 °C)      Physical Exam:  Examination of the left hip reveals that the incision is clean, dry and intact. Sensation is intact to light touch.  mild swelling. No calf pain.   NVSI    Labs:   Lab Results   Component Value Date/Time    HGB 8.9 2017 06:15 AM         Assessment:     Active Problems:    Hip fracture ([address] (2017)        Procedure(s) with comments:  FEMORAL INTERTROCHANTERIC NAIL INSERTION - left hip intramedullary nailing (Synthes TFNA; fx table, fluro)    Plan/Recommendations/Medical Decision Making:     - pain control - oxy  - ice   - pt/ot - wbat LLE  - dvt prophylaxis - lovenox  - d/c planning - rehab today        Shari Enriquez DO

## 2017-05-16 NOTE — PROGRESS NOTES
Sbar report called to [address] to nurse Michelle King RN at [phone] with opportunity for questions allowed. pt going to room 133.

## 2017-05-16 NOTE — PROGRESS NOTES
Bedside and Verbal shift change report given to Gonzalez (oncoming nurse) by Jacinto Lopez (offgoing nurse). Report included the following information SBAR, Kardex, Intake/Output and MAR.

## 2017-05-16 NOTE — PROGRESS NOTES
Pt. D/cris to [address] to room 133,report called,v/s stable, family at bedside,deny any chest pain or left hip discomfort. watkins to gravity with blood tinged urine.

## 2017-05-16 NOTE — PROGRESS NOTES
Ortho / Neurosurgery NP Note    POD# 5  s/p FEMORAL INTERTROCHANTERIC NAIL INSERTION     Pt resting in bed shaving with electric sharver. Wife at bedside. Pt c/o pain in hip with movement    History: Ruptured infected gall bladder; treated at [facility] April 2017 with surgical removal and abx. Had  shunt that required rerouting from abd to chest due to abd infection during this same admission to [facility]. Has been recovering at [facility] since 5/6. On 5/11 he fell out of wheelchair in his room and fractured his left hip. Unwitnessed fall. VSS Afebrile. Voiding status: watkins reinserted 5/12 for retention. Had some hematuria over the weekend (possibly related to trauma from Watkins insertion. Urine had cleared this morning, then became blood tinged again later. Pt also with no BM yet. Constipated and working on bowel regimen. Labs  Lab Results   Component Value Date/Time    HGB 8.9 05/16/2017 06:15 AM      Lab Results   Component Value Date/Time    INR 1.1 05/10/2017 09:54 PM       today - Dr. Bentley Rod aware and will recheck tomorrow. Body mass index is 26.58 kg/(m^2). : A BMI >30 is classified as Obesity. Left hip Opsite Dressing c.d.i  Cryotherapy in place over incision  Calves soft and supple; No pain with passive stretch  Sensation and motor intact  SCDs for mechanical DVT proph while in bed    Abd semi-soft, non-tender. Tried for BM on bedpan.  + bowel sounds, + flatus    PLAN:  1) PT BID, WBAT; OT eval ordered as well  2) Lovenox for DVT Prophylaxis; pt with hx of CVA and was taking ASA 81 every other day. That had been resumed but stopped over the weekend due to hematuria; plan is to continue to hold ASA  3) Pain control - low dose Oxy 2.5-5 mg PO ordered. 4) Hematuria / Urinary retention:  Inserted indwelling watkins at 10 am on 5/12. Clamped after 1000 cc return, Urine reflex ordered - no UTI; now hematuria - Dr. Robbin [facility].   PLT improving to 141  5) Constipation  -suppository now, then enema if no results.   7) Plan d/c back to [facility] today    Eileen Edwards NP

## 2017-05-16 NOTE — PROGRESS NOTES
Pt to discharge to [facility] today and be transported over. Call report number is [phone] or [phone]. Family aware of pt discharge. 2nd IM letter on pt bedside chart.     BRITT York  Ext [phone]

## 2017-05-16 NOTE — PROGRESS NOTES
Bedside and Verbal shift change report given to [place], RN (oncoming nurse) by Karin Alpers, RN (offgoing nurse). Report included the following information SBAR, Kardex, Intake/Output and MAR.

## 2017-05-17 LAB
25(OH)D3 SERPL-MCNC: 10.2 NG/ML (ref 30–100)
ALBUMIN SERPL BCP-MCNC: 2.5 G/DL (ref 3.5–5)
ALBUMIN/GLOB SERPL: 0.6 {RATIO} (ref 1.1–2.2)
ALP SERPL-CCNC: 129 U/L (ref 45–117)
ALT SERPL-CCNC: 17 U/L (ref 12–78)
ANION GAP BLD CALC-SCNC: 9 MMOL/L (ref 5–15)
APPEARANCE UR: ABNORMAL
AST SERPL W P-5'-P-CCNC: 17 U/L (ref 15–37)
BACTERIA URNS QL MICRO: ABNORMAL /HPF
BILIRUB SERPL-MCNC: 0.6 MG/DL (ref 0.2–1)
BILIRUB UR QL: NEGATIVE
BUN SERPL-MCNC: 6 MG/DL (ref 6–20)
BUN/CREAT SERPL: 9 (ref 12–20)
CALCIUM SERPL-MCNC: 8.7 MG/DL (ref 8.5–10.1)
CHLORIDE SERPL-SCNC: 104 MMOL/L (ref 97–108)
CO2 SERPL-SCNC: 24 MMOL/L (ref 21–32)
COLOR UR: ABNORMAL
CREAT SERPL-MCNC: 0.64 MG/DL (ref 0.7–1.3)
EPITH CASTS URNS QL MICRO: ABNORMAL /LPF
ERYTHROCYTE [DISTWIDTH] IN BLOOD BY AUTOMATED COUNT: 14.4 % (ref 11.5–14.5)
GLOBULIN SER CALC-MCNC: 4.1 G/DL (ref 2–4)
GLUCOSE SERPL-MCNC: 144 MG/DL (ref 65–100)
GLUCOSE UR STRIP.AUTO-MCNC: NEGATIVE MG/DL
HCT VFR BLD AUTO: 29.3 % (ref 36.6–50.3)
HGB BLD-MCNC: 9.3 G/DL (ref 12.1–17)
HGB UR QL STRIP: ABNORMAL
KETONES UR QL STRIP.AUTO: ABNORMAL MG/DL
LEUKOCYTE ESTERASE UR QL STRIP.AUTO: ABNORMAL
MAGNESIUM SERPL-MCNC: 1.9 MG/DL (ref 1.6–2.4)
MCH RBC QN AUTO: 28.7 PG (ref 26–34)
MCHC RBC AUTO-ENTMCNC: 31.7 G/DL (ref 30–36.5)
MCV RBC AUTO: 90.4 FL (ref 80–99)
NITRITE UR QL STRIP.AUTO: NEGATIVE
PH UR STRIP: 7.5 [PH] (ref 5–8)
PLATELET # BLD AUTO: 175 K/UL (ref 150–400)
POTASSIUM SERPL-SCNC: 4 MMOL/L (ref 3.5–5.1)
PROT SERPL-MCNC: 6.6 G/DL (ref 6.4–8.2)
PROT UR STRIP-MCNC: NEGATIVE MG/DL
RBC # BLD AUTO: 3.24 M/UL (ref 4.1–5.7)
RBC #/AREA URNS HPF: >100 /HPF (ref 0–5)
SODIUM SERPL-SCNC: 137 MMOL/L (ref 136–145)
SP GR UR REFRACTOMETRY: 1.01 (ref 1–1.03)
UROBILINOGEN UR QL STRIP.AUTO: 1 EU/DL (ref 0.2–1)
WBC # BLD AUTO: 5 K/UL (ref 4.1–11.1)
WBC URNS QL MICRO: ABNORMAL /HPF (ref 0–4)

## 2017-05-17 PROCEDURE — 81001 URINALYSIS AUTO W/SCOPE: CPT | Performed by: PHYSICAL MEDICINE & REHABILITATION

## 2017-05-17 PROCEDURE — 74011250637 HC RX REV CODE- 250/637: Performed by: PHYSICAL MEDICINE & REHABILITATION

## 2017-05-17 PROCEDURE — 83735 ASSAY OF MAGNESIUM: CPT | Performed by: PHYSICAL MEDICINE & REHABILITATION

## 2017-05-17 PROCEDURE — 82306 VITAMIN D 25 HYDROXY: CPT | Performed by: PHYSICAL MEDICINE & REHABILITATION

## 2017-05-17 PROCEDURE — 87086 URINE CULTURE/COLONY COUNT: CPT | Performed by: PHYSICAL MEDICINE & REHABILITATION

## 2017-05-17 PROCEDURE — 85027 COMPLETE CBC AUTOMATED: CPT | Performed by: PHYSICAL MEDICINE & REHABILITATION

## 2017-05-17 PROCEDURE — 36415 COLL VENOUS BLD VENIPUNCTURE: CPT | Performed by: PHYSICAL MEDICINE & REHABILITATION

## 2017-05-17 PROCEDURE — 80053 COMPREHEN METABOLIC PANEL: CPT | Performed by: PHYSICAL MEDICINE & REHABILITATION

## 2017-05-17 PROCEDURE — 74011250636 HC RX REV CODE- 250/636: Performed by: PHYSICAL MEDICINE & REHABILITATION

## 2017-05-17 RX ADMIN — PRAVASTATIN SODIUM 40 MG: 40 TABLET ORAL at 21:46

## 2017-05-17 RX ADMIN — ENOXAPARIN SODIUM 40 MG: 40 INJECTION SUBCUTANEOUS at 08:18

## 2017-05-17 RX ADMIN — TAMSULOSIN HYDROCHLORIDE 0.4 MG: 0.4 CAPSULE ORAL at 08:18

## 2017-05-17 RX ADMIN — OXYCODONE HYDROCHLORIDE 5 MG: 5 TABLET ORAL at 08:18

## 2017-05-17 RX ADMIN — DOCUSATE SODIUM 100 MG: 100 CAPSULE, LIQUID FILLED ORAL at 08:18

## 2017-05-17 RX ADMIN — DILTIAZEM HYDROCHLORIDE 120 MG: 120 CAPSULE, EXTENDED RELEASE ORAL at 08:17

## 2017-05-17 RX ADMIN — ACETAMINOPHEN 650 MG: 325 TABLET, FILM COATED ORAL at 08:18

## 2017-05-17 RX ADMIN — UMECLIDINIUM 1 PUFF: 62.5 AEROSOL, POWDER ORAL at 09:00

## 2017-05-17 RX ADMIN — DOCUSATE SODIUM 100 MG: 100 CAPSULE, LIQUID FILLED ORAL at 17:46

## 2017-05-17 RX ADMIN — ACETAMINOPHEN 650 MG: 325 TABLET, FILM COATED ORAL at 21:46

## 2017-05-17 RX ADMIN — ACETAMINOPHEN 650 MG: 325 TABLET, FILM COATED ORAL at 12:35

## 2017-05-17 RX ADMIN — FOLIC ACID 1 MG: 1 TABLET ORAL at 08:18

## 2017-05-17 RX ADMIN — ACETAMINOPHEN 650 MG: 325 TABLET, FILM COATED ORAL at 17:46

## 2017-05-17 NOTE — DISCHARGE SUMMARY
Hospitalist Discharge Summary     Patient ID:  Elvia Akbar  [id number]  27 y.o.  1940    PCP on record: Hang Julien III,     Admit date: 5/10/2017  Discharge date and time: 5/16/2017      DISCHARGE DIAGNOSIS:      Left hip fracture s/p left hip intramedullary nailing   Hematuria- improved  BPH with urine retention - watkins in place. Voiding trial as able per SNF. Urology saw while inpatient. Constipation- 2/2 narcotics. Continue bowel regimen. Suppository today. May need enema if no BM with suppository. Hx Kidney stones  Thrombocytopenia -stopped ASA, remains on lovenox for dvt prophylaxis per ortho  Hx A flutter -continue cardizem. Holding ASA due to hematuria. DM (HgA1c 6.8)-SSI prn. COPD-Continue spiriva and prn nebs  RA -holding MTX. He has been off humira since hospitalization in April. 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 OP follow up        CONSULTATIONS:  [address] [place] TO UROLOGY    Excerpted HPI from H&P of Eileen Lebron MD:  Barrett Iglesias is a 65 y.o.  male who presents with above. Pt brought over from [address] [place] [address] after a fall. Pt apparently was rolled back to his room after dinner. He needed to go to the bathroom and tried to get out of the wheelchair unassisted but wheels were not locked so when he stood up he slid and fell. Pt with severe pain with movement so brought to the ER. Pt admitted to [facility] on 5/3 after prolonged hospitalization at [facility] 4/14-5/3 for perforated gallbladder with abscess. His shunt for hydrocephalus had to be relocated due to his intraabdominal infection. Additionally, he was intubated and had aflutter post op. Pt denies recent fever, cough or URI sx. He denies CP or SOB.  He has been eating better since going to [facility] (wife reports 20 lbs weight loss over the last couple months before the GB issue was discovered). Pt denies constipation. No new edema. No focal weakness before his fall tonight.    ______________________________________________________________________  DISCHARGE SUMMARY/HOSPITAL COURSE:  for full details see H&P, daily progress notes, labs, consult notes. Left hip fracture  -s/p femoral intertrochanteric nail insertion 5/11/17 Dr Fabio Boucher  -DVT prophylaxis : Lovenox  -Pain control with oxycodone. Continue bowel regimen. -DC to [facility]  -F/u with ortho in 2 weeks. Hematuria  BPH with urine retention   Hx Kidney stones  -continue flomax.   -Hematuria started Sunday, improved but now recurred again. Off ASA. Ok to continue with lovenox.  -watkins in place. Voiding trial as able per SNF. Urology saw while inpatient.  -consult Urology     Thrombocytopenia  -stopped ASA. PLT up to 141 on DC.  -Follow CBC while on lovenox for DVT proph. Off asa with hematuria.     Hx A flutter  -continue cardizem. Holding ASA due to hematuria. BP and HR stable     DM (HgA1c 6.8)  -Accuchecks and SSI prn.   -follow with PCP.     COPD  -Continue spiriva and prn nebs     RA  -holding MTX while in hospital. He has been off humira since hospitalization in April.  -continue folate     Moderate malnutrition with recent weight loss and hypoalbuminemia  -related to recent prolonged hospitalization for sepsis / ruptured GB  -Glucerna supplements / dietary input appreciated  -BMI 26    S/P  shunt for NPH      AAA  -per CT (3/2017) 4.1 x 4.1 cm aneurysm distal abdominal aorta. OP follow up    Constipation    -continue bowel regimen  -suppository day of DC, Follow with enema if needed. _______________________________________________________________________  Patient seen and examined by me on discharge day. Pertinent Findings:  Gen:    Not in distress  Chest: Clear lungs  CVS:   Regular rhythm.   No edema  Abd:  Soft, not distended, not tender  Neuro:  Alert, no focal deficits  : Howard  _______________________________________________________________________  DISCHARGE MEDICATIONS:   Discharge Medication List as of 5/16/2017  3:21 PM      START taking these medications    Details   oxyCODONE IR (ROXICODONE) 5 mg immediate release tablet Take 0.5-1 Tabs by mouth every four (4) hours as needed. Max Daily Amount: 30 mg., No Print, Disp-10 Tab, R-0      enoxaparin (LOVENOX) 40 mg/0.4 mL 0.4 mL by SubCUTAneous route every twenty-four (24) hours for 16 days. , No Print, Disp-6.4 mL, R-0      polyethylene glycol (MIRALAX) 17 gram packet Take 1 Packet by mouth daily as needed (constipation) for up to 15 days. , Print, Disp-15 Packet, R-0      senna-docusate (PERICOLACE) 8.6-50 mg per tablet Take 1 Tab by mouth daily. , Print, Disp-30 Tab, R-0         CONTINUE these medications which have NOT CHANGED    Details   acetaminophen (TYLENOL) 500 mg tablet Take 500 mg by mouth every six (6) hours as needed for Pain., Historical Med      albuterol sulfate (PROVENTIL;VENTOLIN) 2.5 mg/0.5 mL nebu nebulizer solution 2.5 mg by Nebulization route every four (4) hours as needed for Wheezing., Historical Med      dilTIAZem XR (DILACOR XR) 120 mg XR capsule Take 120 mg by mouth daily. , Historical Med      pravastatin (PRAVACHOL) 40 mg tablet Take 40 mg by mouth nightly., Historical Med      tamsulosin (FLOMAX) 0.4 mg capsule Historical Med      methotrexate (RHEUMATREX) 2.5 mg tablet Take 10 mg by mouth Every Thursday., Historical Med      folic acid (FOLVITE) 1 mg tablet Take 1 mg by mouth daily. , Historical Med      esomeprazole (NEXIUM) 40 mg capsule Take 40 mg by mouth daily. , Historical Med      tiotropium (SPIRIVA WITH HANDIHALER) 18 mcg inhalation capsule Take 1 Cap by inhalation daily. , Historical Med      adalimumab (HUMIRA) 40 mg/0.8 mL injection by SubCUTAneous route every fourteen (14) days. , Historical Med         STOP taking these medications       dicyclomine (BENTYL) 20 mg tablet Comments:   Reason for Stopping:               My Recommended Diet, Activity, Wound Care, and follow-up labs are listed in the patient's Discharge Insturctions which I have personally completed and reviewed.     _______________________________________________________________________  DISPOSITION:    Home with Family:    Home with HH/PT/OT/RN:    SNF/LTC:    AMBAR: x   OTHER:        Condition at Discharge:  Stable  _______________________________________________________________________  Follow up with:   PCP : Luz Saldivar III DO  Follow-up Information     Follow up With Details Comments Contact Reji Araujo DO In 2 weeks  Ann Mcdermott [phone] III, DO In 1 week  [address] [facility]  [address] [facility] [address]. [phone]                Total time in minutes spent coordinating this discharge (includes going over instructions, follow-up, prescriptions, and preparing report for sign off to her PCP) :  45 minutes    Signed:  Se Arana MD

## 2017-05-18 LAB
BACTERIA SPEC CULT: NORMAL
CC UR VC: NORMAL
SERVICE CMNT-IMP: NORMAL

## 2017-05-18 PROCEDURE — 74011250636 HC RX REV CODE- 250/636: Performed by: PHYSICAL MEDICINE & REHABILITATION

## 2017-05-18 PROCEDURE — 74011250637 HC RX REV CODE- 250/637: Performed by: PHYSICAL MEDICINE & REHABILITATION

## 2017-05-18 RX ORDER — ERGOCALCIFEROL 1.25 MG/1
50000 CAPSULE ORAL
Status: DISCONTINUED | OUTPATIENT
Start: 2017-05-18 | End: 2017-06-16 | Stop reason: HOSPADM

## 2017-05-18 RX ORDER — NITROFURANTOIN 25; 75 MG/1; MG/1
100 CAPSULE ORAL 2 TIMES DAILY
Status: COMPLETED | OUTPATIENT
Start: 2017-05-18 | End: 2017-05-28

## 2017-05-18 RX ADMIN — ERGOCALCIFEROL 50000 UNITS: 1.25 CAPSULE ORAL at 17:00

## 2017-05-18 RX ADMIN — ENOXAPARIN SODIUM 40 MG: 40 INJECTION SUBCUTANEOUS at 09:31

## 2017-05-18 RX ADMIN — DOCUSATE SODIUM 100 MG: 100 CAPSULE, LIQUID FILLED ORAL at 17:01

## 2017-05-18 RX ADMIN — ACETAMINOPHEN 650 MG: 325 TABLET, FILM COATED ORAL at 09:32

## 2017-05-18 RX ADMIN — DILTIAZEM HYDROCHLORIDE 120 MG: 120 CAPSULE, EXTENDED RELEASE ORAL at 09:31

## 2017-05-18 RX ADMIN — DOCUSATE SODIUM 100 MG: 100 CAPSULE, LIQUID FILLED ORAL at 09:31

## 2017-05-18 RX ADMIN — ACETAMINOPHEN 650 MG: 325 TABLET, FILM COATED ORAL at 12:45

## 2017-05-18 RX ADMIN — UMECLIDINIUM 1 PUFF: 62.5 AEROSOL, POWDER ORAL at 09:31

## 2017-05-18 RX ADMIN — OXYCODONE HYDROCHLORIDE 5 MG: 5 TABLET ORAL at 09:31

## 2017-05-18 RX ADMIN — ACETAMINOPHEN 650 MG: 325 TABLET, FILM COATED ORAL at 22:20

## 2017-05-18 RX ADMIN — PRAVASTATIN SODIUM 40 MG: 40 TABLET ORAL at 22:20

## 2017-05-18 RX ADMIN — FOLIC ACID 1 MG: 1 TABLET ORAL at 09:32

## 2017-05-18 RX ADMIN — ACETAMINOPHEN 650 MG: 325 TABLET, FILM COATED ORAL at 17:00

## 2017-05-18 RX ADMIN — TAMSULOSIN HYDROCHLORIDE 0.4 MG: 0.4 CAPSULE ORAL at 09:32

## 2017-05-18 RX ADMIN — NITROFURANTOIN MONOHYDRATE/MACROCRYSTALLINE 100 MG: 25; 75 CAPSULE ORAL at 17:00

## 2017-05-19 PROCEDURE — 74011250637 HC RX REV CODE- 250/637: Performed by: PHYSICAL MEDICINE & REHABILITATION

## 2017-05-19 PROCEDURE — 74011250636 HC RX REV CODE- 250/636: Performed by: PHYSICAL MEDICINE & REHABILITATION

## 2017-05-19 RX ORDER — GLIPIZIDE 5 MG/1
5 TABLET ORAL
Status: DISCONTINUED | OUTPATIENT
Start: 2017-05-19 | End: 2017-06-05

## 2017-05-19 RX ADMIN — DOCUSATE SODIUM 100 MG: 100 CAPSULE, LIQUID FILLED ORAL at 08:58

## 2017-05-19 RX ADMIN — UMECLIDINIUM 1 PUFF: 62.5 AEROSOL, POWDER ORAL at 08:58

## 2017-05-19 RX ADMIN — NITROFURANTOIN MONOHYDRATE/MACROCRYSTALLINE 100 MG: 25; 75 CAPSULE ORAL at 08:58

## 2017-05-19 RX ADMIN — DOCUSATE SODIUM 100 MG: 100 CAPSULE, LIQUID FILLED ORAL at 17:48

## 2017-05-19 RX ADMIN — FOLIC ACID 1 MG: 1 TABLET ORAL at 08:58

## 2017-05-19 RX ADMIN — NITROFURANTOIN MONOHYDRATE/MACROCRYSTALLINE 100 MG: 25; 75 CAPSULE ORAL at 17:48

## 2017-05-19 RX ADMIN — PRAVASTATIN SODIUM 40 MG: 40 TABLET ORAL at 20:46

## 2017-05-19 RX ADMIN — ACETAMINOPHEN 650 MG: 325 TABLET, FILM COATED ORAL at 08:58

## 2017-05-19 RX ADMIN — GLIPIZIDE 5 MG: 5 TABLET ORAL at 17:48

## 2017-05-19 RX ADMIN — ACETAMINOPHEN 650 MG: 325 TABLET, FILM COATED ORAL at 17:48

## 2017-05-19 RX ADMIN — ACETAMINOPHEN 650 MG: 325 TABLET, FILM COATED ORAL at 12:49

## 2017-05-19 RX ADMIN — ENOXAPARIN SODIUM 40 MG: 40 INJECTION SUBCUTANEOUS at 08:58

## 2017-05-19 RX ADMIN — TAMSULOSIN HYDROCHLORIDE 0.4 MG: 0.4 CAPSULE ORAL at 08:58

## 2017-05-19 RX ADMIN — ACETAMINOPHEN 650 MG: 325 TABLET, FILM COATED ORAL at 20:46

## 2017-05-19 RX ADMIN — DILTIAZEM HYDROCHLORIDE 120 MG: 120 CAPSULE, EXTENDED RELEASE ORAL at 08:58

## 2017-05-20 ENCOUNTER — HOSPITAL ENCOUNTER (OUTPATIENT)
Dept: CT IMAGING | Age: 77
Discharge: HOME | End: 2017-05-20
Attending: PHYSICAL MEDICINE & REHABILITATION
Payer: MEDICARE

## 2017-05-20 LAB
APPEARANCE UR: ABNORMAL
BACTERIA URNS QL MICRO: NEGATIVE /HPF
BILIRUB UR QL: NEGATIVE
COLOR UR: ABNORMAL
EPITH CASTS URNS QL MICRO: ABNORMAL /LPF
GLUCOSE UR STRIP.AUTO-MCNC: NEGATIVE MG/DL
HGB UR QL STRIP: NEGATIVE
KETONES UR QL STRIP.AUTO: NEGATIVE MG/DL
LEUKOCYTE ESTERASE UR QL STRIP.AUTO: ABNORMAL
NITRITE UR QL STRIP.AUTO: NEGATIVE
PH UR STRIP: 5 [PH] (ref 5–8)
PROT UR STRIP-MCNC: 30 MG/DL
RBC #/AREA URNS HPF: ABNORMAL /HPF (ref 0–5)
SP GR UR REFRACTOMETRY: 1.03 (ref 1–1.03)
UROBILINOGEN UR QL STRIP.AUTO: 1 EU/DL (ref 0.2–1)
WBC URNS QL MICRO: ABNORMAL /HPF (ref 0–4)
YEAST URNS QL MICRO: PRESENT

## 2017-05-20 PROCEDURE — 74011250637 HC RX REV CODE- 250/637: Performed by: PHYSICAL MEDICINE & REHABILITATION

## 2017-05-20 PROCEDURE — 81001 URINALYSIS AUTO W/SCOPE: CPT | Performed by: PHYSICAL MEDICINE & REHABILITATION

## 2017-05-20 PROCEDURE — 70450 CT HEAD/BRAIN W/O DYE: CPT

## 2017-05-20 PROCEDURE — 74011250636 HC RX REV CODE- 250/636: Performed by: PHYSICAL MEDICINE & REHABILITATION

## 2017-05-20 RX ADMIN — NITROFURANTOIN MONOHYDRATE/MACROCRYSTALLINE 100 MG: 25; 75 CAPSULE ORAL at 09:27

## 2017-05-20 RX ADMIN — ENOXAPARIN SODIUM 40 MG: 40 INJECTION SUBCUTANEOUS at 09:27

## 2017-05-20 RX ADMIN — PRAVASTATIN SODIUM 40 MG: 40 TABLET ORAL at 21:15

## 2017-05-20 RX ADMIN — ACETAMINOPHEN 650 MG: 325 TABLET, FILM COATED ORAL at 09:27

## 2017-05-20 RX ADMIN — DILTIAZEM HYDROCHLORIDE 120 MG: 120 CAPSULE, EXTENDED RELEASE ORAL at 09:27

## 2017-05-20 RX ADMIN — UMECLIDINIUM 1 PUFF: 62.5 AEROSOL, POWDER ORAL at 09:37

## 2017-05-20 RX ADMIN — ACETAMINOPHEN 650 MG: 325 TABLET, FILM COATED ORAL at 12:18

## 2017-05-20 RX ADMIN — GLIPIZIDE 5 MG: 5 TABLET ORAL at 09:27

## 2017-05-20 RX ADMIN — ACETAMINOPHEN 650 MG: 325 TABLET, FILM COATED ORAL at 16:15

## 2017-05-20 RX ADMIN — DOCUSATE SODIUM 100 MG: 100 CAPSULE, LIQUID FILLED ORAL at 09:27

## 2017-05-20 RX ADMIN — GLIPIZIDE 5 MG: 5 TABLET ORAL at 16:15

## 2017-05-20 RX ADMIN — OXYCODONE HYDROCHLORIDE 10 MG: 5 TABLET ORAL at 09:27

## 2017-05-20 RX ADMIN — TAMSULOSIN HYDROCHLORIDE 0.4 MG: 0.4 CAPSULE ORAL at 09:27

## 2017-05-20 RX ADMIN — NITROFURANTOIN MONOHYDRATE/MACROCRYSTALLINE 100 MG: 25; 75 CAPSULE ORAL at 16:15

## 2017-05-20 RX ADMIN — ACETAMINOPHEN 650 MG: 325 TABLET, FILM COATED ORAL at 21:15

## 2017-05-20 RX ADMIN — FOLIC ACID 1 MG: 1 TABLET ORAL at 09:27

## 2017-05-20 RX ADMIN — DOCUSATE SODIUM 100 MG: 100 CAPSULE, LIQUID FILLED ORAL at 16:15

## 2017-05-21 LAB
ANION GAP BLD CALC-SCNC: 9 MMOL/L (ref 5–15)
BUN SERPL-MCNC: 14 MG/DL (ref 6–20)
BUN/CREAT SERPL: 21 (ref 12–20)
CALCIUM SERPL-MCNC: 8.6 MG/DL (ref 8.5–10.1)
CHLORIDE SERPL-SCNC: 104 MMOL/L (ref 97–108)
CO2 SERPL-SCNC: 26 MMOL/L (ref 21–32)
CREAT SERPL-MCNC: 0.67 MG/DL (ref 0.7–1.3)
GLUCOSE SERPL-MCNC: 136 MG/DL (ref 65–100)
POTASSIUM SERPL-SCNC: 4 MMOL/L (ref 3.5–5.1)
SODIUM SERPL-SCNC: 139 MMOL/L (ref 136–145)

## 2017-05-21 PROCEDURE — 74011250637 HC RX REV CODE- 250/637: Performed by: PHYSICAL MEDICINE & REHABILITATION

## 2017-05-21 PROCEDURE — 36415 COLL VENOUS BLD VENIPUNCTURE: CPT | Performed by: PHYSICAL MEDICINE & REHABILITATION

## 2017-05-21 PROCEDURE — 80048 BASIC METABOLIC PNL TOTAL CA: CPT | Performed by: PHYSICAL MEDICINE & REHABILITATION

## 2017-05-21 RX ADMIN — ACETAMINOPHEN 650 MG: 325 TABLET, FILM COATED ORAL at 08:40

## 2017-05-21 RX ADMIN — FOLIC ACID 1 MG: 1 TABLET ORAL at 08:40

## 2017-05-21 RX ADMIN — TAMSULOSIN HYDROCHLORIDE 0.4 MG: 0.4 CAPSULE ORAL at 08:40

## 2017-05-21 RX ADMIN — PRAVASTATIN SODIUM 40 MG: 40 TABLET ORAL at 21:58

## 2017-05-21 RX ADMIN — ACETAMINOPHEN 650 MG: 325 TABLET, FILM COATED ORAL at 21:58

## 2017-05-21 RX ADMIN — GLIPIZIDE 5 MG: 5 TABLET ORAL at 17:10

## 2017-05-21 RX ADMIN — ACETAMINOPHEN 650 MG: 325 TABLET, FILM COATED ORAL at 17:10

## 2017-05-21 RX ADMIN — NITROFURANTOIN MONOHYDRATE/MACROCRYSTALLINE 100 MG: 25; 75 CAPSULE ORAL at 17:10

## 2017-05-21 RX ADMIN — ACETAMINOPHEN 650 MG: 325 TABLET, FILM COATED ORAL at 12:41

## 2017-05-21 RX ADMIN — NITROFURANTOIN MONOHYDRATE/MACROCRYSTALLINE 100 MG: 25; 75 CAPSULE ORAL at 08:39

## 2017-05-21 RX ADMIN — DOCUSATE SODIUM 100 MG: 100 CAPSULE, LIQUID FILLED ORAL at 08:39

## 2017-05-21 RX ADMIN — DILTIAZEM HYDROCHLORIDE 120 MG: 120 CAPSULE, EXTENDED RELEASE ORAL at 08:40

## 2017-05-21 RX ADMIN — DOCUSATE SODIUM 100 MG: 100 CAPSULE, LIQUID FILLED ORAL at 17:10

## 2017-05-21 RX ADMIN — UMECLIDINIUM 1 PUFF: 62.5 AEROSOL, POWDER ORAL at 08:44

## 2017-05-21 RX ADMIN — GLIPIZIDE 5 MG: 5 TABLET ORAL at 08:40

## 2017-05-22 PROCEDURE — 74011250637 HC RX REV CODE- 250/637: Performed by: PHYSICAL MEDICINE & REHABILITATION

## 2017-05-22 RX ADMIN — GLIPIZIDE 5 MG: 5 TABLET ORAL at 08:42

## 2017-05-22 RX ADMIN — OXYCODONE HYDROCHLORIDE 5 MG: 5 TABLET ORAL at 08:43

## 2017-05-22 RX ADMIN — GLIPIZIDE 5 MG: 5 TABLET ORAL at 17:09

## 2017-05-22 RX ADMIN — ACETAMINOPHEN 650 MG: 325 TABLET, FILM COATED ORAL at 17:09

## 2017-05-22 RX ADMIN — DILTIAZEM HYDROCHLORIDE 120 MG: 120 CAPSULE, EXTENDED RELEASE ORAL at 08:38

## 2017-05-22 RX ADMIN — NITROFURANTOIN MONOHYDRATE/MACROCRYSTALLINE 100 MG: 25; 75 CAPSULE ORAL at 08:39

## 2017-05-22 RX ADMIN — DOCUSATE SODIUM 100 MG: 100 CAPSULE, LIQUID FILLED ORAL at 17:10

## 2017-05-22 RX ADMIN — UMECLIDINIUM 1 PUFF: 62.5 AEROSOL, POWDER ORAL at 08:45

## 2017-05-22 RX ADMIN — FOLIC ACID 1 MG: 1 TABLET ORAL at 08:40

## 2017-05-22 RX ADMIN — ACETAMINOPHEN 650 MG: 325 TABLET, FILM COATED ORAL at 08:41

## 2017-05-22 RX ADMIN — ERGOCALCIFEROL 50000 UNITS: 1.25 CAPSULE ORAL at 17:08

## 2017-05-22 RX ADMIN — ACETAMINOPHEN 650 MG: 325 TABLET, FILM COATED ORAL at 21:53

## 2017-05-22 RX ADMIN — TAMSULOSIN HYDROCHLORIDE 0.4 MG: 0.4 CAPSULE ORAL at 08:40

## 2017-05-22 RX ADMIN — NITROFURANTOIN MONOHYDRATE/MACROCRYSTALLINE 100 MG: 25; 75 CAPSULE ORAL at 17:08

## 2017-05-22 RX ADMIN — ACETAMINOPHEN 650 MG: 325 TABLET, FILM COATED ORAL at 12:34

## 2017-05-22 RX ADMIN — DOCUSATE SODIUM 100 MG: 100 CAPSULE, LIQUID FILLED ORAL at 08:39

## 2017-05-22 RX ADMIN — PRAVASTATIN SODIUM 40 MG: 40 TABLET ORAL at 21:53

## 2017-05-23 ENCOUNTER — HOSPITAL ENCOUNTER (OUTPATIENT)
Dept: CT IMAGING | Age: 77
Discharge: HOME | End: 2017-05-23
Attending: PHYSICAL MEDICINE & REHABILITATION
Payer: MEDICARE

## 2017-05-23 PROCEDURE — 74011250637 HC RX REV CODE- 250/637: Performed by: PHYSICAL MEDICINE & REHABILITATION

## 2017-05-23 PROCEDURE — 70450 CT HEAD/BRAIN W/O DYE: CPT

## 2017-05-23 RX ADMIN — DILTIAZEM HYDROCHLORIDE 120 MG: 120 CAPSULE, EXTENDED RELEASE ORAL at 09:12

## 2017-05-23 RX ADMIN — DOCUSATE SODIUM 100 MG: 100 CAPSULE, LIQUID FILLED ORAL at 09:14

## 2017-05-23 RX ADMIN — NITROFURANTOIN MONOHYDRATE/MACROCRYSTALLINE 100 MG: 25; 75 CAPSULE ORAL at 16:02

## 2017-05-23 RX ADMIN — PRAVASTATIN SODIUM 40 MG: 40 TABLET ORAL at 21:41

## 2017-05-23 RX ADMIN — TAMSULOSIN HYDROCHLORIDE 0.4 MG: 0.4 CAPSULE ORAL at 09:12

## 2017-05-23 RX ADMIN — UMECLIDINIUM 1 PUFF: 62.5 AEROSOL, POWDER ORAL at 09:16

## 2017-05-23 RX ADMIN — OXYCODONE HYDROCHLORIDE 5 MG: 5 TABLET ORAL at 09:13

## 2017-05-23 RX ADMIN — ACETAMINOPHEN 650 MG: 325 TABLET, FILM COATED ORAL at 16:09

## 2017-05-23 RX ADMIN — DOCUSATE SODIUM 100 MG: 100 CAPSULE, LIQUID FILLED ORAL at 16:02

## 2017-05-23 RX ADMIN — GLIPIZIDE 5 MG: 5 TABLET ORAL at 16:01

## 2017-05-23 RX ADMIN — NITROFURANTOIN MONOHYDRATE/MACROCRYSTALLINE 100 MG: 25; 75 CAPSULE ORAL at 09:11

## 2017-05-23 RX ADMIN — ACETAMINOPHEN 650 MG: 325 TABLET, FILM COATED ORAL at 21:41

## 2017-05-23 RX ADMIN — ACETAMINOPHEN 650 MG: 325 TABLET, FILM COATED ORAL at 09:15

## 2017-05-23 RX ADMIN — FOLIC ACID 1 MG: 1 TABLET ORAL at 09:14

## 2017-05-23 RX ADMIN — GLIPIZIDE 5 MG: 5 TABLET ORAL at 09:13

## 2017-05-23 RX ADMIN — MAGNESIUM HYDROXIDE 30 ML: 400 SUSPENSION ORAL at 16:01

## 2017-05-24 PROCEDURE — 74011250637 HC RX REV CODE- 250/637: Performed by: PHYSICAL MEDICINE & REHABILITATION

## 2017-05-24 RX ADMIN — ACETAMINOPHEN 650 MG: 325 TABLET, FILM COATED ORAL at 08:28

## 2017-05-24 RX ADMIN — GLIPIZIDE 5 MG: 5 TABLET ORAL at 17:06

## 2017-05-24 RX ADMIN — ACETAMINOPHEN 650 MG: 325 TABLET, FILM COATED ORAL at 22:12

## 2017-05-24 RX ADMIN — NITROFURANTOIN MONOHYDRATE/MACROCRYSTALLINE 100 MG: 25; 75 CAPSULE ORAL at 17:06

## 2017-05-24 RX ADMIN — DOCUSATE SODIUM 100 MG: 100 CAPSULE, LIQUID FILLED ORAL at 08:28

## 2017-05-24 RX ADMIN — NITROFURANTOIN MONOHYDRATE/MACROCRYSTALLINE 100 MG: 25; 75 CAPSULE ORAL at 08:28

## 2017-05-24 RX ADMIN — TAMSULOSIN HYDROCHLORIDE 0.4 MG: 0.4 CAPSULE ORAL at 08:28

## 2017-05-24 RX ADMIN — UMECLIDINIUM 1 PUFF: 62.5 AEROSOL, POWDER ORAL at 08:34

## 2017-05-24 RX ADMIN — ACETAMINOPHEN 650 MG: 325 TABLET, FILM COATED ORAL at 17:06

## 2017-05-24 RX ADMIN — DILTIAZEM HYDROCHLORIDE 120 MG: 120 CAPSULE, EXTENDED RELEASE ORAL at 08:33

## 2017-05-24 RX ADMIN — GLIPIZIDE 5 MG: 5 TABLET ORAL at 08:28

## 2017-05-24 RX ADMIN — DOCUSATE SODIUM 100 MG: 100 CAPSULE, LIQUID FILLED ORAL at 17:06

## 2017-05-24 RX ADMIN — ACETAMINOPHEN 650 MG: 325 TABLET, FILM COATED ORAL at 14:02

## 2017-05-24 RX ADMIN — PRAVASTATIN SODIUM 40 MG: 40 TABLET ORAL at 22:12

## 2017-05-24 RX ADMIN — FOLIC ACID 1 MG: 1 TABLET ORAL at 08:28

## 2017-05-25 LAB
ALBUMIN SERPL BCP-MCNC: 2.8 G/DL (ref 3.5–5)
ALBUMIN/GLOB SERPL: 0.7 {RATIO} (ref 1.1–2.2)
ALP SERPL-CCNC: 203 U/L (ref 45–117)
ALT SERPL-CCNC: 24 U/L (ref 12–78)
ANION GAP BLD CALC-SCNC: 9 MMOL/L (ref 5–15)
AST SERPL W P-5'-P-CCNC: 29 U/L (ref 15–37)
BILIRUB SERPL-MCNC: 0.5 MG/DL (ref 0.2–1)
BUN SERPL-MCNC: 11 MG/DL (ref 6–20)
BUN/CREAT SERPL: 17 (ref 12–20)
CALCIUM SERPL-MCNC: 8.8 MG/DL (ref 8.5–10.1)
CHLORIDE SERPL-SCNC: 105 MMOL/L (ref 97–108)
CO2 SERPL-SCNC: 26 MMOL/L (ref 21–32)
CREAT SERPL-MCNC: 0.64 MG/DL (ref 0.7–1.3)
ERYTHROCYTE [DISTWIDTH] IN BLOOD BY AUTOMATED COUNT: 14.1 % (ref 11.5–14.5)
GLOBULIN SER CALC-MCNC: 4.2 G/DL (ref 2–4)
GLUCOSE SERPL-MCNC: 125 MG/DL (ref 65–100)
HCT VFR BLD AUTO: 32.3 % (ref 36.6–50.3)
HGB BLD-MCNC: 10.4 G/DL (ref 12.1–17)
MCH RBC QN AUTO: 28.7 PG (ref 26–34)
MCHC RBC AUTO-ENTMCNC: 32.2 G/DL (ref 30–36.5)
MCV RBC AUTO: 89 FL (ref 80–99)
PLATELET # BLD AUTO: 275 K/UL (ref 150–400)
POTASSIUM SERPL-SCNC: 3.8 MMOL/L (ref 3.5–5.1)
PROT SERPL-MCNC: 7 G/DL (ref 6.4–8.2)
RBC # BLD AUTO: 3.63 M/UL (ref 4.1–5.7)
SODIUM SERPL-SCNC: 140 MMOL/L (ref 136–145)
WBC # BLD AUTO: 6.5 K/UL (ref 4.1–11.1)

## 2017-05-25 PROCEDURE — 74011250637 HC RX REV CODE- 250/637: Performed by: PHYSICAL MEDICINE & REHABILITATION

## 2017-05-25 PROCEDURE — 85027 COMPLETE CBC AUTOMATED: CPT | Performed by: PHYSICAL MEDICINE & REHABILITATION

## 2017-05-25 PROCEDURE — 36415 COLL VENOUS BLD VENIPUNCTURE: CPT | Performed by: PHYSICAL MEDICINE & REHABILITATION

## 2017-05-25 PROCEDURE — 74011636637 HC RX REV CODE- 636/637: Performed by: PHYSICAL MEDICINE & REHABILITATION

## 2017-05-25 PROCEDURE — 80053 COMPREHEN METABOLIC PANEL: CPT | Performed by: PHYSICAL MEDICINE & REHABILITATION

## 2017-05-25 RX ADMIN — ERGOCALCIFEROL 50000 UNITS: 1.25 CAPSULE ORAL at 18:04

## 2017-05-25 RX ADMIN — DOCUSATE SODIUM 100 MG: 100 CAPSULE, LIQUID FILLED ORAL at 09:10

## 2017-05-25 RX ADMIN — ACETAMINOPHEN 650 MG: 325 TABLET, FILM COATED ORAL at 13:40

## 2017-05-25 RX ADMIN — GLIPIZIDE 5 MG: 5 TABLET ORAL at 18:04

## 2017-05-25 RX ADMIN — DOCUSATE SODIUM 100 MG: 100 CAPSULE, LIQUID FILLED ORAL at 18:04

## 2017-05-25 RX ADMIN — DILTIAZEM HYDROCHLORIDE 120 MG: 120 CAPSULE, EXTENDED RELEASE ORAL at 09:12

## 2017-05-25 RX ADMIN — INSULIN LISPRO 2 UNITS: 100 INJECTION, SOLUTION INTRAVENOUS; SUBCUTANEOUS at 09:09

## 2017-05-25 RX ADMIN — ACETAMINOPHEN 650 MG: 325 TABLET, FILM COATED ORAL at 18:04

## 2017-05-25 RX ADMIN — ACETAMINOPHEN 650 MG: 325 TABLET, FILM COATED ORAL at 22:16

## 2017-05-25 RX ADMIN — NITROFURANTOIN MONOHYDRATE/MACROCRYSTALLINE 100 MG: 25; 75 CAPSULE ORAL at 18:04

## 2017-05-25 RX ADMIN — FOLIC ACID 1 MG: 1 TABLET ORAL at 09:10

## 2017-05-25 RX ADMIN — ACETAMINOPHEN 650 MG: 325 TABLET, FILM COATED ORAL at 09:10

## 2017-05-25 RX ADMIN — PRAVASTATIN SODIUM 40 MG: 40 TABLET ORAL at 22:16

## 2017-05-25 RX ADMIN — TAMSULOSIN HYDROCHLORIDE 0.4 MG: 0.4 CAPSULE ORAL at 09:10

## 2017-05-25 RX ADMIN — UMECLIDINIUM 1 PUFF: 62.5 AEROSOL, POWDER ORAL at 09:00

## 2017-05-25 RX ADMIN — GLIPIZIDE 5 MG: 5 TABLET ORAL at 09:10

## 2017-05-25 RX ADMIN — ONDANSETRON 4 MG: 4 TABLET, ORALLY DISINTEGRATING ORAL at 18:36

## 2017-05-25 RX ADMIN — NITROFURANTOIN MONOHYDRATE/MACROCRYSTALLINE 100 MG: 25; 75 CAPSULE ORAL at 09:09

## 2017-05-26 LAB
ALBUMIN SERPL BCP-MCNC: 3 G/DL (ref 3.5–5)
ALBUMIN/GLOB SERPL: 0.8 {RATIO} (ref 1.1–2.2)
ALP SERPL-CCNC: 206 U/L (ref 45–117)
ALT SERPL-CCNC: 28 U/L (ref 12–78)
ANION GAP BLD CALC-SCNC: 5 MMOL/L (ref 5–15)
AST SERPL W P-5'-P-CCNC: 28 U/L (ref 15–37)
BILIRUB SERPL-MCNC: 0.4 MG/DL (ref 0.2–1)
BUN SERPL-MCNC: 12 MG/DL (ref 6–20)
BUN/CREAT SERPL: 17 (ref 12–20)
CALCIUM SERPL-MCNC: 8.6 MG/DL (ref 8.5–10.1)
CHLORIDE SERPL-SCNC: 105 MMOL/L (ref 97–108)
CO2 SERPL-SCNC: 29 MMOL/L (ref 21–32)
CREAT SERPL-MCNC: 0.71 MG/DL (ref 0.7–1.3)
ERYTHROCYTE [DISTWIDTH] IN BLOOD BY AUTOMATED COUNT: 14.1 % (ref 11.5–14.5)
GLOBULIN SER CALC-MCNC: 3.8 G/DL (ref 2–4)
GLUCOSE SERPL-MCNC: 122 MG/DL (ref 65–100)
HCT VFR BLD AUTO: 32.3 % (ref 36.6–50.3)
HGB BLD-MCNC: 10.2 G/DL (ref 12.1–17)
MAGNESIUM SERPL-MCNC: 2.1 MG/DL (ref 1.6–2.4)
MCH RBC QN AUTO: 28 PG (ref 26–34)
MCHC RBC AUTO-ENTMCNC: 31.6 G/DL (ref 30–36.5)
MCV RBC AUTO: 88.7 FL (ref 80–99)
PLATELET # BLD AUTO: 245 K/UL (ref 150–400)
POTASSIUM SERPL-SCNC: 4 MMOL/L (ref 3.5–5.1)
PROT SERPL-MCNC: 6.8 G/DL (ref 6.4–8.2)
RBC # BLD AUTO: 3.64 M/UL (ref 4.1–5.7)
SODIUM SERPL-SCNC: 139 MMOL/L (ref 136–145)
WBC # BLD AUTO: 5.8 K/UL (ref 4.1–11.1)

## 2017-05-26 PROCEDURE — 83735 ASSAY OF MAGNESIUM: CPT | Performed by: PHYSICAL MEDICINE & REHABILITATION

## 2017-05-26 PROCEDURE — 36415 COLL VENOUS BLD VENIPUNCTURE: CPT | Performed by: PHYSICAL MEDICINE & REHABILITATION

## 2017-05-26 PROCEDURE — 80053 COMPREHEN METABOLIC PANEL: CPT | Performed by: PHYSICAL MEDICINE & REHABILITATION

## 2017-05-26 PROCEDURE — 85027 COMPLETE CBC AUTOMATED: CPT | Performed by: PHYSICAL MEDICINE & REHABILITATION

## 2017-05-26 PROCEDURE — 74011250637 HC RX REV CODE- 250/637: Performed by: PHYSICAL MEDICINE & REHABILITATION

## 2017-05-26 PROCEDURE — 95816 EEG AWAKE AND DROWSY: CPT | Performed by: PHYSICAL MEDICINE & REHABILITATION

## 2017-05-26 RX ADMIN — DOCUSATE SODIUM 100 MG: 100 CAPSULE, LIQUID FILLED ORAL at 17:11

## 2017-05-26 RX ADMIN — NITROFURANTOIN MONOHYDRATE/MACROCRYSTALLINE 100 MG: 25; 75 CAPSULE ORAL at 17:11

## 2017-05-26 RX ADMIN — GLIPIZIDE 5 MG: 5 TABLET ORAL at 08:14

## 2017-05-26 RX ADMIN — UMECLIDINIUM 1 PUFF: 62.5 AEROSOL, POWDER ORAL at 09:00

## 2017-05-26 RX ADMIN — ACETAMINOPHEN 650 MG: 325 TABLET, FILM COATED ORAL at 17:11

## 2017-05-26 RX ADMIN — ACETAMINOPHEN 650 MG: 325 TABLET, FILM COATED ORAL at 21:30

## 2017-05-26 RX ADMIN — DILTIAZEM HYDROCHLORIDE 120 MG: 120 CAPSULE, EXTENDED RELEASE ORAL at 08:15

## 2017-05-26 RX ADMIN — ACETAMINOPHEN 650 MG: 325 TABLET, FILM COATED ORAL at 08:15

## 2017-05-26 RX ADMIN — ACETAMINOPHEN 650 MG: 325 TABLET, FILM COATED ORAL at 12:20

## 2017-05-26 RX ADMIN — NITROFURANTOIN MONOHYDRATE/MACROCRYSTALLINE 100 MG: 25; 75 CAPSULE ORAL at 08:14

## 2017-05-26 RX ADMIN — PRAVASTATIN SODIUM 40 MG: 40 TABLET ORAL at 21:30

## 2017-05-26 RX ADMIN — TAMSULOSIN HYDROCHLORIDE 0.4 MG: 0.4 CAPSULE ORAL at 08:15

## 2017-05-26 RX ADMIN — FOLIC ACID 1 MG: 1 TABLET ORAL at 08:15

## 2017-05-26 RX ADMIN — DOCUSATE SODIUM 100 MG: 100 CAPSULE, LIQUID FILLED ORAL at 08:14

## 2017-05-26 RX ADMIN — GLIPIZIDE 5 MG: 5 TABLET ORAL at 17:11

## 2017-05-27 PROCEDURE — 74011250637 HC RX REV CODE- 250/637: Performed by: PHYSICAL MEDICINE & REHABILITATION

## 2017-05-27 RX ADMIN — TAMSULOSIN HYDROCHLORIDE 0.4 MG: 0.4 CAPSULE ORAL at 09:10

## 2017-05-27 RX ADMIN — ACETAMINOPHEN 650 MG: 325 TABLET, FILM COATED ORAL at 21:18

## 2017-05-27 RX ADMIN — NITROFURANTOIN MONOHYDRATE/MACROCRYSTALLINE 100 MG: 25; 75 CAPSULE ORAL at 17:40

## 2017-05-27 RX ADMIN — ACETAMINOPHEN 650 MG: 325 TABLET, FILM COATED ORAL at 17:40

## 2017-05-27 RX ADMIN — DILTIAZEM HYDROCHLORIDE 120 MG: 120 CAPSULE, EXTENDED RELEASE ORAL at 09:10

## 2017-05-27 RX ADMIN — FOLIC ACID 1 MG: 1 TABLET ORAL at 09:10

## 2017-05-27 RX ADMIN — ACETAMINOPHEN 650 MG: 325 TABLET, FILM COATED ORAL at 09:09

## 2017-05-27 RX ADMIN — GLIPIZIDE 5 MG: 5 TABLET ORAL at 17:40

## 2017-05-27 RX ADMIN — UMECLIDINIUM 1 PUFF: 62.5 AEROSOL, POWDER ORAL at 09:00

## 2017-05-27 RX ADMIN — ACETAMINOPHEN 650 MG: 325 TABLET, FILM COATED ORAL at 12:06

## 2017-05-27 RX ADMIN — DOCUSATE SODIUM 100 MG: 100 CAPSULE, LIQUID FILLED ORAL at 09:10

## 2017-05-27 RX ADMIN — GLIPIZIDE 5 MG: 5 TABLET ORAL at 09:10

## 2017-05-27 RX ADMIN — PRAVASTATIN SODIUM 40 MG: 40 TABLET ORAL at 21:18

## 2017-05-27 RX ADMIN — DOCUSATE SODIUM 100 MG: 100 CAPSULE, LIQUID FILLED ORAL at 17:40

## 2017-05-27 RX ADMIN — NITROFURANTOIN MONOHYDRATE/MACROCRYSTALLINE 100 MG: 25; 75 CAPSULE ORAL at 09:10

## 2017-05-28 PROCEDURE — 74011250637 HC RX REV CODE- 250/637: Performed by: PHYSICAL MEDICINE & REHABILITATION

## 2017-05-28 RX ADMIN — NITROFURANTOIN MONOHYDRATE/MACROCRYSTALLINE 100 MG: 25; 75 CAPSULE ORAL at 08:02

## 2017-05-28 RX ADMIN — GLIPIZIDE 5 MG: 5 TABLET ORAL at 08:02

## 2017-05-28 RX ADMIN — TAMSULOSIN HYDROCHLORIDE 0.4 MG: 0.4 CAPSULE ORAL at 08:02

## 2017-05-28 RX ADMIN — PRAVASTATIN SODIUM 40 MG: 40 TABLET ORAL at 22:43

## 2017-05-28 RX ADMIN — ACETAMINOPHEN 650 MG: 325 TABLET, FILM COATED ORAL at 12:28

## 2017-05-28 RX ADMIN — UMECLIDINIUM 1 PUFF: 62.5 AEROSOL, POWDER ORAL at 09:00

## 2017-05-28 RX ADMIN — DOCUSATE SODIUM 100 MG: 100 CAPSULE, LIQUID FILLED ORAL at 08:02

## 2017-05-28 RX ADMIN — GLIPIZIDE 5 MG: 5 TABLET ORAL at 17:11

## 2017-05-28 RX ADMIN — FOLIC ACID 1 MG: 1 TABLET ORAL at 09:00

## 2017-05-28 RX ADMIN — ACETAMINOPHEN 650 MG: 325 TABLET, FILM COATED ORAL at 17:10

## 2017-05-28 RX ADMIN — DOCUSATE SODIUM 100 MG: 100 CAPSULE, LIQUID FILLED ORAL at 17:11

## 2017-05-28 RX ADMIN — ACETAMINOPHEN 650 MG: 325 TABLET, FILM COATED ORAL at 08:02

## 2017-05-28 RX ADMIN — DILTIAZEM HYDROCHLORIDE 120 MG: 120 CAPSULE, EXTENDED RELEASE ORAL at 08:02

## 2017-05-29 PROCEDURE — 74011250637 HC RX REV CODE- 250/637: Performed by: PHYSICAL MEDICINE & REHABILITATION

## 2017-05-29 RX ADMIN — ACETAMINOPHEN 650 MG: 325 TABLET, FILM COATED ORAL at 12:53

## 2017-05-29 RX ADMIN — ERGOCALCIFEROL 50000 UNITS: 1.25 CAPSULE ORAL at 17:40

## 2017-05-29 RX ADMIN — ACETAMINOPHEN 650 MG: 325 TABLET, FILM COATED ORAL at 21:18

## 2017-05-29 RX ADMIN — PRAVASTATIN SODIUM 40 MG: 40 TABLET ORAL at 21:18

## 2017-05-29 RX ADMIN — ACETAMINOPHEN 650 MG: 325 TABLET, FILM COATED ORAL at 17:40

## 2017-05-29 RX ADMIN — DILTIAZEM HYDROCHLORIDE 120 MG: 120 CAPSULE, EXTENDED RELEASE ORAL at 08:40

## 2017-05-29 RX ADMIN — DOCUSATE SODIUM 100 MG: 100 CAPSULE, LIQUID FILLED ORAL at 08:40

## 2017-05-29 RX ADMIN — UMECLIDINIUM 1 PUFF: 62.5 AEROSOL, POWDER ORAL at 08:42

## 2017-05-29 RX ADMIN — TAMSULOSIN HYDROCHLORIDE 0.4 MG: 0.4 CAPSULE ORAL at 08:40

## 2017-05-29 RX ADMIN — FOLIC ACID 1 MG: 1 TABLET ORAL at 08:40

## 2017-05-29 RX ADMIN — DOCUSATE SODIUM 100 MG: 100 CAPSULE, LIQUID FILLED ORAL at 17:40

## 2017-05-29 RX ADMIN — ACETAMINOPHEN 650 MG: 325 TABLET, FILM COATED ORAL at 08:40

## 2017-05-29 RX ADMIN — GLIPIZIDE 5 MG: 5 TABLET ORAL at 17:41

## 2017-05-29 RX ADMIN — GLIPIZIDE 5 MG: 5 TABLET ORAL at 08:40

## 2017-05-30 PROCEDURE — 74011250637 HC RX REV CODE- 250/637: Performed by: PHYSICAL MEDICINE & REHABILITATION

## 2017-05-30 RX ADMIN — ACETAMINOPHEN 650 MG: 325 TABLET, FILM COATED ORAL at 09:02

## 2017-05-30 RX ADMIN — FOLIC ACID 1 MG: 1 TABLET ORAL at 09:03

## 2017-05-30 RX ADMIN — TAMSULOSIN HYDROCHLORIDE 0.4 MG: 0.4 CAPSULE ORAL at 09:02

## 2017-05-30 RX ADMIN — ACETAMINOPHEN 650 MG: 325 TABLET, FILM COATED ORAL at 17:40

## 2017-05-30 RX ADMIN — GLIPIZIDE 5 MG: 5 TABLET ORAL at 16:18

## 2017-05-30 RX ADMIN — ACETAMINOPHEN 650 MG: 325 TABLET, FILM COATED ORAL at 14:02

## 2017-05-30 RX ADMIN — DILTIAZEM HYDROCHLORIDE 120 MG: 120 CAPSULE, EXTENDED RELEASE ORAL at 09:03

## 2017-05-30 RX ADMIN — DOCUSATE SODIUM 100 MG: 100 CAPSULE, LIQUID FILLED ORAL at 16:18

## 2017-05-30 RX ADMIN — GLIPIZIDE 5 MG: 5 TABLET ORAL at 09:02

## 2017-05-30 RX ADMIN — ACETAMINOPHEN 650 MG: 325 TABLET, FILM COATED ORAL at 21:05

## 2017-05-30 RX ADMIN — PRAVASTATIN SODIUM 40 MG: 40 TABLET ORAL at 21:05

## 2017-05-30 RX ADMIN — UMECLIDINIUM 1 PUFF: 62.5 AEROSOL, POWDER ORAL at 09:00

## 2017-05-30 RX ADMIN — DOCUSATE SODIUM 100 MG: 100 CAPSULE, LIQUID FILLED ORAL at 09:02

## 2017-05-31 PROCEDURE — 74011250637 HC RX REV CODE- 250/637: Performed by: PHYSICAL MEDICINE & REHABILITATION

## 2017-05-31 RX ADMIN — ACETAMINOPHEN 650 MG: 325 TABLET, FILM COATED ORAL at 21:46

## 2017-05-31 RX ADMIN — ACETAMINOPHEN 650 MG: 325 TABLET, FILM COATED ORAL at 13:48

## 2017-05-31 RX ADMIN — GLIPIZIDE 5 MG: 5 TABLET ORAL at 08:12

## 2017-05-31 RX ADMIN — DOCUSATE SODIUM 100 MG: 100 CAPSULE, LIQUID FILLED ORAL at 18:09

## 2017-05-31 RX ADMIN — FOLIC ACID 1 MG: 1 TABLET ORAL at 08:12

## 2017-05-31 RX ADMIN — DOCUSATE SODIUM 100 MG: 100 CAPSULE, LIQUID FILLED ORAL at 08:12

## 2017-05-31 RX ADMIN — ACETAMINOPHEN 650 MG: 325 TABLET, FILM COATED ORAL at 18:09

## 2017-05-31 RX ADMIN — TAMSULOSIN HYDROCHLORIDE 0.4 MG: 0.4 CAPSULE ORAL at 09:00

## 2017-05-31 RX ADMIN — UMECLIDINIUM 1 PUFF: 62.5 AEROSOL, POWDER ORAL at 09:00

## 2017-05-31 RX ADMIN — ONDANSETRON 4 MG: 4 TABLET, ORALLY DISINTEGRATING ORAL at 13:48

## 2017-05-31 RX ADMIN — DILTIAZEM HYDROCHLORIDE 120 MG: 120 CAPSULE, EXTENDED RELEASE ORAL at 08:11

## 2017-05-31 RX ADMIN — PRAVASTATIN SODIUM 40 MG: 40 TABLET ORAL at 21:45

## 2017-05-31 RX ADMIN — GLIPIZIDE 5 MG: 5 TABLET ORAL at 18:09

## 2017-05-31 RX ADMIN — ACETAMINOPHEN 650 MG: 325 TABLET, FILM COATED ORAL at 08:12

## 2017-06-01 PROCEDURE — 74011250637 HC RX REV CODE- 250/637: Performed by: PHYSICAL MEDICINE & REHABILITATION

## 2017-06-01 RX ORDER — INSULIN LISPRO 100 [IU]/ML
INJECTION, SOLUTION INTRAVENOUS; SUBCUTANEOUS
Status: DISCONTINUED | OUTPATIENT
Start: 2017-06-01 | End: 2017-06-16 | Stop reason: HOSPADM

## 2017-06-01 RX ADMIN — UMECLIDINIUM 1 PUFF: 62.5 AEROSOL, POWDER ORAL at 08:53

## 2017-06-01 RX ADMIN — MAGNESIUM HYDROXIDE 30 ML: 400 SUSPENSION ORAL at 15:08

## 2017-06-01 RX ADMIN — TAMSULOSIN HYDROCHLORIDE 0.4 MG: 0.4 CAPSULE ORAL at 08:51

## 2017-06-01 RX ADMIN — ACETAMINOPHEN 650 MG: 325 TABLET, FILM COATED ORAL at 08:52

## 2017-06-01 RX ADMIN — PRAVASTATIN SODIUM 40 MG: 40 TABLET ORAL at 21:13

## 2017-06-01 RX ADMIN — ERGOCALCIFEROL 50000 UNITS: 1.25 CAPSULE ORAL at 17:32

## 2017-06-01 RX ADMIN — GLIPIZIDE 5 MG: 5 TABLET ORAL at 17:32

## 2017-06-01 RX ADMIN — DOCUSATE SODIUM 100 MG: 100 CAPSULE, LIQUID FILLED ORAL at 17:32

## 2017-06-01 RX ADMIN — DOCUSATE SODIUM 100 MG: 100 CAPSULE, LIQUID FILLED ORAL at 08:51

## 2017-06-01 RX ADMIN — ACETAMINOPHEN 650 MG: 325 TABLET, FILM COATED ORAL at 21:12

## 2017-06-01 RX ADMIN — GLIPIZIDE 5 MG: 5 TABLET ORAL at 08:52

## 2017-06-01 RX ADMIN — ONDANSETRON 4 MG: 4 TABLET, ORALLY DISINTEGRATING ORAL at 12:16

## 2017-06-01 RX ADMIN — ACETAMINOPHEN 650 MG: 325 TABLET, FILM COATED ORAL at 13:29

## 2017-06-01 RX ADMIN — DILTIAZEM HYDROCHLORIDE 120 MG: 120 CAPSULE, EXTENDED RELEASE ORAL at 08:51

## 2017-06-01 RX ADMIN — ACETAMINOPHEN 650 MG: 325 TABLET, FILM COATED ORAL at 17:32

## 2017-06-01 RX ADMIN — FOLIC ACID 1 MG: 1 TABLET ORAL at 08:51

## 2017-06-01 RX ADMIN — BISACODYL 10 MG: 10 SUPPOSITORY RECTAL at 17:35

## 2017-06-02 PROCEDURE — 87086 URINE CULTURE/COLONY COUNT: CPT | Performed by: PHYSICAL MEDICINE & REHABILITATION

## 2017-06-02 PROCEDURE — 87186 SC STD MICRODIL/AGAR DIL: CPT | Performed by: PHYSICAL MEDICINE & REHABILITATION

## 2017-06-02 PROCEDURE — 74011250637 HC RX REV CODE- 250/637: Performed by: PHYSICAL MEDICINE & REHABILITATION

## 2017-06-02 PROCEDURE — 87077 CULTURE AEROBIC IDENTIFY: CPT | Performed by: PHYSICAL MEDICINE & REHABILITATION

## 2017-06-02 RX ORDER — MEGESTROL ACETATE 40 MG/ML
400 SUSPENSION ORAL DAILY
Status: DISCONTINUED | OUTPATIENT
Start: 2017-06-02 | End: 2017-06-06

## 2017-06-02 RX ADMIN — ACETAMINOPHEN 650 MG: 325 TABLET, FILM COATED ORAL at 12:38

## 2017-06-02 RX ADMIN — ACETAMINOPHEN 650 MG: 325 TABLET, FILM COATED ORAL at 08:35

## 2017-06-02 RX ADMIN — PRAVASTATIN SODIUM 40 MG: 40 TABLET ORAL at 21:15

## 2017-06-02 RX ADMIN — GLIPIZIDE 5 MG: 5 TABLET ORAL at 08:35

## 2017-06-02 RX ADMIN — GLIPIZIDE 5 MG: 5 TABLET ORAL at 17:11

## 2017-06-02 RX ADMIN — DOCUSATE SODIUM 100 MG: 100 CAPSULE, LIQUID FILLED ORAL at 17:11

## 2017-06-02 RX ADMIN — DOCUSATE SODIUM 100 MG: 100 CAPSULE, LIQUID FILLED ORAL at 08:35

## 2017-06-02 RX ADMIN — ACETAMINOPHEN 650 MG: 325 TABLET, FILM COATED ORAL at 21:15

## 2017-06-02 RX ADMIN — ACETAMINOPHEN 650 MG: 325 TABLET, FILM COATED ORAL at 17:10

## 2017-06-02 RX ADMIN — TAMSULOSIN HYDROCHLORIDE 0.4 MG: 0.4 CAPSULE ORAL at 08:35

## 2017-06-02 RX ADMIN — FOLIC ACID 1 MG: 1 TABLET ORAL at 08:35

## 2017-06-02 RX ADMIN — MEGESTROL ACETATE 400 MG: 40 SUSPENSION ORAL at 12:41

## 2017-06-02 RX ADMIN — UMECLIDINIUM 1 PUFF: 62.5 AEROSOL, POWDER ORAL at 08:34

## 2017-06-02 RX ADMIN — DILTIAZEM HYDROCHLORIDE 120 MG: 120 CAPSULE, EXTENDED RELEASE ORAL at 08:34

## 2017-06-03 PROCEDURE — 74011250637 HC RX REV CODE- 250/637: Performed by: PHYSICAL MEDICINE & REHABILITATION

## 2017-06-03 RX ORDER — SULFAMETHOXAZOLE AND TRIMETHOPRIM 800; 160 MG/1; MG/1
1 TABLET ORAL EVERY 12 HOURS
Status: DISCONTINUED | OUTPATIENT
Start: 2017-06-03 | End: 2017-06-03

## 2017-06-03 RX ORDER — SULFAMETHOXAZOLE AND TRIMETHOPRIM 800; 160 MG/1; MG/1
1 TABLET ORAL EVERY 12 HOURS
Status: DISCONTINUED | OUTPATIENT
Start: 2017-06-03 | End: 2017-06-04

## 2017-06-03 RX ADMIN — ACETAMINOPHEN 650 MG: 325 TABLET, FILM COATED ORAL at 12:35

## 2017-06-03 RX ADMIN — GLIPIZIDE 5 MG: 5 TABLET ORAL at 08:20

## 2017-06-03 RX ADMIN — UMECLIDINIUM 1 PUFF: 62.5 AEROSOL, POWDER ORAL at 08:20

## 2017-06-03 RX ADMIN — DOCUSATE SODIUM 100 MG: 100 CAPSULE, LIQUID FILLED ORAL at 17:19

## 2017-06-03 RX ADMIN — FOLIC ACID 1 MG: 1 TABLET ORAL at 08:20

## 2017-06-03 RX ADMIN — GLIPIZIDE 5 MG: 5 TABLET ORAL at 17:19

## 2017-06-03 RX ADMIN — DOCUSATE SODIUM 100 MG: 100 CAPSULE, LIQUID FILLED ORAL at 08:20

## 2017-06-03 RX ADMIN — MEGESTROL ACETATE 400 MG: 40 SUSPENSION ORAL at 08:20

## 2017-06-03 RX ADMIN — SULFAMETHOXAZOLE AND TRIMETHOPRIM 1 TABLET: 800; 160 TABLET ORAL at 13:31

## 2017-06-03 RX ADMIN — ACETAMINOPHEN 650 MG: 325 TABLET, FILM COATED ORAL at 08:20

## 2017-06-03 RX ADMIN — ACETAMINOPHEN 650 MG: 325 TABLET, FILM COATED ORAL at 21:04

## 2017-06-03 RX ADMIN — ACETAMINOPHEN 650 MG: 325 TABLET, FILM COATED ORAL at 17:19

## 2017-06-03 RX ADMIN — PRAVASTATIN SODIUM 40 MG: 40 TABLET ORAL at 21:04

## 2017-06-03 RX ADMIN — TAMSULOSIN HYDROCHLORIDE 0.4 MG: 0.4 CAPSULE ORAL at 08:20

## 2017-06-03 RX ADMIN — SULFAMETHOXAZOLE AND TRIMETHOPRIM 1 TABLET: 800; 160 TABLET ORAL at 20:56

## 2017-06-03 RX ADMIN — DILTIAZEM HYDROCHLORIDE 120 MG: 120 CAPSULE, EXTENDED RELEASE ORAL at 08:20

## 2017-06-04 LAB
BACTERIA SPEC CULT: ABNORMAL
CC UR VC: ABNORMAL
SERVICE CMNT-IMP: ABNORMAL

## 2017-06-04 PROCEDURE — 74011250637 HC RX REV CODE- 250/637: Performed by: PHYSICAL MEDICINE & REHABILITATION

## 2017-06-04 PROCEDURE — 74011000258 HC RX REV CODE- 258: Performed by: PHYSICAL MEDICINE & REHABILITATION

## 2017-06-04 PROCEDURE — 74011250636 HC RX REV CODE- 250/636: Performed by: PHYSICAL MEDICINE & REHABILITATION

## 2017-06-04 RX ADMIN — DOCUSATE SODIUM 100 MG: 100 CAPSULE, LIQUID FILLED ORAL at 09:02

## 2017-06-04 RX ADMIN — PRAVASTATIN SODIUM 40 MG: 40 TABLET ORAL at 20:51

## 2017-06-04 RX ADMIN — PIPERACILLIN SODIUM,TAZOBACTAM SODIUM 3.38 G: 3; .375 INJECTION, POWDER, FOR SOLUTION INTRAVENOUS at 14:27

## 2017-06-04 RX ADMIN — DILTIAZEM HYDROCHLORIDE 120 MG: 120 CAPSULE, EXTENDED RELEASE ORAL at 09:02

## 2017-06-04 RX ADMIN — GLIPIZIDE 5 MG: 5 TABLET ORAL at 09:03

## 2017-06-04 RX ADMIN — MEGESTROL ACETATE 400 MG: 40 SUSPENSION ORAL at 09:03

## 2017-06-04 RX ADMIN — GLIPIZIDE 5 MG: 5 TABLET ORAL at 17:00

## 2017-06-04 RX ADMIN — PIPERACILLIN SODIUM,TAZOBACTAM SODIUM 3.38 G: 3; .375 INJECTION, POWDER, FOR SOLUTION INTRAVENOUS at 20:51

## 2017-06-04 RX ADMIN — FOLIC ACID 1 MG: 1 TABLET ORAL at 09:03

## 2017-06-04 RX ADMIN — ACETAMINOPHEN 650 MG: 325 TABLET, FILM COATED ORAL at 13:13

## 2017-06-04 RX ADMIN — SULFAMETHOXAZOLE AND TRIMETHOPRIM 1 TABLET: 800; 160 TABLET ORAL at 09:03

## 2017-06-04 RX ADMIN — ACETAMINOPHEN 650 MG: 325 TABLET, FILM COATED ORAL at 17:00

## 2017-06-04 RX ADMIN — TAMSULOSIN HYDROCHLORIDE 0.4 MG: 0.4 CAPSULE ORAL at 09:03

## 2017-06-04 RX ADMIN — ACETAMINOPHEN 650 MG: 325 TABLET, FILM COATED ORAL at 09:03

## 2017-06-04 RX ADMIN — UMECLIDINIUM 1 PUFF: 62.5 AEROSOL, POWDER ORAL at 09:15

## 2017-06-04 RX ADMIN — ACETAMINOPHEN 650 MG: 325 TABLET, FILM COATED ORAL at 20:51

## 2017-06-04 RX ADMIN — DOCUSATE SODIUM 100 MG: 100 CAPSULE, LIQUID FILLED ORAL at 17:00

## 2017-06-05 PROCEDURE — 74011250636 HC RX REV CODE- 250/636: Performed by: PHYSICAL MEDICINE & REHABILITATION

## 2017-06-05 PROCEDURE — 74011000258 HC RX REV CODE- 258: Performed by: PHYSICAL MEDICINE & REHABILITATION

## 2017-06-05 PROCEDURE — 74011250637 HC RX REV CODE- 250/637: Performed by: PHYSICAL MEDICINE & REHABILITATION

## 2017-06-05 RX ADMIN — TAMSULOSIN HYDROCHLORIDE 0.4 MG: 0.4 CAPSULE ORAL at 09:20

## 2017-06-05 RX ADMIN — DILTIAZEM HYDROCHLORIDE 120 MG: 120 CAPSULE, EXTENDED RELEASE ORAL at 09:19

## 2017-06-05 RX ADMIN — ACETAMINOPHEN 650 MG: 325 TABLET, FILM COATED ORAL at 18:09

## 2017-06-05 RX ADMIN — ACETAMINOPHEN 650 MG: 325 TABLET, FILM COATED ORAL at 13:07

## 2017-06-05 RX ADMIN — PIPERACILLIN SODIUM,TAZOBACTAM SODIUM 3.38 G: 3; .375 INJECTION, POWDER, FOR SOLUTION INTRAVENOUS at 13:07

## 2017-06-05 RX ADMIN — DOCUSATE SODIUM 100 MG: 100 CAPSULE, LIQUID FILLED ORAL at 18:09

## 2017-06-05 RX ADMIN — ERGOCALCIFEROL 50000 UNITS: 1.25 CAPSULE ORAL at 18:09

## 2017-06-05 RX ADMIN — GLIPIZIDE 5 MG: 5 TABLET ORAL at 09:20

## 2017-06-05 RX ADMIN — UMECLIDINIUM 1 PUFF: 62.5 AEROSOL, POWDER ORAL at 09:25

## 2017-06-05 RX ADMIN — PIPERACILLIN SODIUM,TAZOBACTAM SODIUM 3.38 G: 3; .375 INJECTION, POWDER, FOR SOLUTION INTRAVENOUS at 21:33

## 2017-06-05 RX ADMIN — MEGESTROL ACETATE 400 MG: 40 SUSPENSION ORAL at 09:19

## 2017-06-05 RX ADMIN — FOLIC ACID 1 MG: 1 TABLET ORAL at 09:20

## 2017-06-05 RX ADMIN — PIPERACILLIN SODIUM,TAZOBACTAM SODIUM 3.38 G: 3; .375 INJECTION, POWDER, FOR SOLUTION INTRAVENOUS at 05:20

## 2017-06-05 RX ADMIN — ACETAMINOPHEN 650 MG: 325 TABLET, FILM COATED ORAL at 09:20

## 2017-06-05 RX ADMIN — ACETAMINOPHEN 650 MG: 325 TABLET, FILM COATED ORAL at 21:32

## 2017-06-05 RX ADMIN — PRAVASTATIN SODIUM 40 MG: 40 TABLET ORAL at 21:32

## 2017-06-05 RX ADMIN — DOCUSATE SODIUM 100 MG: 100 CAPSULE, LIQUID FILLED ORAL at 09:20

## 2017-06-06 ENCOUNTER — HOSPITAL ENCOUNTER (OUTPATIENT)
Dept: ULTRASOUND IMAGING | Age: 77
Discharge: HOME | End: 2017-06-06
Attending: PHYSICAL MEDICINE & REHABILITATION
Payer: MEDICARE

## 2017-06-06 ENCOUNTER — HOSPITAL ENCOUNTER (OUTPATIENT)
Dept: GENERAL RADIOLOGY | Age: 77
Discharge: HOME | End: 2017-06-06
Attending: PHYSICAL MEDICINE & REHABILITATION
Payer: MEDICARE

## 2017-06-06 PROCEDURE — 73502 X-RAY EXAM HIP UNI 2-3 VIEWS: CPT

## 2017-06-06 PROCEDURE — 74011000258 HC RX REV CODE- 258: Performed by: PHYSICAL MEDICINE & REHABILITATION

## 2017-06-06 PROCEDURE — 74011250637 HC RX REV CODE- 250/637: Performed by: PHYSICAL MEDICINE & REHABILITATION

## 2017-06-06 PROCEDURE — 74011250636 HC RX REV CODE- 250/636: Performed by: PHYSICAL MEDICINE & REHABILITATION

## 2017-06-06 PROCEDURE — 76870 US EXAM SCROTUM: CPT

## 2017-06-06 RX ORDER — MEGESTROL ACETATE 40 MG/ML
800 SUSPENSION ORAL DAILY
Status: DISCONTINUED | OUTPATIENT
Start: 2017-06-06 | End: 2017-06-16 | Stop reason: HOSPADM

## 2017-06-06 RX ORDER — SODIUM CHLORIDE 0.9 % (FLUSH) 0.9 %
SYRINGE (ML) INJECTION
Status: COMPLETED
Start: 2017-06-06 | End: 2017-06-06

## 2017-06-06 RX ORDER — FLUOCINONIDE 0.5 MG/G
CREAM TOPICAL 3 TIMES DAILY
Status: DISPENSED | OUTPATIENT
Start: 2017-06-06 | End: 2017-06-11

## 2017-06-06 RX ORDER — CIPROFLOXACIN 2 MG/ML
400 INJECTION, SOLUTION INTRAVENOUS EVERY 12 HOURS
Status: DISCONTINUED | OUTPATIENT
Start: 2017-06-06 | End: 2017-06-10 | Stop reason: ALTCHOICE

## 2017-06-06 RX ADMIN — PRAVASTATIN SODIUM 40 MG: 40 TABLET ORAL at 22:09

## 2017-06-06 RX ADMIN — ACETAMINOPHEN 650 MG: 325 TABLET, FILM COATED ORAL at 16:21

## 2017-06-06 RX ADMIN — ACETAMINOPHEN 650 MG: 325 TABLET, FILM COATED ORAL at 10:04

## 2017-06-06 RX ADMIN — FLUOCINONIDE: 0.5 CREAM TOPICAL at 16:21

## 2017-06-06 RX ADMIN — DILTIAZEM HYDROCHLORIDE 120 MG: 120 CAPSULE, EXTENDED RELEASE ORAL at 10:09

## 2017-06-06 RX ADMIN — MEGESTROL ACETATE 800 MG: 40 SUSPENSION ORAL at 10:10

## 2017-06-06 RX ADMIN — PIPERACILLIN SODIUM,TAZOBACTAM SODIUM 3.38 G: 3; .375 INJECTION, POWDER, FOR SOLUTION INTRAVENOUS at 04:37

## 2017-06-06 RX ADMIN — ACETAMINOPHEN 650 MG: 325 TABLET, FILM COATED ORAL at 22:09

## 2017-06-06 RX ADMIN — UMECLIDINIUM 1 PUFF: 62.5 AEROSOL, POWDER ORAL at 10:03

## 2017-06-06 RX ADMIN — TAMSULOSIN HYDROCHLORIDE 0.4 MG: 0.4 CAPSULE ORAL at 10:05

## 2017-06-06 RX ADMIN — Medication 10 ML: at 16:21

## 2017-06-06 RX ADMIN — PIPERACILLIN SODIUM,TAZOBACTAM SODIUM 3.38 G: 3; .375 INJECTION, POWDER, FOR SOLUTION INTRAVENOUS at 18:58

## 2017-06-06 RX ADMIN — CIPROFLOXACIN 400 MG: 2 INJECTION, SOLUTION INTRAVENOUS at 18:06

## 2017-06-06 RX ADMIN — FOLIC ACID 1 MG: 1 TABLET ORAL at 10:10

## 2017-06-06 NOTE — CONSULTS
Consult Date: 6/6/2017    ConsultsCalled to see pt for swelling of left testicle  US shows orchitis (left) with poss 8 mm abscess  Denies severe pain. He is on Zosyn for psueudomonas UTI S to all tested   Lawrence is out. On exam not extremely Tender. Would rec additional abx to double cover. Cipro   Prior cipro use no problems. If not improving may need surgery. 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, arms  No date:  Other ill-defined conditions     Comment: colon polyps  No date: RA (rheumatoid arthritis) ([facility] [address]  No date: Scarring  2014: Stroke [facility])     Comment: L arm weakness   Past Surgical History:  04/18/2017: HX CHOLECYSTECTOMY  No date: HX HEENT     Comment: bilat cataract  No date: HX OTHER SURGICAL     Comment: r shoulder melenoma excision  4/28/2011: PA COLSC FLX W/RMVL OF TUMOR POLYP LESION SNAR*     Comment:    4/28/2011: PA EGD TRANSORAL BIOPSY SINGLE/MULTIPLE     Comment:    No date: VASCULAR SURGERY PROCEDURE UNLIST     Comment: stent femoral-left leg  No date: VASCULAR SURGERY PROCEDURE UNLIST     Comment: right AV shunt    Family History    Adopted: Yes         Smoking status: Former Smoker     Smokeless tobacco: Never Used    Alcohol use Yes  3.5 oz/week    7 Shots of liquor per week            Current Facility-Administered Medications:  megestrol (MEGACE) 400 mg/10 mL (10 mL) oral suspension 800 mg 800 mg Oral DAILY Cynthia Chawla  mg at 06/06/17 1010    fluocinoNIDE (LIDEX) 0.05 % cream  Topical TID Claudeen Drummer, MD     ciprofloxacin (CIPRO) 400 mg IVPB (premix) 400 mg IntraVENous Q12H Claudeen Drummer, MD piperacillin-tazobactam (ZOSYN) 3.375 g in 0.9% sodium chloride (MBP/ADV) 100 mL 3.375 g IntraVENous Q8H Cynthia Chawla MD Last Rate: 25 mL/hr at 06/06/17 0437 3.375 g at 06/06/17 0437   insulin lispro (HUMALOG) injection  SubCUTAneous ACB&D Cynthia Chawla MD Stopped at 06/01/17 1630    ergocalciferol (ERGOCALCIFEROL) capsule 50,000 Units 50,000 Units Oral EVERY MON & TH Cynthia Chawla MD 50,000 Units at 06/05/17 1809    acetaminophen (TYLENOL) tablet 650 mg 650 mg Oral QID Cynthia Chawla  mg at 06/06/17 1621    dilTIAZem CD (CARDIZEM CD) capsule 120 mg 120 mg Oral DAILY Cynthia Chawla  mg at 35/77/63 9437    folic acid (FOLVITE) tablet 1 mg 1 mg Oral DAILY Cynthia Chawla MD 1 mg at 06/06/17 1010    pravastatin (PRAVACHOL) tablet 40 mg 40 mg Oral QHS Cynthia Chawla MD 40 mg at 06/05/17 2132    tamsulosin (FLOMAX) capsule 0.4 mg 0.4 mg Oral DAILY Cynthia Chawla MD 0.4 mg at 06/06/17 1005    umeclidinium (INCRUSE ELLIPTA) 62.5 mcg/actuation 1 Puff Inhalation DAILY Cynthia Chawla MD 1 Puff at 06/06/17 1003    acetaminophen (TYLENOL) tablet 650 mg 650 mg Oral Q3H PRN Cynthia Chawla MD     oxyCODONE IR (ROXICODONE) tablet 5 mg 5 mg Oral Q3H PRN Cynthia Chawla MD 5 mg at 05/23/17 0913    oxyCODONE IR (ROXICODONE) tablet 10 mg 10 mg Oral Q3H PRN Cynthia Chawla MD 10 mg at 05/20/17 4146    zolpidem (AMBIEN) tablet 5 mg 5 mg Oral QHS PRN Cynthia Chawla MD     aluminum-magnesium hydroxide (MAALOX) oral suspension 30 mL 30 mL Oral Q4H PRN Cynthia Chawla MD     ondansetron (ZOFRAN ODT) tablet 4 mg 4 mg Oral Q6H PRN Cynthia Chawla MD 4 mg at 06/01/17 1216    glucose chewable tablet 16 g 16 g Oral PRN Cynthia Chawla MD     glucagon (GLUCAGEN) injection 1 mg 1 mg IntraMUSCular PRN Cynthia Chawla MD     docusate sodium (COLACE) capsule 100 mg 100 mg Oral BID Cynthia Chawla MD Stopped at 06/06/17 0900    magnesium hydroxide (MILK OF MAGNESIA) 400 mg/5 mL oral suspension 30 mL 30 mL Oral DAILY PRN Fartun Trivedi MD 30 mL at 06/01/17 1508    bisacodyl (DULCOLAX) suppository 10 mg 10 mg Rectal DAILY PRN Fartun Trivedi MD 10 mg at 06/01/17 1735    dextrose 10% infusion 125-250 mL 125-250 mL IntraVENous PRN Fartun Trivedi MD          Review of Systems    Objective    Vital signs for last 24 hours:  /62  Pulse (!) 114  Ht 5' 9\" (1.753 m)  Wt 80.3 kg (177 lb)  BMI 26.14 kg/m2    Intake/Output this shift:  Current Shift:    Last 3 Shifts:      Data Review:   No results found for this or any previous visit (from the past 24 hour(s)). Physical Exam   Constitutional: He appears well-developed and well-nourished. HENT:   Head: Normocephalic and atraumatic. Abdominal: Hernia confirmed negative in the right inguinal area and confirmed negative in the left inguinal area. Genitourinary: Penis normal. Right testis shows no mass and no tenderness. Left testis shows swelling. Left testis shows no mass and no tenderness. Lymphadenopathy:        Right: No inguinal adenopathy present. Left: No inguinal adenopathy present.

## 2017-06-07 VITALS
BODY MASS INDEX: 26.22 KG/M2 | SYSTOLIC BLOOD PRESSURE: 131 MMHG | DIASTOLIC BLOOD PRESSURE: 70 MMHG | HEIGHT: 69 IN | WEIGHT: 177 LBS | HEART RATE: 80 BPM

## 2017-06-07 LAB
ANION GAP BLD CALC-SCNC: 9 MMOL/L (ref 5–15)
BUN SERPL-MCNC: 13 MG/DL (ref 6–20)
BUN/CREAT SERPL: 19 (ref 12–20)
CALCIUM SERPL-MCNC: 9.3 MG/DL (ref 8.5–10.1)
CHLORIDE SERPL-SCNC: 103 MMOL/L (ref 97–108)
CO2 SERPL-SCNC: 23 MMOL/L (ref 21–32)
CREAT SERPL-MCNC: 0.68 MG/DL (ref 0.7–1.3)
ERYTHROCYTE [DISTWIDTH] IN BLOOD BY AUTOMATED COUNT: 14.6 % (ref 11.5–14.5)
GLUCOSE SERPL-MCNC: 161 MG/DL (ref 65–100)
HCT VFR BLD AUTO: 31.8 % (ref 36.6–50.3)
HGB BLD-MCNC: 10 G/DL (ref 12.1–17)
MCH RBC QN AUTO: 26.3 PG (ref 26–34)
MCHC RBC AUTO-ENTMCNC: 31.4 G/DL (ref 30–36.5)
MCV RBC AUTO: 83.7 FL (ref 80–99)
PLATELET # BLD AUTO: 272 K/UL (ref 150–400)
POTASSIUM SERPL-SCNC: 4.2 MMOL/L (ref 3.5–5.1)
RBC # BLD AUTO: 3.8 M/UL (ref 4.1–5.7)
SODIUM SERPL-SCNC: 135 MMOL/L (ref 136–145)
WBC # BLD AUTO: 11.5 K/UL (ref 4.1–11.1)

## 2017-06-07 PROCEDURE — 74011000258 HC RX REV CODE- 258: Performed by: PHYSICAL MEDICINE & REHABILITATION

## 2017-06-07 PROCEDURE — 74011250636 HC RX REV CODE- 250/636: Performed by: PHYSICAL MEDICINE & REHABILITATION

## 2017-06-07 PROCEDURE — 85027 COMPLETE CBC AUTOMATED: CPT | Performed by: PHYSICAL MEDICINE & REHABILITATION

## 2017-06-07 PROCEDURE — 36415 COLL VENOUS BLD VENIPUNCTURE: CPT | Performed by: PHYSICAL MEDICINE & REHABILITATION

## 2017-06-07 PROCEDURE — 80048 BASIC METABOLIC PNL TOTAL CA: CPT | Performed by: PHYSICAL MEDICINE & REHABILITATION

## 2017-06-07 PROCEDURE — 74011250637 HC RX REV CODE- 250/637: Performed by: PHYSICAL MEDICINE & REHABILITATION

## 2017-06-07 RX ADMIN — PIPERACILLIN SODIUM,TAZOBACTAM SODIUM 3.38 G: 3; .375 INJECTION, POWDER, FOR SOLUTION INTRAVENOUS at 11:28

## 2017-06-07 RX ADMIN — ACETAMINOPHEN 650 MG: 325 TABLET, FILM COATED ORAL at 08:53

## 2017-06-07 RX ADMIN — FOLIC ACID 1 MG: 1 TABLET ORAL at 09:02

## 2017-06-07 RX ADMIN — UMECLIDINIUM 1 PUFF: 62.5 AEROSOL, POWDER ORAL at 11:26

## 2017-06-07 RX ADMIN — FLUOCINONIDE: 0.5 CREAM TOPICAL at 22:03

## 2017-06-07 RX ADMIN — CIPROFLOXACIN 400 MG: 2 INJECTION, SOLUTION INTRAVENOUS at 18:28

## 2017-06-07 RX ADMIN — MEGESTROL ACETATE 800 MG: 40 SUSPENSION ORAL at 09:03

## 2017-06-07 RX ADMIN — TAMSULOSIN HYDROCHLORIDE 0.4 MG: 0.4 CAPSULE ORAL at 08:52

## 2017-06-07 RX ADMIN — PIPERACILLIN SODIUM,TAZOBACTAM SODIUM 3.38 G: 3; .375 INJECTION, POWDER, FOR SOLUTION INTRAVENOUS at 02:11

## 2017-06-07 RX ADMIN — CIPROFLOXACIN 400 MG: 2 INJECTION, SOLUTION INTRAVENOUS at 07:52

## 2017-06-07 RX ADMIN — DOCUSATE SODIUM 100 MG: 100 CAPSULE, LIQUID FILLED ORAL at 09:02

## 2017-06-07 RX ADMIN — FLUOCINONIDE: 0.5 CREAM TOPICAL at 15:08

## 2017-06-07 RX ADMIN — ACETAMINOPHEN 650 MG: 325 TABLET, FILM COATED ORAL at 14:52

## 2017-06-07 RX ADMIN — FLUOCINONIDE: 0.5 CREAM TOPICAL at 06:00

## 2017-06-07 RX ADMIN — DOCUSATE SODIUM 100 MG: 100 CAPSULE, LIQUID FILLED ORAL at 19:17

## 2017-06-07 RX ADMIN — DILTIAZEM HYDROCHLORIDE 120 MG: 120 CAPSULE, EXTENDED RELEASE ORAL at 08:53

## 2017-06-07 RX ADMIN — PIPERACILLIN SODIUM,TAZOBACTAM SODIUM 3.38 G: 3; .375 INJECTION, POWDER, FOR SOLUTION INTRAVENOUS at 20:20

## 2017-06-07 RX ADMIN — PRAVASTATIN SODIUM 40 MG: 40 TABLET ORAL at 22:03

## 2017-06-07 RX ADMIN — ACETAMINOPHEN 650 MG: 325 TABLET, FILM COATED ORAL at 19:15

## 2017-06-08 PROCEDURE — 74011250636 HC RX REV CODE- 250/636: Performed by: PHYSICAL MEDICINE & REHABILITATION

## 2017-06-08 PROCEDURE — 74011000258 HC RX REV CODE- 258: Performed by: PHYSICAL MEDICINE & REHABILITATION

## 2017-06-08 PROCEDURE — 74011250637 HC RX REV CODE- 250/637: Performed by: PHYSICAL MEDICINE & REHABILITATION

## 2017-06-08 RX ORDER — SODIUM CHLORIDE 0.9 % (FLUSH) 0.9 %
SYRINGE (ML) INJECTION
Status: DISPENSED
Start: 2017-06-08 | End: 2017-06-08

## 2017-06-08 RX ORDER — SODIUM CHLORIDE 0.9 % (FLUSH) 0.9 %
5 SYRINGE (ML) INJECTION EVERY 8 HOURS
Status: DISCONTINUED | OUTPATIENT
Start: 2017-06-08 | End: 2017-06-11 | Stop reason: ALTCHOICE

## 2017-06-08 RX ORDER — SODIUM CHLORIDE 0.9 % (FLUSH) 0.9 %
10 SYRINGE (ML) INJECTION AS NEEDED
Status: DISCONTINUED | OUTPATIENT
Start: 2017-06-08 | End: 2017-06-11 | Stop reason: ALTCHOICE

## 2017-06-08 RX ADMIN — Medication 5 ML: at 15:01

## 2017-06-08 RX ADMIN — Medication 5 ML: at 21:04

## 2017-06-08 RX ADMIN — ACETAMINOPHEN 650 MG: 325 TABLET, FILM COATED ORAL at 08:20

## 2017-06-08 RX ADMIN — PIPERACILLIN SODIUM,TAZOBACTAM SODIUM 3.38 G: 3; .375 INJECTION, POWDER, FOR SOLUTION INTRAVENOUS at 09:51

## 2017-06-08 RX ADMIN — DILTIAZEM HYDROCHLORIDE 120 MG: 120 CAPSULE, EXTENDED RELEASE ORAL at 08:20

## 2017-06-08 RX ADMIN — UMECLIDINIUM 1 PUFF: 62.5 AEROSOL, POWDER ORAL at 08:23

## 2017-06-08 RX ADMIN — DOCUSATE SODIUM 100 MG: 100 CAPSULE, LIQUID FILLED ORAL at 21:01

## 2017-06-08 RX ADMIN — FLUOCINONIDE: 0.5 CREAM TOPICAL at 05:00

## 2017-06-08 RX ADMIN — FLUOCINONIDE: 0.5 CREAM TOPICAL at 14:00

## 2017-06-08 RX ADMIN — FLUOCINONIDE: 0.5 CREAM TOPICAL at 21:01

## 2017-06-08 RX ADMIN — ACETAMINOPHEN 650 MG: 325 TABLET, FILM COATED ORAL at 12:47

## 2017-06-08 RX ADMIN — ERGOCALCIFEROL 50000 UNITS: 1.25 CAPSULE ORAL at 18:19

## 2017-06-08 RX ADMIN — PIPERACILLIN SODIUM,TAZOBACTAM SODIUM 3.38 G: 3; .375 INJECTION, POWDER, FOR SOLUTION INTRAVENOUS at 20:48

## 2017-06-08 RX ADMIN — ACETAMINOPHEN 650 MG: 325 TABLET, FILM COATED ORAL at 21:01

## 2017-06-08 RX ADMIN — FOLIC ACID 1 MG: 1 TABLET ORAL at 08:20

## 2017-06-08 RX ADMIN — ACETAMINOPHEN 650 MG: 325 TABLET, FILM COATED ORAL at 18:18

## 2017-06-08 RX ADMIN — PIPERACILLIN SODIUM,TAZOBACTAM SODIUM 3.38 G: 3; .375 INJECTION, POWDER, FOR SOLUTION INTRAVENOUS at 02:00

## 2017-06-08 RX ADMIN — CIPROFLOXACIN 400 MG: 2 INJECTION, SOLUTION INTRAVENOUS at 04:47

## 2017-06-08 RX ADMIN — PRAVASTATIN SODIUM 40 MG: 40 TABLET ORAL at 21:03

## 2017-06-08 RX ADMIN — MEGESTROL ACETATE 800 MG: 40 SUSPENSION ORAL at 08:21

## 2017-06-08 RX ADMIN — CIPROFLOXACIN 400 MG: 2 INJECTION, SOLUTION INTRAVENOUS at 18:19

## 2017-06-08 RX ADMIN — TAMSULOSIN HYDROCHLORIDE 0.4 MG: 0.4 CAPSULE ORAL at 08:20

## 2017-06-09 PROCEDURE — 74011250637 HC RX REV CODE- 250/637: Performed by: PHYSICAL MEDICINE & REHABILITATION

## 2017-06-09 PROCEDURE — 74011000258 HC RX REV CODE- 258: Performed by: PHYSICAL MEDICINE & REHABILITATION

## 2017-06-09 PROCEDURE — 74011250636 HC RX REV CODE- 250/636: Performed by: PHYSICAL MEDICINE & REHABILITATION

## 2017-06-09 RX ADMIN — FOLIC ACID 1 MG: 1 TABLET ORAL at 09:01

## 2017-06-09 RX ADMIN — DOCUSATE SODIUM 100 MG: 100 CAPSULE, LIQUID FILLED ORAL at 09:02

## 2017-06-09 RX ADMIN — Medication 5 ML: at 06:45

## 2017-06-09 RX ADMIN — ACETAMINOPHEN 650 MG: 325 TABLET, FILM COATED ORAL at 17:03

## 2017-06-09 RX ADMIN — ACETAMINOPHEN 650 MG: 325 TABLET, FILM COATED ORAL at 09:01

## 2017-06-09 RX ADMIN — Medication 5 ML: at 21:38

## 2017-06-09 RX ADMIN — ACETAMINOPHEN 650 MG: 325 TABLET, FILM COATED ORAL at 12:53

## 2017-06-09 RX ADMIN — CIPROFLOXACIN 400 MG: 2 INJECTION, SOLUTION INTRAVENOUS at 04:57

## 2017-06-09 RX ADMIN — FLUOCINONIDE: 0.5 CREAM TOPICAL at 06:44

## 2017-06-09 RX ADMIN — PIPERACILLIN SODIUM,TAZOBACTAM SODIUM 3.38 G: 3; .375 INJECTION, POWDER, FOR SOLUTION INTRAVENOUS at 02:09

## 2017-06-09 RX ADMIN — Medication 5 ML: at 14:00

## 2017-06-09 RX ADMIN — FLUOCINONIDE: 0.5 CREAM TOPICAL at 22:00

## 2017-06-09 RX ADMIN — CIPROFLOXACIN 400 MG: 2 INJECTION, SOLUTION INTRAVENOUS at 17:03

## 2017-06-09 RX ADMIN — FLUOCINONIDE: 0.5 CREAM TOPICAL at 14:00

## 2017-06-09 RX ADMIN — DILTIAZEM HYDROCHLORIDE 120 MG: 120 CAPSULE, EXTENDED RELEASE ORAL at 09:02

## 2017-06-09 RX ADMIN — MEGESTROL ACETATE 800 MG: 40 SUSPENSION ORAL at 09:06

## 2017-06-09 RX ADMIN — ACETAMINOPHEN 650 MG: 325 TABLET, FILM COATED ORAL at 21:38

## 2017-06-09 RX ADMIN — TAMSULOSIN HYDROCHLORIDE 0.4 MG: 0.4 CAPSULE ORAL at 09:01

## 2017-06-09 RX ADMIN — UMECLIDINIUM 1 PUFF: 62.5 AEROSOL, POWDER ORAL at 09:00

## 2017-06-09 RX ADMIN — PRAVASTATIN SODIUM 40 MG: 40 TABLET ORAL at 21:38

## 2017-06-10 PROCEDURE — 74011250637 HC RX REV CODE- 250/637: Performed by: PHYSICAL MEDICINE & REHABILITATION

## 2017-06-10 PROCEDURE — 74011250636 HC RX REV CODE- 250/636: Performed by: PHYSICAL MEDICINE & REHABILITATION

## 2017-06-10 RX ORDER — CIPROFLOXACIN 500 MG/1
500 TABLET ORAL EVERY 12 HOURS
Status: DISCONTINUED | OUTPATIENT
Start: 2017-06-10 | End: 2017-06-16 | Stop reason: HOSPADM

## 2017-06-10 RX ADMIN — DOCUSATE SODIUM 100 MG: 100 CAPSULE, LIQUID FILLED ORAL at 17:09

## 2017-06-10 RX ADMIN — FOLIC ACID 1 MG: 1 TABLET ORAL at 08:24

## 2017-06-10 RX ADMIN — Medication 5 ML: at 13:29

## 2017-06-10 RX ADMIN — FLUOCINONIDE: 0.5 CREAM TOPICAL at 05:07

## 2017-06-10 RX ADMIN — FLUOCINONIDE: 0.5 CREAM TOPICAL at 14:00

## 2017-06-10 RX ADMIN — ACETAMINOPHEN 650 MG: 325 TABLET, FILM COATED ORAL at 20:36

## 2017-06-10 RX ADMIN — CIPROFLOXACIN 400 MG: 2 INJECTION, SOLUTION INTRAVENOUS at 05:05

## 2017-06-10 RX ADMIN — PRAVASTATIN SODIUM 40 MG: 40 TABLET ORAL at 20:37

## 2017-06-10 RX ADMIN — DOCUSATE SODIUM 100 MG: 100 CAPSULE, LIQUID FILLED ORAL at 08:24

## 2017-06-10 RX ADMIN — ACETAMINOPHEN 650 MG: 325 TABLET, FILM COATED ORAL at 13:28

## 2017-06-10 RX ADMIN — ACETAMINOPHEN 650 MG: 325 TABLET, FILM COATED ORAL at 17:10

## 2017-06-10 RX ADMIN — DILTIAZEM HYDROCHLORIDE 120 MG: 120 CAPSULE, EXTENDED RELEASE ORAL at 08:24

## 2017-06-10 RX ADMIN — Medication 5 ML: at 05:10

## 2017-06-10 RX ADMIN — ACETAMINOPHEN 650 MG: 325 TABLET, FILM COATED ORAL at 08:24

## 2017-06-10 RX ADMIN — MEGESTROL ACETATE 800 MG: 40 SUSPENSION ORAL at 08:22

## 2017-06-10 RX ADMIN — TAMSULOSIN HYDROCHLORIDE 0.4 MG: 0.4 CAPSULE ORAL at 08:24

## 2017-06-10 RX ADMIN — Medication 5 ML: at 20:37

## 2017-06-10 RX ADMIN — CIPROFLOXACIN HYDROCHLORIDE 500 MG: 500 TABLET, FILM COATED ORAL at 20:35

## 2017-06-10 RX ADMIN — UMECLIDINIUM 1 PUFF: 62.5 AEROSOL, POWDER ORAL at 08:27

## 2017-06-11 PROCEDURE — 74011250637 HC RX REV CODE- 250/637: Performed by: PHYSICAL MEDICINE & REHABILITATION

## 2017-06-11 RX ADMIN — MEGESTROL ACETATE 800 MG: 40 SUSPENSION ORAL at 08:47

## 2017-06-11 RX ADMIN — CIPROFLOXACIN HYDROCHLORIDE 500 MG: 500 TABLET, FILM COATED ORAL at 22:04

## 2017-06-11 RX ADMIN — ACETAMINOPHEN 650 MG: 325 TABLET, FILM COATED ORAL at 22:02

## 2017-06-11 RX ADMIN — ACETAMINOPHEN 650 MG: 325 TABLET, FILM COATED ORAL at 17:13

## 2017-06-11 RX ADMIN — DILTIAZEM HYDROCHLORIDE 120 MG: 120 CAPSULE, EXTENDED RELEASE ORAL at 08:47

## 2017-06-11 RX ADMIN — FOLIC ACID 1 MG: 1 TABLET ORAL at 08:47

## 2017-06-11 RX ADMIN — DOCUSATE SODIUM 100 MG: 100 CAPSULE, LIQUID FILLED ORAL at 17:13

## 2017-06-11 RX ADMIN — ACETAMINOPHEN 650 MG: 325 TABLET, FILM COATED ORAL at 08:47

## 2017-06-11 RX ADMIN — FLUOCINONIDE: 0.5 CREAM TOPICAL at 06:00

## 2017-06-11 RX ADMIN — CIPROFLOXACIN HYDROCHLORIDE 500 MG: 500 TABLET, FILM COATED ORAL at 08:47

## 2017-06-11 RX ADMIN — PRAVASTATIN SODIUM 40 MG: 40 TABLET ORAL at 22:02

## 2017-06-11 RX ADMIN — ALUMINUM HYDROXIDE AND MAGNESIUM HYDROXIDE 30 ML: 200; 200 SUSPENSION ORAL at 02:23

## 2017-06-11 RX ADMIN — UMECLIDINIUM 1 PUFF: 62.5 AEROSOL, POWDER ORAL at 08:46

## 2017-06-11 RX ADMIN — TAMSULOSIN HYDROCHLORIDE 0.4 MG: 0.4 CAPSULE ORAL at 08:47

## 2017-06-11 RX ADMIN — ALUMINUM HYDROXIDE AND MAGNESIUM HYDROXIDE 30 ML: 200; 200 SUSPENSION ORAL at 20:11

## 2017-06-11 RX ADMIN — ACETAMINOPHEN 650 MG: 325 TABLET, FILM COATED ORAL at 12:32

## 2017-06-11 RX ADMIN — DOCUSATE SODIUM 100 MG: 100 CAPSULE, LIQUID FILLED ORAL at 08:47

## 2017-06-12 ENCOUNTER — APPOINTMENT (OUTPATIENT)
Dept: GENERAL RADIOLOGY | Age: 77
End: 2017-06-12
Attending: PHYSICAL MEDICINE & REHABILITATION

## 2017-06-12 PROCEDURE — 74011250637 HC RX REV CODE- 250/637: Performed by: PHYSICAL MEDICINE & REHABILITATION

## 2017-06-12 PROCEDURE — 71020 XR CHEST PA LAT: CPT

## 2017-06-12 RX ORDER — BACLOFEN 10 MG/1
5 TABLET ORAL
Status: DISCONTINUED | OUTPATIENT
Start: 2017-06-12 | End: 2017-06-16

## 2017-06-12 RX ORDER — IBUPROFEN 400 MG/1
400 TABLET ORAL
Status: DISCONTINUED | OUTPATIENT
Start: 2017-06-12 | End: 2017-06-16 | Stop reason: HOSPADM

## 2017-06-12 RX ORDER — METHOTREXATE 2.5 MG/1
10 TABLET ORAL
Status: DISCONTINUED | OUTPATIENT
Start: 2017-06-15 | End: 2017-06-16 | Stop reason: HOSPADM

## 2017-06-12 RX ADMIN — ERGOCALCIFEROL 50000 UNITS: 1.25 CAPSULE ORAL at 17:14

## 2017-06-12 RX ADMIN — DILTIAZEM HYDROCHLORIDE 120 MG: 120 CAPSULE, EXTENDED RELEASE ORAL at 08:50

## 2017-06-12 RX ADMIN — CIPROFLOXACIN HYDROCHLORIDE 500 MG: 500 TABLET, FILM COATED ORAL at 20:41

## 2017-06-12 RX ADMIN — ALUMINUM HYDROXIDE AND MAGNESIUM HYDROXIDE 30 ML: 200; 200 SUSPENSION ORAL at 09:52

## 2017-06-12 RX ADMIN — ACETAMINOPHEN 650 MG: 325 TABLET, FILM COATED ORAL at 17:14

## 2017-06-12 RX ADMIN — DOCUSATE SODIUM 100 MG: 100 CAPSULE, LIQUID FILLED ORAL at 08:51

## 2017-06-12 RX ADMIN — DOCUSATE SODIUM 100 MG: 100 CAPSULE, LIQUID FILLED ORAL at 17:15

## 2017-06-12 RX ADMIN — TAMSULOSIN HYDROCHLORIDE 0.4 MG: 0.4 CAPSULE ORAL at 08:51

## 2017-06-12 RX ADMIN — CIPROFLOXACIN HYDROCHLORIDE 500 MG: 500 TABLET, FILM COATED ORAL at 08:50

## 2017-06-12 RX ADMIN — MEGESTROL ACETATE 800 MG: 40 SUSPENSION ORAL at 08:51

## 2017-06-12 RX ADMIN — UMECLIDINIUM 1 PUFF: 62.5 AEROSOL, POWDER ORAL at 08:51

## 2017-06-12 RX ADMIN — PRAVASTATIN SODIUM 40 MG: 40 TABLET ORAL at 22:30

## 2017-06-12 RX ADMIN — FOLIC ACID 1 MG: 1 TABLET ORAL at 08:51

## 2017-06-12 RX ADMIN — ACETAMINOPHEN 650 MG: 325 TABLET, FILM COATED ORAL at 08:51

## 2017-06-12 RX ADMIN — BACLOFEN 5 MG: 10 TABLET ORAL at 17:14

## 2017-06-12 RX ADMIN — ALUMINUM HYDROXIDE AND MAGNESIUM HYDROXIDE 30 ML: 200; 200 SUSPENSION ORAL at 20:33

## 2017-06-12 RX ADMIN — ACETAMINOPHEN 650 MG: 325 TABLET, FILM COATED ORAL at 22:30

## 2017-06-13 LAB
APPEARANCE UR: CLEAR
BACTERIA URNS QL MICRO: ABNORMAL /HPF
BILIRUB UR QL: NEGATIVE
COLOR UR: ABNORMAL
EPITH CASTS URNS QL MICRO: ABNORMAL /LPF
GLUCOSE UR STRIP.AUTO-MCNC: 500 MG/DL
HGB UR QL STRIP: NEGATIVE
KETONES UR QL STRIP.AUTO: NEGATIVE MG/DL
LEUKOCYTE ESTERASE UR QL STRIP.AUTO: NEGATIVE
NITRITE UR QL STRIP.AUTO: NEGATIVE
PH UR STRIP: 5.5 [PH] (ref 5–8)
PROT UR STRIP-MCNC: 30 MG/DL
RBC #/AREA URNS HPF: ABNORMAL /HPF (ref 0–5)
SP GR UR REFRACTOMETRY: 1.03 (ref 1–1.03)
UROBILINOGEN UR QL STRIP.AUTO: 0.2 EU/DL (ref 0.2–1)
WBC URNS QL MICRO: ABNORMAL /HPF (ref 0–4)

## 2017-06-13 PROCEDURE — 74011250637 HC RX REV CODE- 250/637: Performed by: PHYSICAL MEDICINE & REHABILITATION

## 2017-06-13 PROCEDURE — 74011250636 HC RX REV CODE- 250/636: Performed by: PHYSICAL MEDICINE & REHABILITATION

## 2017-06-13 PROCEDURE — 81001 URINALYSIS AUTO W/SCOPE: CPT | Performed by: PHYSICAL MEDICINE & REHABILITATION

## 2017-06-13 PROCEDURE — 87086 URINE CULTURE/COLONY COUNT: CPT | Performed by: PHYSICAL MEDICINE & REHABILITATION

## 2017-06-13 RX ORDER — METHYLPREDNISOLONE 4 MG/1
4 TABLET ORAL
Status: COMPLETED | OUTPATIENT
Start: 2017-06-14 | End: 2017-06-16

## 2017-06-13 RX ORDER — METHYLPREDNISOLONE 4 MG/1
4 TABLET ORAL ONCE
Status: COMPLETED | OUTPATIENT
Start: 2017-06-13 | End: 2017-06-13

## 2017-06-13 RX ORDER — METHYLPREDNISOLONE 4 MG/1
8 TABLET ORAL
Status: COMPLETED | OUTPATIENT
Start: 2017-06-14 | End: 2017-06-14

## 2017-06-13 RX ORDER — METHYLPREDNISOLONE 4 MG/1
4 TABLET ORAL
Status: DISCONTINUED | OUTPATIENT
Start: 2017-06-14 | End: 2017-06-16 | Stop reason: HOSPADM

## 2017-06-13 RX ORDER — METHYLPREDNISOLONE 4 MG/1
8 TABLET ORAL ONCE
Status: COMPLETED | OUTPATIENT
Start: 2017-06-13 | End: 2017-06-13

## 2017-06-13 RX ORDER — METHYLPREDNISOLONE 4 MG/1
8 TABLET ORAL
Status: COMPLETED | OUTPATIENT
Start: 2017-06-13 | End: 2017-06-13

## 2017-06-13 RX ORDER — METHYLPREDNISOLONE 4 MG/1
4 TABLET ORAL
Status: COMPLETED | OUTPATIENT
Start: 2017-06-14 | End: 2017-06-15

## 2017-06-13 RX ORDER — METHYLPREDNISOLONE 4 MG/1
4 TABLET ORAL
Status: DISCONTINUED | OUTPATIENT
Start: 2017-06-15 | End: 2017-06-16 | Stop reason: HOSPADM

## 2017-06-13 RX ADMIN — DILTIAZEM HYDROCHLORIDE 120 MG: 120 CAPSULE, EXTENDED RELEASE ORAL at 08:46

## 2017-06-13 RX ADMIN — CIPROFLOXACIN HYDROCHLORIDE 500 MG: 500 TABLET, FILM COATED ORAL at 08:47

## 2017-06-13 RX ADMIN — ACETAMINOPHEN 650 MG: 325 TABLET, FILM COATED ORAL at 17:49

## 2017-06-13 RX ADMIN — PRAVASTATIN SODIUM 40 MG: 40 TABLET ORAL at 21:14

## 2017-06-13 RX ADMIN — METHYLPREDNISOLONE 4 MG: 4 TABLET ORAL at 21:13

## 2017-06-13 RX ADMIN — BACLOFEN 5 MG: 10 TABLET ORAL at 21:07

## 2017-06-13 RX ADMIN — TAMSULOSIN HYDROCHLORIDE 0.4 MG: 0.4 CAPSULE ORAL at 08:46

## 2017-06-13 RX ADMIN — CIPROFLOXACIN HYDROCHLORIDE 500 MG: 500 TABLET, FILM COATED ORAL at 21:14

## 2017-06-13 RX ADMIN — FOLIC ACID 1 MG: 1 TABLET ORAL at 08:46

## 2017-06-13 RX ADMIN — MEGESTROL ACETATE 800 MG: 40 SUSPENSION ORAL at 08:47

## 2017-06-13 RX ADMIN — ACETAMINOPHEN 650 MG: 325 TABLET, FILM COATED ORAL at 12:52

## 2017-06-13 RX ADMIN — DOCUSATE SODIUM 100 MG: 100 CAPSULE, LIQUID FILLED ORAL at 08:46

## 2017-06-13 RX ADMIN — UMECLIDINIUM 1 PUFF: 62.5 AEROSOL, POWDER ORAL at 09:00

## 2017-06-13 RX ADMIN — ACETAMINOPHEN 650 MG: 325 TABLET, FILM COATED ORAL at 08:47

## 2017-06-13 RX ADMIN — ACETAMINOPHEN 650 MG: 325 TABLET, FILM COATED ORAL at 07:08

## 2017-06-13 RX ADMIN — METHYLPREDNISOLONE 8 MG: 4 TABLET ORAL at 21:20

## 2017-06-13 RX ADMIN — ALUMINUM HYDROXIDE AND MAGNESIUM HYDROXIDE 30 ML: 200; 200 SUSPENSION ORAL at 21:07

## 2017-06-13 RX ADMIN — METHYLPREDNISOLONE 8 MG: 4 TABLET ORAL at 17:51

## 2017-06-14 PROCEDURE — 74011636637 HC RX REV CODE- 636/637: Performed by: PHYSICAL MEDICINE & REHABILITATION

## 2017-06-14 PROCEDURE — 74011250636 HC RX REV CODE- 250/636: Performed by: PHYSICAL MEDICINE & REHABILITATION

## 2017-06-14 PROCEDURE — 74011250637 HC RX REV CODE- 250/637: Performed by: PHYSICAL MEDICINE & REHABILITATION

## 2017-06-14 RX ADMIN — METHYLPREDNISOLONE 4 MG: 4 TABLET ORAL at 12:53

## 2017-06-14 RX ADMIN — BACLOFEN 5 MG: 10 TABLET ORAL at 21:18

## 2017-06-14 RX ADMIN — CIPROFLOXACIN HYDROCHLORIDE 500 MG: 500 TABLET, FILM COATED ORAL at 09:10

## 2017-06-14 RX ADMIN — ACETAMINOPHEN 650 MG: 325 TABLET, FILM COATED ORAL at 17:02

## 2017-06-14 RX ADMIN — UMECLIDINIUM 1 PUFF: 62.5 AEROSOL, POWDER ORAL at 09:11

## 2017-06-14 RX ADMIN — DILTIAZEM HYDROCHLORIDE 120 MG: 120 CAPSULE, EXTENDED RELEASE ORAL at 09:11

## 2017-06-14 RX ADMIN — ACETAMINOPHEN 650 MG: 325 TABLET, FILM COATED ORAL at 21:18

## 2017-06-14 RX ADMIN — TAMSULOSIN HYDROCHLORIDE 0.4 MG: 0.4 CAPSULE ORAL at 09:11

## 2017-06-14 RX ADMIN — CIPROFLOXACIN HYDROCHLORIDE 500 MG: 500 TABLET, FILM COATED ORAL at 21:22

## 2017-06-14 RX ADMIN — METHYLPREDNISOLONE 4 MG: 4 TABLET ORAL at 17:02

## 2017-06-14 RX ADMIN — METHYLPREDNISOLONE 8 MG: 4 TABLET ORAL at 21:18

## 2017-06-14 RX ADMIN — FOLIC ACID 1 MG: 1 TABLET ORAL at 09:11

## 2017-06-14 RX ADMIN — DOCUSATE SODIUM 100 MG: 100 CAPSULE, LIQUID FILLED ORAL at 09:11

## 2017-06-14 RX ADMIN — ACETAMINOPHEN 650 MG: 325 TABLET, FILM COATED ORAL at 12:52

## 2017-06-14 RX ADMIN — MEGESTROL ACETATE 800 MG: 40 SUSPENSION ORAL at 09:11

## 2017-06-14 RX ADMIN — INSULIN LISPRO 2 UNITS: 100 INJECTION, SOLUTION INTRAVENOUS; SUBCUTANEOUS at 17:03

## 2017-06-14 RX ADMIN — BACLOFEN 5 MG: 10 TABLET ORAL at 17:02

## 2017-06-14 RX ADMIN — DOCUSATE SODIUM 100 MG: 100 CAPSULE, LIQUID FILLED ORAL at 17:02

## 2017-06-14 RX ADMIN — ACETAMINOPHEN 650 MG: 325 TABLET, FILM COATED ORAL at 09:11

## 2017-06-14 RX ADMIN — PRAVASTATIN SODIUM 40 MG: 40 TABLET ORAL at 21:18

## 2017-06-14 RX ADMIN — INSULIN LISPRO 2 UNITS: 100 INJECTION, SOLUTION INTRAVENOUS; SUBCUTANEOUS at 09:11

## 2017-06-14 RX ADMIN — METHYLPREDNISOLONE 4 MG: 4 TABLET ORAL at 09:11

## 2017-06-15 LAB
BACTERIA SPEC CULT: NORMAL
CC UR VC: NORMAL
SERVICE CMNT-IMP: NORMAL

## 2017-06-15 PROCEDURE — 74011636637 HC RX REV CODE- 636/637: Performed by: PHYSICAL MEDICINE & REHABILITATION

## 2017-06-15 PROCEDURE — 74011250637 HC RX REV CODE- 250/637: Performed by: PHYSICAL MEDICINE & REHABILITATION

## 2017-06-15 PROCEDURE — 74011250636 HC RX REV CODE- 250/636: Performed by: PHYSICAL MEDICINE & REHABILITATION

## 2017-06-15 RX ORDER — BACLOFEN 10 MG/1
10 TABLET ORAL EVERY 6 HOURS
Status: DISCONTINUED | OUTPATIENT
Start: 2017-06-15 | End: 2017-06-16 | Stop reason: ALTCHOICE

## 2017-06-15 RX ORDER — BACLOFEN 10 MG/1
10 TABLET ORAL
Status: DISCONTINUED | OUTPATIENT
Start: 2017-06-15 | End: 2017-06-15 | Stop reason: ALTCHOICE

## 2017-06-15 RX ADMIN — ACETAMINOPHEN 650 MG: 325 TABLET, FILM COATED ORAL at 08:37

## 2017-06-15 RX ADMIN — METHYLPREDNISOLONE 4 MG: 4 TABLET ORAL at 21:16

## 2017-06-15 RX ADMIN — MEGESTROL ACETATE 800 MG: 40 SUSPENSION ORAL at 08:37

## 2017-06-15 RX ADMIN — ACETAMINOPHEN 650 MG: 325 TABLET, FILM COATED ORAL at 17:29

## 2017-06-15 RX ADMIN — BACLOFEN 10 MG: 10 TABLET ORAL at 08:36

## 2017-06-15 RX ADMIN — ERGOCALCIFEROL 50000 UNITS: 1.25 CAPSULE ORAL at 17:28

## 2017-06-15 RX ADMIN — PRAVASTATIN SODIUM 40 MG: 40 TABLET ORAL at 22:00

## 2017-06-15 RX ADMIN — ACETAMINOPHEN 650 MG: 325 TABLET, FILM COATED ORAL at 21:15

## 2017-06-15 RX ADMIN — ACETAMINOPHEN 650 MG: 325 TABLET, FILM COATED ORAL at 12:27

## 2017-06-15 RX ADMIN — BACLOFEN 10 MG: 10 TABLET ORAL at 12:28

## 2017-06-15 RX ADMIN — TAMSULOSIN HYDROCHLORIDE 0.4 MG: 0.4 CAPSULE ORAL at 08:36

## 2017-06-15 RX ADMIN — FOLIC ACID 1 MG: 1 TABLET ORAL at 08:36

## 2017-06-15 RX ADMIN — METHYLPREDNISOLONE 4 MG: 4 TABLET ORAL at 08:36

## 2017-06-15 RX ADMIN — METHYLPREDNISOLONE 4 MG: 4 TABLET ORAL at 17:28

## 2017-06-15 RX ADMIN — DOCUSATE SODIUM 100 MG: 100 CAPSULE, LIQUID FILLED ORAL at 17:28

## 2017-06-15 RX ADMIN — BACLOFEN 10 MG: 10 TABLET ORAL at 23:56

## 2017-06-15 RX ADMIN — DOCUSATE SODIUM 100 MG: 100 CAPSULE, LIQUID FILLED ORAL at 08:36

## 2017-06-15 RX ADMIN — DILTIAZEM HYDROCHLORIDE 120 MG: 120 CAPSULE, EXTENDED RELEASE ORAL at 08:36

## 2017-06-15 RX ADMIN — UMECLIDINIUM 1 PUFF: 62.5 AEROSOL, POWDER ORAL at 08:38

## 2017-06-15 RX ADMIN — METHYLPREDNISOLONE 4 MG: 4 TABLET ORAL at 12:28

## 2017-06-15 RX ADMIN — INSULIN LISPRO 2 UNITS: 100 INJECTION, SOLUTION INTRAVENOUS; SUBCUTANEOUS at 18:31

## 2017-06-15 RX ADMIN — METHOTREXATE 10 MG: 2.5 TABLET ORAL at 08:38

## 2017-06-15 RX ADMIN — CIPROFLOXACIN HYDROCHLORIDE 500 MG: 500 TABLET, FILM COATED ORAL at 08:38

## 2017-06-15 RX ADMIN — CIPROFLOXACIN HYDROCHLORIDE 500 MG: 500 TABLET, FILM COATED ORAL at 21:18

## 2017-06-16 ENCOUNTER — APPOINTMENT (OUTPATIENT)
Dept: CT IMAGING | Age: 77
End: 2017-06-16
Attending: PHYSICAL MEDICINE & REHABILITATION

## 2017-06-16 PROCEDURE — 74011250636 HC RX REV CODE- 250/636: Performed by: PHYSICAL MEDICINE & REHABILITATION

## 2017-06-16 PROCEDURE — 70450 CT HEAD/BRAIN W/O DYE: CPT

## 2017-06-16 PROCEDURE — 74011250637 HC RX REV CODE- 250/637: Performed by: PHYSICAL MEDICINE & REHABILITATION

## 2017-06-16 RX ORDER — BACLOFEN 10 MG/1
10 TABLET ORAL
Status: DISCONTINUED | OUTPATIENT
Start: 2017-06-16 | End: 2017-06-16 | Stop reason: HOSPADM

## 2017-06-16 RX ADMIN — FOLIC ACID 1 MG: 1 TABLET ORAL at 08:05

## 2017-06-16 RX ADMIN — ACETAMINOPHEN 650 MG: 325 TABLET, FILM COATED ORAL at 08:05

## 2017-06-16 RX ADMIN — MEGESTROL ACETATE 800 MG: 40 SUSPENSION ORAL at 08:18

## 2017-06-16 RX ADMIN — METHYLPREDNISOLONE 4 MG: 4 TABLET ORAL at 12:02

## 2017-06-16 RX ADMIN — DOCUSATE SODIUM 100 MG: 100 CAPSULE, LIQUID FILLED ORAL at 08:05

## 2017-06-16 RX ADMIN — CIPROFLOXACIN HYDROCHLORIDE 500 MG: 500 TABLET, FILM COATED ORAL at 12:02

## 2017-06-16 RX ADMIN — DOCUSATE SODIUM 100 MG: 100 CAPSULE, LIQUID FILLED ORAL at 17:40

## 2017-06-16 RX ADMIN — ACETAMINOPHEN 650 MG: 325 TABLET, FILM COATED ORAL at 12:02

## 2017-06-16 RX ADMIN — METHYLPREDNISOLONE 4 MG: 4 TABLET ORAL at 08:05

## 2017-06-16 RX ADMIN — BACLOFEN 10 MG: 10 TABLET ORAL at 05:27

## 2017-06-16 RX ADMIN — UMECLIDINIUM 1 PUFF: 62.5 AEROSOL, POWDER ORAL at 08:05

## 2017-06-16 RX ADMIN — ACETAMINOPHEN 650 MG: 325 TABLET, FILM COATED ORAL at 17:40

## 2017-06-16 RX ADMIN — TAMSULOSIN HYDROCHLORIDE 0.4 MG: 0.4 CAPSULE ORAL at 08:05

## 2017-06-16 RX ADMIN — DILTIAZEM HYDROCHLORIDE 120 MG: 120 CAPSULE, EXTENDED RELEASE ORAL at 08:05

## 2017-09-19 ENCOUNTER — TELEPHONE (OUTPATIENT)
Dept: INTERNAL MEDICINE CLINIC | Age: 77
End: 2017-09-19

## 2017-09-19 NOTE — TELEPHONE ENCOUNTER
#[phone] Unique with [facility] pt was discharged today from [address] [facility]. They will be following pt for PT, OT and nursing. Will Dr. Sherie Melara be willing to sign orders? They will also needs pt's date of last visit. Please call.

## 2017-09-19 NOTE — TELEPHONE ENCOUNTER
Left message that Dr. Benjaman Kocher will follow patient patient while at home and will sign any orders needed

## 2017-09-20 ENCOUNTER — TELEPHONE (OUTPATIENT)
Dept: INTERNAL MEDICINE CLINIC | Age: 77
End: 2017-09-20

## 2017-09-20 ENCOUNTER — PATIENT OUTREACH (OUTPATIENT)
Dept: INTERNAL MEDICINE CLINIC | Age: 77
End: 2017-09-20

## 2017-09-20 DIAGNOSIS — Z78.9 IMPAIRED MOBILITY AND ADLS: ICD-10-CM

## 2017-09-20 DIAGNOSIS — R26.81 UNSTEADY GAIT: ICD-10-CM

## 2017-09-20 DIAGNOSIS — Z74.09 IMPAIRED MOBILITY AND ADLS: ICD-10-CM

## 2017-09-20 DIAGNOSIS — R53.1 WEAKNESS GENERALIZED: Primary | ICD-10-CM

## 2017-09-20 RX ORDER — DILTIAZEM HYDROCHLORIDE 60 MG/1
60 TABLET, FILM COATED ORAL 2 TIMES DAILY
COMMUNITY
End: 2017-09-25 | Stop reason: SDUPTHER

## 2017-09-20 RX ORDER — LANOLIN ALCOHOL/MO/W.PET/CERES
CREAM (GRAM) TOPICAL
COMMUNITY
End: 2019-08-23 | Stop reason: ALTCHOICE

## 2017-09-20 RX ORDER — DOCUSATE SODIUM 100 MG/1
100 CAPSULE, LIQUID FILLED ORAL 2 TIMES DAILY
COMMUNITY
End: 2017-09-25 | Stop reason: ALTCHOICE

## 2017-09-20 RX ORDER — INSULIN LISPRO 100 [IU]/ML
INJECTION, SOLUTION INTRAVENOUS; SUBCUTANEOUS 2 TIMES DAILY WITH MEALS
COMMUNITY
End: 2019-05-16 | Stop reason: CLARIF

## 2017-09-20 RX ORDER — TRAMADOL HYDROCHLORIDE 50 MG/1
50 TABLET ORAL
COMMUNITY
End: 2017-10-02 | Stop reason: SDUPTHER

## 2017-09-20 RX ORDER — PREDNISONE 10 MG/1
TABLET ORAL
COMMUNITY
End: 2017-11-07

## 2017-09-20 RX ORDER — FAMOTIDINE 20 MG/1
20 TABLET, FILM COATED ORAL 2 TIMES DAILY
COMMUNITY
End: 2017-09-25

## 2017-09-20 RX ORDER — ERGOCALCIFEROL 1.25 MG/1
50000 CAPSULE ORAL 2 TIMES WEEKLY
COMMUNITY
End: 2017-09-25 | Stop reason: SDUPTHER

## 2017-09-20 NOTE — PROGRESS NOTES
RBVO per Dr. Benjaman Kocher for [facility] health related to patient being released for skilled nursing facility, generalized weakness, unsteady gait, and impaired ability to perform ADL's.  Order placed

## 2017-09-20 NOTE — PROGRESS NOTES
This note will not be viewable in [address] Was asked by SINCERE CARBALLO to follow pt who was discharged from [facility] yesterday. Pt has a complicated hospital and SNF course as follows: [facility] in April to [facility] for rehab to [facility] for fractured hip to [facility] for rehab to [facility] to [facility]. Pt's diagnoses from [facility] were normal pressure hydrocephalus ventriculostomy tube pressure adjustment. Peg tube was placed at [facility]. Pt sees Dr. Lakhwinder Horton as PCP. Spoke to pt's wife who stated pt is doing good; he had a shower today with max assist from wife; shower chair is coming; have grab bars. Appetite is good. Is getting only one container of Jevity before bed. GI doctor at [facility] put in peg tube. Future appts:    Sharon Waldron is a 65 y.o. male   This patient was received as a referral from SINCERE CARBALLO at [address]   Medium Risk            13       Total Score        3 Has Seen PCP in Last 6 Months (Yes=3, No=0)    2 . Living with Significant Other. Assisted Living. LTAC. SNF. or   Rehab    8 Charlson Comorbidity Score (Age + Comorbid Conditions)        Criteria that do not apply:    Patient Length of Stay (>5 days = 3)    IP Visits Last 12 Months (1-3=4, 4=9, >4=11)    Pt. Coverage (Medicare=5 , Medicaid, or Self-Pay=4)       Medications Discontinued During This Encounter   Medication Reason    senna-docusate (PERICOLACE) 8.6-50 mg per tablet Discontinued by Another Clinician    dilTIAZem XR (DILACOR XR) 120 mg XR capsule Discontinued by Another Clinician   Dr. Lisbeth Brooks at [facility] changed diltiazem from 120 mg to 60 mg twice daily. Pt is taking senna and colace instead of pericolace. Current Outpatient Prescriptions   Medication Sig    sennosides (SENNA) 8.6 mg cap Take 1 Cap by mouth daily as needed.  dilTIAZem (CARDIZEM) 60 mg tablet Take 60 mg by mouth two (2) times a day.  insulin lispro (HUMALOG KWIKPEN) 100 unit/mL kwikpen by SubCUTAneous route two (2) times daily (with meals).  Breakfast and dinner per sliding scale:  201-250=2 units  251-300=4 units  301-350=6 units    docusate sodium (COLACE) 100 mg capsule Take 100 mg by mouth two (2) times a day.  fish oil-omega-3 fatty acids 340-1,000 mg capsule Take 1 Cap by mouth two (2) times a day.  ferrous sulfate (IRON) 325 mg (65 mg iron) tablet Take  by mouth Daily (before breakfast).  famotidine (PEPCID) 20 mg tablet Take 20 mg by mouth two (2) times a day.  predniSONE (DELTASONE) 10 mg tablet Take  by mouth daily (with breakfast).  ergocalciferol (ERGOCALCIFEROL) 50,000 unit capsule Take 50,000 Units by mouth two (2) times a week. Monday and Thursday    traMADol (ULTRAM) 50 mg tablet Take 50 mg by mouth every six (6) hours as needed for Pain.  LACTOSE-REDUCED FOOD/FIBER (JEVITY 1.5 AZAEL PO) Take 1 Container by mouth nightly. Per peg tube followed by 150 ml water flush.  acetaminophen (TYLENOL) 500 mg tablet Take 500 mg by mouth every six (6) hours as needed for Pain.  albuterol sulfate (PROVENTIL;VENTOLIN) 2.5 mg/0.5 mL nebu nebulizer solution 2.5 mg by Nebulization route every four (4) hours as needed for Wheezing.  tamsulosin (FLOMAX) 0.4 mg capsule Take 0.4 mg by mouth daily.  methotrexate (RHEUMATREX) 2.5 mg tablet Take 10 mg by mouth Every Thursday.  adalimumab (HUMIRA) 40 mg/0.8 mL injection 40 mg by SubCUTAneous route every fourteen (14) days. Every Tuesday    folic acid (FOLVITE) 1 mg tablet Take 1 mg by mouth daily.  esomeprazole (NEXIUM) 40 mg capsule Take 40 mg by mouth daily.  tiotropium (SPIRIVA WITH HANDIHALER) 18 mcg inhalation capsule Take 1 Cap by inhalation daily.  oxyCODONE IR (ROXICODONE) 5 mg immediate release tablet Take 0.5-1 Tabs by mouth every four (4) hours as needed. Max Daily Amount: 30 mg.  pravastatin (PRAVACHOL) 40 mg tablet Take 40 mg by mouth nightly. No current facility-administered medications for this visit.       Meds/supplements added during this med rec either at [facility] or at [facility] are as follows:  Prednisone, colace, iron, pepcid, vit d, jevity, Humalog insulin, tramadol, fish oil,  Senna. Pt's stool got too loose so not taking meds for stools per wife. Summary of patients top three medical problems:     Problem 1: weakness     Problem 2: COPD     Problem 3: NPH-normal pressure hydrocephalus      Patient's challenges to self management identified:  none; no barriers to care  Patients motivational level on a scale of 0-10: not scaled  Medication Management:  good adherence and good understanding  Advance Care Planning:   Patient was offered the opportunity to discuss advance care planning:  yes  (wife stated pt will not talk about it)   Does patient have an Advance Directive:  no   If no, did you provide information on Advance Care Planning? yes     [employer], Referrals, and Durable Medical Equipment: [facility] - INPATIENT for [place], [address] [facility] [place], aide. Pt has walker, cane, w/c, wears depends    Follow up appointments:  Dr. Toby Ahuja at [facility] next Tuesday; KARISHMA Langley At [facility] will schedule related to peg tube  Goals      Attends follow-up appointments as directed. 9/20/17-  Appt with neurosurgeon at [facility] - Dr. Toby Ahuja, next Tuesday 9/26/17; appt with Dr. Lorna Roberts Monday 9/25/17; wife will schedule GI appt with [facility]; eye doctor appt in two weeks. DW       Prevent complications post hospitalization. 9/20/17-  Attend JANE appt with Dr. Lorna Roberts on 9/25/17 and Dr. Toby Ahuja at [facility] 9/26/17; [facility] referral ordered for SN, PT, OT, home health aide; call with any signs or symptoms of decline or as have experienced in the past before hospitalizations, especially increased confusion; high risk for falls-wife educated; take all meds; call with any questions or concerns. DW          9/20/17-Signed home health order. [facility] health closed at time of call. Will call again.    9/21/17-spoke to Smitha Chinchilla with [facility] and they got referral from [facility] and are waiting for face to face note from them. As soon as they get all the paperwork they need, they will process and get out to see pt in 24-48 hours. Left message with status of home health for wife.

## 2017-09-22 NOTE — PROGRESS NOTES
Spoke to pt's wife today who stated they have not heard from [facility]. It is noted that there is no home health encounter in chart. This NN will call Monday. Pt's stated pt is doing well; eating good, still getting 1 tube feeding; is wearing briefs at night and depends during the day, but is able to walk to the bathroom and urinate; walking around the house and on the street in front of house.  Pt's wife stated pt definitely needs home health for PT.

## 2017-09-25 ENCOUNTER — OFFICE VISIT (OUTPATIENT)
Dept: INTERNAL MEDICINE CLINIC | Age: 77
End: 2017-09-25

## 2017-09-25 ENCOUNTER — PATIENT OUTREACH (OUTPATIENT)
Dept: INTERNAL MEDICINE CLINIC | Age: 77
End: 2017-09-25

## 2017-09-25 ENCOUNTER — DOCUMENTATION ONLY (OUTPATIENT)
Dept: INTERNAL MEDICINE CLINIC | Age: 77
End: 2017-09-25

## 2017-09-25 VITALS
WEIGHT: 168 LBS | SYSTOLIC BLOOD PRESSURE: 105 MMHG | DIASTOLIC BLOOD PRESSURE: 63 MMHG | OXYGEN SATURATION: 97 % | RESPIRATION RATE: 16 BRPM | TEMPERATURE: 98 F | HEIGHT: 69 IN | BODY MASS INDEX: 24.88 KG/M2 | HEART RATE: 85 BPM

## 2017-09-25 DIAGNOSIS — Z23 ENCOUNTER FOR IMMUNIZATION: ICD-10-CM

## 2017-09-25 DIAGNOSIS — N40.1 BENIGN PROSTATIC HYPERPLASIA WITH LOWER URINARY TRACT SYMPTOMS, UNSPECIFIED MORPHOLOGY: Chronic | ICD-10-CM

## 2017-09-25 DIAGNOSIS — E11.65 TYPE 2 DIABETES MELLITUS WITH HYPERGLYCEMIA, WITH LONG-TERM CURRENT USE OF INSULIN (HCC): Primary | Chronic | ICD-10-CM

## 2017-09-25 DIAGNOSIS — R79.89 LOW VITAMIN D LEVEL: ICD-10-CM

## 2017-09-25 DIAGNOSIS — Z79.4 TYPE 2 DIABETES MELLITUS WITH HYPERGLYCEMIA, WITH LONG-TERM CURRENT USE OF INSULIN (HCC): Primary | Chronic | ICD-10-CM

## 2017-09-25 DIAGNOSIS — Z00.00 MEDICARE ANNUAL WELLNESS VISIT, SUBSEQUENT: ICD-10-CM

## 2017-09-25 DIAGNOSIS — M06.9 RHEUMATOID ARTHRITIS INVOLVING MULTIPLE SITES, UNSPECIFIED RHEUMATOID FACTOR PRESENCE: Chronic | ICD-10-CM

## 2017-09-25 DIAGNOSIS — E46 MALNUTRITION (HCC): ICD-10-CM

## 2017-09-25 DIAGNOSIS — I71.40 AAA (ABDOMINAL AORTIC ANEURYSM) WITHOUT RUPTURE: Chronic | ICD-10-CM

## 2017-09-25 DIAGNOSIS — G91.2 NPH (NORMAL PRESSURE HYDROCEPHALUS) (HCC): Chronic | ICD-10-CM

## 2017-09-25 PROBLEM — N40.0 BPH (BENIGN PROSTATIC HYPERPLASIA): Chronic | Status: ACTIVE | Noted: 2017-09-25

## 2017-09-25 RX ORDER — DILTIAZEM HYDROCHLORIDE 60 MG/1
60 TABLET, FILM COATED ORAL 2 TIMES DAILY
Qty: 180 TAB | Refills: 1 | Status: SHIPPED | OUTPATIENT
Start: 2017-09-25 | End: 2018-03-18 | Stop reason: SDUPTHER

## 2017-09-25 RX ORDER — TAMSULOSIN HYDROCHLORIDE 0.4 MG/1
0.4 CAPSULE ORAL DAILY
Qty: 90 CAP | Refills: 1 | Status: SHIPPED | OUTPATIENT
Start: 2017-09-25 | End: 2018-05-30 | Stop reason: SDUPTHER

## 2017-09-25 RX ORDER — ERGOCALCIFEROL 1.25 MG/1
50000 CAPSULE ORAL 2 TIMES WEEKLY
Qty: 24 CAP | Refills: 1 | Status: SHIPPED | OUTPATIENT
Start: 2017-09-25 | End: 2019-08-23 | Stop reason: ALTCHOICE

## 2017-09-25 NOTE — LETTER
10/3/2017 9:25 AM 
 
Mr. Hyacinth Dunne [address] [address] Dear Hyacinth Dunne: 
 
Please find your most recent results below. Resulted Orders CBC WITH AUTOMATED DIFF Result Value Ref Range WBC 7.9 3.4 - 10.8 x10E3/uL  
 RBC 4.36 4.14 - 5.80 x10E6/uL HGB 11.9 (L) 12.6 - 17.7 g/dL HCT 36.6 (L) 37.5 - 51.0 % MCV 84 79 - 97 fL  
 MCH 27.3 26.6 - 33.0 pg  
 MCHC 32.5 31.5 - 35.7 g/dL RDW 20.0 (H) 12.3 - 15.4 % PLATELET 605 312 - 576 x10E3/uL NEUTROPHILS 55 % Lymphocytes 28 % MONOCYTES 12 % EOSINOPHILS 4 % BASOPHILS 0 %  
 ABS. NEUTROPHILS 4.4 1.4 - 7.0 x10E3/uL Abs Lymphocytes 2.2 0.7 - 3.1 x10E3/uL  
 ABS. MONOCYTES 0.9 0.1 - 0.9 x10E3/uL  
 ABS. EOSINOPHILS 0.3 0.0 - 0.4 x10E3/uL  
 ABS. BASOPHILS 0.0 0.0 - 0.2 x10E3/uL IMMATURE GRANULOCYTES 1 %  
 ABS. IMM. GRANS. 0.1 0.0 - 0.1 x10E3/uL Narrative Performed at:  [address]  [id number] : Bethanie Allen MD, Phone:  [phone] METABOLIC PANEL, COMPREHENSIVE Result Value Ref Range Glucose 156 (H) 65 - 99 mg/dL BUN 15 8 - 27 mg/dL Creatinine 0.72 (L) 0.76 - 1.27 mg/dL GFR est non-AA 89 >59 mL/min/1.73 GFR est  >59 mL/min/1.73  
 BUN/Creatinine ratio 21 10 - 24 Sodium 138 134 - 144 mmol/L Potassium 4.0 3.5 - 5.2 mmol/L Chloride 99 96 - 106 mmol/L  
 CO2 24 18 - 29 mmol/L Calcium 9.7 8.6 - 10.2 mg/dL Protein, total 6.9 6.0 - 8.5 g/dL Albumin 3.9 3.5 - 4.8 g/dL GLOBULIN, TOTAL 3.0 1.5 - 4.5 g/dL A-G Ratio 1.3 1.2 - 2.2 Bilirubin, total 0.4 0.0 - 1.2 mg/dL Alk. phosphatase 136 (H) 39 - 117 IU/L  
 AST (SGOT) 17 0 - 40 IU/L  
 ALT (SGPT) 18 0 - 44 IU/L Narrative Performed at:  [address]  [id number] : Bethanie Allen MD, Phone:  [phone] LIPID PANEL Result Value Ref Range Cholesterol, total 205 (H) 100 - 199 mg/dL Triglyceride 174 (H) 0 - 149 mg/dL HDL Cholesterol 69 >39 mg/dL VLDL, calculated 35 5 - 40 mg/dL LDL, calculated 101 (H) 0 - 99 mg/dL Narrative Performed at:  [address]  [id number] : Susan Le MD, Phone:  [phone] HEMOGLOBIN A1C WITH EAG Result Value Ref Range Hemoglobin A1c 7.9 (H) 4.8 - 5.6 % Comment:  
            Pre-diabetes: 5.7 - 6.4 Diabetes: >6.4 Glycemic control for adults with diabetes: <7.0 Estimated average glucose 180 mg/dL Narrative Performed at:  [address]  [id number] : Susan Le MD, Phone:  [phone] VITAMIN D, 25 HYDROXY Result Value Ref Range VITAMIN D, 25-HYDROXY 40.6 30.0 - 100.0 ng/mL Comment:  
   Vitamin D deficiency has been defined by the [address] [place] [address] practice guideline as a 
level of serum 25-OH vitamin D less than 20 ng/mL (1,2). The Endocrine Society went on to further define vitamin D 
insufficiency as a level between 21 and 29 ng/mL (2). 1. IOM ([place] [employer]). 2010. Dietary reference 
   intakes for calcium and D. [address] [place] [address]: The 
   [employer]. 2. Christy MF, Antwan NC, Kyle HERRERA, et al. 
   Evaluation, treatment, and prevention of vitamin D 
   deficiency: an Endocrine Society clinical practice 
   guideline. JCEM. 2011 Jul; 96(7):1911-30. Narrative Performed at:  [address]  [id number] : Susan Le MD, Phone:  [phone] TSH 3RD GENERATION Result Value Ref Range TSH 3.280 0.450 - 4.500 uIU/mL Narrative Performed at:  [address]  [id number] : Susan Le MD, Phone:  [phone] MICROALBUMIN, UR, RAND W/ MICROALBUMIN/CREA RATIO Result Value Ref Range Creatinine, urine 99.5 Not Estab. mg/dL Microalbumin, urine 31.2 Not Estab. ug/mL Microalb/Creat ratio (ug/mg creat.) 31.4 (H) 0.0 - 30.0 mg/g creat Narrative Performed at:  [address]  [id number] : Vj Agee MD, Phone:  [phone] PSA, DIAGNOSTIC (PROSTATE SPECIFIC AG) Result Value Ref Range Prostate Specific Ag 3.9 0.0 - 4.0 ng/mL Comment:  
   Roche ECLIA methodology. According to the American Urological Association, Serum PSA should 
decrease and remain at undetectable levels after radical 
prostatectomy. The AUA defines biochemical recurrence as an initial 
PSA value 0.2 ng/mL or greater followed by a subsequent confirmatory PSA value 0.2 ng/mL or greater. Values obtained with different assay methods or kits cannot be used 
interchangeably. Results cannot be interpreted as absolute evidence 
of the presence or absence of malignant disease. Narrative Performed at:  [address]  [id number] : Vj Agee MD, Phone:  [phone] PREALBUMIN Result Value Ref Range Prealbumin 24 9 - 32 mg/dL Narrative Performed at:  [address]  [id number] : Vj Agee MD, Phone:  [phone] RECOMMENDATIONS: 
Diabetes bit worse, otherwise labs look good. Continue with same meds for now, will consider adding long acting insulin at next visit. Please call me if you have any questions: [phone] Sincerely, Juan Pablo Mena III, DO

## 2017-09-25 NOTE — PROGRESS NOTES
Pt is here with wife today for HFU with Dr. Lucy Willingham for normal pressure hydrocephalus ventriculostomy tube pressure adjustment. Peg tube was placed at [facility]. [facility] has not called yet. Called [phone] and inquired about status. Spoke to Karmen who will use Dr. Davi ayoub note from today and referral that was ordered 17. Goals      Attends follow-up appointments as directed. 17-  Appt with neurosurgeon at [facility] - Dr. [employer], next 17; appt with Dr. Lucy Willingham 17; wife will schedule GI appt with [facility]; eye doctor appt in two weeks. DW    17- pt seen today by Dr. Lucy Willingham for [place]. Pt has appt tomorrow with Dr. [employer] at [facility]; pt is to see GI at [facility] regarding peg tube. DW       Prevent complications post hospitalization. 17-  Attend JANE appt with Dr. Lucy Willingham on 17 and Dr. [employer] at [facility] 17; [facility] referral ordered for SN, PT, OT, home health aide; call with any signs or symptoms of decline or as have experienced in the past before hospitalizations, especially increased confusion; high risk for falls-wife educated; take all meds; call with any questions or concerns. GABRIELA    17- [facility] has not called; spoke to Karmen today who will use Dr. Rilla Severs note as the face to face note. The referral that was ordered 17 is still good, but does  today. 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 DW          Pt given contact info to call with any questions or concerns. Will follow.

## 2017-09-25 NOTE — PATIENT INSTRUCTIONS
PATIENT INSTRUCTIONS:  Prepared by Sapna Youssef date: 9-25-17  Medicare Part B Preventive Services Guidelines/Limitations Date last completed and Frequency Due Date   Bone Mass Measurement  (age 72 & older, biennial) Requires diagnosis related to osteoporosis or estrogen deficiency. Biennial benefit unless patient has history of long-term glucocorticoid tx or baseline is needed because initial test was by other method or for  patients with vertebral abnormalities on x-ray Completed:   No record found    Recommended every 2 years Due: N/A    Unless  recommended by your PCP or Specialist     Cardiovascular Screening Blood Tests (every 5 years or annual if on cholesterol lowering meds)  Total cholesterol, HDL, Triglycerides Order as a panel if possible Completed:   No record found    May have been checked March at [facility]     Due: NOW on next set of labs    OR  As recommended by your PCP or Specialist   HIV Screening    HIV-1 and HIV-2 by EIA, KERRI, rapid antibody test, or oral mucosa transudate Covered annually for patients between the ages of 13 - 72 or patients less than age 13 and those older than age 72 that are considered at an increased risk. N/A N/A   Hepatitis C screening tests Indicated for patients with at least one of the following: current or past history of IV drug use, those who had blood transfusion before 1992, those born between [facility]. N/A N/A   Colorectal Cancer Screening  -Fecal occult blood test (annual)  -Flexible sigmoidoscopy (5y)  -Screening colonoscopy (10y)  -Barium Enema Age 49-80; After age [de-identified] if history of abnormal results Completed:    1-22-16    Dr. Jeff Calle recommended repeat in 1 to 3 years  Due: Between Jan. 2017 and Jan. 2019    Please check with Dr. Simran Frye office and schedule accordingly.   [phone]     Hepatitis B vaccines  (covered for patients at high risk- hemophilia, ESRD, DM, body fluid contact                        ______________  Prostate Cancer Screening (annually up to age 76)  -Digital rectal exam  -Prostate specific antigen (PSA) Medium/high risk factors:  End-stage renal disease,  Hemophiliacs who received Factor VIII or IX concentrates, Clients of institutions for the mentally retarded, Persons who live in the same house as a HepB virus carrier, Homosexual men, Illicit injectable drug abusers.     Three shot series covered once  _________________  Annually (age 48 or over), SHERYL not paid separately when covered E/M service is provided on the same date N/A                                    ______________  Completed:  No record found      Recommended  annually N/A                                    _____________  Due: No longer indicated after age 76 unless experiencing problem or recommended by PCP or Specialist   Seasonal Influenza Vaccination (annually)  Completed:   Last Oct. At [address]    Recommended Annually Due: FALL 2017       TDAP Vaccination Covered by Medicare part D through the pharmacy- PCP provides prescription Completed:   10-24-11    Recommended every 10 years Due: Oct. 2021   Zoster (Shingles) Vaccination Covered by Medicare Part D through the pharmacy- PCP provides prescription Completed:   2015 at [address]  Recommended once over age 61  Completed    This is a one-time vaccine   Glaucoma Screening (no USPSTF recommendation) Covered for high risk patients such as patients with Diabetes mellitus, family history of glaucoma, , age 48 or over,  American, age 72 or over Completed:   Appointment scheduled on Oct 17, 2017 with Dr. Jose A Nunez  Recommended annually Due: Already scheduled 10-17-17    Please schedule screening for glaucoma   Pneumococcal Vaccination (once after 65)  Completed:   Jan. 2016    Recommended once over the age of 72 Completed     This is a one-time vaccine   Prevnar 13 vaccine  Prevnar 15 -   Jan. 2014    Recommended once over the age of 72 Completed      This is a one-time vaccine     Diabetes Screening Tests (at least every 3 years, Medicare covers annually or at 6-month intervals for prediabetic patients)     Fasting blood sugar (FBS) or glucose tolerance test (GTT) Patient must be diagnosed with one of the following:  -Hypertension, Dyslipidemia, obesity, previous impaired FBS or GTT  Or any two of the following: overweight, FH of diabetes, age ? 72, history of gestational diabetes, birth of baby weighing more than 9 pounds Completed: Your A1c was 6.8 on 5-13-17        Recommended every -6 months for pre-diabetics Due:   Aug.- Nov. 2017            OR  As recommended by your PCP or Specialist   Lung Cancer Screening-with low dose CT for patients who meet all criteria:   -Age 50-69  -either a current smoker or have quit in past 15 yrs  -have a smoking history of 30 pack years or more Covered every 12 months Former smoker N/A    Followed by [facility] for COPD   Ultrasound Screening for Abdominal Aortic Aneurysm (AAA) (once) Medicare covers a one-time AAA ultrasound. You must get a referral from your doctor. Limited to patients who meet one of the following criteria:  * Men who are 73-68 years old and have smoked more than 100 cigarettes in their lifetime. *Anyone with a FH of AAA Completed:  3-7-17        N/A = Not Applicable    Please contact RN/Nurse Navigator with any questions about your visit or instructions today. Thank you for the opportunity for us to participate in your care. Come back for fasting labs, lab opens 8 am, mon-fri. No appt needed.

## 2017-09-25 NOTE — MR AVS SNAPSHOT
Visit Information Date & Time Provider Department Dept. Phone Encounter #  
 9/25/2017  1:30 PM Awais Enriquez, [address] [id number] Follow-up Instructions Return in about 3 months (around 12/25/2017). Upcoming Health Maintenance Date Due  
 LIPID PANEL Q1 1939 FOOT EXAM Q1 4/30/1949 MICROALBUMIN Q1 4/30/1949 EYE EXAM RETINAL OR DILATED Q1 4/30/1949 DTaP/Tdap/Td series (1 - Tdap) 4/30/1960 ZOSTER VACCINE AGE 60> 2/28/1999 GLAUCOMA SCREENING Q2Y 4/30/2004 INFLUENZA AGE 9 TO ADULT 8/1/2017 HEMOGLOBIN A1C Q6M 11/13/2017 MEDICARE YEARLY EXAM 9/26/2018 Pneumococcal 65+ Low/Medium Risk (2 of 2 - PPSV23) 1/1/2021 Allergies as of 9/25/2017  Review Complete On: 9/25/2017 By: Gunnar Sethi III, DO Severity Noted Reaction Type Reactions Ceclor [Cefaclor]  04/28/2011    Swelling Contrast Agent [Iodine]  04/12/2017    Unknown (comments) Floxin [Ofloxacin]  04/28/2011    Hives Ivp [Fd And C Blue No.1]  04/28/2011    Itching Current Immunizations  Reviewed on 3/8/2016 Name Date Influenza Vaccine 10/8/2015 Pneumococcal Vaccine (Unspecified Type) 1/1/2016 Not reviewed this visit You Were Diagnosed With   
  
 Codes Comments Type 2 diabetes mellitus with hyperglycemia, with long-term current use of insulin (HCC)    -  Primary ICD-10-CM: E11.65, Z79.4 ICD-9-CM: 250.00, 790.29, V58.67 Medicare annual wellness visit, subsequent     ICD-10-CM: Z00.00 ICD-9-CM: V70.0   
 NPH (normal pressure hydrocephalus)     ICD-10-CM: G91.2 ICD-9-CM: 331.5 Benign prostatic hyperplasia with lower urinary tract symptoms, unspecified morphology     ICD-10-CM: N40.1 ICD-9-CM: 600.01 Rheumatoid arthritis involving multiple sites, unspecified rheumatoid factor presence ([facility]     ICD-10-CM: M06.9 ICD-9-CM: 714.0   
 AAA (abdominal aortic aneurysm) without rupture ([facility]     ICD-10-CM: I71.4 ICD-9-CM: 441.4 Malnutrition ([address]     ICD-10-CM: E46 
ICD-9-CM: 263.9 Low vitamin D level     ICD-10-CM: E55.9 ICD-9-CM: 268.9 Vitals BP Pulse Temp Resp Height(growth percentile) Weight(growth percentile) 105/63 (BP 1 Location: Left arm, BP Patient Position: Sitting) 85 98 °F (36.7 °C) (Oral) 16 5' 9\" (1.753 m) 168 lb (76.2 kg) SpO2 BMI Smoking Status 97% 24.81 kg/m2 Former Smoker Vitals History BMI and BSA Data Body Mass Index Body Surface Area  
 24.81 kg/m 2 1.93 m 2 Preferred Pharmacy Pharmacy Name Phone Enma Young ([address] [place] [address]) [facility] [phone] Your Updated Medication List  
  
   
This list is accurate as of: 9/25/17  2:35 PM.  Always use your most recent med list.  
  
  
  
  
 acetaminophen 500 mg tablet Commonly known as:  TYLENOL Take 500 mg by mouth every six (6) hours as needed for Pain. albuterol sulfate 2.5 mg/0.5 mL Nebu nebulizer solution Commonly known as:  PROVENTIL;VENTOLIN  
2.5 mg by Nebulization route every four (4) hours as needed for Wheezing. dilTIAZem 60 mg tablet Commonly known as:  CARDIZEM Take 1 Tab by mouth two (2) times a day. ergocalciferol 50,000 unit capsule Commonly known as:  ERGOCALCIFEROL Take 1 Cap by mouth two (2) times a week. Monday and Thursday  
  
 fish oil-omega-3 fatty acids 340-1,000 mg capsule Take 1 Cap by mouth daily. folic acid 1 mg tablet Commonly known as:  Google Take 1 mg by mouth daily. HumaLOG KwikPen 100 unit/mL kwikpen Generic drug:  insulin lispro  
by SubCUTAneous route two (2) times daily (with meals). Breakfast and dinner per sliding scale: 201-250=2 units 251-300=4 units 301-350=6 units HUMIRA 40 mg/0.8 mL injection Generic drug:  adalimumab 40 mg by SubCUTAneous route every fourteen (14) days. Every Tuesday Iron 325 mg (65 mg iron) tablet Generic drug:  ferrous sulfate Take  by mouth Daily (before breakfast). JEVITY 1.5 AZAEL PO Take 1 Container by mouth nightly. Per peg tube followed by 150 ml water flush. methotrexate 2.5 mg tablet Commonly known as:  Song Glance Take 10 mg by mouth Every Thursday. NexIUM 40 mg capsule Generic drug:  esomeprazole Take 40 mg by mouth daily. PRAVACHOL 40 mg tablet Generic drug:  pravastatin Take 40 mg by mouth nightly. predniSONE 10 mg tablet Commonly known as:  Lili Ada Take  by mouth daily (with breakfast). SPIRIVA WITH HANDIHALER 18 mcg inhalation capsule Generic drug:  tiotropium Take 1 Cap by inhalation daily. tamsulosin 0.4 mg capsule Commonly known as:  FLOMAX Take 1 Cap by mouth daily. traMADol 50 mg tablet Commonly known as:  ULTRAM  
Take 50 mg by mouth every six (6) hours as needed for Pain. Prescriptions Printed Refills  
 ergocalciferol (ERGOCALCIFEROL) 50,000 unit capsule 1 Sig: Take 1 Cap by mouth two (2) times a week. Monday and Thursday Class: Print Route: Oral  
 dilTIAZem (CARDIZEM) 60 mg tablet 1 Sig: Take 1 Tab by mouth two (2) times a day. Class: Print Route: Oral  
 tamsulosin (FLOMAX) 0.4 mg capsule 1 Sig: Take 1 Cap by mouth daily. Class: Print Route: Oral  
  
We Performed the Following CBC WITH AUTOMATED DIFF [35433 CPT(R)] HEMOGLOBIN A1C WITH EAG [44608 CPT(R)] LIPID PANEL [01871 CPT(R)] METABOLIC PANEL, COMPREHENSIVE [66278 CPT(R)] MICROALBUMIN, UR, RAND W/ MICROALBUMIN/CREA RATIO [id number] CPT(R)] PREALBUMIN [06077 CPT(R)] PSA, DIAGNOSTIC (PROSTATE SPECIFIC AG) [id number] CPT(R)] TSH 3RD GENERATION [92102 CPT(R)] VITAMIN D, 25 HYDROXY [id number] CPT(R)] Follow-up Instructions Return in about 3 months (around 12/25/2017). Patient Instructions PATIENT INSTRUCTIONS: 
Prepared by Rupa Friedman Todays date: 9-25-17 Medicare Part B Preventive Services Guidelines/Limitations Date last completed and Frequency Due Date Bone Mass Measurement 
(age 72 & older, biennial) Requires diagnosis related to osteoporosis or estrogen deficiency. Biennial benefit unless patient has history of long-term glucocorticoid tx or baseline is needed because initial test was by other method or for 
patients with vertebral abnormalities on x-ray Completed: No record found Recommended every 2 years Due: N/A Unless  recommended by your PCP or Specialist 
  
Cardiovascular Screening Blood Tests (every 5 years or annual if on cholesterol lowering meds) Total cholesterol, HDL, Triglycerides Order as a panel if possible Completed: No record found May have been checked March at [facility] Due: NOW on next set of labs OR As recommended by your PCP or Specialist  
HIV Screening HIV-1 and HIV-2 by EIA, KERRI, rapid antibody test, or oral mucosa transudate Covered annually for patients between the ages of 13 - 72 or patients less than age 13 and those older than age 72 that are considered at an increased risk. N/A N/A Hepatitis C screening tests Indicated for patients with at least one of the following: current or past history of IV drug use, those who had blood transfusion before 1992, those born between [facility]. N/A N/A Colorectal Cancer Screening 
-Fecal occult blood test (annual) -Flexible sigmoidoscopy (5y) 
-Screening colonoscopy (10y) -Barium Enema Age 49-80; After age [de-identified] if history of abnormal results Completed:  
 1-22-16 Dr. Ranjana Red recommended repeat in 1 to 3 years  Due: Between Jan. 2017 and Jan. 2019 Please check with Dr. Espinoza Shape office and schedule accordingly. [phone] Hepatitis B vaccines (covered for patients at high risk- hemophilia, ESRD, DM, body fluid contact 
 
 
 
 
 
 
 
 
 
 
 
______________ Prostate Cancer Screening (annually up to age 76) -Digital rectal exam 
 -Prostate specific antigen (PSA) Medium/high risk factors:  End-stage renal disease, Hemophiliacs who received Factor VIII or IX concentrates, Clients of institutions for the mentally retarded, Persons who live in the same house as a HepB virus carrier, Homosexual men, Illicit injectable drug abusers. Three shot series covered once 
_________________ Annually (age 48 or over), SHERYL not paid separately when covered E/M service is provided on the same date N/A 
 
 
 
 
 
 
 
 
 
 
 
 
 
 
 
 
 
______________ Completed: No record found Recommended 
annually N/A 
 
 
 
 
 
 
 
 
 
 
 
 
 
 
 
 
 
_____________ Due: No longer indicated after age 76 unless experiencing problem or recommended by PCP or Specialist  
Seasonal Influenza Vaccination (annually)  Completed:  
Last Oct. At [facility] Recommended Annually Due: FALL 2017 TDAP Vaccination Covered by Medicare part D through the pharmacy- PCP provides prescription Completed:  
10-24-11 Recommended every 10 years Due: Oct. 2021 Zoster (Shingles) Vaccination Covered by Medicare Part D through the pharmacy- PCP provides prescription Completed:  
2015 at [facility] Recommended once over age 61  Completed This is a one-time vaccine Glaucoma Screening (no USPSTF recommendation) Covered for high risk patients such as patients with Diabetes mellitus, family history of glaucoma, , age 48 or over,  American, age 72 or over Completed:  
Appointment scheduled on Oct 17, 2017 with Dr. America Sheriff Recommended annually Due: Already scheduled 10-17-17 Please schedule screening for glaucoma Pneumococcal Vaccination (once after 65)  Completed:  
Jan. 2016 Recommended once over the age of 72 Completed This is a one-time vaccine Prevnar 13 vaccine  Prevnar 13   
Jan. 2014 Recommended once over the age of 72 Completed This is a one-time vaccine Diabetes Screening Tests (at least every 3 years, Medicare covers annually or at 6-month intervals for prediabetic patients) Fasting blood sugar (FBS) or glucose tolerance test (GTT) Patient must be diagnosed with one of the following: 
-Hypertension, Dyslipidemia, obesity, previous impaired FBS or GTT 
Or any two of the following: overweight, FH of diabetes, age ? 72, history of gestational diabetes, birth of baby weighing more than 9 pounds Completed: Your A1c was 6.8 on 5-13-17 Recommended every -6 months for pre-diabetics Due:  
Aug.- Nov. 2017 OR As recommended by your PCP or Specialist  
Lung Cancer Screening-with low dose CT for patients who meet all criteria:  
-Age 50-69 
-either a current smoker or have quit in past 15 yrs 
-have a smoking history of 30 pack years or more Covered every 12 months Former smoker N/A Followed by [facility] for COPD Ultrasound Screening for Abdominal Aortic Aneurysm (AAA) (once) Medicare covers a one-time AAA ultrasound. You must get a referral from your doctor. Limited to patients who meet one of the following criteria: 
* Men who are 73-68 years old and have smoked more than 100 cigarettes in their lifetime. *Anyone with a FH of AAA Completed: 
3-7-17 N/A = Not Applicable Please contact RN/Nurse Navigator with any questions about your visit or instructions today. Thank you for the opportunity for us to participate in your care. Come back for fasting labs, lab opens 8 am, mon-fri. No appt needed. Introducing [facility]! Luci Small introduces [employer] patient portal. Now you can access parts of your medical record, email your doctor's office, and request medication refills online. 1. In your internet browser, go to [email or web address] 2. Click on the First Time User? Click Here link in the Sign In box. You will see the New Member Sign Up page. 3. Enter your [employer] Access Code exactly as it appears below. You will not need to use this code after youve completed the sign-up process. If you do not sign up before the expiration date, you must request a new code. · [employer] Access Code: [email or web address] Expires: 12/24/2017  2:35 PM 
 
4. Enter the last four digits of your Social Security Number (xxxx) and Date of Birth (mm/dd/yyyy) as indicated and click Submit. You will be taken to the next sign-up page. 5. Create a [employer] ID. This will be your [employer] login ID and cannot be changed, so think of one that is secure and easy to remember. 6. Create a [employer] password. You can change your password at any time. 7. Enter your Password Reset Question and Answer. This can be used at a later time if you forget your password. 8. Enter your e-mail address. You will receive e-mail notification when new information is available in [address] 9. Click Sign Up. You can now view and download portions of your medical record. 10. Click the Download Summary menu link to download a portable copy of your medical information. If you have questions, please visit the Frequently Asked Questions section of the [employer] website. Remember, [employer] is NOT to be used for urgent needs. For medical emergencies, dial 911. Now available from your iPhone and Android! Please provide this summary of care documentation to your next provider. Your primary care clinician is listed as Gemma Maldonado If you have any questions after today's visit, please call [phone].

## 2017-09-25 NOTE — PROGRESS NOTES
Reviewed record in preparation for visit and have obtained necessary documentation. Identified pt with two pt identifiers(name and ). Chief Complaint   Patient presents with   [facility] Follow Up   [facility] Maintenance Due   Topic Date Due    LIPID PANEL Q1  1939    FOOT EXAM Q1  1949    MICROALBUMIN Q1  1949    EYE EXAM RETINAL OR DILATED Q1  1949    DTaP/Tdap/Td series (1 - Tdap) 1960    ZOSTER VACCINE AGE 60>  1999    GLAUCOMA SCREENING Q2Y  2004    MEDICARE YEARLY EXAM  2004    INFLUENZA AGE 9 TO ADULT  2017       Mr. Huey Lantigua has a reminder for a \"due or due soon\" health maintenance. I have asked that he discuss health maintenance topic(s) due with His  primary care provider. Coordination of Care Questionnaire:  :     1) Have you been to an emergency room, urgent care clinic since your last visit? no   Hospitalized since your last visit? no             2) Have you seen or consulted any other health care providers outside of [address] [place] [address] since your last visit? no  (Include any pap smears or colon screenings in this section.)    3) Do you have an Advance Directive on file? no    4) Are you interested in receiving information on Advance Directives? NO    Patient is accompanied by self I have received verbal consent from Morrsi Lozoya to discuss any/all medical information while they are present in the room.

## 2017-09-25 NOTE — PROGRESS NOTES
Medicare Wellness Visit      Clark Brar is a 65 y.o. male and presents for Annual Medicare Wellness Visit. Assessment of cognitive impairment: Alert and oriented x 3. Patient appears well dressed with good hygiene. Mood is pleasant and cooperative. Depression Screen:   PHQ over the last two weeks 9/25/2017   Little interest or pleasure in doing things Not at all   Feeling down, depressed or hopeless Not at all   Total Score PHQ 2 0       Fall Risk Assessment:    Fall Risk Assessment, last 12 mths 9/25/2017   Able to walk? Yes   Fall in past 12 months? Yes   Fall with injury? Yes   Number of falls in past 12 months 1   Fall Risk Score 2       Abuse Screen:   Abuse Screening Questionnaire 9/25/2017   Do you ever feel afraid of your partner? N   Are you in a relationship with someone who physically or mentally threatens you? N   Is it safe for you to go home? Y       Activities of Daily Living:  Partial assistance.    Requires assistance with: See Activities of Daily Living flow sheet below   Patient handle his/her own medications  No-wife prepares Use of pill box  Wife states sometimes uses pill box   Activities of Daily Living:   ADL Assessment 9/25/2017   Feeding yourself No Help Needed   Getting from bed to chair Help Needed   Getting dressed Help Needed   Bathing or showering Help Needed   Walk across the room (includes cane/walker) No Help Needed   Using the telphone No Help Needed   Taking your medications Help Needed   Preparing meals Help Needed   Managing money (expenses/bills) Help Needed   Moderately strenuous housework (laundry) Help Needed   Shopping for personal items (toiletries/medicines) Help Needed   Shopping for groceries Help Needed   Driving Help Needed   Climbing a flight of stairs Help Needed   Getting to places beyond walking distances Help Needed       Health Maintenance:  Daily Aspirin: no  Bone Density: N/A patient does not have diagnosis of osteoporosis  Glaucoma Screening: patient reports having appointment on 82-07-02lsod Dr. Ramiro More  Immunizations:    Tetanus: up to date. Influenza: up to date. Last administered at [facility]. Agrees to immunization today. Shingles: up to date Last administered at [facility] approximately 2015. Will request records. PPSV-23: up to date. Prevnar-13: up to date. Cancer screening:    Colon: up to date. Last colonoscopy on 1-22-16 with Dr. Ijeoma Rendon and was recommended to follow up in 1--3 years. Encouraged patient and wife to check with Dr. Ijeoma Rendon for follow up. Prostate:  No longer indicated after age 76    Alcohol Risk Screen:   On any occasion during the past 3 months, have you had more than 3 drinks(female) or 4 drinks (male) containing alcohol in one? No  Do you average more than 7 drinks (female) or 14 drinks (male) per week? No  Type and amount:patient reports drinking 2 oz/per night    Hearing Loss:  Hearing is good. denies any hearing loss    Vision Loss:   Wears glasses, contact lenses, or have any other visual impairment  Yes wears glasses for reading    Adult Nutrition Screen: Patient reports gaining back weight he had lost earlier in the year when he began PEG tube. Reports appetite is good. Advance Care Planning:   End of Life Planning: has NO advanced directive  - add't info provided      Medications/Allergies: Reviewed with patient  Prior to Admission medications    Medication Sig Start Date End Date Taking? Authorizing Provider   dilTIAZem (CARDIZEM) 60 mg tablet Take 60 mg by mouth two (2) times a day. Yes Historical Provider   insulin lispro (HUMALOG KWIKPEN) 100 unit/mL kwikpen by SubCUTAneous route two (2) times daily (with meals). Breakfast and dinner per sliding scale:  201-250=2 units  251-300=4 units  301-350=6 units   Yes Historical Provider   fish oil-omega-3 fatty acids 340-1,000 mg capsule Take 1 Cap by mouth daily.    Yes Historical Provider   ferrous sulfate (IRON) 325 mg (65 mg iron) tablet Take  by mouth Daily (before breakfast). Yes Historical Provider   predniSONE (DELTASONE) 10 mg tablet Take  by mouth daily (with breakfast). Yes Historical Provider   ergocalciferol (ERGOCALCIFEROL) 50,000 unit capsule Take 50,000 Units by mouth two (2) times a week. Monday and Thursday   Yes Historical Provider   traMADol (ULTRAM) 50 mg tablet Take 50 mg by mouth every six (6) hours as needed for Pain. Yes Historical Provider   acetaminophen (TYLENOL) 500 mg tablet Take 500 mg by mouth every six (6) hours as needed for Pain. Yes Historical Provider   albuterol sulfate (PROVENTIL;VENTOLIN) 2.5 mg/0.5 mL nebu nebulizer solution 2.5 mg by Nebulization route every four (4) hours as needed for Wheezing. Yes Historical Provider   tamsulosin (FLOMAX) 0.4 mg capsule Take 0.4 mg by mouth daily. 3/10/17  Yes Historical Provider   methotrexate (RHEUMATREX) 2.5 mg tablet Take 10 mg by mouth Every Thursday. Yes Historical Provider   adalimumab (HUMIRA) 40 mg/0.8 mL injection 40 mg by SubCUTAneous route every fourteen (14) days. Every Tuesday   Yes Historical Provider   folic acid (FOLVITE) 1 mg tablet Take 1 mg by mouth daily. Yes Historical Provider   esomeprazole (NEXIUM) 40 mg capsule Take 40 mg by mouth daily. Yes Historical Provider   tiotropium (SPIRIVA WITH HANDIHALER) 18 mcg inhalation capsule Take 1 Cap by inhalation daily. Yes Historical Provider   LACTOSE-REDUCED FOOD/FIBER (JEVITY 1.5 AZAEL PO) Take 1 Container by mouth nightly. Per peg tube followed by 150 ml water flush. Historical Provider   pravastatin (PRAVACHOL) 40 mg tablet Take 40 mg by mouth nightly.     Historical Provider       Allergies   Allergen Reactions    Ceclor [Cefaclor] Swelling    Contrast Agent [Iodine] Unknown (comments)    Floxin [Ofloxacin] Hives    Ivp [Fd And C Blue No.1] Itching       Objective:  Visit Vitals    /63 (BP 1 Location: Left arm, BP Patient Position: Sitting)    Pulse 85    Temp 98 °F (36.7 °C) (Oral)    Resp 16    Ht 5' 9\" (1.753 m)    Wt 168 lb (76.2 kg)    SpO2 97%    BMI 24.81 kg/m2    Body mass index is 24.81 kg/(m^2). Problem List: Reviewed with patient and discussed risk factors. Patient Active Problem List   Diagnosis Code    History of benign neoplasm of colon Z86.018    GERD (gastroesophageal reflux disease) K21.9    Colon polyp K63.5    Diverticulosis of colon K57.30    Internal hemorrhoid K64.8    Schatzki's ring K22.2    Hiatal hernia K44.9    Diarrhea R19.7    Influenza J11.1    Fall W19. Noa Bal Dysphagia R13.10    Abnormal x-ray of abdomen R93.5    Type 2 diabetes mellitus with hyperglycemia, with long-term current use of insulin (HCC) E11.65, Z79.4    NPH (normal pressure hydrocephalus) G91.2    Alcohol use disorder (HCC) F10.99    Hip fracture (HCC) S72.009A       PSH: Reviewed with patient  Past Surgical History:   Procedure Laterality Date    HX CHOLECYSTECTOMY  04/18/2017    HX HEENT      bilat cataract    HX OTHER SURGICAL      r shoulder melenoma excision    OH COLSC FLX W/RMVL OF TUMOR POLYP LESION SNARE TQ  4/28/2011         OH EGD TRANSORAL BIOPSY SINGLE/MULTIPLE  4/28/2011         VASCULAR SURGERY PROCEDURE UNLIST      stent femoral-left leg    VASCULAR SURGERY PROCEDURE UNLIST      right AV shunt        SH: Reviewed with patient  Social History   Substance Use Topics    Smoking status: Former Smoker    Smokeless tobacco: Never Used    Alcohol use 3.5 oz/week     7 Shots of liquor per week       FH: Reviewed with patient  Family History   Problem Relation Age of Onset    Adopted: Yes       Current medical providers:    Patient Care Team:  Dimas Patel DO as PCP - General (Internal Medicine)  Elizabeth Simon RN as Ambulatory Care Navigator  Deisy Rodriguez MD (Neurosurgery)  Camilla Saldaña MD (Pulmonary Disease)  Lucas Armstrong MD as Physician (Gastroenterology)  Polo Mckinnon MD as Physician (Rheumatology)    Plan:      No orders of the defined types were placed in this encounter. 1. Patient will schedule and maintain health maintenance screens as discussed this visit  2. Patient will continue current exercise program; increase gradually as tolerated  3. Patient will complete ADV document and supply to office for scanning into chart. Provided Right to Decide booklet and blank AMD document. Health Maintenance   Topic Date Due    LIPID PANEL Q1  1939    FOOT EXAM Q1  04/30/1949    MICROALBUMIN Q1  04/30/1949    EYE EXAM RETINAL OR DILATED Q1  04/30/1949    DTaP/Tdap/Td series (1 - Tdap) 04/30/1960    ZOSTER VACCINE AGE 60>  02/28/1999    GLAUCOMA SCREENING Q2Y  04/30/2004    INFLUENZA AGE 9 TO ADULT  08/01/2017    HEMOGLOBIN A1C Q6M  11/13/2017    MEDICARE YEARLY EXAM  09/26/2018    Pneumococcal 65+ Low/Medium Risk (2 of 2 - PPSV23) 01/01/2021         Urinary/ Fecal Incontinence: Yes patient reports slight urine leakage on occasion. Offered referral. Rolf Oleary at this time. Wife reports this has improved since patient has become more active and doesn't feel it is needed at this time. Regular physical exercise: Yes wife reports making sure patient walks daily and is looking into getting home therapy for him. Medication reconciliation completed by MA and reviewed by provider. Family history and Care Team reviewed and updated. Patient provided with a copy of After Visit Summary and Medicare Part B Preventive Services Table. See table for findings under Recommendation and Scheduled.     Patient Verbalized understanding of all information discussed         Attending note:  Agree with above  Thanks  Kya Mccormack do

## 2017-09-25 NOTE — PROGRESS NOTES
Fany Shah is a 65 y.o. male who presents for evaluation of transition of care. Last seen by me April 13, 2017. Since that visit, [facility], went to [facility] for rehab twice,   Was inpt at [facility] once for broken hip, and eventually ended up at [facility], where he has been until just last week. He finally got home after being gone for almost 5 months. Currently still has peg for tube feeds, but is taking oral nutrition much better, and hopes to have peg removed in near future. Did have a fall over weekend, when he was bending over to  a paper napkin, and took a spill. No significant trauma. His weight on April 13 was 196. He dropped as low as 156, and is now up to 168.       ROS:  Constitutional: negative for fevers, chills, anorexia and weight loss  Eyes:   negative for visual disturbance and irritation  ENT:   negative for tinnitus,sore throat,nasal congestion,ear pain,hoarseness  Respiratory:  negative for cough, hemoptysis, dyspnea,wheezing  CV:   negative for chest pain, palpitations, lower extremity edema  GI:   negative for nausea, vomiting, diarrhea, abdominal pain,melena  Genitourinary: negative for frequency, dysuria and hematuria  Musculoskel: negative for myalgias, arthralgias, back pain, muscle weakness, joint pain  Neurological:  negative for headaches, dizziness, focal weakness, numbness  Psychiatric:     Negative for depression or anxiety      Past Medical History:   Diagnosis Date    Cancer ([address]     r shoulder melanoma    COPD     Diabetes ([address]     Diarrhea 1/22/2016    Dysphagia 4/4/2017    Emphysema lung ([address]     GERD (gastroesophageal reflux disease)     Hydrocephalus 2014    shunt    Hypertension     no longer on meds    Ill-defined condition     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, arms    Other ill-defined conditions     colon polyps    RA (rheumatoid arthritis) ([address]     Scarring     Stroke ([address] 2014    L arm weakness       Past Surgical History: Procedure Laterality Date    HX CHOLECYSTECTOMY  04/18/2017    HX HEENT      bilat cataract    HX OTHER SURGICAL      r shoulder melenoma excision    NV COLSC FLX W/RMVL OF TUMOR POLYP LESION SNARE TQ  4/28/2011         NV EGD TRANSORAL BIOPSY SINGLE/MULTIPLE  4/28/2011         VASCULAR SURGERY PROCEDURE UNLIST      stent femoral-left leg    VASCULAR SURGERY PROCEDURE UNLIST      right AV shunt       Family History   Problem Relation Age of Onset    Adopted: Yes       Social History     Social History    Marital status:      Spouse name: N/A    Number of children: N/A    Years of education: N/A     Occupational History    Not on file. Social History Main Topics    Smoking status: Former Smoker    Smokeless tobacco: Never Used    Alcohol use 3.5 oz/week     7 Shots of liquor per week    Drug use: No    Sexual activity: Not on file     Other Topics Concern    Not on file     Social History Narrative            Visit Vitals    /63 (BP 1 Location: Left arm, BP Patient Position: Sitting)    Pulse 85    Temp 98 °F (36.7 °C) (Oral)    Resp 16    Ht 5' 9\" (1.753 m)    Wt 168 lb (76.2 kg)    SpO2 97%    BMI 24.81 kg/m2       Physical Examination:   General - Well appearing male  HEENT - PERRL, TM no erythema/opacification, normal nasal turbinates, no oropharyngeal erythema or exudate, MMM  Neck - supple, no bruits, no thyroidomegaly, no lymphadenopathy  Pulm - clear to auscultation bilaterally  Cardio - RRR, normal S1 S2, no murmur  Abd - soft, nontender, no masses, no HSM  Extrem - no edema, +2 distal pulses  Neuro-  No focal deficits, CN intact           Assessment/Plan:    1. NPH with shunt--[facility]. 2.  Dm, type 2--currently only on ssi with lispro at meals. Check a1c, urine micro. As appetite improves, might benefit from a long acting insulin (tresiba)  3. RA--on low dose prednisone, humira, and mtx  4.   bph--continue with flomax  5. Copd--continue with spiriva, prn albuterol  6. AAA--follow with ultrasound  7. Malnutrition--check prealbumin. Still has peg tube  8. Routine adult health maintenance--flu shot given today. 9.  Generalized deconditioning, sp fall--would benefit from home health pt/ot/rn to maximize his potential in adls and get him back to prior level of independent living, as he has been in hospitals and snf since April 2017.     rtc 3 months      Gaviota Shannon III, DO

## 2017-09-27 ENCOUNTER — HOME HEALTH ADMISSION (OUTPATIENT)
Dept: HOME HEALTH SERVICES | Facility: HOME HEALTH | Age: 77
End: 2017-09-27
Payer: MEDICARE

## 2017-09-28 ENCOUNTER — HOSPITAL ENCOUNTER (OUTPATIENT)
Dept: LAB | Age: 77
Discharge: HOME | End: 2017-09-28
Payer: MEDICARE

## 2017-09-28 ENCOUNTER — APPOINTMENT (OUTPATIENT)
Dept: INTERNAL MEDICINE CLINIC | Age: 77
End: 2017-09-28

## 2017-09-28 PROCEDURE — 82043 UR ALBUMIN QUANTITATIVE: CPT

## 2017-09-28 PROCEDURE — 84443 ASSAY THYROID STIM HORMONE: CPT

## 2017-09-28 PROCEDURE — 85025 COMPLETE CBC W/AUTO DIFF WBC: CPT

## 2017-09-28 PROCEDURE — 84153 ASSAY OF PSA TOTAL: CPT

## 2017-09-28 PROCEDURE — 36415 COLL VENOUS BLD VENIPUNCTURE: CPT

## 2017-09-28 PROCEDURE — 83036 HEMOGLOBIN GLYCOSYLATED A1C: CPT

## 2017-09-28 PROCEDURE — 80061 LIPID PANEL: CPT

## 2017-09-28 PROCEDURE — 82306 VITAMIN D 25 HYDROXY: CPT

## 2017-09-28 PROCEDURE — 84134 ASSAY OF PREALBUMIN: CPT

## 2017-09-28 PROCEDURE — 80053 COMPREHEN METABOLIC PANEL: CPT

## 2017-09-29 ENCOUNTER — HOME CARE VISIT (OUTPATIENT)
Dept: SCHEDULING | Facility: HOME HEALTH | Age: 77
End: 2017-09-29
Payer: MEDICARE

## 2017-09-29 LAB
25(OH)D3+25(OH)D2 SERPL-MCNC: 40.6 NG/ML (ref 30–100)
ALBUMIN SERPL-MCNC: 3.9 G/DL (ref 3.5–4.8)
ALBUMIN/CREAT UR: 31.4 MG/G CREAT (ref 0–30)
ALBUMIN/GLOB SERPL: 1.3 {RATIO} (ref 1.2–2.2)
ALP SERPL-CCNC: 136 IU/L (ref 39–117)
ALT SERPL-CCNC: 18 IU/L (ref 0–44)
AST SERPL-CCNC: 17 IU/L (ref 0–40)
BASOPHILS # BLD AUTO: 0 X10E3/UL (ref 0–0.2)
BASOPHILS NFR BLD AUTO: 0 %
BILIRUB SERPL-MCNC: 0.4 MG/DL (ref 0–1.2)
BUN SERPL-MCNC: 15 MG/DL (ref 8–27)
BUN/CREAT SERPL: 21 (ref 10–24)
CALCIUM SERPL-MCNC: 9.7 MG/DL (ref 8.6–10.2)
CHLORIDE SERPL-SCNC: 99 MMOL/L (ref 96–106)
CHOLEST SERPL-MCNC: 205 MG/DL (ref 100–199)
CO2 SERPL-SCNC: 24 MMOL/L (ref 18–29)
CREAT SERPL-MCNC: 0.72 MG/DL (ref 0.76–1.27)
CREAT UR-MCNC: 99.5 MG/DL
EOSINOPHIL # BLD AUTO: 0.3 X10E3/UL (ref 0–0.4)
EOSINOPHIL NFR BLD AUTO: 4 %
ERYTHROCYTE [DISTWIDTH] IN BLOOD BY AUTOMATED COUNT: 20 % (ref 12.3–15.4)
EST. AVERAGE GLUCOSE BLD GHB EST-MCNC: 180 MG/DL
GLOBULIN SER CALC-MCNC: 3 G/DL (ref 1.5–4.5)
GLUCOSE SERPL-MCNC: 156 MG/DL (ref 65–99)
HBA1C MFR BLD: 7.9 % (ref 4.8–5.6)
HCT VFR BLD AUTO: 36.6 % (ref 37.5–51)
HDLC SERPL-MCNC: 69 MG/DL
HGB BLD-MCNC: 11.9 G/DL (ref 12.6–17.7)
IMM GRANULOCYTES # BLD: 0.1 X10E3/UL (ref 0–0.1)
IMM GRANULOCYTES NFR BLD: 1 %
LDLC SERPL CALC-MCNC: 101 MG/DL (ref 0–99)
LYMPHOCYTES # BLD AUTO: 2.2 X10E3/UL (ref 0.7–3.1)
LYMPHOCYTES NFR BLD AUTO: 28 %
MCH RBC QN AUTO: 27.3 PG (ref 26.6–33)
MCHC RBC AUTO-ENTMCNC: 32.5 G/DL (ref 31.5–35.7)
MCV RBC AUTO: 84 FL (ref 79–97)
MICROALBUMIN UR-MCNC: 31.2 UG/ML
MONOCYTES # BLD AUTO: 0.9 X10E3/UL (ref 0.1–0.9)
MONOCYTES NFR BLD AUTO: 12 %
NEUTROPHILS # BLD AUTO: 4.4 X10E3/UL (ref 1.4–7)
NEUTROPHILS NFR BLD AUTO: 55 %
PLATELET # BLD AUTO: 242 X10E3/UL (ref 150–379)
POTASSIUM SERPL-SCNC: 4 MMOL/L (ref 3.5–5.2)
PREALB SERPL-MCNC: 24 MG/DL (ref 9–32)
PROT SERPL-MCNC: 6.9 G/DL (ref 6–8.5)
PSA SERPL-MCNC: 3.9 NG/ML (ref 0–4)
RBC # BLD AUTO: 4.36 X10E6/UL (ref 4.14–5.8)
SODIUM SERPL-SCNC: 138 MMOL/L (ref 134–144)
TRIGL SERPL-MCNC: 174 MG/DL (ref 0–149)
TSH SERPL DL<=0.005 MIU/L-ACNC: 3.28 UIU/ML (ref 0.45–4.5)
VLDLC SERPL CALC-MCNC: 35 MG/DL (ref 5–40)
WBC # BLD AUTO: 7.9 X10E3/UL (ref 3.4–10.8)

## 2017-09-29 PROCEDURE — G0299 HHS/HOSPICE OF RN EA 15 MIN: HCPCS

## 2017-09-29 PROCEDURE — 400013 HH SOC

## 2017-09-29 PROCEDURE — 3331090002 HH PPS REVENUE DEBIT

## 2017-09-29 PROCEDURE — G0151 HHCP-SERV OF PT,EA 15 MIN: HCPCS

## 2017-09-29 PROCEDURE — 3331090001 HH PPS REVENUE CREDIT

## 2017-09-29 NOTE — PROGRESS NOTES
Diabetes bit worse, otherwise labs look good. Continue with same meds for now, will consider adding long acting insulin at next visit.

## 2017-09-30 PROCEDURE — 3331090001 HH PPS REVENUE CREDIT

## 2017-09-30 PROCEDURE — 3331090002 HH PPS REVENUE DEBIT

## 2017-10-01 VITALS
WEIGHT: 167.4 LBS | HEART RATE: 82 BPM | OXYGEN SATURATION: 98 % | RESPIRATION RATE: 16 BRPM | SYSTOLIC BLOOD PRESSURE: 112 MMHG | BODY MASS INDEX: 24.79 KG/M2 | HEIGHT: 69 IN | DIASTOLIC BLOOD PRESSURE: 58 MMHG | TEMPERATURE: 97.3 F

## 2017-10-01 PROCEDURE — 3331090002 HH PPS REVENUE DEBIT

## 2017-10-01 PROCEDURE — 3331090001 HH PPS REVENUE CREDIT

## 2017-10-02 ENCOUNTER — TELEPHONE (OUTPATIENT)
Dept: INTERNAL MEDICINE CLINIC | Age: 77
End: 2017-10-02

## 2017-10-02 ENCOUNTER — OFFICE VISIT (OUTPATIENT)
Dept: INTERNAL MEDICINE CLINIC | Age: 77
End: 2017-10-02

## 2017-10-02 ENCOUNTER — HOME CARE VISIT (OUTPATIENT)
Dept: SCHEDULING | Facility: HOME HEALTH | Age: 77
End: 2017-10-02
Payer: MEDICARE

## 2017-10-02 VITALS
OXYGEN SATURATION: 98 % | DIASTOLIC BLOOD PRESSURE: 70 MMHG | HEART RATE: 76 BPM | TEMPERATURE: 98 F | SYSTOLIC BLOOD PRESSURE: 110 MMHG

## 2017-10-02 VITALS
TEMPERATURE: 97.8 F | BODY MASS INDEX: 25.33 KG/M2 | DIASTOLIC BLOOD PRESSURE: 65 MMHG | HEART RATE: 77 BPM | OXYGEN SATURATION: 99 % | HEIGHT: 69 IN | SYSTOLIC BLOOD PRESSURE: 109 MMHG | RESPIRATION RATE: 16 BRPM | WEIGHT: 171 LBS

## 2017-10-02 DIAGNOSIS — M54.31 SCIATICA OF RIGHT SIDE: Primary | ICD-10-CM

## 2017-10-02 PROCEDURE — 3331090002 HH PPS REVENUE DEBIT

## 2017-10-02 PROCEDURE — G0152 HHCP-SERV OF OT,EA 15 MIN: HCPCS

## 2017-10-02 PROCEDURE — 3331090001 HH PPS REVENUE CREDIT

## 2017-10-02 RX ORDER — PREDNISONE 20 MG/1
TABLET ORAL
Qty: 18 TAB | Refills: 0 | Status: SHIPPED | OUTPATIENT
Start: 2017-10-02 | End: 2017-10-13 | Stop reason: ALTCHOICE

## 2017-10-02 RX ORDER — TRAMADOL HYDROCHLORIDE 50 MG/1
50 TABLET ORAL
Qty: 60 TAB | Refills: 0 | Status: SHIPPED | OUTPATIENT
Start: 2017-10-02 | End: 2017-11-07

## 2017-10-02 RX ORDER — ADALIMUMAB 40MG/0.8ML
KIT SUBCUTANEOUS
COMMUNITY
Start: 2017-09-14 | End: 2019-05-03 | Stop reason: SDUPTHER

## 2017-10-02 NOTE — TELEPHONE ENCOUNTER
Patient's daughter, Fozia Valdes she needs a call back right away in reference to letter being ready today for her to not be able to leave the country as she's patient's Primary caregiver. [de-identified] statement for [employer] insurance company. Please call to advise if letter can be available today at patient's appt at 12 noon. Please call.  Thank you

## 2017-10-02 NOTE — PROGRESS NOTES
Reviewed record in preparation for visit and have obtained necessary documentation. Identified pt with two pt identifiers(name and ). Chief Complaint   Patient presents with    Leg Pain     right; x4 weeks       Health Maintenance Due   Topic Date Due    FOOT EXAM Q1  1950    EYE EXAM RETINAL OR DILATED Q1  1949    DTaP/Tdap/Td series (1 - Tdap) 1960    ZOSTER VACCINE AGE 60>  1999    GLAUCOMA SCREENING Q2Y  2004       Mr. Tracie Philip has a reminder for a \"due or due soon\" health maintenance. I have asked that he discuss health maintenance topic(s) due with His  primary care provider. Coordination of Care Questionnaire:  :     1) Have you been to an emergency room, urgent care clinic since your last visit? no   Hospitalized since your last visit? no             2) Have you seen or consulted any other health care providers outside of [address] [place] [address] since your last visit? no  (Include any pap smears or colon screenings in this section.)    3) Do you have an Advance Directive on file? no    4) Are you interested in receiving information on Advance Directives? NO    Patient is accompanied by self I have received verbal consent from Mindy Rodriguez to discuss any/all medical information while they are present in the room.

## 2017-10-02 NOTE — PATIENT INSTRUCTIONS
Sciatica: Exercises  Your Care Instructions  Here are some examples of typical rehabilitation exercises for your condition. Start each exercise slowly. Ease off the exercise if you start to have pain. Your doctor or physical therapist will tell you when you can start these exercises and which ones will work best for you. When you are not being active, find a comfortable position for rest. Some people are comfortable on the floor or a medium-firm bed with a small pillow under their head and another under their knees. Some people prefer to lie on their side with a pillow between their knees. Don't stay in one position for too long. Take short walks (10 to 20 minutes) every 2 to 3 hours. Avoid slopes, hills, and stairs until you feel better. Walk only distances you can manage without pain, especially leg pain. How to do the exercises  Back stretches    1. Get down on your hands and knees on the floor. 2. Relax your head and allow it to droop. Round your back up toward the ceiling until you feel a nice stretch in your upper, middle, and lower back. Hold this stretch for as long as it feels comfortable, or about 15 to 30 seconds. 3. Return to the starting position with a flat back while you are on your hands and knees. 4. Let your back sway by pressing your stomach toward the floor. Lift your buttocks toward the ceiling. 5. Hold this position for 15 to 30 seconds. 6. Repeat 2 to 4 times. Follow-up care is a key part of your treatment and safety. Be sure to make and go to all appointments, and call your doctor if you are having problems. It's also a good idea to know your test results and keep a list of the medicines you take. Where can you learn more? Go to http://yannick-helen.info/. Enter N543 in the search box to learn more about \"Sciatica: Exercises. \"  Current as of: March 21, 2017  Content Version: 11.3  © 0705-1508 [employer].  Care instructions adapted under license by [address] (which disclaims liability or warranty for this information). If you have questions about a medical condition or this instruction, always ask your healthcare professional. [address] any warranty or liability for your use of this information. Sciatica: Care Instructions  Your Care Instructions    Sciatica (say \"suw-NI-yh-kuh\") is an irritation of one of the sciatic nerves, which come from the spinal cord in the lower back. The sciatic nerves and their branches extend down through the buttock to the foot. Sciatica can develop when an injured disc in the back presses against a spinal nerve root. Its main symptom is pain, numbness, or weakness that is often worse in the leg or foot than in the back. Sciatica often will improve and go away with time. Early treatment usually includes medicines and exercises to relieve pain. Follow-up care is a key part of your treatment and safety. Be sure to make and go to all appointments, and call your doctor if you are having problems. It's also a good idea to know your test results and keep a list of the medicines you take. How can you care for yourself at home? · Take pain medicines exactly as directed. ¨ If the doctor gave you a prescription medicine for pain, take it as prescribed. ¨ If you are not taking a prescription pain medicine, ask your doctor if you can take an over-the-counter medicine. · Use heat or ice to relieve pain. ¨ To apply heat, put a warm water bottle, heating pad set on low, or warm cloth on your back. Do not go to sleep with a heating pad on your skin. ¨ To use ice, put ice or a cold pack on the area for 10 to 20 minutes at a time. Put a thin cloth between the ice and your skin. · Avoid sitting if possible, unless it feels better than standing. · Alternate lying down with short walks. Increase your walking distance as you are able to without making your symptoms worse.   · Do not do anything that makes your symptoms worse. When should you call for help? Call 911 anytime you think you may need emergency care. For example, call if:  · You are unable to move a leg at all. Call your doctor now or seek immediate medical care if:  · You have new or worse symptoms in your legs or buttocks. Symptoms may include:  ¨ Numbness or tingling. ¨ Weakness. ¨ Pain. · You lose bladder or bowel control. Watch closely for changes in your health, and be sure to contact your doctor if:  · You are not getting better as expected. Where can you learn more? Go to http://yannick-helen.info/. Enter [phone] in the search box to learn more about \"Sciatica: Care Instructions. \"  Current as of: March 21, 2017  Content Version: 11.3  © 9411-8146 [employer] Incorporated. Care instructions adapted under license by [employer] (which disclaims liability or warranty for this information). If you have questions about a medical condition or this instruction, always ask your healthcare professional. [place] [address] any warranty or liability for your use of this information.

## 2017-10-02 NOTE — MR AVS SNAPSHOT
Visit Information Date & Time Provider Department Dept. Phone Encounter #  
 10/2/2017 12:00 PM Cal Sherman, [address] [id number] Follow-up Instructions Return in about 3 months (around 1/2/2018). Your Appointments 11/7/2017  1:30 PM  
ROUTINE CARE with DO ARIANNE Samuels III [facility]) Appt Note: 6 wk F/U  
 [place] [address] [id number]  
[address] [facility] [address] [facility] Upcoming Health Maintenance Date Due  
 FOOT EXAM Q1 4/30/1950 EYE EXAM RETINAL OR DILATED Q1 4/30/1949 DTaP/Tdap/Td series (1 - Tdap) 4/30/1960 ZOSTER VACCINE AGE 60> 2/28/1999 GLAUCOMA SCREENING Q2Y 4/30/2004 HEMOGLOBIN A1C Q6M 3/28/2018 MEDICARE YEARLY EXAM 9/26/2018 MICROALBUMIN Q1 9/28/2018 LIPID PANEL Q1 9/28/2018 Pneumococcal 65+ Low/Medium Risk (2 of 2 - PPSV23) 1/1/2021 Allergies as of 10/2/2017  Review Complete On: 10/2/2017 By: Jose Vance III, DO Severity Noted Reaction Type Reactions Ceclor [Cefaclor]  04/28/2011    Swelling Contrast Agent [Iodine]  04/12/2017    Unknown (comments) Floxin [Ofloxacin]  04/28/2011    Hives Ivp [Fd And C Blue No.1]  04/28/2011    Itching Current Immunizations  Reviewed on 3/8/2016 Name Date Influenza High Dose Vaccine PF 9/25/2017 Influenza Vaccine 10/8/2015 Pneumococcal Vaccine (Unspecified Type) 1/1/2016 Not reviewed this visit You Were Diagnosed With   
  
 Codes Comments Sciatica of right side    -  Primary ICD-10-CM: M54.31 
ICD-9-CM: 724.3 Vitals BP Pulse Temp Resp Height(growth percentile) Weight(growth percentile) 109/65 (BP 1 Location: Left arm, BP Patient Position: Sitting) 77 97.8 °F (36.6 °C) (Oral) 16 5' 9\" (1.753 m) 171 lb (77.6 kg) SpO2 BMI Smoking Status 99% 25.25 kg/m2 Former Smoker Vitals History BMI and BSA Data Body Mass Index Body Surface Area  
 25.25 kg/m 2 1.94 m 2 Preferred Pharmacy Pharmacy Name Phone [address], [place] [phone] Your Updated Medication List  
  
   
This list is accurate as of: 10/2/17 12:57 PM.  Always use your most recent med list.  
  
  
  
  
 acetaminophen 500 mg tablet Commonly known as:  TYLENOL Take 500 mg by mouth every six (6) hours as needed for Pain. albuterol sulfate 2.5 mg/0.5 mL Nebu nebulizer solution Commonly known as:  PROVENTIL;VENTOLIN  
2.5 mg by Nebulization route every four (4) hours as needed for Wheezing. dilTIAZem 60 mg tablet Commonly known as:  CARDIZEM Take 1 Tab by mouth two (2) times a day. ergocalciferol 50,000 unit capsule Commonly known as:  ERGOCALCIFEROL Take 1 Cap by mouth two (2) times a week. Monday and Thursday  
  
 fish oil-omega-3 fatty acids 340-1,000 mg capsule Take 1 Cap by mouth daily. folic acid 1 mg tablet Commonly known as:  Google Take 1 mg by mouth daily. HumaLOG KwikPen 100 unit/mL kwikpen Generic drug:  insulin lispro  
by SubCUTAneous route two (2) times daily (with meals). Breakfast and dinner per sliding scale: 201-250=2 units 251-300=4 units 301-350=6 units * HUMIRA 40 mg/0.8 mL injection Generic drug:  adalimumab 40 mg by SubCUTAneous route every fourteen (14) days. Every Tuesday * HUMIRA PEN 40 mg/0.8 mL injection pen Generic drug:  adalimumab Iron 325 mg (65 mg iron) tablet Generic drug:  ferrous sulfate Take  by mouth Daily (before breakfast). JEVITY 1.5 AZAEL PO Take 1 Container by mouth nightly. Per peg tube followed by 150 ml water flush. methotrexate 2.5 mg tablet Commonly known as:  Rafaela Dirk Take 10 mg by mouth Every Thursday. NexIUM 40 mg capsule Generic drug:  esomeprazole Take 40 mg by mouth daily. PRAVACHOL 40 mg tablet Generic drug:  pravastatin Take 40 mg by mouth nightly. * predniSONE 10 mg tablet Commonly known as:  Berle Pouch Take  by mouth daily (with breakfast). * predniSONE 20 mg tablet Commonly known as:  DELTASONE  
60 mg daily x 3 days, then 40 mg daily x 3 days, then 20 mg daily x 3 days, then continue prior dose. SPIRIVA WITH HANDIHALER 18 mcg inhalation capsule Generic drug:  tiotropium Take 1 Cap by inhalation daily. tamsulosin 0.4 mg capsule Commonly known as:  FLOMAX Take 1 Cap by mouth daily. traMADol 50 mg tablet Commonly known as:  ULTRAM  
Take 1 Tab by mouth every six (6) hours as needed for Pain. Max Daily Amount: 200 mg.  
  
 TUSSIN DM  mg/5 mL Liqd Generic drug:  guaiFENesin-dextromethorphan Take 5 mL by mouth every six (6) hours as needed (cough congestion). * Notice: This list has 4 medication(s) that are the same as other medications prescribed for you. Read the directions carefully, and ask your doctor or other care provider to review them with you. Prescriptions Printed Refills  
 traMADol (ULTRAM) 50 mg tablet 0 Sig: Take 1 Tab by mouth every six (6) hours as needed for Pain. Max Daily Amount: 200 mg. Class: Print Route: Oral  
  
Prescriptions Sent to Pharmacy Refills  
 predniSONE (DELTASONE) 20 mg tablet 0 Si mg daily x 3 days, then 40 mg daily x 3 days, then 20 mg daily x 3 days, then continue prior dose. Class: Normal  
 Pharmacy: [address] [place] [address] Ph #: [phone] Follow-up Instructions Return in about 3 months (around 2018). To-Do List   
 10/02/2017 3:30 PM  
  Appointment with Jermaine Steiner OT at [facility]  
  
 10/03/2017 To Be Determined   Appointment with Antonio Rosario LPN at [facility] SCHEDULING  
  
 10/06/2017 To Be Determined Appointment with Moris Still LPN at [place] [address]  
  
 10/09/2017 To Be Determined Appointment with Moris Still LPN at [place] [address]  
  
 10/12/2017 To Be Determined Appointment with Ana Rosa Saldivar at [place] [address]  
  
 10/12/2017 To Be Determined Appointment with Ana Rosa Saldivar at [place] [address] Patient Instructions Sciatica: Exercises Your Care Instructions Here are some examples of typical rehabilitation exercises for your condition. Start each exercise slowly. Ease off the exercise if you start to have pain. Your doctor or physical therapist will tell you when you can start these exercises and which ones will work best for you. When you are not being active, find a comfortable position for rest. Some people are comfortable on the floor or a medium-firm bed with a small pillow under their head and another under their knees. Some people prefer to lie on their side with a pillow between their knees. Don't stay in one position for too long. Take short walks (10 to 20 minutes) every 2 to 3 hours. Avoid slopes, hills, and stairs until you feel better. Walk only distances you can manage without pain, especially leg pain. How to do the exercises Back stretches 1. Get down on your hands and knees on the floor. 2. Relax your head and allow it to droop. Round your back up toward the ceiling until you feel a nice stretch in your upper, middle, and lower back. Hold this stretch for as long as it feels comfortable, or about 15 to 30 seconds. 3. Return to the starting position with a flat back while you are on your hands and knees. 4. Let your back sway by pressing your stomach toward the floor. Lift your buttocks toward the ceiling. 5. Hold this position for 15 to 30 seconds. 6. Repeat 2 to 4 times. Follow-up care is a key part of your treatment and safety. Be sure to make and go to all appointments, and call your doctor if you are having problems. It's also a good idea to know your test results and keep a list of the medicines you take. Where can you learn more? Go to http://yannick-helen.info/. Enter N562 in the search box to learn more about \"Sciatica: Exercises. \" Current as of: March 21, 2017 Content Version: 11.3 © 6383-5082 [employer]. Care instructions adapted under license by [employer] (which disclaims liability or warranty for this information). If you have questions about a medical condition or this instruction, always ask your healthcare professional. [address] any warranty or liability for your use of this information. Sciatica: Care Instructions Your Care Instructions Sciatica (say \"rrj-HT-ys-kuh\") is an irritation of one of the sciatic nerves, which come from the spinal cord in the lower back. The sciatic nerves and their branches extend down through the buttock to the foot. Sciatica can develop when an injured disc in the back presses against a spinal nerve root. Its main symptom is pain, numbness, or weakness that is often worse in the leg or foot than in the back. Sciatica often will improve and go away with time. Early treatment usually includes medicines and exercises to relieve pain. Follow-up care is a key part of your treatment and safety. Be sure to make and go to all appointments, and call your doctor if you are having problems. It's also a good idea to know your test results and keep a list of the medicines you take. How can you care for yourself at home? · Take pain medicines exactly as directed. ¨ If the doctor gave you a prescription medicine for pain, take it as prescribed. ¨ If you are not taking a prescription pain medicine, ask your doctor if you can take an over-the-counter medicine. · Use heat or ice to relieve pain. ¨ To apply heat, put a warm water bottle, heating pad set on low, or warm cloth on your back. Do not go to sleep with a heating pad on your skin. ¨ To use ice, put ice or a cold pack on the area for 10 to 20 minutes at a time. Put a thin cloth between the ice and your skin. · Avoid sitting if possible, unless it feels better than standing. · Alternate lying down with short walks. Increase your walking distance as you are able to without making your symptoms worse. · Do not do anything that makes your symptoms worse. When should you call for help? Call 911 anytime you think you may need emergency care. For example, call if: 
· You are unable to move a leg at all. Call your doctor now or seek immediate medical care if: 
· You have new or worse symptoms in your legs or buttocks. Symptoms may include: ¨ Numbness or tingling. ¨ Weakness. ¨ Pain. · You lose bladder or bowel control. Watch closely for changes in your health, and be sure to contact your doctor if: 
· You are not getting better as expected. Where can you learn more? Go to http://yannick-helen.info/. Enter [phone] in the search box to learn more about \"Sciatica: Care Instructions. \" Current as of: March 21, 2017 Content Version: 11.3 © 7106-6400 [employer]. Care instructions adapted under license by [employer] (which disclaims liability or warranty for this information). If you have questions about a medical condition or this instruction, always ask your healthcare professional. [place] [address] any warranty or liability for your use of this information. Introducing [facility]! Jackie Monroy introduces [employer] patient portal. Now you can access parts of your medical record, email your doctor's office, and request medication refills online. 1. In your internet browser, go to [email or web address] 2. Click on the First Time User? Click Here link in the Sign In box. You will see the New Member Sign Up page. 3. Enter your Nepris Access Code exactly as it appears below. You will not need to use this code after youve completed the sign-up process. If you do not sign up before the expiration date, you must request a new code. · Nepris Access Code: [email or web address] Expires: 12/24/2017  2:35 PM 
 
4. Enter the last four digits of your Social Security Number (xxxx) and Date of Birth (mm/dd/yyyy) as indicated and click Submit. You will be taken to the next sign-up page. 5. Create a Nepris ID. This will be your Nepris login ID and cannot be changed, so think of one that is secure and easy to remember. 6. Create a Nepris password. You can change your password at any time. 7. Enter your Password Reset Question and Answer. This can be used at a later time if you forget your password. 8. Enter your e-mail address. You will receive e-mail notification when new information is available in [address] 9. Click Sign Up. You can now view and download portions of your medical record. 10. Click the Download Summary menu link to download a portable copy of your medical information. If you have questions, please visit the Frequently Asked Questions section of the Nepris website. Remember, Nepris is NOT to be used for urgent needs. For medical emergencies, dial 911. Now available from your iPhone and Android! Please provide this summary of care documentation to your next provider. Your primary care clinician is listed as Qi Warren If you have any questions after today's visit, please call [phone].

## 2017-10-02 NOTE — PROGRESS NOTES
Emily Yeager is a 65 y.o. male who presents for evaluation of right sided sciatica. Onset about 4 weeks ago, while still at [facility]. Has worsened over past few weeks. Last seen by me sept 25, and he did not mention it. Had a fall last weekend, but landed on left side. No bowel or bladder issues.       ROS:  Constitutional: negative for fevers, chills, anorexia and weight loss  Eyes:   negative for visual disturbance and irritation  ENT:   negative for tinnitus,sore throat,nasal congestion,ear pain,hoarseness  Respiratory:  negative for cough, hemoptysis, dyspnea,wheezing  CV:   negative for chest pain, palpitations, lower extremity edema  GI:   negative for nausea, vomiting, diarrhea, abdominal pain,melena  Genitourinary: negative for frequency, dysuria and hematuria  Musculoskel: negative for myalgias, arthralgias, back pain, muscle weakness, joint pain  Neurological:  negative for headaches, dizziness, focal weakness, numbness  Psychiatric:     Negative for depression or anxiety      Past Medical History:   Diagnosis Date    Cancer ([address]     r shoulder melanoma    COPD     Diabetes ([address]     Diarrhea 1/22/2016    Dysphagia 4/4/2017    Emphysema lung ([address]     GERD (gastroesophageal reflux disease)     Hydrocephalus 2014    shunt    Hypertension     no longer on meds    Ill-defined condition     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, arms    Other ill-defined conditions(799.89)     colon polyps    RA (rheumatoid arthritis) ([address]     Scarring     Stroke ([address] 2014    L arm weakness       Past Surgical History:   Procedure Laterality Date    HX CHOLECYSTECTOMY  04/18/2017    HX HEENT      bilat cataract    HX OTHER SURGICAL      r shoulder melenoma excision    IN COLSC FLX W/RMVL OF TUMOR POLYP LESION SNARE TQ  4/28/2011         IN EGD TRANSORAL BIOPSY SINGLE/MULTIPLE  4/28/2011         VASCULAR SURGERY PROCEDURE UNLIST      stent femoral-left leg    VASCULAR SURGERY PROCEDURE UNLIST right AV shunt       Family History   Problem Relation Age of Onset    Adopted: Yes       Social History     Social History    Marital status:      Spouse name: N/A    Number of children: N/A    Years of education: N/A     Occupational History    Not on file. Social History Main Topics    Smoking status: Former Smoker    Smokeless tobacco: Never Used    Alcohol use 3.5 oz/week     7 Shots of liquor per week    Drug use: No    Sexual activity: Not on file     Other Topics Concern    Not on file     Social History Narrative            Visit Vitals    /65 (BP 1 Location: Left arm, BP Patient Position: Sitting)    Pulse 77    Temp 97.8 °F (36.6 °C) (Oral)    Resp 16    Ht 5' 9\" (1.753 m)    Wt 171 lb (77.6 kg)    SpO2 99%    BMI 25.25 kg/m2       Physical Examination:   General - Well appearing male  HEENT - PERRL, TM no erythema/opacification, normal nasal turbinates, no oropharyngeal erythema or exudate, MMM  Neck - supple, no bruits, no thyroidomegaly, no lymphadenopathy  Pulm - clear to auscultation bilaterally  Cardio - RRR, normal S1 S2, no murmur  Abd - soft, nontender, no masses, no HSM  Extrem - no edema, +2 distal pulses  Neuro-  No focal deficits, CN intact     Assessment/Plan:    1. Right sided sciatica--prednisone taper, then continue with prior 10 mg daily dose. rx for ultram as well. Stretches given as well. rtc prn for regular visit.         Chloe Gains III, DO

## 2017-10-03 ENCOUNTER — HOME CARE VISIT (OUTPATIENT)
Dept: HOME HEALTH SERVICES | Facility: HOME HEALTH | Age: 77
End: 2017-10-03
Payer: MEDICARE

## 2017-10-03 PROCEDURE — 3331090002 HH PPS REVENUE DEBIT

## 2017-10-03 PROCEDURE — 3331090001 HH PPS REVENUE CREDIT

## 2017-10-03 NOTE — PROGRESS NOTES
I have attempted to contact this patient by phone with the following results:   Diabetes bit worse, otherwise labs look good. Continue with same meds for now, will consider adding long acting insulin at next visit. Results mailed to patient's home.

## 2017-10-04 ENCOUNTER — HOME CARE VISIT (OUTPATIENT)
Dept: SCHEDULING | Facility: HOME HEALTH | Age: 77
End: 2017-10-04
Payer: MEDICARE

## 2017-10-04 ENCOUNTER — PATIENT OUTREACH (OUTPATIENT)
Dept: INTERNAL MEDICINE CLINIC | Age: 77
End: 2017-10-04

## 2017-10-04 VITALS
OXYGEN SATURATION: 99 % | DIASTOLIC BLOOD PRESSURE: 62 MMHG | HEART RATE: 80 BPM | TEMPERATURE: 97.9 F | SYSTOLIC BLOOD PRESSURE: 102 MMHG

## 2017-10-04 PROCEDURE — 3331090001 HH PPS REVENUE CREDIT

## 2017-10-04 PROCEDURE — G0157 HHC PT ASSISTANT EA 15: HCPCS

## 2017-10-04 PROCEDURE — 3331090002 HH PPS REVENUE DEBIT

## 2017-10-04 PROCEDURE — G0300 HHS/HOSPICE OF LPN EA 15 MIN: HCPCS

## 2017-10-04 NOTE — PROGRESS NOTES
Spoke to pt's wife today. Pt is doing better. Was seen two days ago by Dr. Rory Edouard and pain now only 3-4/10 right leg. Someone from [facility] is coming this morning for a f/u visit. Pt's wife c/o home health PT has not been back since last week intake. Spoke to [facility] with [facility] and Angelia Lan is the PT scheduled to see pt today. Angelia Lan will call pt about arrival time. Goals      Attends follow-up appointments as directed. 9/20/17-  Appt with neurosurgeon at [address] [place] [address] - Dr. Clay Granger, next Tuesday 9/26/17; appt with Dr. Rory Edouard Monday 9/25/17; wife will schedule GI appt with [facility]; eye doctor appt in two weeks. DW    9/25/17- pt seen today by Dr. Rory Edouard for [place]. Pt has appt tomorrow with Dr. Clay Granger at [address] [place] [address]; pt is to see GI at [address] [place] [address] regarding peg tube. DW    10/4/17- 10/17/17- eye Dr. Cristobal Vela; 10/19/17- Dr. Jayjay Pride; 10/24/17- Dr. Gabriela Ramirez; 11/7/17-Dr. Rory Edouard; GI- not scheduled yet due to wants to keep peg tube for extra nourishment with flu season here; Dr. Clay Granger at [address] [place] [address]- November.  DW  Future Appointments  Date Time Provider Noe Queen   10/4/2017 11:30 AM [facility]   10/4/2017 5:45 PM Alex Ochoa LPN [facility]   10/6/2017 To Be Determined Alex Ochoa LPN [facility] [address]   10/6/2017 10:00 AM Luisana Florentino OT [facility]   10/6/2017 To Be Determined [facility]   10/9/2017 To Be Determined Alex Ochoa LPN [facility]   10/10/2017 To Be Determined [facility]   10/11/2017 To Be Determined [facility]   10/12/2017 To Be Determined Thierno Templeton [facility]   10/12/2017 To Be Determined Thierno Templeton [address]   10/16/2017 To Be Determined [facility]   10/18/2017 To Be Determined Mary Templeton [facility] [address]   11/7/2017 1:30 PM Elias Echevarria III, DO [address]       Last Appointment My Department:  10/2/2017          Prevent complications post hospitalization. 17-  Attend JANE appt with Dr. Tyrone Bentley on 17 and Dr. Bharat Rajput at [address] [facility] 17; [facility] referral ordered for SN, PT, OT, home health aide; call with any signs or symptoms of decline or as have experienced in the past before hospitalizations, especially increased confusion; high risk for falls-wife educated; take all meds; call with any questions or concerns. DW    17- 5225 Marilee Soto has not called; spoke to [facility] today who will use Dr. Maite burns as the face to face note. The referral that was ordered 17 is still good, but does  today. 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  DW    10/4/17-    [facility] home health started coming to house and calling last 17; SN, PT, OT; per wife pt needs PT the most and hasn't heard back from them since initial eval last week; gait is steady (more sure footed) and improving using a cane; does not bend over when drops something due to history of loosing balance due to NPH    Seen for right side sciatica on 10/2/17 by Dr. Safia Stanton given prednisone taper and tramadol; only took 2 tramadol yesterday; right now pain scale is 3-4/10 and leg did not hurt when he first put wt on foot this morning; pt is comfortable; takes methotrexate Wed and Thurs; takes one can of Iso source via peg at 9 every evening    Is keeping peg for now for extra nourishment due to flu season has started; needs to buy more-will call if needs prescription; weights since 17 are between 165-167 lb    No breathing problems    [facility] is coming this morning for a discharge f/u visit in the home; pt's wife has this NNs contact phone number for an questions or concerns.  DW

## 2017-10-05 VITALS
TEMPERATURE: 97.9 F | BODY MASS INDEX: 24.54 KG/M2 | WEIGHT: 166.2 LBS | HEART RATE: 80 BPM | OXYGEN SATURATION: 99 % | SYSTOLIC BLOOD PRESSURE: 102 MMHG | RESPIRATION RATE: 12 BRPM | DIASTOLIC BLOOD PRESSURE: 62 MMHG

## 2017-10-05 VITALS
TEMPERATURE: 98.4 F | DIASTOLIC BLOOD PRESSURE: 68 MMHG | RESPIRATION RATE: 18 BRPM | HEART RATE: 90 BPM | OXYGEN SATURATION: 97 % | SYSTOLIC BLOOD PRESSURE: 138 MMHG

## 2017-10-05 PROCEDURE — 3331090001 HH PPS REVENUE CREDIT

## 2017-10-05 PROCEDURE — 3331090002 HH PPS REVENUE DEBIT

## 2017-10-06 ENCOUNTER — HOME CARE VISIT (OUTPATIENT)
Dept: SCHEDULING | Facility: HOME HEALTH | Age: 77
End: 2017-10-06
Payer: MEDICARE

## 2017-10-06 VITALS
DIASTOLIC BLOOD PRESSURE: 62 MMHG | HEART RATE: 81 BPM | SYSTOLIC BLOOD PRESSURE: 132 MMHG | OXYGEN SATURATION: 98 % | WEIGHT: 165.8 LBS | RESPIRATION RATE: 14 BRPM | TEMPERATURE: 97.4 F | BODY MASS INDEX: 24.48 KG/M2

## 2017-10-06 VITALS — DIASTOLIC BLOOD PRESSURE: 70 MMHG | SYSTOLIC BLOOD PRESSURE: 130 MMHG

## 2017-10-06 PROCEDURE — G0300 HHS/HOSPICE OF LPN EA 15 MIN: HCPCS

## 2017-10-06 PROCEDURE — 3331090001 HH PPS REVENUE CREDIT

## 2017-10-06 PROCEDURE — G0157 HHC PT ASSISTANT EA 15: HCPCS

## 2017-10-06 PROCEDURE — G0152 HHCP-SERV OF OT,EA 15 MIN: HCPCS

## 2017-10-06 PROCEDURE — 3331090002 HH PPS REVENUE DEBIT

## 2017-10-07 VITALS
OXYGEN SATURATION: 98 % | HEART RATE: 77 BPM | SYSTOLIC BLOOD PRESSURE: 100 MMHG | DIASTOLIC BLOOD PRESSURE: 66 MMHG | TEMPERATURE: 98.2 F | RESPIRATION RATE: 17 BRPM

## 2017-10-07 PROCEDURE — 3331090002 HH PPS REVENUE DEBIT

## 2017-10-07 PROCEDURE — 3331090001 HH PPS REVENUE CREDIT

## 2017-10-08 PROCEDURE — 3331090001 HH PPS REVENUE CREDIT

## 2017-10-08 PROCEDURE — 3331090002 HH PPS REVENUE DEBIT

## 2017-10-09 ENCOUNTER — HOME CARE VISIT (OUTPATIENT)
Dept: SCHEDULING | Facility: HOME HEALTH | Age: 77
End: 2017-10-09
Payer: MEDICARE

## 2017-10-09 PROCEDURE — 3331090002 HH PPS REVENUE DEBIT

## 2017-10-09 PROCEDURE — G0299 HHS/HOSPICE OF RN EA 15 MIN: HCPCS

## 2017-10-09 PROCEDURE — 3331090001 HH PPS REVENUE CREDIT

## 2017-10-10 ENCOUNTER — HOME CARE VISIT (OUTPATIENT)
Dept: SCHEDULING | Facility: HOME HEALTH | Age: 77
End: 2017-10-10
Payer: MEDICARE

## 2017-10-10 PROCEDURE — G0157 HHC PT ASSISTANT EA 15: HCPCS

## 2017-10-10 PROCEDURE — 3331090001 HH PPS REVENUE CREDIT

## 2017-10-10 PROCEDURE — 3331090002 HH PPS REVENUE DEBIT

## 2017-10-11 VITALS
TEMPERATURE: 99 F | RESPIRATION RATE: 20 BRPM | HEART RATE: 93 BPM | DIASTOLIC BLOOD PRESSURE: 64 MMHG | OXYGEN SATURATION: 97 % | SYSTOLIC BLOOD PRESSURE: 142 MMHG

## 2017-10-11 PROCEDURE — 3331090002 HH PPS REVENUE DEBIT

## 2017-10-11 PROCEDURE — 3331090001 HH PPS REVENUE CREDIT

## 2017-10-12 ENCOUNTER — HOME CARE VISIT (OUTPATIENT)
Dept: SCHEDULING | Facility: HOME HEALTH | Age: 77
End: 2017-10-12
Payer: MEDICARE

## 2017-10-12 ENCOUNTER — TELEPHONE (OUTPATIENT)
Dept: INTERNAL MEDICINE CLINIC | Age: 77
End: 2017-10-12

## 2017-10-12 ENCOUNTER — HOME CARE VISIT (OUTPATIENT)
Dept: HOME HEALTH SERVICES | Facility: HOME HEALTH | Age: 77
End: 2017-10-12
Payer: MEDICARE

## 2017-10-12 VITALS
OXYGEN SATURATION: 95 % | SYSTOLIC BLOOD PRESSURE: 132 MMHG | HEART RATE: 100 BPM | TEMPERATURE: 97.7 F | DIASTOLIC BLOOD PRESSURE: 68 MMHG | RESPIRATION RATE: 14 BRPM

## 2017-10-12 PROCEDURE — 3331090002 HH PPS REVENUE DEBIT

## 2017-10-12 PROCEDURE — 3331090001 HH PPS REVENUE CREDIT

## 2017-10-12 PROCEDURE — G0157 HHC PT ASSISTANT EA 15: HCPCS

## 2017-10-12 NOTE — TELEPHONE ENCOUNTER
----- Message from Alvin Vargas sent at 10/12/2017 10:44 AM EDT -----  Regarding: Dr. Paulette Carpenter, pt's spouse, states pt's right leg is not better since 10/02/17 appt. Best contact number is [phone].     Message copied/pasted from [facility]

## 2017-10-12 NOTE — TELEPHONE ENCOUNTER
Spoke with patients wife, she advised that patients leg is not getting any better and wants patient to be seen as soon as possible. Gave appt with Dr. Maritza Iverson on Friday, October 13, 2017 03:00 PM. Patient and wife oked this appt.

## 2017-10-13 ENCOUNTER — OFFICE VISIT (OUTPATIENT)
Dept: INTERNAL MEDICINE CLINIC | Age: 77
End: 2017-10-13

## 2017-10-13 ENCOUNTER — HOSPITAL ENCOUNTER (OUTPATIENT)
Dept: LAB | Age: 77
Discharge: HOME | End: 2017-10-13
Payer: MEDICARE

## 2017-10-13 ENCOUNTER — PATIENT OUTREACH (OUTPATIENT)
Dept: INTERNAL MEDICINE CLINIC | Age: 77
End: 2017-10-13

## 2017-10-13 ENCOUNTER — HOME CARE VISIT (OUTPATIENT)
Dept: SCHEDULING | Facility: HOME HEALTH | Age: 77
End: 2017-10-13
Payer: MEDICARE

## 2017-10-13 VITALS
OXYGEN SATURATION: 97 % | TEMPERATURE: 98.2 F | RESPIRATION RATE: 16 BRPM | BODY MASS INDEX: 24.81 KG/M2 | HEIGHT: 69 IN | SYSTOLIC BLOOD PRESSURE: 96 MMHG | HEART RATE: 101 BPM | WEIGHT: 167.5 LBS | DIASTOLIC BLOOD PRESSURE: 60 MMHG

## 2017-10-13 DIAGNOSIS — M54.31 SCIATICA OF RIGHT SIDE: ICD-10-CM

## 2017-10-13 DIAGNOSIS — R19.7 DIARRHEA, UNSPECIFIED TYPE: Primary | ICD-10-CM

## 2017-10-13 LAB
ALBUMIN SERPL-MCNC: 3.9 G/DL (ref 3.5–4.8)
ALBUMIN/GLOB SERPL: 1.4 {RATIO} (ref 1.2–2.2)
ALP SERPL-CCNC: 152 IU/L (ref 39–117)
ALT SERPL-CCNC: 34 IU/L (ref 0–44)
AST SERPL-CCNC: 32 IU/L (ref 0–40)
BASOPHILS # BLD AUTO: 0 X10E3/UL (ref 0–0.2)
BASOPHILS NFR BLD AUTO: 0 %
BILIRUB SERPL-MCNC: 0.5 MG/DL (ref 0–1.2)
BUN SERPL-MCNC: 14 MG/DL (ref 8–27)
BUN/CREAT SERPL: 17 (ref 10–24)
CALCIUM SERPL-MCNC: 9.4 MG/DL (ref 8.6–10.2)
CHLORIDE SERPL-SCNC: 96 MMOL/L (ref 96–106)
CO2 SERPL-SCNC: 23 MMOL/L (ref 18–29)
CREAT SERPL-MCNC: 0.82 MG/DL (ref 0.76–1.27)
EOSINOPHIL # BLD AUTO: 0.1 X10E3/UL (ref 0–0.4)
EOSINOPHIL NFR BLD AUTO: 1 %
ERYTHROCYTE [DISTWIDTH] IN BLOOD BY AUTOMATED COUNT: 21.5 % (ref 12.3–15.4)
GLOBULIN SER CALC-MCNC: 2.7 G/DL (ref 1.5–4.5)
GLUCOSE SERPL-MCNC: 188 MG/DL (ref 65–99)
HCT VFR BLD AUTO: 39.7 % (ref 37.5–51)
HGB BLD-MCNC: 13.1 G/DL (ref 12.6–17.7)
LYMPHOCYTES # BLD AUTO: 1.1 X10E3/UL (ref 0.7–3.1)
LYMPHOCYTES NFR BLD AUTO: 13 %
MCH RBC QN AUTO: 27.5 PG (ref 26.6–33)
MCHC RBC AUTO-ENTMCNC: 33 G/DL (ref 31.5–35.7)
MCV RBC AUTO: 83 FL (ref 79–97)
MONOCYTES # BLD AUTO: 0.8 X10E3/UL (ref 0.1–0.9)
MONOCYTES NFR BLD AUTO: 9 %
MORPHOLOGY BLD-IMP: ABNORMAL
NEUTROPHILS # BLD AUTO: 6.8 X10E3/UL (ref 1.4–7)
NEUTROPHILS NFR BLD AUTO: 77 %
PLATELET # BLD AUTO: 217 X10E3/UL (ref 150–379)
POTASSIUM SERPL-SCNC: 4.6 MMOL/L (ref 3.5–5.2)
PROT SERPL-MCNC: 6.6 G/DL (ref 6–8.5)
RBC # BLD AUTO: 4.76 X10E6/UL (ref 4.14–5.8)
SODIUM SERPL-SCNC: 135 MMOL/L (ref 134–144)
WBC # BLD AUTO: 8.8 X10E3/UL (ref 3.4–10.8)

## 2017-10-13 PROCEDURE — 80053 COMPREHEN METABOLIC PANEL: CPT

## 2017-10-13 PROCEDURE — 3331090002 HH PPS REVENUE DEBIT

## 2017-10-13 PROCEDURE — 85025 COMPLETE CBC W/AUTO DIFF WBC: CPT

## 2017-10-13 PROCEDURE — 3331090001 HH PPS REVENUE CREDIT

## 2017-10-13 PROCEDURE — 36415 COLL VENOUS BLD VENIPUNCTURE: CPT

## 2017-10-13 RX ORDER — GABAPENTIN 100 MG/1
200 CAPSULE ORAL 3 TIMES DAILY
Qty: 180 CAP | Refills: 1 | Status: SHIPPED | OUTPATIENT
Start: 2017-10-13 | End: 2017-11-07 | Stop reason: SINTOL

## 2017-10-13 RX ORDER — FLUTICASONE PROPIONATE 50 MCG
2 SPRAY, SUSPENSION (ML) NASAL
COMMUNITY
Start: 2017-10-06

## 2017-10-13 NOTE — PROGRESS NOTES
Chart reviewed. Reached out to pt regarding - Transitions of Care Coordination-dc from [facility] 9/19/17    Goals      Attends follow-up appointments as directed. 9/20/17-  Appt with neurosurgeon at [address] [place] [address] - Dr. Vijay Kim, next Tuesday 9/26/17; appt with Dr. Matt Wesley Monday 9/25/17; wife will schedule GI appt with [facility]; eye doctor appt in two weeks. DW    9/25/17- pt seen today by Dr. Matt Wesley for [place]. Pt has appt tomorrow with Dr. Vijay Kim at [address] [place] [address]; pt is to see GI at [address] [place] [address] regarding peg tube. DW    10/4/17- 10/17/17- eye Dr. Crowell President; 10/19/17- Dr. Yue Michael; 10/24/17- Dr. Sraah López; 11/7/17-Dr. Matt Wesley; GI- not scheduled yet due to wants to keep peg tube for extra nourishment with flu season here; Dr. Vijay Kim at [address] [place] [address]- November. DW  Future Appointments  Date Time Provider Noe Queen   10/4/2017 11:30 AM Winnie Hooker PTA [facility]   10/4/2017 5:45 PM Maicol Groves, LPN [facility]   10/6/2017 To Be Determined Maicol Pesa, LPN [facility] [address]   10/6/2017 10:00 AM Winifred Carpenter OT [facility]   10/6/2017 To Be Determined [facility]   10/9/2017 To Be Determined Maicol Pesa, LPN [facility]   10/10/2017 To Be Determined [facility]   10/11/2017 To Be Determined [facility]   10/12/2017 To Be Determined [address] [place] [address] Dr,[address]   10/12/2017 To Be Determined [facility] [address]   10/16/2017 To Be Determined Winnie Hooker PTA [facility] [address]   10/18/2017 To Be Determined Rio Mejia [facility] [address]   11/7/2017 1:30 PM Lianna Maher III, DO [address]       10/13/17-  On 10/17/17- eye Dr. Crowell President; 10/19/17- Dr. Yue Michael; 10/24/17- Dr. Sarah López; 11/7/17-Dr. Matt Wesley; GI- not scheduled yet due to wants to keep peg tube for extra nourishment with flu season here; Dr. Vijay Kim at [address] [facility]- November.  DW    Future Appointments  Date Time Provider Noe Queen 10/17/2017 10:30 AM Jaqueline Candelaria PTA [facility]   10/18/2017 To Be Determined Darleen Nathan [address]   2017 1:30 PM Jermaine Kong III, DO [facility].          Prevent complications post hospitalization. 17-  Attend JANE appt with Dr. René Mendez on 17 and Dr. Lexii Gallo at [facility] 17; [address] referral ordered for SN, PT, OT, home health aide; call with any signs or symptoms of decline or as have experienced in the past before hospitalizations, especially increased confusion; high risk for falls-wife educated; take all meds; call with any questions or concerns.     17- 9725 Marilee Soto has not called; spoke to [facility] today who will use Dr. Stephen Roman note as the face to face note. The referral that was ordered 17 is still good, but does  today. 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      10/4/17-    [facility] home health started coming to house and calling last 17; SN, PT, OT; per wife pt needs PT the most and hasn't heard back from them since initial eval last week; gait is steady (more sure footed) and improving using a cane; does not bend over when drops something due to history of loosing balance due to NPH    Seen for right side sciatica on 10/2/17 by Dr. Jadon Harrison given prednisone taper and tramadol; only took 2 tramadol yesterday; right now pain scale is 3-4/10 and leg did not hurt when he first put wt on foot this morning; pt is comfortable; takes methotrexate Wed and Thurs; takes one can of Iso source via peg at 9 every evening    Is keeping peg for now for extra nourishment due to flu season has started; needs to buy more-will call if needs prescription; weights since 9/29/17 are between 165-167 lb    No breathing problems    [facility] is coming this morning for a discharge f/u visit in the home; pt's wife has this s contact phone number for an questions or concerns. GABRIELA    10/13/17- spoke to wife-pt seen today by Dr. Sheldon Rocha for continued right leg pain and diarrhea; does not need depends; peg tube feeding was held last night and will be held this evening-will flush with 200 mls water; pt is drinking and voiding without difficulty; appetite okay; will take gabapentin 200 mg at bedtime tonight that will hopefully help pain; [facility] PT was there yesterday and pt is progressing, but could be better if didn't have leg pain; Home Health skilled nurse will be there on Monday to check VS and go over meds again; breathing is good; weight stable; bp was down today, but due to afib, cardizem was not adjusted; blood sugar is around 200 fasting and 300 before dinner- asked that glucose log be brought to next appt with Dr. Nathan Hardy. Understanding and agreement was verbalized. Will follow.  GABRIELA

## 2017-10-13 NOTE — LETTER
10/18/2017 3:28 PM 
 
Mr. Minna Jiménez [address] [address] Dear Minna Jiménez: 
 
Please find your most recent results below. Resulted Orders GASTROINTESTINAL PROFILE, STOOL, PCR Result Value Ref Range Campylobacter Not Detected Not Detected C difficile toxin A/B Not Detected Not Detected Plesiomonas shigelloides Not Detected Not Detected Salmonella Not Detected Not Detected Vibrio Not Detected Not Detected Vibrio cholerae Not Detected Not Detected Yersinia enterocolitica Not Detected Not Detected Enteroaggregative E coli Not Detected Not Detected Enteropathogenic E. coli (EPEC) Not Detected Not Detected Enterotoxigenic E. coli (ETEC) Not Detected Not Detected Shiga-toxin-producing E coli Not Detected Not Detected E coli X731 Not applicable Not Detected Shigella/Enteroinvasive E coli (EIEC) Not Detected Not Detected Cryptosporidium Not Detected Not Detected Cyclospora cayetanensis Not Detected Not Detected Entamoeba histolytica Not Detected Not Detected Giardia lamblia Not Detected Not Detected Adenovirus F 40/41 Not Detected Not Detected Astrovirus Not Detected Not Detected Norovirus GI/GII Not Detected Not Detected Rotavirus A Not Detected Not Detected Sapovirus Not Detected Not Detected Narrative Performed at:  [address]  [id number] : Parminder Salazar MD, Phone:  [phone] CBC WITH AUTOMATED DIFF Result Value Ref Range WBC 8.8 3.4 - 10.8 x10E3/uL  
 RBC 4.76 4.14 - 5.80 x10E6/uL HGB 13.1 12.6 - 17.7 g/dL HCT 39.7 37.5 - 51.0 % MCV 83 79 - 97 fL  
 MCH 27.5 26.6 - 33.0 pg  
 MCHC 33.0 31.5 - 35.7 g/dL RDW 21.5 (H) 12.3 - 15.4 % PLATELET 214 105 - 123 x10E3/uL Comment:  
   Platelets vary in size. NEUTROPHILS 77 Not Estab. % Comment:  
   Occasional myelocyte seen on scanning. Lymphocytes 13 Not Estab. % MONOCYTES 9 Not Estab. % EOSINOPHILS 1 Not Estab. % BASOPHILS 0 Not Estab. %  
 ABS. NEUTROPHILS 6.8 1.4 - 7.0 x10E3/uL Abs Lymphocytes 1.1 0.7 - 3.1 x10E3/uL  
 ABS. MONOCYTES 0.8 0.1 - 0.9 x10E3/uL  
 ABS. EOSINOPHILS 0.1 0.0 - 0.4 x10E3/uL  
 ABS. BASOPHILS 0.0 0.0 - 0.2 x10E3/uL Hematology comments: Note:   
   Comment:  
   Verified by microscopic examination. Narrative Specimen Comment: faxed @ [phone] and gave results to Dr Jessica Garsia @ 1500. dw  
10/13/2017 Performed at:  [address] [place] [address]  [id number] : Jesus Kelsey MD, Phone:  [phone] METABOLIC PANEL, COMPREHENSIVE Result Value Ref Range Glucose 188 (H) 65 - 99 mg/dL BUN 14 8 - 27 mg/dL Creatinine 0.82 0.76 - 1.27 mg/dL GFR est non-AA 85 >59 mL/min/1.73 GFR est AA 98 >59 mL/min/1.73  
 BUN/Creatinine ratio 17 10 - 24 Sodium 135 134 - 144 mmol/L Potassium 4.6 3.5 - 5.2 mmol/L Chloride 96 96 - 106 mmol/L  
 CO2 23 18 - 29 mmol/L Calcium 9.4 8.6 - 10.2 mg/dL Protein, total 6.6 6.0 - 8.5 g/dL Albumin 3.9 3.5 - 4.8 g/dL GLOBULIN, TOTAL 2.7 1.5 - 4.5 g/dL A-G Ratio 1.4 1.2 - 2.2 Bilirubin, total 0.5 0.0 - 1.2 mg/dL Alk. phosphatase 152 (H) 39 - 117 IU/L  
 AST (SGOT) 32 0 - 40 IU/L  
 ALT (SGPT) 34 0 - 44 IU/L Narrative Specimen Comment: faxed @ [phone] and gave results to Dr Jessica Garsia @ 1500. dw  
10/13/2017 Performed at:  [address] [place] [address]  [id number] : Jesus Kelsey MD, Phone:  [phone] RECOMMENDATIONS: 
Stool testing was normal no evidence of infection. Patient had gastroenteritis ( likely viral) and symptoms typically improve in 5 days Please call me if you have any questions: [phone] Sincerely, Johnnie Petersen MD

## 2017-10-13 NOTE — LETTER
10/16/2017 4:26 PM 
 
Mr. Emily Yeager [address] [address] Dear Emily Yeager: 
 
Please find your most recent results below. Resulted Orders CBC WITH AUTOMATED DIFF Result Value Ref Range WBC 8.8 3.4 - 10.8 x10E3/uL  
 RBC 4.76 4.14 - 5.80 x10E6/uL HGB 13.1 12.6 - 17.7 g/dL HCT 39.7 37.5 - 51.0 % MCV 83 79 - 97 fL  
 MCH 27.5 26.6 - 33.0 pg  
 MCHC 33.0 31.5 - 35.7 g/dL RDW 21.5 (H) 12.3 - 15.4 % PLATELET 018 731 - 541 x10E3/uL Comment:  
   Platelets vary in size. NEUTROPHILS 77 Not Estab. % Comment:  
   Occasional myelocyte seen on scanning. Lymphocytes 13 Not Estab. % MONOCYTES 9 Not Estab. % EOSINOPHILS 1 Not Estab. % BASOPHILS 0 Not Estab. %  
 ABS. NEUTROPHILS 6.8 1.4 - 7.0 x10E3/uL Abs Lymphocytes 1.1 0.7 - 3.1 x10E3/uL  
 ABS. MONOCYTES 0.8 0.1 - 0.9 x10E3/uL  
 ABS. EOSINOPHILS 0.1 0.0 - 0.4 x10E3/uL  
 ABS. BASOPHILS 0.0 0.0 - 0.2 x10E3/uL Hematology comments: Note:   
   Comment:  
   Verified by microscopic examination. Narrative Specimen Comment: faxed @ [phone] and gave results to Dr Fabrizio Shea @ 1500. dw  
10/13/2017 Performed at:  [address] [place] [address]  [id number] : Yandy Naidu MD, Phone:  [phone] METABOLIC PANEL, COMPREHENSIVE Result Value Ref Range Glucose 188 (H) 65 - 99 mg/dL BUN 14 8 - 27 mg/dL Creatinine 0.82 0.76 - 1.27 mg/dL GFR est non-AA 85 >59 mL/min/1.73 GFR est AA 98 >59 mL/min/1.73  
 BUN/Creatinine ratio 17 10 - 24 Sodium 135 134 - 144 mmol/L Potassium 4.6 3.5 - 5.2 mmol/L Chloride 96 96 - 106 mmol/L  
 CO2 23 18 - 29 mmol/L Calcium 9.4 8.6 - 10.2 mg/dL Protein, total 6.6 6.0 - 8.5 g/dL Albumin 3.9 3.5 - 4.8 g/dL GLOBULIN, TOTAL 2.7 1.5 - 4.5 g/dL A-G Ratio 1.4 1.2 - 2.2 Bilirubin, total 0.5 0.0 - 1.2 mg/dL Alk.  phosphatase 152 (H) 39 - 117 IU/L  
 AST (SGOT) 32 0 - 40 IU/L  
 ALT (SGPT) 34 0 - 44 IU/L Narrative Specimen Comment: faxed @ [phone] and gave results to Dr Jessica Garsia @ 1500. dw  
10/13/2017 Performed at:  [address] [place] [address]  [id number] : Jesus Kelsey MD, Phone:  [phone] RECOMMENDATIONS: 
labs are stable, no evidence of dehydration or elevated WBC count Please call me if you have any questions: [phone] Sincerely, Johnnie Petersen MD

## 2017-10-13 NOTE — PROGRESS NOTES
CC: Leg Pain (Right Side Leg Pain w/ No Injury )      HPI:    He is a 65 y.o. male with a hx of diabetes and on chronic PEG tube feeding who presents for evaluation of loose stools and leg pain    He las seen on 10/02 by PCP for sciatica and prescribed prednisone taper for right leg pain. No relief with prednisone. Pain in entire side of leg - \" shooting pain: present for \" a long time\"   Wondering if gabapentin would help and requesting trial of medication. Pain exacerbated by sitting  No bowel or bladder incontinence/ no weakness of legs    Diarrhea: patient is on PEG tube feeds after ruptured gallbladder had failure to thrive- eats by mouth and has extra tube feeding at night. Complains of 36 hours loose stools - had explosive diarrhea last night, and large soft stools this morning  No recent antibiotics. 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 Max Daily Amount: 200 mg. 60 Tab 0    ergocalciferol (ERGOCALCIFEROL) 50,000 unit capsule Take 1 Cap by mouth two (2) times a week.  Monday and Thursday 24 Cap 1    dilTIAZem (CARDIZEM) 60 mg tablet Take 1 Tab by mouth two (2) times a day. 180 Tab 1    tamsulosin (FLOMAX) 0.4 mg capsule Take 1 Cap by mouth daily. 90 Cap 1    insulin lispro (HUMALOG KWIKPEN) 100 unit/mL kwikpen by SubCUTAneous route two (2) times daily (with meals). Breakfast and dinner per sliding scale:  201-250=2 units  251-300=4 units  301-350=6 units      fish oil-omega-3 fatty acids 340-1,000 mg capsule Take 1 Cap by mouth daily.  ferrous sulfate (IRON) 325 mg (65 mg iron) tablet Take  by mouth Daily (before breakfast).  predniSONE (DELTASONE) 10 mg tablet Take  by mouth daily (with breakfast).  LACTOSE-REDUCED FOOD/FIBER (JEVITY 1.5 AZAEL PO) Take 1 Container by mouth nightly. Per peg tube followed by 150 ml water flush.  acetaminophen (TYLENOL) 500 mg tablet Take 500 mg by mouth every six (6) hours as needed for Pain.  albuterol sulfate (PROVENTIL;VENTOLIN) 2.5 mg/0.5 mL nebu nebulizer solution 2.5 mg by Nebulization route every four (4) hours as needed for Wheezing.  methotrexate (RHEUMATREX) 2.5 mg tablet Take 10 mg by mouth Every Thursday. Every Wednesday and Thursday      adalimumab (HUMIRA) 40 mg/0.8 mL injection 40 mg by SubCUTAneous route every fourteen (14) days. Every Tuesday      folic acid (FOLVITE) 1 mg tablet Take 1 mg by mouth daily.  esomeprazole (NEXIUM) 40 mg capsule Take 40 mg by mouth daily.  tiotropium (SPIRIVA WITH HANDIHALER) 18 mcg inhalation capsule Take 1 Cap by inhalation daily. No current facility-administered medications on file prior to visit.         Past Surgical History:   Procedure Laterality Date    HX CHOLECYSTECTOMY  04/18/2017    HX HEENT      bilat cataract    HX OTHER SURGICAL      r shoulder melenoma excision    CT COLSC FLX W/RMVL OF TUMOR POLYP LESION SNARE TQ  4/28/2011         CT EGD TRANSORAL BIOPSY SINGLE/MULTIPLE  4/28/2011         VASCULAR SURGERY PROCEDURE UNLIST      stent femoral-left leg    VASCULAR SURGERY PROCEDURE UNLIST right AV shunt       Family History   Problem Relation Age of Onset    Adopted: Yes     Reviewed and no changes     Social History     Social History    Marital status:      Spouse name: N/A    Number of children: N/A    Years of education: N/A     Occupational History    Not on file.      Social History Main Topics    Smoking status: Former Smoker    Smokeless tobacco: Never Used    Alcohol use 3.5 oz/week     7 Shots of liquor per week    Drug use: No    Sexual activity: Not Currently     Other Topics Concern    Not on file     Social History Narrative            Visit Vitals    BP 96/60 (BP 1 Location: Left arm, BP Patient Position: Sitting)    Pulse (!) 101    Temp 98.2 °F (36.8 °C) (Oral)    Resp 16    Ht 5' 9\" (1.753 m)    Wt 167 lb 8 oz (76 kg)    SpO2 97%    BMI 24.74 kg/m2       Physical Examination:   General - Well appearing male  HEENT - PERRL, TM no erythema/opacification, normal nasal turbinates, oropharynx no erythema or exudate, MMM  Neck - supple, no bruits, no TMG, no LAD  Pulm - clear to auscultation bilaterally  Cardio - RRR, normal S1 S2, no murmur gallops or rubs  Abd - soft, nontender, no masses, no HSM  Peg is clean /intact/ no erythema  Extrem - no edema, +2 distal pulses  Psych - normal affect, appropriate mood  Strenght is 5/5 on both legs     Lab Results   Component Value Date/Time    WBC 8.8 10/13/2017 11:42 AM    HGB 13.1 10/13/2017 11:42 AM    HCT 39.7 10/13/2017 11:42 AM    PLATELET 824 42/38/3634 11:42 AM    MCV 83 10/13/2017 11:42 AM     Lab Results   Component Value Date/Time    Sodium 135 10/13/2017 11:42 AM    Potassium 4.6 10/13/2017 11:42 AM    Chloride 96 10/13/2017 11:42 AM    CO2 23 10/13/2017 11:42 AM    Anion gap 9 06/07/2017 05:45 AM    Glucose 188 10/13/2017 11:42 AM    BUN 14 10/13/2017 11:42 AM    Creatinine 0.82 10/13/2017 11:42 AM    BUN/Creatinine ratio 17 10/13/2017 11:42 AM    GFR est AA 98 10/13/2017 11:42 AM    GFR est non-AA 85 10/13/2017 11:42 AM    Calcium 9.4 10/13/2017 11:42 AM     Lab Results   Component Value Date/Time    Cholesterol, total 205 09/28/2017 09:05 AM    HDL Cholesterol 69 09/28/2017 09:05 AM    LDL, calculated 101 09/28/2017 09:05 AM    VLDL, calculated 35 09/28/2017 09:05 AM    Triglyceride 174 09/28/2017 09:05 AM     Lab Results   Component Value Date/Time    TSH 3.280 09/28/2017 09:05 AM     Lab Results   Component Value Date/Time    Prostate Specific Ag 3.9 09/28/2017 09:05 AM     Lab Results   Component Value Date/Time    Hemoglobin A1c 7.9 09/28/2017 09:05 AM     Lab Results   Component Value Date/Time    Vitamin D 25-Hydroxy 10.2 05/17/2017 06:15 AM    VITAMIN D, 25-HYDROXY 40.6 09/28/2017 09:05 AM       Lab Results   Component Value Date/Time    ALT (SGPT) 34 10/13/2017 11:42 AM    AST (SGOT) 32 10/13/2017 11:42 AM    Alk. phosphatase 152 10/13/2017 11:42 AM    Bilirubin, total 0.5 10/13/2017 11:42 AM           Assessment/Plan:    1. Diarrhea, unspecified type  - suspect gastroenteritis, but will check for infectious diarrhea and labs  - GASTROINTESTINAL PROFILE, STOOL, PCR  - CBC WITH AUTOMATED DIFF  - METABOLIC PANEL, COMPREHENSIVE  - advised to increase water intake and call if no improvement     2. Sciatica of right side  Trial of low dose Neurontin / advised to wait till diarrhea resolves prior to starting a new medication   - gabapentin (NEURONTIN) 100 mg capsule; Take 2 Caps by mouth three (3) times daily. Dispense: 180 Cap; Refill: 1      Follow-up Disposition:  Return if symptoms worsen or fail to improve.     Dillon Zhang MD

## 2017-10-13 NOTE — PATIENT INSTRUCTIONS
BRING STOOL FOR TESTING  WE ARE CHECKING BLOOD WORK TODAY  START GABAPENTIN AFTER DIARRHEA IMPROVES TAKE AT BEDTIME FIRST     IF DIARRHEA WORSENS GO TO ER

## 2017-10-13 NOTE — MR AVS SNAPSHOT
Visit Information Date & Time Provider Department Dept. Phone Encounter #  
 10/13/2017 10:45 AM Norman Deluna, [address] [phone] [id number] Follow-up Instructions Return if symptoms worsen or fail to improve. Your Appointments 11/7/2017  1:30 PM  
ROUTINE CARE with Orlando Jaime III, DO [facility] [address]) Appt Note: 6 wk F/U  
 [address] [phone]  
[address] [facility] [address] [place] [address] Upcoming Health Maintenance Date Due  
 FOOT EXAM Q1 4/30/1950 EYE EXAM RETINAL OR DILATED Q1 4/30/1949 DTaP/Tdap/Td series (1 - Tdap) 4/30/1960 ZOSTER VACCINE AGE 60> 2/28/1999 GLAUCOMA SCREENING Q2Y 4/30/2004 HEMOGLOBIN A1C Q6M 3/28/2018 MEDICARE YEARLY EXAM 9/26/2018 MICROALBUMIN Q1 9/28/2018 LIPID PANEL Q1 9/28/2018 Pneumococcal 65+ Low/Medium Risk (2 of 2 - PPSV23) 1/1/2021 Allergies as of 10/13/2017  Review Complete On: 10/13/2017 By: Priscila Tejeda LPN Severity Noted Reaction Type Reactions Ceclor [Cefaclor]  04/28/2011    Swelling Contrast Agent [Iodine]  04/12/2017    Unknown (comments) Floxin [Ofloxacin]  04/28/2011    Hives Ivp [Fd And C Blue No.1]  04/28/2011    Itching Current Immunizations  Reviewed on 3/8/2016 Name Date Influenza High Dose Vaccine PF 9/25/2017 Influenza Vaccine 10/8/2015 Pneumococcal Vaccine (Unspecified Type) 1/1/2016 Not reviewed this visit You Were Diagnosed With   
  
 Codes Comments Diarrhea, unspecified type    -  Primary ICD-10-CM: R19.7 ICD-9-CM: 787.91 Sciatica of right side     ICD-10-CM: M54.31 
ICD-9-CM: 724.3 Vitals BP Pulse Temp Resp Height(growth percentile) Weight(growth percentile)  96/60 (BP 1 Location: Left arm, BP Patient Position: Sitting) (!) 101 98.2 °F (36.8 °C) (Oral) 16 5' 9\" (1.753 m) 167 lb 8 oz (76 kg) SpO2 BMI Smoking Status 97% 24.74 kg/m2 Former Smoker Vitals History BMI and BSA Data Body Mass Index Body Surface Area 24.74 kg/m 2 1.92 m 2 Preferred Pharmacy Pharmacy Name Phone Rachael Knight ([facility]) [facility] [phone] Your Updated Medication List  
  
   
This list is accurate as of: 10/13/17 11:37 AM.  Always use your most recent med list.  
  
  
  
  
 acetaminophen 500 mg tablet Commonly known as:  TYLENOL Take 500 mg by mouth every six (6) hours as needed for Pain. albuterol sulfate 2.5 mg/0.5 mL Nebu nebulizer solution Commonly known as:  PROVENTIL;VENTOLIN  
2.5 mg by Nebulization route every four (4) hours as needed for Wheezing. dilTIAZem 60 mg tablet Commonly known as:  CARDIZEM Take 1 Tab by mouth two (2) times a day. ergocalciferol 50,000 unit capsule Commonly known as:  ERGOCALCIFEROL Take 1 Cap by mouth two (2) times a week. Monday and Thursday  
  
 fish oil-omega-3 fatty acids 340-1,000 mg capsule Take 1 Cap by mouth daily. fluticasone 50 mcg/actuation nasal spray Commonly known as:  [place] 2 Sprays by Both Nostrils route daily. folic acid 1 mg tablet Commonly known as:  [employer] Take 1 mg by mouth daily. gabapentin 100 mg capsule Commonly known as:  NEURONTIN Take 2 Caps by mouth three (3) times daily. HumaLOG KwikPen 100 unit/mL kwikpen Generic drug:  insulin lispro  
by SubCUTAneous route two (2) times daily (with meals). Breakfast and dinner per sliding scale: 201-250=2 units 251-300=4 units 301-350=6 units * HUMIRA 40 mg/0.8 mL injection Generic drug:  adalimumab 40 mg by SubCUTAneous route every fourteen (14) days. Every Tuesday * HUMIRA PEN 40 mg/0.8 mL injection pen Generic drug:  adalimumab Iron 325 mg (65 mg iron) tablet Generic drug:  ferrous sulfate Take  by mouth Daily (before breakfast). JEVITY 1.5 AZAEL PO Take 1 Container by mouth nightly. Per peg tube followed by 150 ml water flush. methotrexate 2.5 mg tablet Commonly known as:  Maribellmoraima Vuong Take 10 mg by mouth Every Thursday. Every Wednesday and Thursday NexIUM 40 mg capsule Generic drug:  esomeprazole Take 40 mg by mouth daily. predniSONE 10 mg tablet Commonly known as:  Iza Thompson Take  by mouth daily (with breakfast). SPIRIVA WITH HANDIHALER 18 mcg inhalation capsule Generic drug:  tiotropium Take 1 Cap by inhalation daily. tamsulosin 0.4 mg capsule Commonly known as:  FLOMAX Take 1 Cap by mouth daily. traMADol 50 mg tablet Commonly known as:  ULTRAM  
Take 1 Tab by mouth every six (6) hours as needed for Pain. Max Daily Amount: 200 mg.  
  
 * Notice: This list has 2 medication(s) that are the same as other medications prescribed for you. Read the directions carefully, and ask your doctor or other care provider to review them with you. Prescriptions Printed Refills  
 gabapentin (NEURONTIN) 100 mg capsule 1 Sig: Take 2 Caps by mouth three (3) times daily. Class: Print Route: Oral  
  
We Performed the Following CBC WITH AUTOMATED DIFF [98933 CPT(R)] GASTROINTESTINAL PROFILE, STOOL, PCR [id number] Custom] METABOLIC PANEL, COMPREHENSIVE [01020 CPT(R)] Comments: [facility] [phone] Follow-up Instructions Return if symptoms worsen or fail to improve. To-Do List   
 10/13/2017 12:00 PM  
  Appointment with Lissy Doyle at [place] [address]  
  
 10/17/2017 10:30 AM  
  Appointment with Ester Romo PTA at [place] [address]  
  
 10/18/2017 To Be Determined Appointment with Ondina Charles at [place] [address] Patient Instructions BRING STOOL FOR TESTING 
WE ARE CHECKING BLOOD WORK TODAY START GABAPENTIN AFTER DIARRHEA IMPROVES TAKE AT BEDTIME FIRST  
 
IF DIARRHEA WORSENS GO TO ER hospitals [facility] HE[facility]! Kraig Kwon introduces [employer] patient portal. Now you can access parts of your medical record, email your doctor's office, and request medication refills online. 1. In your internet browser, go to [email or web address]. [employer] 2. Click on the First Time User? Click Here link in the Sign In box. You will see the New Member Sign Up page. 3. Enter your [employer] Access Code exactly as it appears below. You will not need to use this code after youve completed the sign-up process. If you do not sign up before the expiration date, you must request a new code. · [employer] Access Code: [email or web address] Expires: 12/24/2017  2:35 PM 
 
4. Enter the last four digits of your Social Security Number (xxxx) and Date of Birth (mm/dd/yyyy) as indicated and click Submit. You will be taken to the next sign-up page. 5. Create a [employer] ID. This will be your [employer] login ID and cannot be changed, so think of one that is secure and easy to remember. 6. Create a [employer] password. You can change your password at any time. 7. Enter your Password Reset Question and Answer. This can be used at a later time if you forget your password. 8. Enter your e-mail address. You will receive e-mail notification when new information is available in [address] 9. Click Sign Up. You can now view and download portions of your medical record. 10. Click the Download Summary menu link to download a portable copy of your medical information. If you have questions, please visit the Frequently Asked Questions section of the [employer] website. Remember, [employer] is NOT to be used for urgent needs. For medical emergencies, dial 911. Now available from your iPhone and Android! Please provide this summary of care documentation to your next provider. Your primary care clinician is listed as Griselda Pagan If you have any questions after today's visit, please call [phone].

## 2017-10-14 PROCEDURE — 3331090002 HH PPS REVENUE DEBIT

## 2017-10-14 PROCEDURE — 3331090001 HH PPS REVENUE CREDIT

## 2017-10-15 ENCOUNTER — HOSPITAL ENCOUNTER (OUTPATIENT)
Dept: LAB | Age: 77
Discharge: HOME | End: 2017-10-15
Payer: MEDICARE

## 2017-10-15 PROCEDURE — 3331090002 HH PPS REVENUE DEBIT

## 2017-10-15 PROCEDURE — 87507 IADNA-DNA/RNA PROBE TQ 12-25: CPT

## 2017-10-15 PROCEDURE — 3331090001 HH PPS REVENUE CREDIT

## 2017-10-16 PROCEDURE — 3331090001 HH PPS REVENUE CREDIT

## 2017-10-16 PROCEDURE — 3331090002 HH PPS REVENUE DEBIT

## 2017-10-17 ENCOUNTER — HOME CARE VISIT (OUTPATIENT)
Dept: SCHEDULING | Facility: HOME HEALTH | Age: 77
End: 2017-10-17
Payer: MEDICARE

## 2017-10-17 VITALS
OXYGEN SATURATION: 96 % | HEART RATE: 98 BPM | DIASTOLIC BLOOD PRESSURE: 70 MMHG | SYSTOLIC BLOOD PRESSURE: 142 MMHG | TEMPERATURE: 97.3 F | RESPIRATION RATE: 16 BRPM

## 2017-10-17 LAB
ADENOVIRUS F 40/41: NOT DETECTED
ASTROVIRUS: NOT DETECTED
C DIFFICILE TOXIN A/B: NOT DETECTED
CAMPYLOBACTER: NOT DETECTED
CRYPTOSPORIDIUM, CRYPTOSPORIDIUM: NOT DETECTED
CYCLOSPORA CAYETANENSIS: NOT DETECTED
E COLI O157: NORMAL
ENTAMOEBA HISTOLYTICA: NOT DETECTED
ENTEROAGGREGATIVE E COLI: NOT DETECTED
ENTEROPATHOGENIC E COLI (EPEC), EPEC: NOT DETECTED
ENTEROTOXIGENIC E COLI (ETEC), ETEC: NOT DETECTED
GIARDIA LAMBLIA: NOT DETECTED
NOROVIRUS GI/GII: NOT DETECTED
PLESIOMONAS SHIGELLOIDES: NOT DETECTED
ROTAVIRUS A: NOT DETECTED
SALMONELLA: NOT DETECTED
SAPOVIRUS: NOT DETECTED
SHIGA-TOXIN-PRODUCING E COLI: NOT DETECTED
SHIGELLA/ENTEROINVASIVE E COLI (EIEC), EIEC: NOT DETECTED
VIBRIO CHOLERAE: NOT DETECTED
VIBRIO: NOT DETECTED
YERSINIA ENTEROCOLITICA: NOT DETECTED

## 2017-10-17 PROCEDURE — 3331090002 HH PPS REVENUE DEBIT

## 2017-10-17 PROCEDURE — 3331090001 HH PPS REVENUE CREDIT

## 2017-10-17 PROCEDURE — G0157 HHC PT ASSISTANT EA 15: HCPCS

## 2017-10-17 RX ORDER — PREDNISONE 20 MG/1
TABLET ORAL
Qty: 18 TAB | Refills: 0 | Status: SHIPPED | OUTPATIENT
Start: 2017-10-17 | End: 2017-11-07 | Stop reason: ALTCHOICE

## 2017-10-17 NOTE — TELEPHONE ENCOUNTER
Per casimiro from Dr. Bertha Barrett, prednisone ordered for pt and routed to Dr. Bertha Barrett for review and signature.

## 2017-10-17 NOTE — PROGRESS NOTES
Stool testing was normal no evidence of infection.  Patient had gastroenteritis ( likely viral) and symptoms typically improve in 5 days

## 2017-10-18 ENCOUNTER — HOME CARE VISIT (OUTPATIENT)
Dept: SCHEDULING | Facility: HOME HEALTH | Age: 77
End: 2017-10-18
Payer: MEDICARE

## 2017-10-18 VITALS
DIASTOLIC BLOOD PRESSURE: 58 MMHG | HEART RATE: 86 BPM | SYSTOLIC BLOOD PRESSURE: 114 MMHG | RESPIRATION RATE: 16 BRPM | OXYGEN SATURATION: 98 % | TEMPERATURE: 98.1 F

## 2017-10-18 PROCEDURE — 3331090003 HH PPS REVENUE ADJ

## 2017-10-18 PROCEDURE — 3331090002 HH PPS REVENUE DEBIT

## 2017-10-18 PROCEDURE — 3331090001 HH PPS REVENUE CREDIT

## 2017-10-18 PROCEDURE — G0151 HHCP-SERV OF PT,EA 15 MIN: HCPCS

## 2017-10-20 ENCOUNTER — PATIENT OUTREACH (OUTPATIENT)
Dept: INTERNAL MEDICINE CLINIC | Age: 77
End: 2017-10-20

## 2017-10-20 NOTE — PROGRESS NOTES
NN - progress note:     Goals met and completed. Navigation type closed. Episode resolved. Pt attended [facility]ANE appt and next appt is 11/7/17 w/Dr. Matt Wesley. Nasir Verma

## 2017-11-01 ENCOUNTER — PATIENT OUTREACH (OUTPATIENT)
Dept: INTERNAL MEDICINE CLINIC | Age: 77
End: 2017-11-01

## 2017-11-01 NOTE — PROGRESS NOTES
Received vm from pt's wife yesterday afternoon stating she was having trouble getting an MRI of pt's back scheduled to try and figure out why he is having such right leg pain. Pt's wife is trying to get it schedule at [facility]. Pt saw his rheumatologist last Friday who would like him to have this test and possibly an epidural steroid injection. Rheumatologist thinks pt may have spinal stenosis. Spoke to pt's wife who is afraid he is going to fall and he is declining overall, not wanting to go anywhere. Pt has NPH and Dr. Clay Granger at [facility] has approved MRIs in the past. Pt's wife stated she will call back on Wednesday if she continues to have trouble getting the MRI scheduled. Informed pt's wife that this NN would assist as needed. Update- per pt's granddaughter, Rosana Tinoco, pt's MRI is scheduled for Friday 11/3/17.

## 2017-11-07 ENCOUNTER — OFFICE VISIT (OUTPATIENT)
Dept: INTERNAL MEDICINE CLINIC | Age: 77
End: 2017-11-07

## 2017-11-07 VITALS
SYSTOLIC BLOOD PRESSURE: 121 MMHG | HEART RATE: 92 BPM | DIASTOLIC BLOOD PRESSURE: 71 MMHG | WEIGHT: 170 LBS | RESPIRATION RATE: 16 BRPM | OXYGEN SATURATION: 98 % | TEMPERATURE: 98 F | HEIGHT: 69 IN | BODY MASS INDEX: 25.18 KG/M2

## 2017-11-07 DIAGNOSIS — M06.9 RHEUMATOID ARTHRITIS INVOLVING MULTIPLE SITES, UNSPECIFIED RHEUMATOID FACTOR PRESENCE: Chronic | ICD-10-CM

## 2017-11-07 DIAGNOSIS — E11.65 TYPE 2 DIABETES MELLITUS WITH HYPERGLYCEMIA, WITH LONG-TERM CURRENT USE OF INSULIN (HCC): Primary | Chronic | ICD-10-CM

## 2017-11-07 DIAGNOSIS — N40.1 BENIGN PROSTATIC HYPERPLASIA WITH LOWER URINARY TRACT SYMPTOMS, SYMPTOM DETAILS UNSPECIFIED: Chronic | ICD-10-CM

## 2017-11-07 DIAGNOSIS — G91.2 NPH (NORMAL PRESSURE HYDROCEPHALUS) (HCC): Chronic | ICD-10-CM

## 2017-11-07 DIAGNOSIS — Z79.4 TYPE 2 DIABETES MELLITUS WITH HYPERGLYCEMIA, WITH LONG-TERM CURRENT USE OF INSULIN (HCC): Primary | Chronic | ICD-10-CM

## 2017-11-07 DIAGNOSIS — I71.40 AAA (ABDOMINAL AORTIC ANEURYSM) WITHOUT RUPTURE: Chronic | ICD-10-CM

## 2017-11-07 NOTE — PATIENT INSTRUCTIONS

## 2017-11-07 NOTE — PROGRESS NOTES
Delaney Ruiz is a 65 y.o. male who presents for evaluation of routine follow up. Last seen by me oct 2, was having some right sided sciatica issues. rx given for prednisone. Had appt with dr Michael Dickens on oct 13, was having diarrhea and left sided sciatica. Stool cx negative. rx given for neurontin, which did not help at all. Feels much better now. Wife concerned about his slow lack of weight gain.       ROS:  Constitutional: negative for fevers, chills, anorexia and weight loss  Eyes:   negative for visual disturbance and irritation  ENT:   negative for tinnitus,sore throat,nasal congestion,ear pain,hoarseness  Respiratory:  negative for cough, hemoptysis, dyspnea,wheezing  CV:   negative for chest pain, palpitations, lower extremity edema  GI:   negative for nausea, vomiting, diarrhea, abdominal pain,melena  Genitourinary: negative for frequency, dysuria and hematuria  Musculoskel: negative for myalgias, arthralgias, back pain, muscle weakness, joint pain  Neurological:  negative for headaches, dizziness, focal weakness, numbness  Psychiatric:     Negative for depression or anxiety      Past Medical History:   Diagnosis Date    Cancer ([address]     r shoulder melanoma    COPD     Diabetes ([address]     Diarrhea 1/22/2016    Dysphagia 4/4/2017    Emphysema lung ([address]     GERD (gastroesophageal reflux disease)     Hydrocephalus 2014    shunt    Hypertension     no longer on meds    Ill-defined condition     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, arms    Other ill-defined conditions(799.89)     colon polyps    RA (rheumatoid arthritis) ([address]     Scarring     Stroke ([address] 2014    L arm weakness       Past Surgical History:   Procedure Laterality Date    HX CHOLECYSTECTOMY  04/18/2017    HX HEENT      bilat cataract    HX OTHER SURGICAL      r shoulder melenoma excision    VT COLSC FLX W/RMVL OF TUMOR POLYP LESION SNARE TQ  4/28/2011         VT EGD TRANSORAL BIOPSY SINGLE/MULTIPLE  4/28/2011         VASCULAR SURGERY PROCEDURE UNLIST      stent femoral-left leg    VASCULAR SURGERY PROCEDURE UNLIST      right AV shunt       Family History   Problem Relation Age of Onset    Adopted: Yes       Social History     Social History    Marital status:      Spouse name: N/A    Number of children: N/A    Years of education: N/A     Occupational History    Not on file. Social History Main Topics    Smoking status: Former Smoker    Smokeless tobacco: Never Used    Alcohol use 3.5 oz/week     7 Shots of liquor per week    Drug use: No    Sexual activity: Not Currently     Other Topics Concern    Not on file     Social History Narrative            Visit Vitals    /71 (BP 1 Location: Left arm, BP Patient Position: Sitting)    Pulse 92    Temp 98 °F (36.7 °C) (Oral)    Resp 16    Ht 5' 9\" (1.753 m)    Wt 170 lb (77.1 kg)    SpO2 98%    BMI 25.1 kg/m2       Physical Examination:   General - Well appearing male  HEENT - PERRL, TM no erythema/opacification, normal nasal turbinates, no oropharyngeal erythema or exudate, MMM  Neck - supple, no bruits, no thyroidomegaly, no lymphadenopathy  Pulm - clear to auscultation bilaterally  Cardio - RRR, normal S1 S2, no murmur  Abd - soft, nontender, no masses, no HSM  Extrem - no edema, +2 distal pulses  Neuro-  No focal deficits, CN intact         Diabetic foot exam performed by Serena Licea III, DO     Measurement  Response Nurse Comment Physician Comment   Monofilament  R - normal sensation with micro filament  L - normal sensation with micro filament     Pulse DP R - present  L - present     Pulse TP R - present  L - present     Structural deformity R - None  L - None     Skin Integrity / Deformity R - None  L - None        Reviewed by:                Assessment/Plan:    1.  htn--controlled with diltiazem  2.  Dm, type 2--on ssi alone now--humalog  3. RA--on humira, mtx  4. bph--continue flomax  5. AAA--monitor with ultrasound  6.   PCM--weight slowly improving, still with PEG tube and tube feeds nightly. rx given for tdap.     rtc 3 months      Dre Mcmahon III,

## 2017-11-07 NOTE — PROGRESS NOTES
Reviewed record in preparation for visit and have obtained necessary documentation. Identified pt with two pt identifiers(name and ). Chief Complaint   Patient presents with   KATHY Hoang 1 Maintenance Due   Topic Date Due    FOOT EXAM Q1  1950    DTaP/Tdap/Td series (1 - Tdap) 1960    ZOSTER VACCINE AGE 60>  1999       Mr. Gurmeet Don has a reminder for a \"due or due soon\" health maintenance. I have asked that he discuss health maintenance topic(s) due with His  primary care provider. Coordination of Care Questionnaire:  :     1) Have you been to an emergency room, urgent care clinic since your last visit? no   Hospitalized since your last visit? no             2) Have you seen or consulted any other health care providers outside of [address] [place] [address] since your last visit? yes  (Include any pap smears or colon screenings in this section.)    3) Do you have an Advance Directive on file? no    4) Are you interested in receiving information on Advance Directives? NO    Patient is accompanied by self I have received verbal consent from Jess Eldridge to discuss any/all medical information while they are present in the room.

## 2017-11-07 NOTE — MR AVS SNAPSHOT
Visit Information Date & Time Provider Department Dept. Phone Encounter #  
 11/7/2017  1:30 PM Heidy Ghosh, [address] [id number] Follow-up Instructions Return in about 3 months (around 2/7/2018). Upcoming Health Maintenance Date Due DTaP/Tdap/Td series (1 - Tdap) 4/30/1960 ZOSTER VACCINE AGE 60> 2/28/1999 HEMOGLOBIN A1C Q6M 3/28/2018 MEDICARE YEARLY EXAM 9/26/2018 MICROALBUMIN Q1 9/28/2018 LIPID PANEL Q1 9/28/2018 EYE EXAM RETINAL OR DILATED Q1 10/17/2018 FOOT EXAM Q1 11/7/2018 GLAUCOMA SCREENING Q2Y 10/17/2019 Pneumococcal 65+ Low/Medium Risk (2 of 2 - PPSV23) 1/1/2021 Allergies as of 11/7/2017  Review Complete On: 11/7/2017 By: Marco A Chauhan III, DO Severity Noted Reaction Type Reactions Ceclor [Cefaclor]  04/28/2011    Swelling Contrast Agent [Iodine]  04/12/2017    Unknown (comments) Floxin [Ofloxacin]  04/28/2011    Hives Ivp [Fd And C Blue No.1]  04/28/2011    Itching Current Immunizations  Reviewed on 3/8/2016 Name Date Influenza High Dose Vaccine PF 9/25/2017 Influenza Vaccine 10/8/2015 Pneumococcal Vaccine (Unspecified Type) 1/1/2016 Not reviewed this visit You Were Diagnosed With   
  
 Codes Comments Type 2 diabetes mellitus with hyperglycemia, with long-term current use of insulin (HCC)    -  Primary ICD-10-CM: E11.65, Z79.4 ICD-9-CM: 250.00, 790.29, V58.67 Rheumatoid arthritis involving multiple sites, unspecified rheumatoid factor presence ([facility]     ICD-10-CM: M06.9 ICD-9-CM: 714.0   
 NPH (normal pressure hydrocephalus)     ICD-10-CM: G91.2 ICD-9-CM: 331.5 Benign prostatic hyperplasia with lower urinary tract symptoms, symptom details unspecified     ICD-10-CM: N40.1 ICD-9-CM: 600.01   
 AAA (abdominal aortic aneurysm) without rupture (HCC)     ICD-10-CM: I71.4 ICD-9-CM: 313. 4 Vitals BP Pulse Temp Resp Height(growth percentile) Weight(growth percentile) 121/71 (BP 1 Location: Left arm, BP Patient Position: Sitting) 92 98 °F (36.7 °C) (Oral) 16 5' 9\" (1.753 m) 170 lb (77.1 kg) SpO2 BMI Smoking Status 98% 25.1 kg/m2 Former Smoker Vitals History BMI and BSA Data Body Mass Index Body Surface Area  
 25.1 kg/m 2 1.94 m 2 Preferred Pharmacy Pharmacy Name Phone [address], [address] [place] [address] [facility] [phone] Your Updated Medication List  
  
   
This list is accurate as of: 11/7/17  2:02 PM.  Always use your most recent med list.  
  
  
  
  
 acetaminophen 500 mg tablet Commonly known as:  TYLENOL Take 500 mg by mouth every six (6) hours as needed for Pain. albuterol sulfate 2.5 mg/0.5 mL Nebu nebulizer solution Commonly known as:  PROVENTIL;VENTOLIN  
2.5 mg by Nebulization route every four (4) hours as needed for Wheezing. dilTIAZem 60 mg tablet Commonly known as:  CARDIZEM Take 1 Tab by mouth two (2) times a day. Diphth, Pertus(Acell), Tetanus 2.5-8-5 Lf-mcg-Lf/0.5mL Susp susp Commonly known as:  BOOSTRIX TDAP  
0.5 mL by IntraMUSCular route once for 1 dose. ergocalciferol 50,000 unit capsule Commonly known as:  ERGOCALCIFEROL Take 1 Cap by mouth two (2) times a week. Monday and Thursday  
  
 fluticasone 50 mcg/actuation nasal spray Commonly known as:  Domnick Means 2 Sprays by Both Nostrils route daily. folic acid 1 mg tablet Commonly known as:  Google Take 1 mg by mouth daily. HumaLOG KwikPen 100 unit/mL kwikpen Generic drug:  insulin lispro  
by SubCUTAneous route two (2) times daily (with meals). Breakfast and dinner per sliding scale: 201-250=2 units 251-300=4 units 301-350=6 units * HUMIRA 40 mg/0.8 mL injection Generic drug:  adalimumab 40 mg by SubCUTAneous route every fourteen (14) days. Every Tuesday * HUMIRA PEN 40 mg/0.8 mL injection pen Generic drug:  adalimumab Iron 325 mg (65 mg iron) tablet Generic drug:  ferrous sulfate Take  by mouth Daily (before breakfast). JEVITY 1.5 AZAEL PO Take 1 Container by mouth nightly. Per peg tube followed by 150 ml water flush. methotrexate 2.5 mg tablet Commonly known as:  Antonio Radha Take 10 mg by mouth Every Thursday. Every Wednesday and Thursday NexIUM 40 mg capsule Generic drug:  esomeprazole Take 40 mg by mouth daily. SPIRIVA WITH HANDIHALER 18 mcg inhalation capsule Generic drug:  tiotropium Take 1 Cap by inhalation daily. tamsulosin 0.4 mg capsule Commonly known as:  FLOMAX Take 1 Cap by mouth daily. * Notice: This list has 2 medication(s) that are the same as other medications prescribed for you. Read the directions carefully, and ask your doctor or other care provider to review them with you. Prescriptions Printed Refills Tharon Cornel,, Tetanus (BOOSTRIX TDAP) 2.5-8-5 Lf-mcg-Lf/0.5mL susp susp 0 Si.5 mL by IntraMUSCular route once for 1 dose. Class: Print Route: IntraMUSCular Follow-up Instructions Return in about 3 months (around 2018). Patient Instructions Learning About Diabetes Food Guidelines Your Care Instructions Meal planning is important to manage diabetes. It helps keep your blood sugar at a target level (which you set with your doctor). You don't have to eat special foods. You can eat what your family eats, including sweets once in a while. But you do have to pay attention to how often you eat and how much you eat of certain foods. You may want to work with a dietitian or a certified diabetes educator (CDE) to help you plan meals and snacks. A dietitian or CDE can also help you lose weight if that is one of your goals. What should you know about eating carbs? Managing the amount of carbohydrate (carbs) you eat is an important part of healthy meals when you have diabetes. Carbohydrate is found in many foods. · Learn which foods have carbs. And learn the amounts of carbs in different foods. ¨ Bread, cereal, pasta, and rice have about 15 grams of carbs in a serving. A serving is 1 slice of bread (1 ounce), ½ cup of cooked cereal, or 1/3 cup of cooked pasta or rice. ¨ Fruits have 15 grams of carbs in a serving. A serving is 1 small fresh fruit, such as an apple or orange; ½ of a banana; ½ cup of cooked or canned fruit; ½ cup of fruit juice; 1 cup of melon or raspberries; or 2 tablespoons of dried fruit. ¨ Milk and no-sugar-added yogurt have 15 grams of carbs in a serving. A serving is 1 cup of milk or 2/3 cup of no-sugar-added yogurt. ¨ Starchy vegetables have 15 grams of carbs in a serving. A serving is ½ cup of mashed potatoes or sweet potato; 1 cup winter squash; ½ of a small baked potato; ½ cup of cooked beans; or ½ cup cooked corn or green peas. · Learn how much carbs to eat each day and at each meal. A dietitian or CDE can teach you how to keep track of the amount of carbs you eat. This is called carbohydrate counting. · If you are not sure how to count carbohydrate grams, use the Plate Method to plan meals. It is a good, quick way to make sure that you have a balanced meal. It also helps you spread carbs throughout the day. ¨ Divide your plate by types of foods. Put non-starchy vegetables on half the plate, meat or other protein food on one-quarter of the plate, and a grain or starchy vegetable in the final quarter of the plate. To this you can add a small piece of fruit and 1 cup of milk or yogurt, depending on how many carbs you are supposed to eat at a meal. 
· Try to eat about the same amount of carbs at each meal. Do not \"save up\" your daily allowance of carbs to eat at one meal. 
· Proteins have very little or no carbs per serving.  Examples of proteins are beef, chicken, turkey, fish, eggs, tofu, cheese, cottage cheese, and peanut butter. A serving size of meat is 3 ounces, which is about the size of a deck of cards. Examples of meat substitute serving sizes (equal to 1 ounce of meat) are 1/4 cup of cottage cheese, 1 egg, 1 tablespoon of peanut butter, and ½ cup of tofu. How can you eat out and still eat healthy? · Learn to estimate the serving sizes of foods that have carbohydrate. If you measure food at home, it will be easier to estimate the amount in a serving of restaurant food. · If the meal you order has too much carbohydrate (such as potatoes, corn, or baked beans), ask to have a low-carbohydrate food instead. Ask for a salad or green vegetables. · If you use insulin, check your blood sugar before and after eating out to help you plan how much to eat in the future. · If you eat more carbohydrate at a meal than you had planned, take a walk or do other exercise. This will help lower your blood sugar. What else should you know? · Limit saturated fat, such as the fat from meat and dairy products. This is a healthy choice because people who have diabetes are at higher risk of heart disease. So choose lean cuts of meat and nonfat or low-fat dairy products. Use olive or canola oil instead of butter or shortening when cooking. · Don't skip meals. Your blood sugar may drop too low if you skip meals and take insulin or certain medicines for diabetes. · Check with your doctor before you drink alcohol. Alcohol can cause your blood sugar to drop too low. Alcohol can also cause a bad reaction if you take certain diabetes medicines. Follow-up care is a key part of your treatment and safety. Be sure to make and go to all appointments, and call your doctor if you are having problems. It's also a good idea to know your test results and keep a list of the medicines you take. Where can you learn more? Go to http://yannick-helen.info/. Enter O905 in the search box to learn more about \"Learning About Diabetes Food Guidelines. \" Current as of: March 13, 2017 Content Version: 11.4 © 5941-3629 Healthwise, [employer]. Care instructions adapted under license by [employer] (which disclaims liability or warranty for this information). If you have questions about a medical condition or this instruction, always ask your healthcare professional. [address] any warranty or liability for your use of this information. Try to get tdap booster shot before next visit. Introducing [facility]! Romayne Duster introduces [employer] patient portal. Now you can access parts of your medical record, email your doctor's office, and request medication refills online. 1. In your internet browser, go to [email or web address] 2. Click on the First Time User? Click Here link in the Sign In box. You will see the New Member Sign Up page. 3. Enter your [employer] Access Code exactly as it appears below. You will not need to use this code after youve completed the sign-up process. If you do not sign up before the expiration date, you must request a new code. · [employer] Access Code: [email or web address] Expires: 12/24/2017  1:35 PM 
 
4. Enter the last four digits of your Social Security Number (xxxx) and Date of Birth (mm/dd/yyyy) as indicated and click Submit. You will be taken to the next sign-up page. 5. Create a [employer] ID. This will be your [employer] login ID and cannot be changed, so think of one that is secure and easy to remember. 6. Create a [employer] password. You can change your password at any time. 7. Enter your Password Reset Question and Answer. This can be used at a later time if you forget your password. 8. Enter your e-mail address. You will receive e-mail notification when new information is available in [address] 9. Click Sign Up. You can now view and download portions of your medical record. 10. Click the Download Summary menu link to download a portable copy of your medical information. If you have questions, please visit the Frequently Asked Questions section of the [employer] website. Remember, [employer] is NOT to be used for urgent needs. For medical emergencies, dial 911. Now available from your iPhone and Android! Please provide this summary of care documentation to your next provider. Your primary care clinician is listed as Karli Yan If you have any questions after today's visit, please call [phone].

## 2017-11-08 NOTE — TELEPHONE ENCOUNTER
Pt's wife (p) [phone], during his last visit yesterday, they forgot to put in an request for his \" Pen needles for his  Insulin 8 MM with a gauge of 31\" for [address] [phone].         Message received & copied from [facility]

## 2017-11-09 RX ORDER — PEN NEEDLE, DIABETIC 30 GX3/16"
NEEDLE, DISPOSABLE MISCELLANEOUS
Qty: 1 PACKAGE | Refills: 11 | Status: SHIPPED | OUTPATIENT
Start: 2017-11-09 | End: 2020-07-23

## 2017-11-29 NOTE — TELEPHONE ENCOUNTER
Pt is requesting  (One Touch Ultra Strip 100)  (Pravastatin 80mg)  (Esomeprazole 40mg) , no pharmacy provided.   Pt best contact [phone]       Message received & copied from [facility]

## 2017-11-30 RX ORDER — PRAVASTATIN SODIUM 80 MG/1
80 TABLET ORAL
Qty: 90 TAB | Refills: 3 | Status: SHIPPED | OUTPATIENT
Start: 2017-11-30 | End: 2019-02-04 | Stop reason: SDUPTHER

## 2017-11-30 RX ORDER — ESOMEPRAZOLE MAGNESIUM 40 MG/1
40 CAPSULE, DELAYED RELEASE ORAL DAILY
Qty: 90 CAP | Refills: 3 | Status: SHIPPED | OUTPATIENT
Start: 2017-11-30 | End: 2018-12-18 | Stop reason: SDUPTHER

## 2018-03-18 RX ORDER — DILTIAZEM HYDROCHLORIDE 60 MG/1
TABLET, FILM COATED ORAL
Qty: 180 TAB | Refills: 1 | Status: SHIPPED | OUTPATIENT
Start: 2018-03-18 | End: 2018-11-05 | Stop reason: SDUPTHER

## 2019-04-23 ENCOUNTER — HOSPITAL ENCOUNTER (OUTPATIENT)
Dept: NEUROLOGY | Age: 79
Discharge: HOME | End: 2019-04-23
Attending: PSYCHIATRY & NEUROLOGY
Payer: MEDICARE

## 2019-04-23 DIAGNOSIS — G40.909 EPILEPSY (HCC): ICD-10-CM

## 2019-04-23 PROCEDURE — 95816 EEG AWAKE AND DROWSY: CPT

## 2019-04-29 ENCOUNTER — TELEPHONE (OUTPATIENT)
Dept: INTERNAL MEDICINE CLINIC | Age: 79
End: 2019-04-29

## 2019-04-29 NOTE — TELEPHONE ENCOUNTER
#[phone]  Wife states pt hasn't been seen in some time and needs an appt. (sooner than June) to discuss treatment (?)    She was told that Dr. Virginia Sims would be calling pt for an appt is what the specialist told pt.

## 2019-05-02 NOTE — TELEPHONE ENCOUNTER
Called and spoke with pt's wife, Linsey Romano. Appointment scheduled for 5/3 @ [address] verbalized understanding of information discussed w/ no further questions at this time.

## 2019-05-03 ENCOUNTER — HOSPITAL ENCOUNTER (OUTPATIENT)
Dept: LAB | Age: 79
Discharge: HOME | End: 2019-05-03
Payer: MEDICARE

## 2019-05-03 ENCOUNTER — OFFICE VISIT (OUTPATIENT)
Dept: INTERNAL MEDICINE CLINIC | Age: 79
End: 2019-05-03

## 2019-05-03 VITALS
WEIGHT: 197 LBS | HEART RATE: 83 BPM | RESPIRATION RATE: 16 BRPM | DIASTOLIC BLOOD PRESSURE: 59 MMHG | TEMPERATURE: 98.1 F | SYSTOLIC BLOOD PRESSURE: 84 MMHG | HEIGHT: 69 IN | BODY MASS INDEX: 29.18 KG/M2 | OXYGEN SATURATION: 96 %

## 2019-05-03 DIAGNOSIS — Z00.00 MEDICARE ANNUAL WELLNESS VISIT, SUBSEQUENT: Primary | ICD-10-CM

## 2019-05-03 DIAGNOSIS — E11.65 TYPE 2 DIABETES MELLITUS WITH HYPERGLYCEMIA, WITH LONG-TERM CURRENT USE OF INSULIN (HCC): ICD-10-CM

## 2019-05-03 DIAGNOSIS — K21.9 GASTROESOPHAGEAL REFLUX DISEASE, ESOPHAGITIS PRESENCE NOT SPECIFIED: ICD-10-CM

## 2019-05-03 DIAGNOSIS — Z79.4 TYPE 2 DIABETES MELLITUS WITH HYPERGLYCEMIA, WITH LONG-TERM CURRENT USE OF INSULIN (HCC): ICD-10-CM

## 2019-05-03 DIAGNOSIS — G91.2 NPH (NORMAL PRESSURE HYDROCEPHALUS) (HCC): ICD-10-CM

## 2019-05-03 DIAGNOSIS — N40.1 BENIGN PROSTATIC HYPERPLASIA WITH LOWER URINARY TRACT SYMPTOMS, SYMPTOM DETAILS UNSPECIFIED: ICD-10-CM

## 2019-05-03 DIAGNOSIS — F10.20 ALCOHOL DEPENDENCE, DAILY USE (HCC): ICD-10-CM

## 2019-05-03 DIAGNOSIS — M06.9 RHEUMATOID ARTHRITIS INVOLVING MULTIPLE SITES, UNSPECIFIED RHEUMATOID FACTOR PRESENCE: ICD-10-CM

## 2019-05-03 DIAGNOSIS — Z22.7 TB LUNG, LATENT: ICD-10-CM

## 2019-05-03 DIAGNOSIS — M54.31 SCIATICA OF RIGHT SIDE: ICD-10-CM

## 2019-05-03 DIAGNOSIS — I71.40 AAA (ABDOMINAL AORTIC ANEURYSM) WITHOUT RUPTURE: ICD-10-CM

## 2019-05-03 DIAGNOSIS — G45.9 TIA (TRANSIENT ISCHEMIC ATTACK): ICD-10-CM

## 2019-05-03 PROCEDURE — 80061 LIPID PANEL: CPT

## 2019-05-03 PROCEDURE — 85025 COMPLETE CBC W/AUTO DIFF WBC: CPT

## 2019-05-03 PROCEDURE — 83036 HEMOGLOBIN GLYCOSYLATED A1C: CPT

## 2019-05-03 PROCEDURE — 36415 COLL VENOUS BLD VENIPUNCTURE: CPT

## 2019-05-03 PROCEDURE — 84443 ASSAY THYROID STIM HORMONE: CPT

## 2019-05-03 PROCEDURE — 80053 COMPREHEN METABOLIC PANEL: CPT

## 2019-05-03 PROCEDURE — 82043 UR ALBUMIN QUANTITATIVE: CPT

## 2019-05-03 RX ORDER — CLOPIDOGREL BISULFATE 75 MG/1
75 TABLET ORAL DAILY
COMMUNITY
Start: 2019-04-22

## 2019-05-03 NOTE — PROGRESS NOTES
Elsa Martinez is a [de-identified] y.o. male who presents for evaluation of awv, hospital follow up. Last seen by me nov 7, 2017. Since then had a TIA a few months ago, spent 4 days at [facility] [employer]. Workup was negative, d/c to home with asa. Saw neurology in follow up, and they switched him to plavix. Has also recently tested + for latent TB. ID and rheum are trying to find best medicine for him to take. ID wants INH, but rheum wants rifampin. He does not have any symptoms of tb. Since last visit with me, he has gained 27 lbs, and no longer has PEG tube. Still bit wobbly with walking, uses cane. Has not needed any recent adjustments with his shunt for NPH. Wife with him today.       ROS:  Constitutional: negative for fevers, chills, anorexia and weight loss  Eyes:   negative for visual disturbance and irritation  ENT:   negative for tinnitus,sore throat,nasal congestion,ear pain,hoarseness  Respiratory:  negative for cough, hemoptysis, dyspnea,wheezing  CV:   negative for chest pain, palpitations, lower extremity edema  GI:   negative for nausea, vomiting, diarrhea, abdominal pain,melena  Genitourinary: negative for frequency, dysuria and hematuria  Musculoskel: negative for myalgias, arthralgias, back pain, muscle weakness, joint pain  Neurological:  negative for headaches, dizziness, focal weakness, numbness  Psychiatric:     Negative for depression or anxiety      Past Medical History:   Diagnosis Date    Cancer ([address]     r shoulder melanoma    COPD     Diabetes ([address]     Diarrhea 1/22/2016    Dysphagia 4/4/2017    Emphysema lung ([address]     GERD (gastroesophageal reflux disease)     Hydrocephalus 2014    shunt    Hypertension     no longer on meds    Ill-defined condition     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, arms    Other ill-defined conditions(799.89)     colon polyps    RA (rheumatoid arthritis) ([address]     Scarring     Stroke ([address] 2014    L arm weakness       Past Surgical History:   Procedure Laterality Date    HX CHOLECYSTECTOMY  04/18/2017    HX HEENT      bilat cataract    HX OTHER SURGICAL      r shoulder melenoma excision    OK COLSC FLX W/RMVL OF TUMOR POLYP LESION SNARE TQ  4/28/2011         OK EGD TRANSORAL BIOPSY SINGLE/MULTIPLE  4/28/2011         VASCULAR SURGERY PROCEDURE UNLIST      stent femoral-left leg    VASCULAR SURGERY PROCEDURE UNLIST      right AV shunt       Family History   Adopted: Yes       Social History     Socioeconomic History    Marital status:      Spouse name: Not on file    Number of children: Not on file    Years of education: Not on file    Highest education level: Not on file   Occupational History    Not on file   Social Needs    Financial resource strain: Not on file    Food insecurity:     Worry: Not on file     Inability: Not on file    Transportation needs:     Medical: Not on file     Non-medical: Not on file   Tobacco Use    Smoking status: Former Smoker    Smokeless tobacco: Never Used   Substance and Sexual Activity    Alcohol use:  Yes     Alcohol/week: 3.5 oz     Types: 7 Shots of liquor per week    Drug use: No    Sexual activity: Not Currently   Lifestyle    Physical activity:     Days per week: Not on file     Minutes per session: Not on file    Stress: Not on file   Relationships    Social connections:     Talks on phone: Not on file     Gets together: Not on file     Attends Restorationism service: Not on file     Active member of club or organization: Not on file     Attends meetings of clubs or organizations: Not on file     Relationship status: Not on file    Intimate partner violence:     Fear of current or ex partner: Not on file     Emotionally abused: Not on file     Physically abused: Not on file     Forced sexual activity: Not on file   Other Topics Concern    Not on file   Social History Narrative    Not on file            Visit Vitals  BP (!) 84/59 (BP 1 Location: Left arm, BP Patient Position: Sitting)   Pulse 83 Temp 98.1 °F (36.7 °C) (Oral)   Resp 16   Ht 5' 9\" (1.753 m)   Wt 197 lb (89.4 kg)   SpO2 96%   BMI 29.09 kg/m²       Physical Examination:   General - Well appearing male  HEENT - PERRL, TM no erythema/opacification, normal nasal turbinates, no oropharyngeal erythema or exudate, MMM  Neck - supple, no bruits, no thyroidomegaly, no lymphadenopathy  Pulm - clear to auscultation bilaterally  Cardio - RRR, normal S1 S2, no murmur  Abd - soft, nontender, no masses, no HSM  Extrem - no edema, +2 distal pulses  Neuro-  No focal deficits, CN intact     Assessment/Plan:    1.  Dm, type 2--last a1c 7.9, check again with urine micro. Only uses ssi when above 200. Would likely benefit from glucophage if a1c remains up  2. AAA--abd ultrasound ordered. Was 4x4 cm when last seen, 2017  3.  hyperlipids--on pravachol, last . Check again  4. bph--continue flomax  5.  tia--on plavix now. Some concern though about plavix interacting with rifampin. Asked him to discuss with neurology about switching to aggrenox  6. Latent TB--thinking about rifampin  7. RA--on humira, mtx  8. NPH--with shunt. Seems stable, follows [facility]  9. Chronic back pain--referral to dr Maria A Howard  10. Routine adult health maintenance--rx given for shingrix    rtc 6 months. 70 minutes spent with pt and wife. Mauricio Wei III, DO            This is the Subsequent Medicare Annual Wellness Exam, performed 12 months or more after the Initial AWV or the last Subsequent AWV    I have reviewed the patient's medical history in detail and updated the computerized patient record.      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 2014    L arm weakness      Past Surgical History:   Procedure Laterality Date    HX CHOLECYSTECTOMY  04/18/2017    HX HEENT      bilat cataract    HX OTHER SURGICAL      r shoulder melenoma excision    CA COLSC FLX W/RMVL OF TUMOR POLYP LESION SNARE TQ  4/28/2011         CA EGD TRANSORAL BIOPSY SINGLE/MULTIPLE  4/28/2011         VASCULAR SURGERY PROCEDURE UNLIST      stent femoral-left leg    VASCULAR SURGERY PROCEDURE UNLIST      right AV shunt     Current Outpatient Medications   Medication Sig Dispense Refill    pravastatin (PRAVACHOL) 80 mg tablet TAKE ONE TABLET BY MOUTH EVERY NIGHT 90 Tab 3    esomeprazole (NEXIUM) 40 mg capsule TAKE ONE CAPSULE BY MOUTH EVERY DAY 90 Cap 3    dilTIAZem (CARDIZEM) 60 mg tablet TAKE 1 TABLET TWICE A  Tab 3    tamsulosin (FLOMAX) 0.4 mg capsule TAKE 1 CAPSULE 30 MINUTES AFTER THE SAME MEAL DAILY 90 Cap 3    Insulin Needles, Disposable, 31 gauge x 5/16\" ndle Use to inject insulin daily subcutaneus. Dx e11.9 1 Package 11    fluticasone (FLONASE) 50 mcg/actuation nasal spray 2 Sprays by Both Nostrils route daily.  insulin lispro (HUMALOG KWIKPEN) 100 unit/mL kwikpen by SubCUTAneous route two (2) times daily (with meals). Breakfast and dinner per sliding scale:  201-250=2 units  251-300=4 units  301-350=6 units      acetaminophen (TYLENOL) 500 mg tablet Take 500 mg by mouth every six (6) hours as needed for Pain.  albuterol sulfate (PROVENTIL;VENTOLIN) 2.5 mg/0.5 mL nebu nebulizer solution 2.5 mg by Nebulization route every four (4) hours as needed for Wheezing.  methotrexate (RHEUMATREX) 2.5 mg tablet Take 10 mg by mouth Every Thursday. Every Wednesday and Thursday      adalimumab (HUMIRA) 40 mg/0.8 mL injection 40 mg by SubCUTAneous route every fourteen (14) days.  Every Tuesday      folic acid (FOLVITE) 1 mg tablet Take 1 mg by mouth daily.  tiotropium (SPIRIVA WITH HANDIHALER) 18 mcg inhalation capsule Take 1 Cap by inhalation daily.  clopidogrel (PLAVIX) 75 mg tab       ergocalciferol (ERGOCALCIFEROL) 50,000 unit capsule Take 1 Cap by mouth two (2) times a week. Monday and Thursday 24 Cap 1    ferrous sulfate (IRON) 325 mg (65 mg iron) tablet Take  by mouth Daily (before breakfast).  LACTOSE-REDUCED FOOD/FIBER (JEVITY 1.5 AZAEL PO) Take 1 Container by mouth nightly. Per peg tube followed by 150 ml water flush. Allergies   Allergen Reactions    Ceclor [Cefaclor] Swelling    Contrast Agent [Iodine] Unknown (comments)    Floxin [Ofloxacin] Hives    Gabapentin Other (comments)     Causes confusion    Ivp [Fd And C Blue No.1] Itching     Family History   Adopted: Yes     Social History     Tobacco Use    Smoking status: Former Smoker    Smokeless tobacco: Never Used   Substance Use Topics    Alcohol use: Yes     Alcohol/week: 3.5 oz     Types: 7 Shots of liquor per week     Patient Active Problem List   Diagnosis Code    History of benign neoplasm of colon Z86.018    GERD (gastroesophageal reflux disease) K21.9    Colon polyp K63.5    Diverticulosis of colon K57.30    Internal hemorrhoid K64.8    Schatzki's ring K22.2    Hiatal hernia K44.9    Diarrhea R19.7    Influenza J11.1    Fall W19. Durene Friar Dysphagia R13.10    Abnormal x-ray of abdomen R93.5    Type 2 diabetes mellitus with hyperglycemia, with long-term current use of insulin (HCC) E11.65, Z79.4    NPH (normal pressure hydrocephalus) G91.2    Alcohol use disorder [id number]    Hip fracture (HCC) S72.009A    BPH (benign prostatic hyperplasia) N40.0    Rheumatoid arthritis involving multiple sites ([facility] M06.9    AAA (abdominal aortic aneurysm) without rupture ([facility]) I71.4       Depression Risk Factor Screening:     3 most recent PHQ Screens 5/3/2019   Little interest or pleasure in doing things Not at all   Feeling down, depressed, irritable, or hopeless Not at all   Total Score PHQ 2 0     Alcohol Risk Factor Screening: You do not drink alcohol or very rarely. Typically 2 drinks per night. Functional Ability and Level of Safety:   Hearing Loss  Hearing is good. Activities of Daily Living  The home contains: cane, but only when leaves house. Patient needs help with:  transportation. Was told by neurology not to drive until re-evaluated by them. Fall Risk  Fall Risk Assessment, last 12 mths 5/3/2019   Able to walk? Yes   Fall in past 12 months? No   Fall with injury? -   Number of falls in past 12 months -   Fall Risk Score -       Abuse Screen  Patient is not abused    Cognitive Screening   Evaluation of Cognitive Function:  Has your family/caregiver stated any concerns about your memory: yes  Normal    Patient Care Team   Patient Care Team:  Molly Wang DO as PCP - General (Internal Medicine)  Tatyana Damon, RN as Ambulatory Care Navigator  Ayesha Tovar MD (Neurosurgery)  Tristen Cowart MD (Pulmonary Disease)  Graciela Crisostomo MD as Physician (Gastroenterology)  Johnson Lizama MD as Physician (Rheumatology)  Julianne Cranker, MD as Physician (Cardiology)  Abebe Domingo MD as Physician (Neurology)    Assessment/Plan   Education and counseling provided:  Are appropriate based on today's review and evaluation  End-of-Life planning (with patient's consent)  Pneumococcal Vaccine  Influenza Vaccine    Diagnoses and all orders for this visit:    1. Medicare annual wellness visit, subsequent    2. AAA (abdominal aortic aneurysm) without rupture ([facility]    3. Alcohol dependence, daily use ([facility]    4. Type 2 diabetes mellitus with hyperglycemia, with long-term current use of insulin ([facility])    5.  Rheumatoid arthritis involving multiple sites, unspecified rheumatoid factor presence [facility])        Health Maintenance Due   Topic Date Due    DTaP/Tdap/Td series (1 - Tdap) 04/30/1960    Shingrix Vaccine Age 50> (1 of 2) 04/30/1989    Pneumococcal 65+ years (2 of 2 - PCV13) 01/01/2017    HEMOGLOBIN A1C Q6M  03/28/2018    MEDICARE YEARLY EXAM  09/26/2018    MICROALBUMIN Q1  09/28/2018    LIPID PANEL Q1  09/28/2018    FOOT EXAM Q1  11/07/2018

## 2019-05-03 NOTE — PATIENT INSTRUCTIONS
Medicare Wellness Visit, Male    The best way to live healthy is to have a lifestyle where you eat a well-balanced diet, exercise regularly, limit alcohol use, and quit all forms of tobacco/nicotine, if applicable. Regular preventive services are another way to keep healthy. Preventive services (vaccines, screening tests, monitoring & exams) can help personalize your care plan, which helps you manage your own care. Screening tests can find health problems at the earliest stages, when they are easiest to treat. [facility] follows the current, evidence-based guidelines published by the [facility] ([facility]) when recommending preventive services for our patients. Because we follow these guidelines, sometimes recommendations change over time as research supports it. (For example, a prostate screening blood test is no longer routinely recommended for men with no symptoms.)  Of course, you and your doctor may decide to screen more often for some diseases, based on your risk and co-morbidities (chronic disease you are already diagnosed with). Preventive services for you include:  - Medicare offers their members a free annual wellness visit, which is time for you and your primary care provider to discuss and plan for your preventive service needs. Take advantage of this benefit every year!  -All adults over age 72 should receive the recommended pneumonia vaccines. Current USPSTF guidelines recommend a series of two vaccines for the best pneumonia protection.   -All adults should have a flu vaccine yearly and an ECG.  All adults age 61 and older should receive a shingles vaccine once in their lifetime.    -All adults age 38-68 who are overweight should have a diabetes screening test once every three years.   -Other screening tests & preventive services for persons with diabetes include: an eye exam to screen for diabetic retinopathy, a kidney function test, a foot exam, and stricter control over your cholesterol.   -Cardiovascular screening for adults with routine risk involves an electrocardiogram (ECG) at intervals determined by the provider.   -Colorectal cancer screening should be done for adults age 54-65 with no increased risk factors for colorectal cancer. There are a number of acceptable methods of screening for this type of cancer. Each test has its own benefits and drawbacks. Discuss with your provider what is most appropriate for you during your annual wellness visit. The different tests include: colonoscopy (considered the best screening method), a fecal occult blood test, a fecal DNA test, and sigmoidoscopy.  -All adults born between [facility] should be screened once for Hepatitis C.  -An Abdominal Aortic Aneurysm (AAA) Screening is recommended for men age 73-68 who has ever smoked in their lifetime. Here is a list of your current Health Maintenance items (your personalized list of preventive services) with a due date:  Health Maintenance Due   Topic Date Due    DTaP/Tdap/Td  (1 - Tdap) 04/30/1960    Shingles Vaccine (1 of 2) 04/30/1989    Pneumococcal Vaccine (2 of 2 - PCV13) 01/01/2017    Hemoglobin A1C    03/28/2018    Annual Well Visit  09/26/2018    Albumin Urine Test  09/28/2018    Cholesterol Test   09/28/2018    Diabetic Foot Care  11/07/2018     Ask neurology about switching to aggrenox, if you can't take plavix due to interactions with rifampin. Cut back on nexium to every other day for next 2 weeks, and if not having any reflux or indigestion symptoms, then try to stop for good.

## 2019-05-03 NOTE — PROGRESS NOTES
Reviewed record in preparation for visit and have obtained necessary documentation. Identified pt with two pt identifiers(name and ). Chief Complaint   Patient presents with   [address] Maintenance Due   Topic Date Due    DTaP/Tdap/Td series (1 - Tdap) 1960    Shingrix Vaccine Age 50> (1 of 2) 1989    Pneumococcal 65+ years (2 of 2 - PCV13) 2017    HEMOGLOBIN A1C Q6M  2018    MEDICARE YEARLY EXAM  2018    MICROALBUMIN Q1  2018    LIPID PANEL Q1  2018    FOOT EXAM Q1  2018       Mr. Judson Betancourt has a reminder for a \"due or due soon\" health maintenance. I have asked that he discuss health maintenance topic(s) due with His  primary care provider. Coordination of Care Questionnaire:  :     1) Have you been to an emergency room, urgent care clinic since your last visit? no   Hospitalized since your last visit? no             2) Have you seen or consulted any other health care providers outside of [address] [place] [address] since your last visit? no  (Include any pap smears or colon screenings in this section.)    3) Do you have an Advance Directive on file? no    4) Are you interested in receiving information on Advance Directives? NO    Patient is accompanied by self I have received verbal consent from Retajake Garrett to discuss any/all medical information while they are present in the room.

## 2019-05-04 LAB
ALBUMIN SERPL-MCNC: 4.1 G/DL (ref 3.5–4.7)
ALBUMIN/CREAT UR: 6.3 MG/G CREAT (ref 0–30)
ALBUMIN/GLOB SERPL: 1.5 {RATIO} (ref 1.2–2.2)
ALP SERPL-CCNC: 100 IU/L (ref 39–117)
ALT SERPL-CCNC: 30 IU/L (ref 0–44)
AST SERPL-CCNC: 35 IU/L (ref 0–40)
BASOPHILS # BLD AUTO: 0 X10E3/UL (ref 0–0.2)
BASOPHILS NFR BLD AUTO: 0 %
BILIRUB SERPL-MCNC: 0.7 MG/DL (ref 0–1.2)
BUN SERPL-MCNC: 14 MG/DL (ref 8–27)
BUN/CREAT SERPL: 13 (ref 10–24)
CALCIUM SERPL-MCNC: 9.5 MG/DL (ref 8.6–10.2)
CHLORIDE SERPL-SCNC: 101 MMOL/L (ref 96–106)
CHOLEST SERPL-MCNC: 126 MG/DL (ref 100–199)
CO2 SERPL-SCNC: 22 MMOL/L (ref 20–29)
CREAT SERPL-MCNC: 1.11 MG/DL (ref 0.76–1.27)
CREAT UR-MCNC: 166.8 MG/DL
EOSINOPHIL # BLD AUTO: 0.1 X10E3/UL (ref 0–0.4)
EOSINOPHIL NFR BLD AUTO: 2 %
ERYTHROCYTE [DISTWIDTH] IN BLOOD BY AUTOMATED COUNT: 15.1 % (ref 12.3–15.4)
EST. AVERAGE GLUCOSE BLD GHB EST-MCNC: 177 MG/DL
GLOBULIN SER CALC-MCNC: 2.7 G/DL (ref 1.5–4.5)
GLUCOSE SERPL-MCNC: 161 MG/DL (ref 65–99)
HBA1C MFR BLD: 7.8 % (ref 4.8–5.6)
HCT VFR BLD AUTO: 40.8 % (ref 37.5–51)
HDLC SERPL-MCNC: 39 MG/DL
HGB BLD-MCNC: 13.4 G/DL (ref 13–17.7)
IMM GRANULOCYTES # BLD AUTO: 0 X10E3/UL (ref 0–0.1)
IMM GRANULOCYTES NFR BLD AUTO: 0 %
LDLC SERPL CALC-MCNC: 49 MG/DL (ref 0–99)
LYMPHOCYTES # BLD AUTO: 1.4 X10E3/UL (ref 0.7–3.1)
LYMPHOCYTES NFR BLD AUTO: 21 %
MCH RBC QN AUTO: 30.9 PG (ref 26.6–33)
MCHC RBC AUTO-ENTMCNC: 32.8 G/DL (ref 31.5–35.7)
MCV RBC AUTO: 94 FL (ref 79–97)
MICROALBUMIN UR-MCNC: 10.5 UG/ML
MONOCYTES # BLD AUTO: 0.5 X10E3/UL (ref 0.1–0.9)
MONOCYTES NFR BLD AUTO: 7 %
NEUTROPHILS # BLD AUTO: 4.6 X10E3/UL (ref 1.4–7)
NEUTROPHILS NFR BLD AUTO: 70 %
PLATELET # BLD AUTO: 146 X10E3/UL (ref 150–379)
POTASSIUM SERPL-SCNC: 4.5 MMOL/L (ref 3.5–5.2)
PROT SERPL-MCNC: 6.8 G/DL (ref 6–8.5)
RBC # BLD AUTO: 4.33 X10E6/UL (ref 4.14–5.8)
SODIUM SERPL-SCNC: 139 MMOL/L (ref 134–144)
TRIGL SERPL-MCNC: 191 MG/DL (ref 0–149)
TSH SERPL DL<=0.005 MIU/L-ACNC: 2.58 UIU/ML (ref 0.45–4.5)
VLDLC SERPL CALC-MCNC: 38 MG/DL (ref 5–40)
WBC # BLD AUTO: 6.6 X10E3/UL (ref 3.4–10.8)

## 2019-05-05 RX ORDER — METFORMIN HYDROCHLORIDE 500 MG/1
500 TABLET, EXTENDED RELEASE ORAL 2 TIMES DAILY WITH MEALS
Qty: 189 TAB | Refills: 3 | Status: SHIPPED | OUTPATIENT
Start: 2019-05-05 | End: 2020-04-15

## 2019-05-05 NOTE — PROGRESS NOTES
Diabetes remains elevated, a1c 7.8 for average sugar of 177. Would suggest starting glucophage 500 mg bid, which will help lower his sugars without significant risk of causing low sugars. Other labs look fine.

## 2019-05-06 NOTE — PROGRESS NOTES
Called and spoke with pt's wife, Ancelmo Blas. Ancelmo Blas notified of results/recommendations per Dr. René Mendez. Ancelmo Blas verbalized understanding of information discussed w/ no further questions at this time.

## 2019-05-13 ENCOUNTER — HOSPITAL ENCOUNTER (OUTPATIENT)
Dept: ULTRASOUND IMAGING | Age: 79
Discharge: HOME | End: 2019-05-13
Attending: INTERNAL MEDICINE
Payer: MEDICARE

## 2019-05-13 DIAGNOSIS — I71.40 AAA (ABDOMINAL AORTIC ANEURYSM) WITHOUT RUPTURE: ICD-10-CM

## 2019-05-13 PROCEDURE — 76775 US EXAM ABDO BACK WALL LIM: CPT

## 2019-05-13 NOTE — PROGRESS NOTES
abd ultrasound shows stable sized AAA--unchanged from march 2017. Will plan to do ultrasounds every 12 months.

## 2019-05-16 ENCOUNTER — TELEPHONE (OUTPATIENT)
Dept: INTERNAL MEDICINE CLINIC | Age: 79
End: 2019-05-16

## 2019-05-16 DIAGNOSIS — E11.65 TYPE 2 DIABETES MELLITUS WITH HYPERGLYCEMIA, WITH LONG-TERM CURRENT USE OF INSULIN (HCC): Primary | ICD-10-CM

## 2019-05-16 DIAGNOSIS — Z79.4 TYPE 2 DIABETES MELLITUS WITH HYPERGLYCEMIA, WITH LONG-TERM CURRENT USE OF INSULIN (HCC): Primary | ICD-10-CM

## 2019-05-16 RX ORDER — INSULIN ASPART 100 [IU]/ML
2-6 INJECTION, SOLUTION INTRAVENOUS; SUBCUTANEOUS
Qty: 15 ML | Refills: 1 | Status: SHIPPED | OUTPATIENT
Start: 2019-05-16 | End: 2020-07-23

## 2019-05-16 NOTE — TELEPHONE ENCOUNTER
Pt's wife Mrs. Kishor Valdez called on behalf of pt she stated that they picked-up pt's prescription for Metformin on last Friday, when they got home pt started with soft stools yesterday (5/15/19). However, this morning pt started experiencing intense diarrhea; he did not take medication today.  Mrs.  [employer] Contact  Number: [phone]       Message received & copied from [facility]

## 2019-05-16 NOTE — TELEPHONE ENCOUNTER
Patient has approximately 5-6 days supply of Humalog remaining. Has never filled prescription for humalog at pharmacy as previous PCP has always provided pt with samples. Jonas Hassan, can you contact Wife to further assist as we do not have any samples available at this time?

## 2019-05-16 NOTE — TELEPHONE ENCOUNTER
Called and spoke with pt's wife, Jeffery Duarte. Jeffery Duarte stated that pt has been experiencing bad diarrhea d/t the Metformin. Jeffery Duarte is requesting an alternative medication. Notified Jeffery Duarte I would send message to Dr. Shahid Donovan. Jeffery Felicia verbalized understanding of information discussed w/ no further questions at this time.

## 2019-05-16 NOTE — TELEPHONE ENCOUNTER
Pharmacy Progress Note - Telephone Encounter    S/O: Mr. Betzaida Finney [de-identified] y.o. male,'s wife, Darrick Thorpe, was contacted via an outbound telephone call to discuss his Humalog med access today. Verified patients identifiers (name & ) per HIPAA policy. - Pt recently started on Metformin  mg BID. Developed diarrhea w/ it. Wife reports he takes the first dose with food but not the afternoon dose. - For Humalog: generally give 2-6 units if BG > 200.  1 pen generally lasts him \"a while. \" Has never filled it at the pharmacy.    - Has not picked up [address] [place] [address]. A/P:  - Recommend Pt take metformin with food to minimize GI side effects.   - Confirmed with Lissette Kim. Novolog preferred on formulary. 1 box (5 pens) = $30.  This is affordable for the patient. - Will send in Novolog.   - Patient endorses understanding to the provided information. All questions were answered at this time.        Thank you,  Carmela Valenzuela, PharmD, CDE

## 2019-08-23 NOTE — PERIOP NOTES
PAT call to patient and wife. Wife reports cruise to [facility] THE [facility], [address], and [facility] 2/2019. Pt with probably exposure to TB, seeing pulm and ID. Will begin tx 9/2019. Call to [facility] RN infection control. TB questionnaire negative except for exposure. Ok to proceed with planned procedure.

## 2019-08-23 NOTE — PERIOP NOTES
[facility]  Ambulatory Surgery Unit  Pre-operative Instructions    Procedure Date  8/23/19            Tentative Arrival Time 1500      1. On the day of your procedure, please report to the Ambulatory Surgery Unit Registration Desk and sign in at your designated time. The Ambulatory Surgery Unit is located in [facility] on the [facility] side of the [facility] across from the [address] [place] [address]. Please have all of your health insurance cards and a photo ID. 2. You must have someone with you to drive you home as directed by your surgeon. 3. You may have a light breakfast and take normal morning medications. 4. We recommend you do not drink any alcoholic beverages for 24 hours before and after your procedure. 5. Contact your surgeons office for instructions on the following medications: non-steroidal anti-inflammatory drugs (i.e. Advil, Aleve), vitamins, and supplements. (Some surgeons will want you to stop these medications prior to surgery and others may allow you to take them)   **If you are currently taking Plavix, Coumadin, Aspirin and/or other blood-thinning agents, contact your surgeon for instructions. ** Your surgeon will partner with the physician prescribing these medications to determine if it is safe to stop or if you need to continue taking. Please do not stop taking these medications without instructions from your surgeon. 6. In an effort to help prevent surgical site infection, we ask that you shower with an anti-bacterial soap (i.e. Dial or Safeguard) on the morning of your procedure. Do not apply any lotions, powders, or deodorants after showering. 7. Wear comfortable clothes. Wear glasses instead of contacts. Do not bring any jewelry or money (other than copays or fees as instructed). Do not wear make-up, particularly mascara, the morning of your procedure. Wear your hair loose or down, no ponytails, buns, dante pins or clips.  All body piercings must be removed. 8. You should understand that if you do not follow these instructions your procedure may be cancelled. If your physical condition changes (i.e. fever, cold or flu) please contact your surgeon as soon as possible. 9. It is important that you be on time. If a situation occurs where you may be late, or if you have any questions or problems, please call [phone].    10. Your procedure time may be subject to change. You will receive a phone call the day prior to confirm your arrival time. I understand a pre-operative phone call will be made to verify my procedure time. In the event that I am not available, I give permission for a message to be left on my answering service and/or with another person?       yes    Reviewed by phone with wife, verbalized understanding     ___________________      ___________________      ___________________  (Signature of Patient)          (Witness)                   (Date and Time)

## 2019-08-27 ENCOUNTER — HOSPITAL ENCOUNTER (OUTPATIENT)
Age: 79
Setting detail: OUTPATIENT SURGERY
Discharge: HOME | End: 2019-08-27
Attending: PHYSICAL MEDICINE & REHABILITATION | Admitting: PHYSICAL MEDICINE & REHABILITATION
Payer: MEDICARE

## 2019-08-27 ENCOUNTER — APPOINTMENT (OUTPATIENT)
Dept: GENERAL RADIOLOGY | Age: 79
End: 2019-08-27
Attending: PHYSICAL MEDICINE & REHABILITATION
Payer: MEDICARE

## 2019-08-27 VITALS
TEMPERATURE: 98 F | OXYGEN SATURATION: 96 % | HEART RATE: 78 BPM | BODY MASS INDEX: 28.79 KG/M2 | SYSTOLIC BLOOD PRESSURE: 115 MMHG | HEIGHT: 69 IN | RESPIRATION RATE: 18 BRPM | DIASTOLIC BLOOD PRESSURE: 81 MMHG | WEIGHT: 194.38 LBS

## 2019-08-27 LAB
GLUCOSE BLD STRIP.AUTO-MCNC: 111 MG/DL (ref 65–100)
GLUCOSE BLD STRIP.AUTO-MCNC: 126 MG/DL (ref 65–100)
SERVICE CMNT-IMP: ABNORMAL
SERVICE CMNT-IMP: ABNORMAL

## 2019-08-27 PROCEDURE — 76000 FLUOROSCOPY <1 HR PHYS/QHP: CPT

## 2019-08-27 PROCEDURE — 76030000002 HC AMB SURG OR TIME FIRST 0.: Performed by: PHYSICAL MEDICINE & REHABILITATION

## 2019-08-27 PROCEDURE — 76210000046 HC AMBSU PH II REC FIRST 0.5 HR: Performed by: PHYSICAL MEDICINE & REHABILITATION

## 2019-08-27 PROCEDURE — 72020 X-RAY EXAM OF SPINE 1 VIEW: CPT

## 2019-08-27 PROCEDURE — 74011250636 HC RX REV CODE- 250/636: Performed by: PHYSICAL MEDICINE & REHABILITATION

## 2019-08-27 PROCEDURE — 77030003666 HC NDL SPINAL BD -A: Performed by: PHYSICAL MEDICINE & REHABILITATION

## 2019-08-27 PROCEDURE — 74011636320 HC RX REV CODE- 636/320: Performed by: PHYSICAL MEDICINE & REHABILITATION

## 2019-08-27 PROCEDURE — 82962 GLUCOSE BLOOD TEST: CPT

## 2019-08-27 PROCEDURE — A9579 GAD-BASE MR CONTRAST NOS,1ML: HCPCS | Performed by: PHYSICAL MEDICINE & REHABILITATION

## 2019-08-27 RX ORDER — BUPIVACAINE HYDROCHLORIDE 5 MG/ML
10 INJECTION, SOLUTION EPIDURAL; INTRACAUDAL ONCE
Status: DISCONTINUED | OUTPATIENT
Start: 2019-08-27 | End: 2019-08-27 | Stop reason: HOSPADM

## 2019-08-27 RX ORDER — METHYLPREDNISOLONE ACETATE 40 MG/ML
80 INJECTION, SUSPENSION INTRA-ARTICULAR; INTRALESIONAL; INTRAMUSCULAR; SOFT TISSUE ONCE
Status: COMPLETED | OUTPATIENT
Start: 2019-08-27 | End: 2019-08-27

## 2019-08-27 RX ORDER — LIDOCAINE HYDROCHLORIDE 20 MG/ML
5 INJECTION, SOLUTION EPIDURAL; INFILTRATION; INTRACAUDAL; PERINEURAL ONCE
Status: COMPLETED | OUTPATIENT
Start: 2019-08-27 | End: 2019-08-27

## 2019-08-27 NOTE — PERIOP NOTES
Permission received to review discharge instructions and discuss private health information with wife Herminio Bernardkareem

## 2019-08-27 NOTE — PERIOP NOTES
Pt arrived to PACU. PT denies pain. VSS. Neuro:  Push/Pull assessment:                 LUE Response: strong               LLE Response: moderate push/moderate pull              RUE Response: strong               RLE Response: moderate push/moderate pull    Pt standing. Denies numbness or weakness in legs. Discharge instructions reviewed with pt. Pt meets discharge cirtiera.

## 2019-08-27 NOTE — PERIOP NOTES
Skin assessment:   WNL   Skin color: wnl for ethnicity    Skin condition: wnl for age   Skin integrity: wnl for age   Turgor: wnl for age    Neuro:  Push/Pull assessment:     LUE Response: strong    LLE Response: moderate push/moderate pull   RUE Response: strong    RLE Response: moderate push/moderate pull    OB/Gyn assessment:    LMP: male    If no menstrual period then reason:

## 2019-08-27 NOTE — OP NOTES
Epidural Steroid Injection Operative Report    Indications: This is a [de-identified] y.o. male who presents with low back pain. He was positive for LS DDD. The patient was admitted for surgery as conservative measures have failed. Date of Surgery: 8/27/2019    Preoperative Diagnosis: LS DDD    Postoperative Diagnosis: LS DDD    Surgeon(s) and Role:     * Brooke Adams MD - Primary     Procedure:  Procedure(s):  BILATEARL L5 TFESI    Procedure in Detail:  After appropriate informed consent was obtained, the patient was taken to the operating suite and placed in the prone position on the operating table on appropriate padding. The LS region was prepped and draped in the usual sterile fashion. Intraoperative fluoroscopy was used to localize the LS spine. The skin was infiltrated with 2% lidocaine. An 22-g needle was advanced into the Oniel L4 neuroforamen under fluoroscopic guidance. A small amount of contrast was injected into the epidural space, confirming appropriate needle placement on fluoroscopy. Next, 2ml of 2% lidocaine and 80mg of Depo-Medrol were injected. The needle was removed from the patient. The patient was then turned back into the supine position on the stretcher and was taken to the Recovery Room in stable condition.     Estimated Blood Loss:  none     Specimens: None       Drains: None          Complications:  None    Signed By: Micheline Posey MD                        August 27, 2019

## 2019-08-27 NOTE — PERIOP NOTES
Amari John  1940  [id number]    Situation:  Verbal report given from: Stephany Rowell RN  Procedure: Procedure(s):  BILATEARL L5 TFESI    Background:    Preoperative diagnosis: DDD LUMBAR    Postoperative diagnosis: DDD LUMBAR    :  Dr. Darryle Hales    Assistant(s): Circ-1: Esperanza Boo RN  Local Nurse Monitor: Ajit Mcgowan RN  Surg Asst-1: Tennille Guillen    Specimens: * No specimens in log *    Assessment:  Intra-procedure medications         Anesthesia gave intra-procedure sedation and medications, see anesthesia flow sheet     Intravenous fluids: LR@ KVO     Vital signs stable       Recommendation:    Permission to share finding with : no

## 2019-08-27 NOTE — DISCHARGE INSTRUCTIONS
Dr. Robert Burt Discharge Instructions  Caudal Epidural Steroid Injection    You had a caudal epidural steroid injection. You will probably have some numbness, and possibly weakness, in your leg for the next 6 to 8 hours. The steroids will slowly become effective, reducing your pain, over the next 2 weeks. You should begin feeling better after a few days, but it may take up to 2 weeks to notice the difference. The benefit you get from your injection will last a variable amount of time, depending on the severity of your lumbar spine problem.  Pain: Most people do not have any increase in pain after this injection. However, you might experience some soreness in your low back. If this happens, putting an ice pack over the sore area will help.  Bandage: You will have a small bandage covering the site of the injection. You may remove it once you get home.  Restrictions: Someone should drive you home after the injection. After that, you have no restrictions. You need to be careful while walking, as you may still have some numbness or weakness in your leg. You may resume your normal level of activity. You may take a shower or bath, and you may eat normally. You should continue your current exercises and/or therapy routine.   Medications: Continue your current medications as prescribed. If your pain decreases, you may reduce the amount of your pain medicines. If you stopped taking anticoagulants or blood-thinners before the injection, start them tomorrow. If you have diabetes, your blood sugar may be elevated for a few days. Call your primary doctor with any questions.   Call Dr. Robert Burt at [phone] if you experience:   Fever (101 degrees Fahrenheit or greater)   Nausea or vomiting   Headache unrelieved by your normal pain medicine   Redness or swelling at the injection site that lasts more than 1 day   New numbness, tingling, weakness, or pain that you didnt have before the injection    Follow-up appointment:   If still having pain in 1-2 weeks, call office at [phone] for a follow up appointment. DISCHARGE SUMMARY from Nurse    The following personal items collected during your admission are returned to you:   Dental Appliance: Dental Appliances: Uppers, Lowers, Partials  Vision: Visual Aid: Glasses  Hearing Aid:    Jewelry: Jewelry: Ring, Watch, Bracelet(one ring, one watch, and one braclet given to wife)  Clothing: Clothing: With patient, Hat(hat given to wife)  Other Valuables: Other Valuables: Eyeglasses, Cane(given to wife)  Valuables sent to safe: If you were given prescriptions, please review the written information on prescribed medications. · You will receive a Post Operative Call from one of the Recovery Room Nurses on the day after your surgery to check on you. It is very important for us to know how you are recovering after your surgery. · You may receive an e-mail or letter in the mail from [employer] regarding your experience with us in the Ambulatory Surgery Unit. Your feedback is valuable to us and we appreciate your participation in the survey. If you have not had your influenza or pneumococcal vaccines, please follow up with your primary care physician. The discharge information has been reviewed with the patient. The patient verbalized understanding.

## 2019-08-27 NOTE — H&P
Procedural Case Note    8/27/2019    (3:35 PM)    Tomas Mukherjeet    1940   ([de-identified] y.o.)    [id number]    CC:  pain    ROS:   Complete ROS obtained, no CP, no SOB, no N or V    PMH:     Past Medical History:   Diagnosis Date    AAA (abdominal aortic aneurysm) (HCC)     Cancer (HCC)     r shoulder melanoma    Colon polyp     COPD     Diabetes ([facility]     Diarrhea 1/22/2016    Dysphagia 4/4/2017    Emphysema lung ([facility]     Exposure to TB     2/2019 cruise, probably exposure to TB, beginning TC 9/2019    GERD (gastroesophageal reflux disease)     Hypertension     Ill-defined condition     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, arms    NPH (normal pressure hydrocephalus) 2014    shunt    RA (rheumatoid arthritis) ([facility]     Scarring     [facility] 2014    L arm weakness    TIA (transient ischemic attack) 11/2018       ALLERGIES:     Allergies   Allergen Reactions    Ceclor [Cefaclor] Swelling    Contrast Agent [Iodine] Hives and Itching    Floxin [Ofloxacin] Hives    Gabapentin Other (comments)     Causes confusion    Ivp [Fd And C Blue No.1] Itching       MEDS:     Current Facility-Administered Medications   Medication Dose Route Frequency    bupivacaine (PF) (MARCAINE) 0.5 % (5 mg/mL) injection 50 mg  10 mL Epidural ONCE    gadopentetate dimeglumine (MAGNEVIST) 10 mmol/20 mL (469.01 mg/mL) contrast solution 20 mL  20 mL IntraVENous ONCE    lidocaine (PF) (XYLOCAINE) 20 mg/mL (2 %) injection 100 mg  5 mL Epidural ONCE    methylPREDNISolone acetate (DEPO-MEDROL) 40 mg/mL injection 80 mg  80 mg Epidural ONCE    sodium bicarbonate (4%) (NEUT) injection 1 mL  1 mL SubCUTAneous ONCE          Visit Vitals  Ht 5' 9\" (1.753 m)   Wt 90.7 kg (200 lb)   BMI 29.53 kg/m²     PE:  Gen: NAD  Head: normocephalic  Heart: RRR  Lungs: CTA tom  Abd: NT, ND, soft  Neuro: awake and alert  Skin: intact    IMPRESSION:   LS DDD    Note:  The clinical status was discussed in detail with the patient.   The procedure was again discussed and described in detail. All understand and accept the planned procedure and risks; reject other forms of treatment. All questions are answered.     Willy Tai MD

## 2019-09-23 NOTE — PERIOP NOTES
[facility]  Ambulatory Surgery Unit  Pre-operative Instructions    Procedure Date  09/24            Tentative Arrival Time 4658      1. On the day of your procedure, please report to the Ambulatory Surgery Unit Registration Desk and sign in at your designated time. The Ambulatory Surgery Unit is located in [facility] on the [facility] side of the [facility] across from the [address] [place] [address]. Please have all of your health insurance cards and a photo ID. 2. You must have someone with you to drive you home as directed by your surgeon. 3. You may have a light breakfast and take normal morning medications. 4. We recommend you do not drink any alcoholic beverages for 24 hours before and after your procedure. 5. Contact your surgeons office for instructions on the following medications: non-steroidal anti-inflammatory drugs (i.e. Advil, Aleve), vitamins, and supplements. (Some surgeons will want you to stop these medications prior to surgery and others may allow you to take them)   **If you are currently taking Plavix, Coumadin, Aspirin and/or other blood-thinning agents, contact your surgeon for instructions. ** Your surgeon will partner with the physician prescribing these medications to determine if it is safe to stop or if you need to continue taking. Please do not stop taking these medications without instructions from your surgeon. 6. In an effort to help prevent surgical site infection, we ask that you shower with an anti-bacterial soap (i.e. Dial or Safeguard) on the morning of your procedure. Do not apply any lotions, powders, or deodorants after showering. 7. Wear comfortable clothes. Wear glasses instead of contacts. Do not bring any jewelry or money (other than copays or fees as instructed). Do not wear make-up, particularly mascara, the morning of your procedure. Wear your hair loose or down, no ponytails, buns, dante pins or clips.  All body piercings must be removed. 8. You should understand that if you do not follow these instructions your procedure may be cancelled. If your physical condition changes (i.e. fever, cold or flu) please contact your surgeon as soon as possible. 9. It is important that you be on time. If a situation occurs where you may be late, or if you have any questions or problems, please call [phone].    10. Your procedure time may be subject to change. You will receive a phone call the day prior to confirm your arrival time. I understand a pre-operative phone call will be made to verify my procedure time. In the event that I am not available, I give permission for a message to be left on my answering service and/or with another person?       yes         ___________________      ___________________      ___________________  (Signature of Patient)          (Witness)                   (Date and Time)

## 2019-09-24 ENCOUNTER — APPOINTMENT (OUTPATIENT)
Dept: GENERAL RADIOLOGY | Age: 79
End: 2019-09-24
Attending: PHYSICAL MEDICINE & REHABILITATION
Payer: MEDICARE

## 2019-09-24 ENCOUNTER — HOSPITAL ENCOUNTER (OUTPATIENT)
Age: 79
Setting detail: OUTPATIENT SURGERY
Discharge: HOME | End: 2019-09-24
Attending: PHYSICAL MEDICINE & REHABILITATION | Admitting: PHYSICAL MEDICINE & REHABILITATION
Payer: MEDICARE

## 2019-09-24 VITALS
DIASTOLIC BLOOD PRESSURE: 68 MMHG | WEIGHT: 189 LBS | OXYGEN SATURATION: 95 % | RESPIRATION RATE: 14 BRPM | HEART RATE: 73 BPM | SYSTOLIC BLOOD PRESSURE: 136 MMHG | TEMPERATURE: 98.4 F | BODY MASS INDEX: 27.99 KG/M2 | HEIGHT: 69 IN

## 2019-09-24 LAB
GLUCOSE BLD STRIP.AUTO-MCNC: 123 MG/DL (ref 65–100)
GLUCOSE BLD STRIP.AUTO-MCNC: 142 MG/DL (ref 65–100)
SERVICE CMNT-IMP: ABNORMAL
SERVICE CMNT-IMP: ABNORMAL

## 2019-09-24 PROCEDURE — 76000 FLUOROSCOPY <1 HR PHYS/QHP: CPT

## 2019-09-24 PROCEDURE — 82962 GLUCOSE BLOOD TEST: CPT

## 2019-09-24 PROCEDURE — 74011000250 HC RX REV CODE- 250: Performed by: PHYSICAL MEDICINE & REHABILITATION

## 2019-09-24 PROCEDURE — 77030003666 HC NDL SPINAL BD -A: Performed by: PHYSICAL MEDICINE & REHABILITATION

## 2019-09-24 PROCEDURE — 76210000046 HC AMBSU PH II REC FIRST 0.5 HR: Performed by: PHYSICAL MEDICINE & REHABILITATION

## 2019-09-24 PROCEDURE — 74011250636 HC RX REV CODE- 250/636: Performed by: PHYSICAL MEDICINE & REHABILITATION

## 2019-09-24 PROCEDURE — 76030000002 HC AMB SURG OR TIME FIRST 0.: Performed by: PHYSICAL MEDICINE & REHABILITATION

## 2019-09-24 PROCEDURE — 72020 X-RAY EXAM OF SPINE 1 VIEW: CPT

## 2019-09-24 RX ORDER — LIDOCAINE HYDROCHLORIDE 20 MG/ML
5 INJECTION, SOLUTION EPIDURAL; INFILTRATION; INTRACAUDAL; PERINEURAL ONCE
Status: COMPLETED | OUTPATIENT
Start: 2019-09-24 | End: 2019-09-24

## 2019-09-24 RX ORDER — BUPIVACAINE HYDROCHLORIDE 5 MG/ML
10 INJECTION, SOLUTION EPIDURAL; INTRACAUDAL ONCE
Status: COMPLETED | OUTPATIENT
Start: 2019-09-24 | End: 2019-09-24

## 2019-09-24 RX ORDER — METHYLPREDNISOLONE ACETATE 40 MG/ML
80 INJECTION, SUSPENSION INTRA-ARTICULAR; INTRALESIONAL; INTRAMUSCULAR; SOFT TISSUE ONCE
Status: COMPLETED | OUTPATIENT
Start: 2019-09-24 | End: 2019-09-24

## 2019-09-24 NOTE — H&P
Procedural Case Note    9/24/2019    (1:17 PM)    Raymundo Apo    1940   ([de-identified] y.o.)    [id number]    CC:  pain    ROS:   Complete ROS obtained, no CP, no SOB, no N or V    PMH:     Past Medical History:   Diagnosis Date    AAA [facility]ab[facility]) [facility]     Followed by Dr Bull Levi as of 05/13/19 3.9 x 4.1    Cancer ([facility]     r shoulder melanoma    Colon polyp     COPD     Diabetes ([facility]     Diarrhea 1/22/2016    Dysphagia 4/4/2017    Emphysema lung ([facility]     Exposure to TB     2/2019 cruise, probably exposure to TB, beginning TC 9/2019    GERD (gastroesophageal reflux disease)     Hypertension     Ill-defined condition     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, arms    NPH (normal pressure hydrocephalus) 2014    shunt    RA (rheumatoid arthritis) ([facility]     Scarring     Stroke ([facility] 2014    L arm weakness states not a stroke states it is a TIA     TIA (transient ischemic attack) 11/2018       ALLERGIES:     Allergies   Allergen Reactions    Ceclor [Cefaclor] Swelling    Contrast Agent [Iodine] Hives and Itching    Floxin [Ofloxacin] Hives    Gabapentin Other (comments)     Causes confusion    Ivp [Fd And C Blue No.1] Itching       MEDS:     No current facility-administered medications for this encounter. Visit Vitals  Ht 5' 9\" (1.753 m)   Wt 86.2 kg (190 lb)   BMI 28.06 kg/m²     PE:  Gen: NAD  Head: normocephalic  Heart: RRR  Lungs: CTA tom  Abd: NT, ND, soft  Neuro: awake and alert  Skin: intact    IMPRESSION:   LS DJD    Note:  The clinical status was discussed in detail with the patient. The procedure was again discussed and described in detail. All understand and accept the planned procedure and risks; reject other forms of treatment. All questions are answered.     Marques Shea MD

## 2019-09-24 NOTE — DISCHARGE INSTRUCTIONS
Discharge Instructions    Lumbar Facet Block/Medial Branch Block    You had a lumbar facet injection today. You will probably have some numbness in your lower back area for the next 6-8 hours. The steroids will slowly become effective, reducing your pain, over the next 2 weeks. You should begin feeling better after a few days, but it may take up to 2 weeks to notice the difference. The benefit you get from your injection will last a variable amount of time, depending on the severity of your spine problem. If the results you experience are significant, but not lasting a long time, you may be a candidate for a procedure that can be longer lasting (radiofrequency ablation of the nerves innervating the facet joints).  Pain:  Most people do not have any increase in pain after this injection. However, you might experience some soreness at the site of the injection. If this happens, putting an icepack over the sore area will help.  Bandage: You have a small bandage covering the site of the injection. You may remove it when you get home.  Restrictions:  Someone should drive you home after the injection. After that, you have no restrictions. You may resume your normal level of activity. You may take a shower or bath, and you may eat normally. You should continue your current exercised and/or therapy routine.  Medications:  Continue your current medications as prescribed. If your pain decreases, you may reduce the amount of your pain medicines. If you stopped taking anticoagulants or blood-thinners before the injection, start them tomorrow. If you have diabetes, your blood sugar may be elevated for a few days. Call your primary doctor with any questions.     Call Dr. Dionne Allison at [phone] if you experience:   Fever (101 degrees Fahrenheit or greater)   Nausea or vomiting   Headache unrelieved by your normal pain medicine   Redness or swelling at the injection site that lasts more than 1 day   New numbness, tingling, weakness, or pain that you didnt have before the injection     If still having pain in 1-2 weeks, call office at [phone] for a follow up appointment. DISCHARGE SUMMARY from Nurse    The following personal items collected during your admission are returned to you:   Dental Appliance:    Vision:    Hearing Aid:    Jewelry:    Clothing:    Other Valuables:    Valuables sent to safe: If you were given prescriptions, please review the written information on prescribed medications. · You will receive a Post Operative Call from one of the Recovery Room Nurses on the day after your surgery to check on you. It is very important for us to know how you are recovering after your surgery. · You may receive an e-mail or letter in the mail from [employer] regarding your experience with us in the Ambulatory Surgery Unit. Your feedback is valuable to us and we appreciate your participation in the survey. If you have not had your influenza or pneumococcal vaccines, please follow up with your primary care physician. The discharge information has been reviewed with the patient. The patient verbalized understanding.

## 2019-09-24 NOTE — PERIOP NOTES
1403 Pt arrived via stretcher into PACU from OR procedure with Dr. Divina Catalan awake and alert and pain free. Received report from RN. 1410 Discharge instructions complete    1412 Blood Sugar 123    1413 Pt. Alert. Denies pain or chill. Discharge instructions reviewed with caregiver and patient. Allowed and answered any and all questions. Tolerating PO fluids. Patient states ready for discharge Confirmed all belongings with patient

## 2019-09-24 NOTE — PERIOP NOTES
Amador Nassar  1940  [id number]    Situation:  Verbal report given from: RICK Gunderson RN  Procedure: Procedure(s):  BILATERAL L3-4, L4-5, L5-S1 FACET INJECTION    Background:    Preoperative diagnosis: OA LUMBAR SPINE    Postoperative diagnosis: OA LUMBAR SPINE    :  Dr. Curtis Bradley    Assistant(s): Circ-1: Alondra Nicole RN  Local Nurse Monitor: Wayne MARTINEZ  Scrub Tech-1: Hanna Tadeo    Specimens: * No specimens in log *    Assessment:  Intra-procedure medications         Anesthesia gave intra-procedure sedation and medications, see anesthesia flow sheet     Intravenous fluids: LR@ KVO     Vital signs stable       Recommendation:    Permission to share finding with self : yes

## 2019-11-12 ENCOUNTER — APPOINTMENT (OUTPATIENT)
Dept: GENERAL RADIOLOGY | Age: 79
End: 2019-11-12
Attending: PHYSICAL MEDICINE & REHABILITATION
Payer: MEDICARE

## 2019-11-12 ENCOUNTER — HOSPITAL ENCOUNTER (OUTPATIENT)
Age: 79
Setting detail: OUTPATIENT SURGERY
Discharge: HOME | End: 2019-11-12
Attending: PHYSICAL MEDICINE & REHABILITATION | Admitting: PHYSICAL MEDICINE & REHABILITATION
Payer: MEDICARE

## 2019-11-12 VITALS
HEIGHT: 69 IN | OXYGEN SATURATION: 100 % | SYSTOLIC BLOOD PRESSURE: 147 MMHG | HEART RATE: 76 BPM | RESPIRATION RATE: 20 BRPM | WEIGHT: 188 LBS | DIASTOLIC BLOOD PRESSURE: 88 MMHG | BODY MASS INDEX: 27.85 KG/M2 | TEMPERATURE: 98.6 F

## 2019-11-12 LAB
GLUCOSE BLD STRIP.AUTO-MCNC: 144 MG/DL (ref 65–100)
SERVICE CMNT-IMP: ABNORMAL

## 2019-11-12 PROCEDURE — 72020 X-RAY EXAM OF SPINE 1 VIEW: CPT

## 2019-11-12 PROCEDURE — 77030003666 HC NDL SPINAL BD -A: Performed by: PHYSICAL MEDICINE & REHABILITATION

## 2019-11-12 PROCEDURE — 76000 FLUOROSCOPY <1 HR PHYS/QHP: CPT

## 2019-11-12 PROCEDURE — 76210000046 HC AMBSU PH II REC FIRST 0.5 HR: Performed by: PHYSICAL MEDICINE & REHABILITATION

## 2019-11-12 PROCEDURE — 76030000002 HC AMB SURG OR TIME FIRST 0.: Performed by: PHYSICAL MEDICINE & REHABILITATION

## 2019-11-12 PROCEDURE — 77030003665 HC NDL SPN BBMI -A: Performed by: PHYSICAL MEDICINE & REHABILITATION

## 2019-11-12 PROCEDURE — 82962 GLUCOSE BLOOD TEST: CPT

## 2019-11-12 PROCEDURE — 74011000250 HC RX REV CODE- 250: Performed by: PHYSICAL MEDICINE & REHABILITATION

## 2019-11-12 PROCEDURE — 74011250636 HC RX REV CODE- 250/636: Performed by: PHYSICAL MEDICINE & REHABILITATION

## 2019-11-12 PROCEDURE — A9579 GAD-BASE MR CONTRAST NOS,1ML: HCPCS | Performed by: PHYSICAL MEDICINE & REHABILITATION

## 2019-11-12 PROCEDURE — 74011636320 HC RX REV CODE- 636/320: Performed by: PHYSICAL MEDICINE & REHABILITATION

## 2019-11-12 RX ORDER — BUPIVACAINE HYDROCHLORIDE 5 MG/ML
5 INJECTION, SOLUTION EPIDURAL; INTRACAUDAL ONCE
Status: DISCONTINUED | OUTPATIENT
Start: 2019-11-12 | End: 2019-11-12 | Stop reason: HOSPADM

## 2019-11-12 RX ORDER — LIDOCAINE HYDROCHLORIDE 20 MG/ML
7 INJECTION, SOLUTION INFILTRATION; PERINEURAL ONCE
Status: COMPLETED | OUTPATIENT
Start: 2019-11-12 | End: 2019-11-12

## 2019-11-12 RX ORDER — METHYLPREDNISOLONE ACETATE 40 MG/ML
40 INJECTION, SUSPENSION INTRA-ARTICULAR; INTRALESIONAL; INTRAMUSCULAR; SOFT TISSUE ONCE
Status: COMPLETED | OUTPATIENT
Start: 2019-11-12 | End: 2019-11-12

## 2019-11-12 NOTE — PERIOP NOTES
Casey Citizen  1940  [id number]    Situation:  Verbal report given from: RICK Gunderson RN  Procedure: Procedure(s):  BILATERAL L5 TRANSFORAMINAL EPIDURAL STEROID INJECTION    Background:    Preoperative diagnosis: DEGENERATIVE DISC DISEASE    Postoperative diagnosis: DEGENERATIVE DISC DISEASE    :  Dr. Alisha Bird:  Intra-procedure medications, procedure, and allergies reviewed        Recommendation:    Discharge patient home after discharge instructions reviewed with patient. Rest until local has worn off.

## 2019-11-12 NOTE — H&P
Procedural Case Note    11/12/2019    (3:49 PM)    Gomez Lot    1940   ([de-identified] y.o.)    [id number]    CC:  pain    ROS:   Complete ROS obtained, no CP, no SOB, no N or V    PMH:     Past Medical History:   Diagnosis Date    AAA (abdominal aortic aneurysm) ([facility]     Followed by Dr Roverto Ramirez as of 05/13/19 3.9 x 4.1    Cancer ([facility]     r shoulder melanoma    Colon polyp     COPD     Diabetes ([facility]     Diarrhea 1/22/2016    Dysphagia 4/4/2017    Emphysema lung ([facility]     Exposure to TB     2/2019 cruise, probably exposure to TB, beginning TC 9/2019    GERD (gastroesophageal reflux disease)     Hypertension     Ill-defined condition     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, arms    NPH (normal pressure hydrocephalus) ([facility] 2014    shunt    RA (rheumatoid arthritis) ([facility]     Scarring     [facility]    L arm weakness states not a stroke states it is a TIA     TIA (transient ischemic attack) 11/2018       ALLERGIES:     Allergies   Allergen Reactions    Ceclor [Cefaclor] Swelling    Contrast Agent [Iodine] Hives and Itching    Floxin [Ofloxacin] Hives    Gabapentin Other (comments)     Causes confusion    Ivp [Fd And C Blue No.1] Itching       MEDS:     No current facility-administered medications for this encounter. Visit Vitals  /82 (BP 1 Location: Right arm, BP Patient Position: At rest)   Pulse 80   Temp 98.6 °F (37 °C)   Resp 17   Ht 5' 9\" (1.753 m)   Wt 85.3 kg (188 lb)   SpO2 97%   BMI 27.76 kg/m²     PE:  Gen: NAD  Head: normocephalic  Heart: RRR  Lungs: CTA tom  Abd: NT, ND, soft  Neuro: awake and alert  Skin: intact    IMPRESSION:   LS DDD    Note:  The clinical status was discussed in detail with the patient. The procedure was again discussed and described in detail. All understand and accept the planned procedure and risks; reject other forms of treatment. All questions are answered.     Derick Yadav MD

## 2019-11-12 NOTE — PERIOP NOTES
Permission received to review discharge instructions and discuss private health information with Casper Page, wife.

## 2019-11-12 NOTE — DISCHARGE INSTRUCTIONS
Dr. Joel Watson Discharge Instructions  Transforaminal Epidural Steroid Injection/ Selective Nerve Block    You had a transforaminal epidural steroid injection/ selective nerve block today. You will probably have some numbness, and possibly weakness, in your leg for the next 6 to 8 hours. The steroids will slowly become effective, reducing your pain, over the next 2 weeks. You should begin feeling better after a few days, but it may take up to 2 weeks to notice the difference. The benefit you get from your injection will last a variable amount of time, depending on the severity of your lumbar spine problem.  Pain: Most people do not have any increase in pain after this injection. However, you might experience some soreness in your low back. If this happens, putting an ice pack over the sore area will help.  Bandage: You will have a small bandage covering the site of the injection. You may remove it once you get home.  Restrictions: Someone should drive you home after the injection. After that, you have no restrictions. You need to be careful while walking, as you may still have some numbness or weakness in your leg. You may resume your normal level of activity. You may take a shower or bath, and you may eat normally. You should continue your current exercises and/or therapy routine.   Medications: Continue your current medications as prescribed. If your pain decreases, you may reduce the amount of your pain medicines. If you stopped taking anticoagulants or blood-thinners before the injection, start them tomorrow. If you have diabetes, your blood sugar may be elevated for a few days. Call your primary doctor with any questions.   Call Dr. Joel Watson at [phone] if you experience:   Fever (101 degrees Fahrenheit or greater)   Nausea or vomiting   Headache unrelieved by your normal pain medicine   Redness or swelling at the injection site that lasts more than 1 day   New numbness, tingling, weakness, or pain that you didnt have before the injection    Follow-up appointment:   If still having pain in 1-2 weeks, call office at [phone] for a follow up appointment. DISCHARGE SUMMARY from Nurse    The following personal items collected during your admission are returned to you:   Dental Appliance: Dental Appliances: Uppers, Partials, Lowers  Vision:    Hearing Aid:    Jewelry: Jewelry: Watch, Ring, With patient, Bracelet  Clothing: Clothing: With patient  Other Valuables: Other Valuables: None  Valuables sent to safe: If you were given prescriptions, please review the written information on prescribed medications. · You will receive a Post Operative Call from one of the Recovery Room Nurses on the day after your surgery to check on you. It is very important for us to know how you are recovering after your surgery. · You may receive an e-mail or letter in the mail from [employer] regarding your experience with us in the Ambulatory Surgery Unit. Your feedback is valuable to us and we appreciate your participation in the survey. If you have not had your influenza or pneumococcal vaccines, please follow up with your primary care physician. The discharge information has been reviewed with the patient. The patient verbalized understanding. DISCHARGE SUMMARY from Nurse    The following personal items collected during your admission are returned to you:   Dental Appliance: Dental Appliances: Uppers, Partials, Lowers  Vision:    Hearing Aid:    Jewelry: Jewelry: Watch, Ring, With patient, Bracelet  Clothing: Clothing: With patient  Other Valuables: Other Valuables: None  Valuables sent to safe: If you were given prescriptions, please review the written information on prescribed medications. · You will receive a Post Operative Call from one of the Recovery Room Nurses on the day after your surgery to check on you.  It is very important for us to know how you are recovering after your surgery. · You may receive an e-mail or letter in the mail from [place] regarding your experience with us in the Ambulatory Surgery Unit. Your feedback is valuable to us and we appreciate your participation in the survey. If you have not had your influenza or pneumococcal vaccines, please follow up with your primary care physician. The discharge information has been reviewed with the patient and caregiver. The patient and caregiver verbalized understanding.

## 2019-11-12 NOTE — PERIOP NOTES
Skin assessment:   WNL   Skin color: WNL   Skin condition: WNL   Skin integrity: WNL   Turgor:  WNL    Neuro:  Push/Pull assessment:     LUE Response: equal/strong    LLE Response: equal push/pull   RUE Response: equal/strong    RLE Response: equal push/pull

## 2019-11-12 NOTE — OP NOTES
Epidural Steroid Injection Operative Report    Indications: This is a [de-identified] y.o. male who presents with low back pain. He was positive for LS DDD. The patient was admitted for surgery as conservative measures have failed. Date of Surgery: 11/12/2019    Preoperative Diagnosis: LS DDD    Postoperative Diagnosis: LS DDD    Surgeon(s) and Role:     * Maria C Grant MD - Primary     Procedure:  Procedure(s):  BILATERAL L5 TRANSFORAMINAL EPIDURAL STEROID INJECTION    Procedure in Detail:  After appropriate informed consent was obtained, the patient was taken to the operating suite and placed in the prone position on the operating table on appropriate padding. The LS region was prepped and draped in the usual sterile fashion. Intraoperative fluoroscopy was used to localize the LS spine. The skin was infiltrated with 2% lidocaine. An 22-g needle was advanced into the Oniel L5 neuroforamen under fluoroscopic guidance. A small amount of contrast was injected into the epidural space, confirming appropriate needle placement on fluoroscopy. Next, 2ml of 2% lidocaine and 80mg of Depo-Medrol were injected. The needle was removed from the patient. The patient was then turned back into the supine position on the stretcher and was taken to the Recovery Room in stable condition.     Estimated Blood Loss:  none     Specimens: None       Drains: None          Complications:  None    Signed By: Derick Yadav MD                        November 12, 2019

## 2020-01-16 ENCOUNTER — TELEPHONE (OUTPATIENT)
Dept: INTERNAL MEDICINE CLINIC | Age: 80
End: 2020-01-16

## 2020-01-16 NOTE — TELEPHONE ENCOUNTER
----- Message from Lorenzo Toth sent at 1/16/2020 11:29 AM EST -----  Regarding: Dr. Castrejon Mar: [phone]  Appointment not available    Caller's first and last name and relationship to patient (if not the patient):Torri Gonzalez/wife      Best contact number:[phone]      Preferred date and time:January 28,29 or 30 or week of February 10      Scheduled appointment date and time:      Reason for appointment:diabetes check up      Details to clarify the request:      Lorenzo Toth

## 2020-01-16 NOTE — TELEPHONE ENCOUNTER
Called and spoke with pt's wife, Ragini Ellis. Appointment scheduled for 1/30 @ 130 with Dr. Carly Gracia verbalized understanding of information discussed w/ no further questions at this time.

## 2020-01-30 ENCOUNTER — OFFICE VISIT (OUTPATIENT)
Dept: INTERNAL MEDICINE CLINIC | Age: 80
End: 2020-01-30

## 2020-01-30 VITALS
TEMPERATURE: 97.6 F | RESPIRATION RATE: 16 BRPM | BODY MASS INDEX: 28.79 KG/M2 | DIASTOLIC BLOOD PRESSURE: 70 MMHG | HEART RATE: 85 BPM | HEIGHT: 69 IN | OXYGEN SATURATION: 96 % | WEIGHT: 194.4 LBS | SYSTOLIC BLOOD PRESSURE: 113 MMHG

## 2020-01-30 DIAGNOSIS — G91.2 NPH (NORMAL PRESSURE HYDROCEPHALUS) (HCC): ICD-10-CM

## 2020-01-30 DIAGNOSIS — Z12.11 SCREEN FOR COLON CANCER: ICD-10-CM

## 2020-01-30 DIAGNOSIS — E11.9 TYPE 2 DIABETES MELLITUS WITHOUT COMPLICATION, WITHOUT LONG-TERM CURRENT USE OF INSULIN (HCC): ICD-10-CM

## 2020-01-30 DIAGNOSIS — I71.40 AAA (ABDOMINAL AORTIC ANEURYSM) WITHOUT RUPTURE: ICD-10-CM

## 2020-01-30 DIAGNOSIS — M06.9 RHEUMATOID ARTHRITIS INVOLVING MULTIPLE SITES, UNSPECIFIED RHEUMATOID FACTOR PRESENCE: ICD-10-CM

## 2020-01-30 DIAGNOSIS — G45.9 TIA (TRANSIENT ISCHEMIC ATTACK): ICD-10-CM

## 2020-01-30 DIAGNOSIS — E11.69 HYPERLIPIDEMIA ASSOCIATED WITH TYPE 2 DIABETES MELLITUS (HCC): ICD-10-CM

## 2020-01-30 DIAGNOSIS — Z22.7 TB LUNG, LATENT: Primary | ICD-10-CM

## 2020-01-30 DIAGNOSIS — E78.5 HYPERLIPIDEMIA ASSOCIATED WITH TYPE 2 DIABETES MELLITUS (HCC): ICD-10-CM

## 2020-01-30 PROBLEM — Z79.4 TYPE 2 DIABETES MELLITUS WITH HYPERGLYCEMIA, WITH LONG-TERM CURRENT USE OF INSULIN (HCC): Chronic | Status: RESOLVED | Noted: 2017-04-13 | Resolved: 2020-01-30

## 2020-01-30 PROBLEM — E11.65 TYPE 2 DIABETES MELLITUS WITH HYPERGLYCEMIA, WITH LONG-TERM CURRENT USE OF INSULIN (HCC): Chronic | Status: RESOLVED | Noted: 2017-04-13 | Resolved: 2020-01-30

## 2020-01-30 LAB — HBA1C MFR BLD HPLC: 6.5 %

## 2020-01-30 NOTE — PROGRESS NOTES
Chavo Obrien is a [de-identified] y.o. male who presents for evaluation of routine follow up. Last seen by me may 3, 2019 in [facility]. Had recently at that time been dx with latent tb, and rheum, neurology, and ID were trying to decide on treatment. Apparently they never decided on anything, as he never started any meds. He would like to see a different ID doctor, as he and wife did not care for dr Darrin Null office. All in all, doing pretty good. Had  shunt adjusted recently for his NPH by neurosx, dr odell, at [facility]. Trying to walk more, but is inconsistent. Legs remain weak, though has not had any falls.   Tries to check sugars each morning--was 160 this am.      ROS:  Constitutional: negative for fevers, chills, anorexia and weight loss  Eyes:   negative for visual disturbance and irritation  ENT:   negative for tinnitus,sore throat,nasal congestion,ear pain,hoarseness  Respiratory:  negative for cough, hemoptysis, dyspnea,wheezing  CV:   negative for chest pain, palpitations, lower extremity edema  GI:   negative for nausea, vomiting, diarrhea, abdominal pain,melena  Genitourinary: negative for frequency, dysuria and hematuria  Musculoskel: negative for myalgias, arthralgias, back pain, muscle weakness, joint pain  Neurological:  negative for headaches, dizziness, focal weakness, numbness  Psychiatric:     Negative for depression or anxiety      Past Medical History:   Diagnosis Date    AAA (abdominal aortic aneurysm) ([address]     Followed by Dr Eduardo Daly as of 05/13/19 3.9 x 4.1    Cancer ([address]     r shoulder melanoma    Colon polyp     COPD     Diabetes ([address]     Diarrhea 1/22/2016    Dysphagia 4/4/2017    Emphysema lung ([address]     Exposure to TB     2/2019 cruise, probably exposure to TB, beginning TC 9/2019    GERD (gastroesophageal reflux disease)     Hypertension     Ill-defined condition     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, arms    NPH (normal pressure hydrocephalus) ([address] 2014    shunt    RA [facility]     Scarring     Stroke ([facility] 2014    L arm weakness states not a stroke states it is a TIA     TIA (transient ischemic attack) 11/2018       Past Surgical History:   Procedure Laterality Date    HX CHOLECYSTECTOMY  04/18/2017    HX GI      PEG placed and removed    HX HEENT      bilat cataract    HX ORTHOPAEDIC Left     Femur     HX OTHER SURGICAL      r shoulder melenoma excision    RI COLSC FLX W/RMVL OF TUMOR POLYP LESION SNARE TQ  4/28/2011         RI EGD TRANSORAL BIOPSY SINGLE/MULTIPLE  4/28/2011         VASCULAR SURGERY PROCEDURE UNLIST      stent femoral-left leg    VASCULAR SURGERY PROCEDURE UNLIST  2014    right AV shunt       Family History   Adopted: Yes       Social History     Socioeconomic History    Marital status:      Spouse name: Not on file    Number of children: Not on file    Years of education: Not on file    Highest education level: Not on file   Occupational History    Not on file   Social Needs    Financial resource strain: Not on file    Food insecurity:     Worry: Not on file     Inability: Not on file    Transportation needs:     Medical: Not on file     Non-medical: Not on file   Tobacco Use    Smoking status: Former Smoker    Smokeless tobacco: Never Used   Substance and Sexual Activity    Alcohol use:  Yes     Alcohol/week: 5.8 standard drinks     Types: 7 Shots of liquor per week    Drug use: No    Sexual activity: Not Currently   Lifestyle    Physical activity:     Days per week: Not on file     Minutes per session: Not on file    Stress: Not on file   Relationships    Social connections:     Talks on phone: Not on file     Gets together: Not on file     Attends Scientologist service: Not on file     Active member of club or organization: Not on file     Attends meetings of clubs or organizations: Not on file     Relationship status: Not on file    Intimate partner violence:     Fear of current or ex partner: Not on file Emotionally abused: Not on file     Physically abused: Not on file     Forced sexual activity: Not on file   Other Topics Concern    Not on file   Social History Narrative    Not on file            Visit Vitals  /70 (BP 1 Location: Left arm, BP Patient Position: Sitting)   Pulse 85   Temp 97.6 °F (36.4 °C) (Oral)   Resp 16   Ht 5' 9\" (1.753 m)   Wt 194 lb 6.4 oz (88.2 kg)   SpO2 96%   BMI 28.71 kg/m²       Physical Examination:   General - Well appearing male  HEENT - PERRL, TM no erythema/opacification, normal nasal turbinates, no oropharyngeal erythema or exudate, MMM  Neck - supple, no bruits, no thyroidomegaly, no lymphadenopathy  Pulm - clear to auscultation bilaterally  Cardio - RRR, normal S1 S2, no murmur  Abd - soft, nontender, no masses, no HSM  Extrem - no edema, +2 distal pulses  Neuro-  No focal deficits, CN intact         Diabetic foot exam performed by Landy Garzon III, DO     Measurement  Response Nurse Comment Physician Comment   Monofilament  R - reduced sensation with micro filament  L - reduced sensation with micro filament     Pulse DP R - present  L - present     Pulse TP R - present  L - present     Structural deformity R - None  L - None     Skin Integrity / Deformity R - None  L - None        Reviewed by:              Assessment/Plan:    1.  Dm, type 2--on glucophage. Check a1c, last was 7.8. Down to 6.5 today  2.  tia--on plavix  3.  nph with  shunt--follows with n/s at [facility]. Had it adjusted recently  4.  htn--controlled with diltiazem  5.  hyperlipids--on pravachol  6. RA--on humira, MTX  7. Latent tb--referral to dr Rachel Kwong. Has not yet been on any treatment  8. Copd--on spiriva, dr martinez  9. bph--on flomax  10. Hx AAA--  11.  Cervical radiculopathy--received steroid injection in next that helped. Fit cards given today. rtc 6 months for [facility].         Landy Garzon III DO

## 2020-01-30 NOTE — PATIENT INSTRUCTIONS
Office Policies    Phone calls/patient messages:            Please allow up to 24 hours for someone in the office to contact you about your call or message. Be mindful your provider may be out of the office or your message may require further review. We encourage you to use [employer] for your messages as this is a faster, more efficient way to communicate with our office                         Medication Refills:            Prescription medications require 48-72 business hours to process. We encourage you to use [employer] for your refills. For controlled medications: Please allow 72 business hours to process. Certain medications may require you to  a written prescription at our office. NO narcotic/controlled medications will be prescribed after 4pm Monday through Friday or on weekends              Form/Paperwork Completion:            Please note a $25 fee may incur for all paperwork for completed by our providers. We ask that you allow 7-10 business days. Pre-payment is due prior to picking up/faxing the completed form. You may also download your forms to [employer] to have your doctor print off. Diabetes Foot Health: Care Instructions  Your Care Instructions    When you have diabetes, your feet need extra care and attention. Diabetes can damage the nerve endings and blood vessels in your feet, making you less likely to notice when your feet are injured. Diabetes also limits your body's ability to fight infection and get blood to areas that need it. If you get a minor foot injury, it could become an ulcer or a serious infection. With good foot care, you can prevent most of these problems. Caring for your feet can be quick and easy. Most of the care can be done when you are bathing or getting ready for bed. Follow-up care is a key part of your treatment and safety. Be sure to make and go to all appointments, and call your doctor if you are having problems.  It's also a good idea to know your test results and keep a list of the medicines you take. How can you care for yourself at home? · Keep your blood sugar close to normal by watching what and how much you eat, monitoring blood sugar, taking medicines if prescribed, and getting regular exercise. · Do not smoke. Smoking affects blood flow and can make foot problems worse. If you need help quitting, talk to your doctor about stop-smoking programs and medicines. These can increase your chances of quitting for good. · Eat a diet that is low in fats. High fat intake can cause fat to build up in your blood vessels and decrease blood flow. · Inspect your feet daily for blisters, cuts, cracks, or sores. If you cannot see well, use a mirror or have someone help you. · Take care of your feet:  ? Wash your feet every day. Use warm (not hot) water. Check the water temperature with your wrists or other part of your body, not your feet. ? Dry your feet well. Pat them dry. Do not rub the skin on your feet too hard. Dry well between your toes. If the skin on your feet stays moist, bacteria or a fungus can grow, which can lead to infection. ? Keep your skin soft. Use moisturizing skin cream to keep the skin on your feet soft and prevent calluses and cracks. But do not put the cream between your toes, and stop using any cream that causes a rash. ? Clean underneath your toenails carefully. Do not use a sharp object to clean underneath your toenails. Use the blunt end of a nail file or other rounded tool. ? Trim and file your toenails straight across to prevent ingrown toenails. Use a nail clipper, not scissors. Use an emery board to smooth the edges. · Change socks daily. Socks without seams are best, because seams often rub the feet. You can find socks for people with diabetes from specialty catalogs. · Look inside your shoes every day for things like gravel or torn linings, which could cause blisters or sores.   · Buy shoes that fit well:  ? Look for shoes that have plenty of space around the toes. This helps prevent bunions and blisters. ? Try on shoes while wearing the kind of socks you will usually wear with the shoes. ? Avoid plastic shoes. They may rub your feet and cause blisters. Good shoes should be made of materials that are flexible and breathable, such as leather or cloth. ? Break in new shoes slowly by wearing them for no more than an hour a day for several days. Take extra time to check your feet for red areas, blisters, or other problems after you wear new shoes. · Do not go barefoot. Do not wear sandals, and do not wear shoes with very thin soles. Thin soles are easy to puncture. They also do not protect your feet from hot pavement or cold weather. · Have your doctor check your feet during each visit. If you have a foot problem, see your doctor. Do not try to treat an early foot problem at home. Home remedies or treatments that you can buy without a prescription (such as corn removers) can be harmful. · Always get early treatment for foot problems. A minor irritation can lead to a major problem if not properly cared for early. When should you call for help? Call your doctor now or seek immediate medical care if:    · You have a foot sore, an ulcer or break in the skin that is not healing after 4 days, bleeding corns or calluses, or an ingrown toenail.     · You have blue or black areas, which can mean bruising or blood flow problems.     · You have peeling skin or tiny blisters between your toes or cracking or oozing of the skin.     · You have a fever for more than 24 hours and a foot sore.     · You have new numbness or tingling in your feet that does not go away after you move your feet or change positions.     · You have unexplained or unusual swelling of the foot or ankle.    Watch closely for changes in your health, and be sure to contact your doctor if:    · You cannot do proper foot care. Where can you learn more?   Go to http://yannick-helen.info/. Enter A739 in the search box to learn more about \"Diabetes Foot Health: Care Instructions. \"  Current as of: April 16, 2019  Content Version: 12.2  © 9281-1584 [employer]. Care instructions adapted under license by [employer] (which disclaims liability or warranty for this information). If you have questions about a medical condition or this instruction, always ask your healthcare professional. [place] [address] any warranty or liability for your use of this information. Look into 'silver sneachocos program' at [employer].

## 2020-01-30 NOTE — PROGRESS NOTES
Reviewed record in preparation for visit and have obtained necessary documentation. Identified pt with two pt identifiers(name and ). Chief Complaint   Patient presents with   [facility] Diabetes       Health Maintenance Due   Topic Date Due    Shingrix Vaccine Age 49> (1 of 2) 1989    FOOT EXAM Q1  2018    GLAUCOMA SCREENING Q2Y  10/17/2019    EYE EXAM RETINAL OR DILATED  10/17/2019    HEMOGLOBIN A1C Q6M  2019       Mr. Abraham Gonzalez has a reminder for a \"due or due soon\" health maintenance. I have asked that he discuss health maintenance topic(s) due with His  primary care provider. Coordination of Care Questionnaire:  :     1) Have you been to an emergency room, urgent care clinic since your last visit? no   Hospitalized since your last visit? no             2) Have you seen or consulted any other health care providers outside of [address] [place] [address] since your last visit? yes  (Include any pap smears or colon screenings in this section.)    3) Do you have an Advance Directive on file? no    4) Are you interested in receiving information on Advance Directives? NO    Patient is accompanied by self I have received verbal consent from Laquita Bateman to discuss any/all medical information while they are present in the room.

## 2020-02-10 ENCOUNTER — TELEPHONE (OUTPATIENT)
Dept: FAMILY MEDICINE CLINIC | Age: 80
End: 2020-02-10

## 2020-02-10 NOTE — TELEPHONE ENCOUNTER
----- Message from Arias Gao sent at 2/10/2020  4:28 PM EST -----  Regarding: /telephone  General Message/Vendor Calls    Caller's first and last name: Ge Holly, pt's wife. Reason for call: The pt's wife stated the pt does have TB and needs to be scheduled.       Callback required yes/no and why: yes      Best contact number(s):[phone]

## 2020-02-19 ENCOUNTER — TELEPHONE (OUTPATIENT)
Dept: FAMILY MEDICINE CLINIC | Age: 80
End: 2020-02-19

## 2020-02-19 NOTE — TELEPHONE ENCOUNTER
Patient wife came in advised that she called into the call center on 02.10.20 & 02.13.20. Following up on the records.   Wanting to get his treatments started      Best contact number to reach out wife Cyndy Bustamante  [phone]

## 2020-02-21 ENCOUNTER — DOCUMENTATION ONLY (OUTPATIENT)
Dept: FAMILY MEDICINE CLINIC | Age: 80
End: 2020-02-21

## 2020-02-21 NOTE — TELEPHONE ENCOUNTER
Verified patient with two types of identifiers. Spoke to wife and she states that the RA MD was previously taking care of him for this. She is going to contact their office and have them fax over the records for Dr Carmen Galaviz to review.

## 2020-02-21 NOTE — PROGRESS NOTES
I dont see records re. Latent TB in his CC chart . Could you request records from Rheumatology &  ID Dr Eric Valenzuela ?

## 2020-02-28 ENCOUNTER — TELEPHONE (OUTPATIENT)
Dept: INTERNAL MEDICINE CLINIC | Age: 80
End: 2020-02-28

## 2020-02-28 NOTE — TELEPHONE ENCOUNTER
Received triage call from pt's wife, Anders Bains. Anders Bains stated that pt is c/o severe left shoulder pain that radiates to his lower arm. Pt apparently received a cortisone shot from his RA doctor last week that helped but is now in severe pain. Pt has been trying to tylenol for pain for one week but tylenol is not helping. Appointment scheduled for 3/2 @ 2 with Dr. Jerome Bustillo verbalized understanding of information discussed w/ no further questions at this time.

## 2020-03-02 ENCOUNTER — OFFICE VISIT (OUTPATIENT)
Dept: INTERNAL MEDICINE CLINIC | Age: 80
End: 2020-03-02

## 2020-03-02 VITALS
RESPIRATION RATE: 16 BRPM | WEIGHT: 197 LBS | TEMPERATURE: 97.7 F | DIASTOLIC BLOOD PRESSURE: 71 MMHG | SYSTOLIC BLOOD PRESSURE: 149 MMHG | HEIGHT: 69 IN | HEART RATE: 89 BPM | OXYGEN SATURATION: 97 % | BODY MASS INDEX: 29.18 KG/M2

## 2020-03-02 DIAGNOSIS — G91.2 NPH (NORMAL PRESSURE HYDROCEPHALUS) (HCC): ICD-10-CM

## 2020-03-02 DIAGNOSIS — J42 CHRONIC BRONCHITIS, UNSPECIFIED CHRONIC BRONCHITIS TYPE (HCC): ICD-10-CM

## 2020-03-02 DIAGNOSIS — E11.9 TYPE 2 DIABETES MELLITUS WITHOUT COMPLICATION, WITHOUT LONG-TERM CURRENT USE OF INSULIN (HCC): ICD-10-CM

## 2020-03-02 DIAGNOSIS — E11.69 HYPERLIPIDEMIA ASSOCIATED WITH TYPE 2 DIABETES MELLITUS (HCC): ICD-10-CM

## 2020-03-02 DIAGNOSIS — M25.512 ACUTE PAIN OF LEFT SHOULDER: Primary | ICD-10-CM

## 2020-03-02 DIAGNOSIS — E78.5 HYPERLIPIDEMIA ASSOCIATED WITH TYPE 2 DIABETES MELLITUS (HCC): ICD-10-CM

## 2020-03-02 DIAGNOSIS — Z22.7 TB LUNG, LATENT: ICD-10-CM

## 2020-03-02 DIAGNOSIS — M06.9 RHEUMATOID ARTHRITIS INVOLVING MULTIPLE SITES, UNSPECIFIED RHEUMATOID FACTOR PRESENCE: ICD-10-CM

## 2020-03-02 RX ORDER — BUDESONIDE AND FORMOTEROL FUMARATE DIHYDRATE 80; 4.5 UG/1; UG/1
2 AEROSOL RESPIRATORY (INHALATION) 2 TIMES DAILY
Qty: 3 INHALER | Refills: 3 | Status: SHIPPED | OUTPATIENT
Start: 2020-03-02 | End: 2020-07-23 | Stop reason: SDUPTHER

## 2020-03-02 RX ORDER — FLUOROURACIL 50 MG/G
CREAM TOPICAL DAILY
COMMUNITY
Start: 2020-02-12 | End: 2020-07-08

## 2020-03-02 RX ORDER — BUDESONIDE AND FORMOTEROL FUMARATE DIHYDRATE 80; 4.5 UG/1; UG/1
2 AEROSOL RESPIRATORY (INHALATION) 2 TIMES DAILY
Qty: 1 INHALER | Refills: 0 | Status: SHIPPED | COMMUNITY
Start: 2020-03-02 | End: 2020-07-08

## 2020-03-02 RX ORDER — DICLOFENAC SODIUM 10 MG/G
GEL TOPICAL
Qty: 100 G | Refills: 3 | Status: SHIPPED | OUTPATIENT
Start: 2020-03-02

## 2020-03-02 NOTE — PROGRESS NOTES
Reviewed record in preparation for visit and have obtained necessary documentation. Identified pt with two pt identifiers(name and ). Chief Complaint   Patient presents with    Shoulder Pain     left    Cough     productive       Health Maintenance Due   Topic Date Due    Shingrix Vaccine Age 49> (1 of 2) 1989       Mr. Byron Jenkins has a reminder for a \"due or due soon\" health maintenance. I have asked that he discuss health maintenance topic(s) due with His  primary care provider. Coordination of Care Questionnaire:  :     1) Have you been to an emergency room, urgent care clinic since your last visit? no   Hospitalized since your last visit? no             2) Have you seen or consulted any other health care providers outside of [address] [place] [address] since your last visit? no  (Include any pap smears or colon screenings in this section.)    3) Do you have an Advance Directive on file? no    4) Are you interested in receiving information on Advance Directives? NO    Patient is accompanied by self I have received verbal consent from Feliciano Vidal to discuss any/all medical information while they are present in the room.

## 2020-03-02 NOTE — PROGRESS NOTES
Pharmacy Progress Note - Medication/Device Education     S/O: Mr. Feliciano Vidal is a [de-identified] y.o., with a PMH of COPD, was referred by Shannon Smith DO for medication/device teaching today. Accompanied by his wife today. Medication/Product:  Symbicort 80/4.5 mcg    - Wife reports that patient does not fully expirate. - Hx of using Spiriva inhaler and albuterol inhaler. Recent increased need of albuterol. 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, arms    NPH (normal pressure hydrocephalus) ([address] 2014    shunt    RA (rheumatoid arthritis) ([address]     Scarring     Stroke ([address] 2014    L arm weakness states not a stroke states it is a TIA     TIA (transient ischemic attack) 11/2018     Allergies   Allergen Reactions    Ceclor [Cefaclor] Swelling    Contrast Agent [Iodine] Hives and Itching    Floxin [Ofloxacin] Hives    Gabapentin Other (comments)     Causes confusion    Ivp [Fd And C Blue No.1] Itching     Current Outpatient Medications   Medication Sig    fluorouraciL (EFUDEX) 5 % chemo cream Apply  to affected area daily.  budesonide-formoteroL (SYMBICORT) 80-4.5 mcg/actuation HFAA Take 2 Puffs by inhalation two (2) times a day.  budesonide-formoteroL (SYMBICORT) 80-4.5 mcg/actuation HFAA Take 2 Puffs by inhalation two (2) times a day.  diclofenac (VOLTAREN) 1 % gel Use to left shoulder up to 4x daily.     pravastatin (PRAVACHOL) 80 mg tablet TAKE ONE TABLET BY MOUTH EVERY NIGHT    dilTIAZem (CARDIZEM) 60 mg tablet TAKE 1 TABLET TWICE A DAY    insulin aspart U-100 (NOVOLOG) 100 unit/mL (3 mL) inpn 2-6 Units by SubCUTAneous route Before breakfast and dinner. Sliding scale: 201-250= 2 units; 251-300 =4 units; 301-350 = 6 units    tamsulosin (FLOMAX) 0.4 mg capsule TAKE 1 CAPSULE 30 MINUTES AFTER THE SAME MEAL DAILY    metFORMIN ER (GLUCOPHAGE XR) 500 mg tablet Take 1 Tab by mouth two (2) times daily (with meals).  clopidogrel (PLAVIX) 75 mg tab Take 75 mg by mouth daily.  esomeprazole (NEXIUM) 40 mg capsule TAKE ONE CAPSULE BY MOUTH EVERY DAY    Insulin Needles, Disposable, 31 gauge x 5/16\" ndle Use to inject insulin daily subcutaneus. Dx e11.9    fluticasone (FLONASE) 50 mcg/actuation nasal spray 2 Sprays by Both Nostrils route daily as needed.  acetaminophen (TYLENOL) 500 mg tablet Take 500 mg by mouth every six (6) hours as needed for Pain.  albuterol sulfate (PROVENTIL;VENTOLIN) 2.5 mg/0.5 mL nebu nebulizer solution 2.5 mg by Nebulization route every four (4) hours as needed for Wheezing.  methotrexate (RHEUMATREX) 2.5 mg tablet Take 10 mg by mouth. Every Wednesday and Thursday    adalimumab (HUMIRA) 40 mg/0.8 mL injection 40 mg by SubCUTAneous route every fourteen (14) days.  folic acid (FOLVITE) 1 mg tablet Take 1 mg by mouth daily.  tiotropium (SPIRIVA WITH HANDIHALER) 18 mcg inhalation capsule Take 1 Cap by inhalation daily. No current facility-administered medications for this visit. A/P:  - Review Symbicort's role in COPD management with regards to his current inhaler therapy. - Reviewed Symbicort's side effects. Rinse mouth after each dose. - Discussed medication's priming, inhaler technique. Patient confirmed understanding using the teach back method    Resources/samples provided:   - Symbicort 80/4.5 mcg      Patient verbalized understanding of the information presented and all of the patients questions were answered. AVS was handed to the patient.  Patient advised to call the office with any additional questions or concerns. Notifications of recommendations will be sent to Dr. Clint Iglesias DO for review.     Thank you for the consult,  Carmela Amor, PharmD, BCACP, CDE

## 2020-03-02 NOTE — PROGRESS NOTES
Emily Yeager is a [de-identified] y.o. male who presents for evaluation of left shoulder pain, as well as cough and sob. Last seen by me jan 30, 2020. Shoulder has been bothering him for about a month now. He saw rheumatology, and dr Sapphrie Borjas gave him a steroid injection, but it did not seem to help much. Also having increased cough and some wheezing. Has been taking his spiriva daily, but also having to use his proair about 4x daily. Has never been on any steroid inhalers. ROS:  Constitutional: negative for fevers, chills, anorexia and weight loss  Eyes:   negative for visual disturbance and irritation  ENT:   negative for tinnitus,sore throat,nasal congestion,ear pain,hoarseness  Respiratory:  negative for cough, hemoptysis, dyspnea,wheezing  CV:   negative for chest pain, palpitations, lower extremity edema  GI:   negative for nausea, vomiting, diarrhea, abdominal pain,melena  Genitourinary: negative for frequency, dysuria and hematuria  Musculoskel: negative for myalgias, arthralgias, back pain, muscle weakness.   ++left shoulder joint pain  Neurological:  negative for headaches, dizziness, focal weakness, numbness  Psychiatric:     Negative for depression or anxiety      Past Medical History:   Diagnosis Date    AAA (abdominal aortic aneurysm) ([address]     Followed by Dr Alize Blair as of 05/13/19 3.9 x 4.1    Cancer ([address]     r shoulder melanoma    Colon polyp     COPD     Diabetes ([address]     Diarrhea 1/22/2016    Dysphagia 4/4/2017    Emphysema lung ([address]     Exposure to TB     2/2019 cruise, probably exposure to TB, beginning TC 9/2019    GERD (gastroesophageal reflux disease)     Hypertension     Ill-defined condition     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, arms    NPH (normal pressure hydrocephalus) ([address] 2014    shunt    RA (rheumatoid arthritis) ([address]     Scarring     Stroke ([address] 2014    L arm weakness states not a stroke states it is a TIA     TIA (transient ischemic attack) 11/2018       Past Surgical History:   Procedure Laterality Date    HX CHOLECYSTECTOMY  04/18/2017    HX GI      PEG placed and removed    HX HEENT      bilat cataract    HX ORTHOPAEDIC Left     Femur     HX OTHER SURGICAL      r shoulder melenoma excision    [place] COLSC FLX W/RMVL OF TUMOR POLYP LESION SNARE TQ  4/28/2011         [place] EGD TRANSORAL BIOPSY SINGLE/MULTIPLE  4/28/2011         VASCULAR SURGERY PROCEDURE UNLIST      stent femoral-left leg    VASCULAR SURGERY PROCEDURE UNLIST  2014    right AV shunt       Family History   Adopted: Yes       Social History     Socioeconomic History    Marital status:      Spouse name: Not on file    Number of children: Not on file    Years of education: Not on file    Highest education level: Not on file   Occupational History    Not on file   Social Needs    Financial resource strain: Not on file    Food insecurity:     Worry: Not on file     Inability: Not on file    Transportation needs:     Medical: Not on file     Non-medical: Not on file   Tobacco Use    Smoking status: Former Smoker    Smokeless tobacco: Never Used   Substance and Sexual Activity    Alcohol use:  Yes     Alcohol/week: 5.8 standard drinks     Types: 7 Shots of liquor per week    Drug use: No    Sexual activity: Not Currently   Lifestyle    Physical activity:     Days per week: Not on file     Minutes per session: Not on file    Stress: Not on file   Relationships    Social connections:     Talks on phone: Not on file     Gets together: Not on file     Attends Mandaeism service: Not on file     Active member of club or organization: Not on file     Attends meetings of clubs or organizations: Not on file     Relationship status: Not on file    Intimate partner violence:     Fear of current or ex partner: Not on file     Emotionally abused: Not on file     Physically abused: Not on file     Forced sexual activity: Not on file   Other Topics Concern    Not on file   Social History Narrative    Not on file            Visit Vitals  /71 (BP 1 Location: Right arm, BP Patient Position: Sitting)   Pulse 89   Temp 97.7 °F (36.5 °C) (Oral)   Resp 16   Ht 5' 9\" (1.753 m)   Wt 197 lb (89.4 kg)   SpO2 97%   BMI 29.09 kg/m²       Physical Examination:   General - Well appearing male  HEENT - PERRL, TM no erythema/opacification, normal nasal turbinates, no oropharyngeal erythema or exudate, MMM  Neck - supple, no bruits, no thyroidomegaly, no lymphadenopathy  Pulm - clear to auscultation bilaterally  Cardio - RRR, normal S1 S2, no murmur  Abd - soft, nontender, no masses, no HSM  Extrem - no edema, +2 distal pulses  Neuro-  No focal deficits, CN intact     Assessment/Plan:    1. Left shoulder pain--rx for diclofenac cream.  Continue with PT  2. Copd--continue spiriva. Add symbicort. Samples given  3. Hx latent tb--still waiting to hear from ID, dr robertson  4.  nph with  shunt--follows with neurosx at [facility]  5. bph--on flomax  6.  hyperlipids--on pravachol  7. Hx tia--on plavix  8.  htn--continue diltiazem  9. RA--on humira, mtx    rtc 3 months, due for AWV then.         Alaina Morgan III, DO

## 2020-03-02 NOTE — PATIENT INSTRUCTIONS
Office Policies    Phone calls/patient messages:            Please allow up to 24 hours for someone in the office to contact you about your call or message. Be mindful your provider may be out of the office or your message may require further review. We encourage you to use [employer] for your messages as this is a faster, more efficient way to communicate with our office                         Medication Refills:            Prescription medications require 48-72 business hours to process. We encourage you to use [employer] for your refills. For controlled medications: Please allow 72 business hours to process. Certain medications may require you to  a written prescription at our office. NO narcotic/controlled medications will be prescribed after 4pm Monday through Friday or on weekends              Form/Paperwork Completion:            Please note a $25 fee may incur for all paperwork for completed by our providers. We ask that you allow 7-10 business days. Pre-payment is due prior to picking up/faxing the completed form. You may also download your forms to [employer] to have your doctor print off. Shoulder Stretches: Exercises  Introduction  Here are some examples of exercises for you to try. The exercises may be suggested for a condition or for rehabilitation. Start each exercise slowly. Ease off the exercises if you start to have pain. You will be told when to start these exercises and which ones will work best for you. How to do the exercises  Shoulder stretch    1.  a doorway and place one arm against the door frame. Your elbow should be a little higher than your shoulder. 2. Relax your shoulders as you lean forward, allowing your chest and shoulder muscles to stretch. You can also turn your body slightly away from your arm to stretch the muscles even more. 3. Hold for 15 to 30 seconds. 4. Repeat 2 to 4 times with each arm. Shoulder and chest stretch    1.  Shoulder and chest stretch  2. While sitting, relax your upper body so you slump slightly in your chair. 3. As you breathe in, straighten your back and open your arms out to the sides. 4. Gently pull your shoulder blades back and downward. 5. Hold for 15 to 30 seconds as your breathe normally. 6. Repeat 2 to 4 times. Overhead stretch    1. Reach up over your head with both arms. 2. Hold for 15 to 30 seconds. 3. Repeat 2 to 4 times. Follow-up care is a key part of your treatment and safety. Be sure to make and go to all appointments, and call your doctor if you are having problems. It's also a good idea to know your test results and keep a list of the medicines you take. Where can you learn more? Go to http://yannick-helen.info/. Enter S254 in the search box to learn more about \"Shoulder Stretches: Exercises. \"  Current as of: June 26, 2019  Content Version: 12.2  © 0977-3358 [employer]. Care instructions adapted under license by [employer] (which disclaims liability or warranty for this information). If you have questions about a medical condition or this instruction, always ask your healthcare professional. [address] any warranty or liability for your use of this information. Let me know if you need an additional referral to work with PT for your shoulder.

## 2020-04-15 RX ORDER — METFORMIN HYDROCHLORIDE 500 MG/1
TABLET, EXTENDED RELEASE ORAL
Qty: 180 TAB | Refills: 3 | Status: SHIPPED | OUTPATIENT
Start: 2020-04-15 | End: 2022-01-27 | Stop reason: SINTOL

## 2020-07-08 NOTE — PERIOP NOTES
Pt gave name and , along with procedure being performed and then gave verbal permission to continue call with his wife.    Tony Saavedra emailed to PCP.

## 2020-07-08 NOTE — PERIOP NOTES
[facility]  Ambulatory Surgery Unit  Pre-operative Instructions for Endo Procedures    Procedure Date  Friday, July 17, 2020            Tentative Arrival Time 0845      1. On the day of your procedure, please report to the Ambulatory Surgery Unit Registration Desk and sign in at your designated time. The Ambulatory Surgery Unit is located in [facility] on the [facility] side of the [facility] across from the [facility]. Please have all of your health insurance cards and a photo ID. 2. You must have someone with you to drive you home, as you should not drive a car for 24 hours following anesthesia. Please make arrangements for a responsible adult friend or family member to stay with you for at least the first 24 hours after your procedure. 3. Do not have anything to eat or drink (including water, gum, mints, coffee, juice) after 11:59 PM, Thursday. This may not apply to medications prescribed by your physician. (Please note below the special instructions with medications to take the morning of your procedure.)    4. If applicable, follow the clear liquid diet and bowel prep instructions provided by your physician's office. If you do not have this information, or have any questions, please contact your physician's office. 5. We recommend you do not drink any alcoholic beverages for 24 hours before and after your procedure. 6. Contact your surgeons office for instructions on the following medications: non-steroidal anti-inflammatory drugs (i.e. Advil, Aleve), vitamins, and supplements. (Some surgeons will want you to stop these medications prior to surgery and others may allow you to take them)   **If you are currently taking Plavix, Coumadin, Aspirin and/or other blood-thinning agents, contact your surgeon for instructions. ** Your surgeon will partner with the physician prescribing these medications to determine if it is safe to stop or if you need to continue taking. Please do not stop taking these medications without instructions from your surgeon. 7. In an effort to help prevent surgical site infection, we ask that you shower with an anti-bacterial soap (i.e. Dial or Safeguard) on the morning of your procedure. Do not apply any lotions, powders, or deodorants after showering. 8. Wear comfortable clothes. Wear glasses instead of contacts. Do not bring any jewelry or money (other than copays or fees as instructed). Do not wear make-up, particularly mascara, the morning of your procedure. Wear your hair loose or down, no ponytails, buns, dante pins or clips. All body piercings must be removed. 9. You should understand that if you do not follow these instructions your procedure may be cancelled. If your physical condition changes (i.e. fever, cold or flu) please contact your surgeon as soon as possible. 10. It is important that you be on time. If a situation occurs where you may be late, or if you have any questions or problems, please call [phone]. 11. Your procedure time may be subject to change. You will receive a phone call the day prior to confirm your arrival time. Special Instructions: Take all medications and inhalers, as prescribed, on the morning of surgery with a sip of water EXCEPT: no diabetic medications day of surgery. Insulin Dependent Diabetic patients: Take your diabetic medications as prescribed the day before surgery. Hold all diabetic medications the day of surgery. If you are scheduled to arrive for surgery after 8:00 AM, and your AM blood sugar is >200, please call Ambulatory Surgery. I understand a pre-operative phone call will be made to verify my procedure time. In the event that I am not available, I give permission for a message to be left on my answering service and/or with another person?       yes    Preop instructions reviewed  Pt verbalized understanding.      ___________________      ___________________ ___________________  (Signature of Patient)          (Witness)                   (Date and Time)

## 2020-07-13 ENCOUNTER — HOSPITAL ENCOUNTER (OUTPATIENT)
Dept: PREADMISSION TESTING | Age: 80
Discharge: HOME | End: 2020-07-13
Payer: MEDICARE

## 2020-07-13 PROCEDURE — 87635 SARS-COV-2 COVID-19 AMP PRB: CPT

## 2020-07-15 LAB
SARS-COV-2, COV2NT: NOT DETECTED
SOURCE, COVRS: NORMAL
SPECIMEN SOURCE, FCOV2M: NORMAL

## 2020-07-16 ENCOUNTER — ANESTHESIA EVENT (OUTPATIENT)
Dept: SURGERY | Age: 80
End: 2020-07-16
Payer: MEDICARE

## 2020-07-16 RX ORDER — SODIUM CHLORIDE 0.9 % (FLUSH) 0.9 %
5-40 SYRINGE (ML) INJECTION AS NEEDED
Status: CANCELLED | OUTPATIENT
Start: 2020-07-16

## 2020-07-16 RX ORDER — ONDANSETRON 2 MG/ML
4 INJECTION INTRAMUSCULAR; INTRAVENOUS AS NEEDED
Status: CANCELLED | OUTPATIENT
Start: 2020-07-16

## 2020-07-16 RX ORDER — FENTANYL CITRATE 50 UG/ML
25 INJECTION, SOLUTION INTRAMUSCULAR; INTRAVENOUS
Status: CANCELLED | OUTPATIENT
Start: 2020-07-16

## 2020-07-16 RX ORDER — SODIUM CHLORIDE, SODIUM LACTATE, POTASSIUM CHLORIDE, CALCIUM CHLORIDE 600; 310; 30; 20 MG/100ML; MG/100ML; MG/100ML; MG/100ML
25 INJECTION, SOLUTION INTRAVENOUS CONTINUOUS
Status: CANCELLED | OUTPATIENT
Start: 2020-07-16

## 2020-07-16 RX ORDER — SODIUM CHLORIDE 0.9 % (FLUSH) 0.9 %
5-40 SYRINGE (ML) INJECTION EVERY 8 HOURS
Status: CANCELLED | OUTPATIENT
Start: 2020-07-16

## 2020-07-16 RX ORDER — DIPHENHYDRAMINE HYDROCHLORIDE 50 MG/ML
12.5 INJECTION, SOLUTION INTRAMUSCULAR; INTRAVENOUS AS NEEDED
Status: CANCELLED | OUTPATIENT
Start: 2020-07-16 | End: 2020-07-16

## 2020-07-17 ENCOUNTER — ANESTHESIA (OUTPATIENT)
Dept: SURGERY | Age: 80
End: 2020-07-17
Payer: MEDICARE

## 2020-07-17 ENCOUNTER — HOSPITAL ENCOUNTER (OUTPATIENT)
Age: 80
Setting detail: OUTPATIENT SURGERY
Discharge: HOME | End: 2020-07-17
Attending: SPECIALIST | Admitting: SPECIALIST
Payer: MEDICARE

## 2020-07-17 VITALS
DIASTOLIC BLOOD PRESSURE: 72 MMHG | BODY MASS INDEX: 28.44 KG/M2 | WEIGHT: 192 LBS | SYSTOLIC BLOOD PRESSURE: 116 MMHG | RESPIRATION RATE: 13 BRPM | HEIGHT: 69 IN | TEMPERATURE: 98.2 F | OXYGEN SATURATION: 98 % | HEART RATE: 75 BPM

## 2020-07-17 PROBLEM — R14.2 ERUCTATION: Status: ACTIVE | Noted: 2020-07-17

## 2020-07-17 LAB
GLUCOSE BLD STRIP.AUTO-MCNC: 118 MG/DL (ref 65–100)
GLUCOSE BLD STRIP.AUTO-MCNC: 138 MG/DL (ref 65–100)
SERVICE CMNT-IMP: ABNORMAL
SERVICE CMNT-IMP: ABNORMAL

## 2020-07-17 PROCEDURE — 76210000046 HC AMBSU PH II REC FIRST 0.5 HR: Performed by: SPECIALIST

## 2020-07-17 PROCEDURE — 77030021352 HC CBL LD SYS DISP COVD -B: Performed by: SPECIALIST

## 2020-07-17 PROCEDURE — 76210000040 HC AMBSU PH I REC FIRST 0.5 HR: Performed by: SPECIALIST

## 2020-07-17 PROCEDURE — 82962 GLUCOSE BLOOD TEST: CPT

## 2020-07-17 PROCEDURE — 77030018712 HC DEV BLLN INFL BSC -B: Performed by: SPECIALIST

## 2020-07-17 PROCEDURE — 76030000002 HC AMB SURG OR TIME FIRST 0.: Performed by: SPECIALIST

## 2020-07-17 PROCEDURE — 74011250636 HC RX REV CODE- 250/636: Performed by: NURSE ANESTHETIST, CERTIFIED REGISTERED

## 2020-07-17 PROCEDURE — C1726 CATH, BAL DIL, NON-VASCULAR: HCPCS | Performed by: SPECIALIST

## 2020-07-17 PROCEDURE — 76060000073 HC AMB SURG ANES FIRST 0.5 HR: Performed by: SPECIALIST

## 2020-07-17 PROCEDURE — 74011250636 HC RX REV CODE- 250/636: Performed by: ANESTHESIOLOGY

## 2020-07-17 PROCEDURE — 74011000250 HC RX REV CODE- 250: Performed by: NURSE ANESTHETIST, CERTIFIED REGISTERED

## 2020-07-17 RX ORDER — ESOMEPRAZOLE MAGNESIUM 40 MG/1
40 CAPSULE, DELAYED RELEASE ORAL
Qty: 60 CAP | Refills: 5 | Status: SHIPPED | OUTPATIENT
Start: 2020-07-17 | End: 2021-01-13

## 2020-07-17 RX ORDER — LIDOCAINE HYDROCHLORIDE 10 MG/ML
0.1 INJECTION, SOLUTION EPIDURAL; INFILTRATION; INTRACAUDAL; PERINEURAL AS NEEDED
Status: DISCONTINUED | OUTPATIENT
Start: 2020-07-17 | End: 2020-07-17 | Stop reason: HOSPADM

## 2020-07-17 RX ORDER — SODIUM CHLORIDE, SODIUM LACTATE, POTASSIUM CHLORIDE, CALCIUM CHLORIDE 600; 310; 30; 20 MG/100ML; MG/100ML; MG/100ML; MG/100ML
25 INJECTION, SOLUTION INTRAVENOUS CONTINUOUS
Status: DISCONTINUED | OUTPATIENT
Start: 2020-07-17 | End: 2020-07-17 | Stop reason: HOSPADM

## 2020-07-17 RX ORDER — SODIUM CHLORIDE 0.9 % (FLUSH) 0.9 %
5-40 SYRINGE (ML) INJECTION AS NEEDED
Status: DISCONTINUED | OUTPATIENT
Start: 2020-07-17 | End: 2020-07-17 | Stop reason: HOSPADM

## 2020-07-17 RX ORDER — SODIUM CHLORIDE 0.9 % (FLUSH) 0.9 %
5-40 SYRINGE (ML) INJECTION EVERY 8 HOURS
Status: DISCONTINUED | OUTPATIENT
Start: 2020-07-17 | End: 2020-07-17 | Stop reason: HOSPADM

## 2020-07-17 RX ORDER — SODIUM CHLORIDE, SODIUM LACTATE, POTASSIUM CHLORIDE, CALCIUM CHLORIDE 600; 310; 30; 20 MG/100ML; MG/100ML; MG/100ML; MG/100ML
INJECTION, SOLUTION INTRAVENOUS
Status: DISCONTINUED | OUTPATIENT
Start: 2020-07-17 | End: 2020-07-17 | Stop reason: HOSPADM

## 2020-07-17 RX ORDER — PROPOFOL 10 MG/ML
INJECTION, EMULSION INTRAVENOUS AS NEEDED
Status: DISCONTINUED | OUTPATIENT
Start: 2020-07-17 | End: 2020-07-17 | Stop reason: HOSPADM

## 2020-07-17 RX ORDER — LIDOCAINE HYDROCHLORIDE 20 MG/ML
INJECTION, SOLUTION EPIDURAL; INFILTRATION; INTRACAUDAL; PERINEURAL AS NEEDED
Status: DISCONTINUED | OUTPATIENT
Start: 2020-07-17 | End: 2020-07-17 | Stop reason: HOSPADM

## 2020-07-17 RX ADMIN — LIDOCAINE HYDROCHLORIDE 40 MG: 20 INJECTION, SOLUTION EPIDURAL; INFILTRATION; INTRACAUDAL; PERINEURAL at 09:57

## 2020-07-17 RX ADMIN — PROPOFOL 50 MG: 10 INJECTION, EMULSION INTRAVENOUS at 10:01

## 2020-07-17 RX ADMIN — PROPOFOL 20 MG: 10 INJECTION, EMULSION INTRAVENOUS at 10:06

## 2020-07-17 RX ADMIN — SODIUM CHLORIDE, SODIUM LACTATE, POTASSIUM CHLORIDE, AND CALCIUM CHLORIDE 25 ML/HR: 600; 310; 30; 20 INJECTION, SOLUTION INTRAVENOUS at 09:21

## 2020-07-17 RX ADMIN — SODIUM CHLORIDE, SODIUM LACTATE, POTASSIUM CHLORIDE, AND CALCIUM CHLORIDE: 600; 310; 30; 20 INJECTION, SOLUTION INTRAVENOUS at 09:51

## 2020-07-17 RX ADMIN — PROPOFOL 50 MG: 10 INJECTION, EMULSION INTRAVENOUS at 09:57

## 2020-07-17 NOTE — ANESTHESIA PREPROCEDURE EVALUATION
Anesthetic History   No history of anesthetic complications            Review of Systems / Medical History  Patient summary reviewed, nursing notes reviewed and pertinent labs reviewed    Pulmonary    COPD (no recent problems): mild            Comments: Former smoker   Neuro/Psych       CVA (left arm weakness)  TIA    Comments: Normal pressure hydrocephalus with right-sided shunt  Memory problems Cardiovascular    Hypertension ( taken off med): well controlled              Exercise tolerance: <4 METS  Comments: 05/19 Stress test= negative, EF 73%   GI/Hepatic/Renal     GERD: well controlled          Comments: H/o PEG tube 2017  H/o dysphagia Endo/Other    Diabetes: well controlled, type 2, using insulin    Arthritis (Rheumatoid; affects hips & neck), cancer (melanoma) and anemia     Other Findings   Comments: H/o ETOH abuse         Physical Exam    Airway  Mallampati: I  TM Distance: 4 - 6 cm  Neck ROM: decreased range of motion   Mouth opening: Normal     Cardiovascular    Rhythm: regular  Rate: normal         Dental    Dentition: Upper partial plate and Full lower dentures     Pulmonary      Decreased breath sounds: left           Abdominal  GI exam deferred       Other Findings            Anesthetic Plan    ASA: 3  Anesthesia type: total IV anesthesia and general          Induction: Intravenous  Anesthetic plan and risks discussed with: Patient      preop glucose 138

## 2020-07-17 NOTE — DISCHARGE INSTRUCTIONS
Merline Sharps  [id number]  1940              Procedure  Discharge Instructions:      Discomfort:  Redness at IV site- apply warm compress to area; if redness or soreness persist- contact your physician  There may be a slight amount of blood passed from the rectum  Gaseous discomfort- walking, belching will help relieve any discomfort  You may not operate a vehicle for 12 hours  You may not engage in an occupation involving machinery or appliances for rest of today  You may not drink alcoholic beverages for at least 12 hours  Avoid making any critical decisions for at least 24 hour  DIET:   You may resume your normal diet today. You should not overeat or \"feast\" today as your abdomen may become distended or uncomfortable. MEDICATIONS:   I reconciled this list from the list you gave us when you came today for the procedure. Please clarify with me, your primary care physician and the nurse who is discharging you if we have any discrepancies. Aspirin and or non-steroidal medication (Ibuprofen, Motrin, naproxen, etc.) is ok in limited quantities. ACTIVITY:  You may resume your normal daily activities it is recommended that you spend the remainder of the day resting -  avoid any strenuous activity. CALL M.D. ANY SIGN OF:  Increasing pain, nausea, vomiting  Abdominal distension (swelling)  New increased bleeding (oral or rectal)  Fever (chills)  Pain in chest area  Bloody discharge from nose or mouth  Shortness of breath          Follow-up Instructions:   Call Dr. Erika Contreras for the results of  biopsy in approximately one week  Telephone #  [phone]  Follow up visit as previously scheduled. We are doubling your generic Nexium. You can restart plavix tomorrow. Chasity Guerra MD  10:17 AM  7/17/2020         DO NOT TAKE SLEEPING MEDICATIONS OR ANTIANXIETY MEDICATIONS WHILE TAKING NARCOTIC PAIN MEDICATIONS,  ESPECIALLY THE NIGHT OF ANESTHESIA.     CPAP PATIENTS BE SURE TO WEAR MACHINE WHENEVER NAPPING OR SLEEPING. DISCHARGE SUMMARY from Nurse    The following personal items collected during your admission are returned to you:   Dental Appliance: Dental Appliances: Lowers, Uppers, Partials(PACU)  Vision: Visual Aid: Glasses, At home  Hearing Aid:    Jewelry:    Clothing:    Other Valuables:    Valuables sent to safe:        PATIENT INSTRUCTIONS:    Anesthesia Discharge Instructions for Procedural Area requiring Sedation (MAC Anesthesia, Cath Lab, Endo and Radiology): You have been given medications during your procedure that may affect your memory and mental judgement for the next 24 hours. During this time frame for your safety, please follow the instructions listed below :    Have a responsible adult to drive you home and be with you for at least 12 hours.  Rest today and resume normal activities tomorrow.  Start with a soft bland diet and advance as tolerated to your recommended diet.  Do not drive any motor vehicle or operate mechanical or electrical equipment prior to [employer].  Avoid making critical decisions or signing legal documents prior to 6am tomorrow.  Do not drink alcohol prior to 6am tomorrow.  If you have sleep apnea and you plan to go home and take a nap, please use your CPAP machine not only at bedtime, but also while napping for 24 hours.  If you notice any redness or swelling on parts of your body where IV medications were given, place a warm wet washcloth over the area for 20 minutes at a time until the redness or swelling goes away. If you still have redness or swelling after 2-3 days, please call us. · You will receive a Post Operative Call from one of the Recovery Room Nurses on the day after your surgery to check on you. It is very important for us to know how you are recovering after your surgery. If you have an issue or need to speak with someone, please call your surgeon, do not wait for the post operative call.     · You may receive an e-mail or letter in the mail from [place] regarding your experience with us in the Ambulatory Surgery Unit. Your feedback is valuable to us and we appreciate your participation in the survey. · If the above instructions are not adequate, please contact KAI Cook, RN Perianesthesia Manager at [phone]. If you are having problems after your surgery, call the physician at his office number. · We wish you a speedy recovery ? What to do at Home:      *  Please give a list of your current medications to your Primary Care Provider. *  Please update this list whenever your medications are discontinued, doses are      changed, or new medications (including over-the-counter products) are added. *  Please carry medication information at all times in case of emergency situations. If you have not received your influenza and/or pneumococcal vaccine, please follow up with your primary care physician. The discharge information has been reviewed with the patient and spouse. The patient and spouse verbalized understanding.

## 2020-07-17 NOTE — ANESTHESIA POSTPROCEDURE EVALUATION
Procedure(s):  ESOPHAGOGASTRODUODENOSCOPY (EGD) WITH DILITATION  ESOPHAGEAL DILATION.     total IV anesthesia, general    Anesthesia Post Evaluation      Multimodal analgesia: multimodal analgesia not used between 6 hours prior to anesthesia start to PACU discharge  Patient location during evaluation: PACU  Patient participation: complete - patient participated  Level of consciousness: awake and alert  Pain score: 0  Airway patency: patent  Anesthetic complications: no  Cardiovascular status: acceptable  Respiratory status: acceptable  Hydration status: acceptable  Post anesthesia nausea and vomiting:  none  Final Post Anesthesia Temperature Assessment:  Normothermia (36.0-37.5 degrees C)      INITIAL Post-op Vital signs:   Vitals Value Taken Time   /72 7/17/2020 10:31 AM   Temp 36.8 °C (98.2 °F) 7/17/2020 10:31 AM   Pulse 75 7/17/2020 10:31 AM   Resp 13 7/17/2020 10:31 AM   SpO2 98 % 7/17/2020 10:31 AM

## 2020-07-17 NOTE — PERIOP NOTES
Brigida North  1940  [id number]    Situation:  Verbal report given from: Yuko Hdez CRNA  Procedure: Procedure(s) with comments:  ESOPHAGOGASTRODUODENOSCOPY (EGD) WITH DILITATION  ESOPHAGEAL DILATION - 15-18      Background:    Preoperative diagnosis: GERD, PERSONAL HISTORY COLONIC POLYPS    Postoperative diagnosis: grade C esophagitis, schatzki's ring, bile in stomach, Hiatal hernia    :  Dr. New Goss    Assistant(s): Circ-1: Darell Weaver  Circ-2: Vijay Barahona RN  Scrub RN-1: Shyam Liu RN    Specimens: * No specimens in log *    Assessment:  Intra-procedure medications   Propofol  mg      Anesthesia gave intra-procedure sedation and medications, see anesthesia flow sheet     Intravenous fluids: LR@ KVO     Vital signs stable     Abdominal assessment: round and soft       Recommendation:    Permission to share finding with family yes    All side rails up, bed in low position, wheels locked. Nurse at bedside.

## 2020-07-17 NOTE — PROCEDURES
Esophagogastroduodenoscopy    Indications:  Dysphagia  Eructation, belching, burping    Medications:  See anesthesia form    Assistants:  Addison Suarez RN        Post procedure diagnosis:  grade C esophagitis, schatzki's ring, bile in stomach, Hiatal hernia    Description of Procedure:    Prior to the procedure its objectives, risks, consequences and alternatives were discussed with the patient who then elected to proceed. The Olympus video endoscope was inserted under direct vision into the mouth and then into the esophagus. The esophagus looked normal to the distal esophagus. There were erosions above the z line and two areas of exudate that spanned folds at the z line (Grade C esophagitis). There was a non obstructing ring/ stenosis at the z line. The z-line was located at 39 cm. A two cm hiatal hernia was seen with the diaphragm pinch at 41 cm. There was bile in the stomach. There were no diagnostic abnormalities of the body, fundus, antrum, cardia and incisura of the stomach. This included direct and retroflexion examination. The first and second portion of the duodenum appeared normal.  I then dilated the distal esophagus with a through the scope balloon. I dilated from 15mm to 16.5 mm. There were two non transmural tears after the second dilation. Each time the balloon was inflated for sixty seconds. There were no apparent complications. Complications: There were no apparent complications and the patient tolerated the procedure well.         Implants:  none    Estimated Blood Loss:  About 3ml at the esophagus/ stomach junction  Specimens Removed:  none  Impressions:  Successful dilation to 16.5mm  Ring, dilated  [place] C esophagitis  Hiatal hernia  Bile in the stomach  (We will increase reflux rx for now then consider sucralfate)      Signed By: Cat Hawthorne MD                        July 17, 2020     10:21 AM

## 2020-07-17 NOTE — PERIOP NOTES
CRE balloon dilatation of the esophagus   15 mm Balloon inflated to 3 ATMs and held for 60  seconds. 16.5 mm Balloon inflated to 4. 5ATMs and held for 60 seconds. No subcutaneous crepitus of the chest or cervical region was noted post dilatation.

## 2020-07-17 NOTE — PERIOP NOTES
1016 Pt arrived to PACU. Pt drowsy but arrousable. BReathing easily. 1025 Blood sugar 118. Pt tolearting PO.   1030 Discharge instructions reviewed with wife over the phone. Questions answered. 1040 Pt meets dischage criteria.

## 2020-07-17 NOTE — PERIOP NOTES
Permission received to review discharge instructions and discuss private health information with Nisha Wiley, wife.

## 2020-07-17 NOTE — H&P
Pre-endoscopy H and P    The patient was seen and examined in the [facility] pre op. The airway was assessed and docuemented. The problem list, past medical history, and medications were reviewed. The history is:   \" usedefaultnarrative=\"False\" emptytext=\"\" modes=\"\" contentclass=\"narrative\" supressedplaceholders=\"rosWording\"   Last visit was 11.2019    today he is here for belching, burping, dysphagia    he eats tums 3-4 per day. there is discomfort in the mid chest.   currently taking tums    takes Nexium that he has squirreled away. Nexium may or may not help.     spagetting and chili make it worse    some dysphagia solids and liquids. copd is better. last egd 2017: Impressions: Successful dilation to 47 FRAcquired esophageal ringHiatal hernia bx neg eoe   ? Last visit was 11.2019    today he is here for belching, burping, dysphagia    he eats tums 3-4 per day. there is discomfort in the mid chest.   currently taking tums    takes Nexium that he has squirreled away. Nexium may or may not help.     spagetting and chili make it worse    some dysphagia solids and liquids. copd is better. ?     last egd 2017: Impressions: Successful dilation to 47 FRAcquired esophageal ringHiatal hernia bx neg eoe      Past Medical His       Patient Active Problem List   Diagnosis Code    History of benign neoplasm of colon Z86.018    GERD (gastroesophageal reflux disease) K21.9    Colon polyp K63.5    Diverticulosis of colon K57.30    Internal hemorrhoid K64.8    Schatzki's ring K22.2    Hiatal hernia K44.9    Diarrhea R19.7    Influenza J11.1    Fall W19. Vishal Pea    Dysphagia R13.10    Abnormal x-ray of abdomen R93.5    NPH (normal pressure hydrocephalus) ([facility] G91.2    Alcohol use disorder [id number]    Hip fracture ([facility]) S72.009A    BPH (benign prostatic hyperplasia) N40.0    Rheumatoid arthritis involving multiple sites ([facility] M06.9    AAA (abdominal aortic aneurysm) without rupture ([facility]) I71.4     Social History     Socioeconomic History    Marital status:      Spouse name: Not on file    Number of children: Not on file    Years of education: Not on file    Highest education level: Not on file   Occupational History    Not on file   Social Needs    Financial resource strain: Not on file    Food insecurity     Worry: Not on file     Inability: Not on file    Transportation needs     Medical: Not on file     Non-medical: Not on file   Tobacco Use    Smoking status: Former Smoker     Packs/day: 1.00     Last attempt to quit: 2000     Years since quittin.5    Smokeless tobacco: Never Used   Substance and Sexual Activity    Alcohol use:  Yes     Alcohol/week: 5.8 standard drinks     Types: 7 Shots of liquor per week    Drug use: No    Sexual activity: Not Currently   Lifestyle    Physical activity     Days per week: Not on file     Minutes per session: Not on file    Stress: Not on file   Relationships    Social connections     Talks on phone: Not on file     Gets together: Not on file     Attends [employer] service: Not on file     Active member of club or organization: Not on file     Attends meetings of clubs or organizations: Not on file     Relationship status: Not on file    Intimate partner violence     Fear of current or ex partner: Not on file     Emotionally abused: Not on file     Physically abused: Not on file     Forced sexual activity: Not on file   Other Topics Concern    Not on file   Social History Narrative    Not on file     Past Medical History:   Diagnosis Date    [facility]     Followed by Dr Kathleen Rodriges as of 19 3.9 x 4.1    At risk for sleep apnea 2020    during PAT assessment, MEL score 4    Cancer ([facility]     r shoulder melanoma    Colon polyp     COPD     Diabetes ([facility]     Diarrhea 2016    Dysphagia 2017    Emphysema lung ([facility]     Exposure to TB     2019 cruise, probably exposure to TB, beginning [facility] 2019  GERD (gastroesophageal reflux disease)     Hypertension     Ill-defined condition     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 Taking? Insulin Needles, Disposable, 31 gauge x \" ndle 2020 at Unknown time  No Yes   Sig: Use to inject insulin daily subcutaneus. Dx e11.9   OneTouch Ultra Blue Test Strip strip 2020 at Unknown time  No Yes   Sig: TEST 1-2 TIMES A DAY   acetaminophen (TYLENOL) 500 mg tablet Unknown at Unknown time  Yes No   Sig: Take 500 mg by mouth every six (6) hours as needed for Pain. adalimumab (HUMIRA) 40 mg/0.8 mL injection 7/10/2020 at Unknown time  Yes Yes   Si mg by SubCUTAneous route every fourteen (14) days. albuterol sulfate (PROVENTIL;VENTOLIN) 2.5 mg/0.5 mL nebu nebulizer solution Not Taking at Unknown time  Yes No   Si.5 mg by Nebulization route every four (4) hours as needed for Wheezing. budesonide-formoteroL (SYMBICORT) 80-4.5 mcg/actuation HFAA Not Taking at Unknown time  No No   Sig: Take 2 Puffs by inhalation two (2) times a day. clopidogrel (PLAVIX) 75 mg tab 2020  Yes Yes   Sig: Take 75 mg by mouth daily. diclofenac (VOLTAREN) 1 % gel 2020 at Unknown time  No Yes   Sig: Use to left shoulder up to 4x daily.    dilTIAZem (CARDIZEM) 60 mg tablet 2020 at Unknown time  No Yes   Sig: TAKE 1 TABLET TWICE A DAY   esomeprazole (NEXIUM) 40 mg capsule 2020 at Unknown time  No Yes   Sig: TAKE ONE CAPSULE BY MOUTH EVERY DAY   fluticasone (FLONASE) 50 mcg/actuation nasal spray 2020 at Unknown time  Yes Yes   Si Sprays by Both Nostrils route daily as needed. folic acid (FOLVITE) 1 mg tablet 2020 at Unknown time  Yes Yes   Sig: Take 1 mg by mouth daily. insulin aspart U-100 (NOVOLOG) 100 unit/mL (3 mL) inpn 2020 at Unknown time  No Yes   Si-6 Units by SubCUTAneous route Before breakfast and dinner. Sliding scale: 201-250= 2 units; 251-300 =4 units; 301-350 = 6 units   metFORMIN ER (GLUCOPHAGE XR) 500 mg tablet 2020 at Unknown time  No Yes   Sig: TAKE 1 TABLET TWICE DAILY  WITH MEALS   methotrexate (RHEUMATREX) 2.5 mg tablet 2020 at Unknown time  Yes Yes   Sig: Take 10 mg by mouth. Every Wednesday and Thursday   pravastatin (PRAVACHOL) 80 mg tablet 2020 at Unknown time  No Yes   Sig: TAKE ONE TABLET BY MOUTH EVERY NIGHT   tamsulosin (FLOMAX) 0.4 mg capsule 2020 at Unknown time  No Yes   Sig: TAKE 1 CAPSULE 30 MINUTES AFTER THE SAME MEAL DAILY   tiotropium (SPIRIVA WITH HANDIHALER) 18 mcg inhalation capsule 2020 at Unknown time  Yes Yes   Sig: Take 1 Cap by inhalation daily. Facility-Administered Medications: None           The review of systems is:  negatie for shortness of breath or chest pain      The heart, lungs, and mental status were satisfactory for the administration of anesthesia sedation and for the procedure. I discussed with the patient the objectives, risks, consequences and alternatives to the procedure. The patient was counseled at length about the risks of jane Covid-19 during their perioperative period and any recovery window from their procedure. The patient was made aware that jane Covid-19  may worsen their prognosis for recovering from their procedure and lend to a higher morbidity and/or mortality risk. All material risks, benefits, and reasonable alternatives including postponing the procedure were discussed. The patient does  wish to proceed with the procedure at this time.         Roxane Us MD  2020  9:42 AM

## 2020-07-23 ENCOUNTER — VIRTUAL VISIT (OUTPATIENT)
Dept: INTERNAL MEDICINE CLINIC | Age: 80
End: 2020-07-23

## 2020-07-23 DIAGNOSIS — J42 CHRONIC BRONCHITIS, UNSPECIFIED CHRONIC BRONCHITIS TYPE (HCC): ICD-10-CM

## 2020-07-23 DIAGNOSIS — I10 ESSENTIAL HYPERTENSION: ICD-10-CM

## 2020-07-23 DIAGNOSIS — G91.2 NPH (NORMAL PRESSURE HYDROCEPHALUS) (HCC): ICD-10-CM

## 2020-07-23 DIAGNOSIS — M06.9 RHEUMATOID ARTHRITIS INVOLVING MULTIPLE SITES, UNSPECIFIED RHEUMATOID FACTOR PRESENCE: ICD-10-CM

## 2020-07-23 DIAGNOSIS — Z00.00 MEDICARE ANNUAL WELLNESS VISIT, SUBSEQUENT: Primary | ICD-10-CM

## 2020-07-23 DIAGNOSIS — E11.69 HYPERLIPIDEMIA ASSOCIATED WITH TYPE 2 DIABETES MELLITUS (HCC): ICD-10-CM

## 2020-07-23 DIAGNOSIS — E11.9 TYPE 2 DIABETES MELLITUS WITHOUT COMPLICATION, WITHOUT LONG-TERM CURRENT USE OF INSULIN (HCC): ICD-10-CM

## 2020-07-23 DIAGNOSIS — E78.5 HYPERLIPIDEMIA ASSOCIATED WITH TYPE 2 DIABETES MELLITUS (HCC): ICD-10-CM

## 2020-07-23 DIAGNOSIS — R35.0 BENIGN PROSTATIC HYPERPLASIA WITH URINARY FREQUENCY: ICD-10-CM

## 2020-07-23 DIAGNOSIS — N40.1 BENIGN PROSTATIC HYPERPLASIA WITH URINARY FREQUENCY: ICD-10-CM

## 2020-07-23 DIAGNOSIS — G45.9 TIA (TRANSIENT ISCHEMIC ATTACK): ICD-10-CM

## 2020-07-23 RX ORDER — DILTIAZEM HYDROCHLORIDE 60 MG/1
60 TABLET, FILM COATED ORAL 2 TIMES DAILY
Qty: 180 TAB | Refills: 3 | Status: SHIPPED | OUTPATIENT
Start: 2020-07-23 | End: 2021-08-30

## 2020-07-23 NOTE — PATIENT INSTRUCTIONS
Medicare Wellness Visit, Male    The best way to live healthy is to have a lifestyle where you eat a well-balanced diet, exercise regularly, limit alcohol use, and quit all forms of tobacco/nicotine, if applicable. Regular preventive services are another way to keep healthy. Preventive services (vaccines, screening tests, monitoring & exams) can help personalize your care plan, which helps you manage your own care. Screening tests can find health problems at the earliest stages, when they are easiest to treat. [facility] follows the current, evidence-based guidelines published by the [facility] ([facility]) when recommending preventive services for our patients. Because we follow these guidelines, sometimes recommendations change over time as research supports it. (For example, a prostate screening blood test is no longer routinely recommended for men with no symptoms). Of course, you and your doctor may decide to screen more often for some diseases, based on your risk and co-morbidities (chronic disease you are already diagnosed with). Preventive services for you include:  - Medicare offers their members a free annual wellness visit, which is time for you and your primary care provider to discuss and plan for your preventive service needs. Take advantage of this benefit every year!  -All adults over age 72 should receive the recommended pneumonia vaccines. Current USPSTF guidelines recommend a series of two vaccines for the best pneumonia protection.   -All adults should have a flu vaccine yearly and tetanus vaccine every 10 years.  -All adults age 48 and older should receive the shingles vaccines (series of two vaccines).        -All adults age 38-68 who are overweight should have a diabetes screening test once every three years.   -Other screening tests & preventive services for persons with diabetes include: an eye exam to screen for diabetic retinopathy, a kidney function test, a foot exam, and stricter control over your cholesterol.   -Cardiovascular screening for adults with routine risk involves an electrocardiogram (ECG) at intervals determined by the provider.   -Colorectal cancer screening should be done for adults age 54-65 with no increased risk factors for colorectal cancer. There are a number of acceptable methods of screening for this type of cancer. Each test has its own benefits and drawbacks. Discuss with your provider what is most appropriate for you during your annual wellness visit. The different tests include: colonoscopy (considered the best screening method), a fecal occult blood test, a fecal DNA test, and sigmoidoscopy.  -All adults born between [facility] should be screened once for Hepatitis C.  -An Abdominal Aortic Aneurysm (AAA) Screening is recommended for men age 73-68 who has ever smoked in their lifetime. Here is a list of your current Health Maintenance items (your personalized list of preventive services) with a due date:  Health Maintenance Due   Topic Date Due    Shingles Vaccine (1 of 2) 04/30/1989    Annual Well Visit  05/03/2020    Albumin Urine Test  05/03/2020    Cholesterol Test   05/03/2020       Get fasting labs done.

## 2020-07-23 NOTE — PROGRESS NOTES
Kate Mcclain is a 79 y.o. male who was seen by synchronous (real-time) audio-video technology on 7/23/2020 for Diabetes (3 month follow )    Last seen by me march 2, 2020 for uri. He is doing well now. Actually at [facility]. He feels well and has no new complaints, though does ask for renewal of his handicap placard. He had egd done last week, waiting for bx results. Checks sugars daily, and has not needed any insulin in over 6 months. Takes glucophage. This is a virtual visit due to covid 19. Assessment & Plan:   Diagnoses and all orders for this visit:    1. Medicare annual wellness visit, subsequent    2. Essential hypertension  -     dilTIAZem IR (CARDIZEM) 60 mg tablet; Take 1 Tab by mouth two (2) times a day. 3. NPH (normal pressure hydrocephalus) ([facility]    4. Rheumatoid arthritis involving multiple sites, unspecified rheumatoid factor presence ([address]    5. Type 2 diabetes mellitus without complication, without long-term current use of insulin ([address]    6. Hyperlipidemia associated with type 2 diabetes mellitus ([address]    7. TIA (transient ischemic attack)    8. Type 2 diabetes mellitus with hyperglycemia, without long-term current use of insulin (HCC)    9. Chronic bronchitis, unspecified chronic bronchitis type ([address]        I spent at least 35 minutes on this visit with this established patient. 712  Subjective:       Prior to Admission medications    Medication Sig Start Date End Date Taking? Authorizing Provider   esomeprazole (NEXIUM) 40 mg capsule Take 1 Cap by mouth Before breakfast and dinner for 180 days.  Indications: gastroesophageal reflux disease 7/17/20 1/13/21 Yes Sondra Salas MD   tamsulosin (FLOMAX) 0.4 mg capsule TAKE 1 CAPSULE 30 MINUTES AFTER THE SAME MEAL DAILY 6/12/20  Yes Howard Kong III, DO   OneTouch Ultra Blue Test Strip strip TEST 1-2 TIMES A DAY 6/4/20  Yes Jermaine Kong III, DO   metFORMIN ER (GLUCOPHAGE XR) 500 mg tablet TAKE 1 TABLET TWICE DAILY  WITH MEALS 4/15/20  Yes Jermaine Kong III, DO   diclofenac (VOLTAREN) 1 % gel Use to left shoulder up to 4x daily. 3/2/20  Yes Jermaine Kong III, DO   pravastatin (PRAVACHOL) 80 mg tablet TAKE ONE TABLET BY MOUTH EVERY NIGHT 2/17/20  Yes Jermaine Kong III, DO   dilTIAZem (CARDIZEM) 60 mg tablet TAKE 1 TABLET TWICE A DAY 11/13/19  Yes Jermaine Kong III, DO   clopidogrel (PLAVIX) 75 mg tab Take 75 mg by mouth daily. 4/22/19  Yes Provider, Historical   fluticasone (FLONASE) 50 mcg/actuation nasal spray 2 Sprays by Both Nostrils route daily as needed. 10/6/17  Yes Provider, Historical   acetaminophen (TYLENOL) 500 mg tablet Take 500 mg by mouth every six (6) hours as needed for Pain. Yes Provider, Historical   albuterol sulfate (PROVENTIL;VENTOLIN) 2.5 mg/0.5 mL nebu nebulizer solution 2.5 mg by Nebulization route every four (4) hours as needed for Wheezing. Yes Provider, Historical   methotrexate (RHEUMATREX) 2.5 mg tablet Take 10 mg by mouth. Every Wednesday and Thursday   Yes Provider, Historical   adalimumab (HUMIRA) 40 mg/0.8 mL injection 40 mg by SubCUTAneous route every fourteen (14) days. Yes Provider, Historical   folic acid (FOLVITE) 1 mg tablet Take 1 mg by mouth daily. Yes Provider, Historical   tiotropium (SPIRIVA WITH HANDIHALER) 18 mcg inhalation capsule Take 1 Cap by inhalation daily.    Yes Provider, Historical         ROS    Objective:     Patient-Reported Vitals 7/23/2020   Patient-Reported Weight 192 lb   Patient-Reported Height 69\"      General: alert, cooperative, no distress   Mental  status: normal mood, behavior, speech, dress, motor activity, and thought processes, able to follow commands   HENT: NCAT   Neck: no visualized mass   Resp: no respiratory distress   Neuro: no gross deficits   Skin: no discoloration or lesions of concern on visible areas   Psychiatric: normal affect, consistent with stated mood, no evidence of hallucinations     Additional exam findings:     1.  Dm, type 2--on glucophage. Check a1c, last was 6.5  2.  htn--on diltiazem. Check cbc, cmp, tsh  3.  hyperlipids--check flp, on pravachol  4. NPH with  shunt--doing well, [facility].  5.  Hx tia--on plavix  6. RA--on mtx, humira  7.  gerd--does well with nexium  8. bph--on flomax, check psa  9. Hx latent tb--I don't think he ever opted for any treatment. 10.  Copd--on spiriva. No longer using symbicort. Handicap form filled out. rtc 6 months. We discussed the expected course, resolution and complications of the diagnosis(es) in detail. Medication risks, benefits, costs, interactions, and alternatives were discussed as indicated. I advised him to contact the office if his condition worsens, changes or fails to improve as anticipated. He expressed understanding with the diagnosis(es) and plan. Rosario Mistrymiriam, who was evaluated through a patient-initiated, synchronous (real-time) audio-video encounter, and/or his healthcare decision maker, is aware that it is a billable service, with coverage as determined by his insurance carrier. He provided verbal consent to proceed: Yes, and patient identification was verified. It was conducted pursuant to the emergency declaration under the [address] [place] [address], [address] [place] [address] authority and the [employer] Act. A caregiver was present when appropriate. Ability to conduct physical exam was limited. I was in the office. The patient was at home. Miguel Borja III, DO      This is the Subsequent Medicare Annual Wellness Exam, performed 12 months or more after the Initial AWV or the last Subsequent AWV    I have reviewed the patient's medical history in detail and updated the computerized patient record.      History     Patient Active Problem List   Diagnosis Code    History of benign neoplasm of colon Z86.018    GERD (gastroesophageal reflux disease) K21.9    Colon polyp K63.5    Diverticulosis of colon K57.30    Internal hemorrhoid K64.8    Schatzki's ring K22.2    Hiatal hernia K44.9    Diarrhea R19.7    Influenza J11.1    Fall W19. Jessy Kluver    Dysphagia R13.10    Abnormal x-ray of abdomen R93.5    NPH (normal pressure hydrocephalus) (HCC) G91.2    Alcohol use disorder [id number]    Hip fracture (HCC) S72.009A    BPH (benign prostatic hyperplasia) N40.0    Rheumatoid arthritis involving multiple sites ([address] M06.9    AAA (abdominal aortic aneurysm) without rupture ([facility]) I71.4    Eructation R14.2     Past Medical History:   Diagnosis Date    AAA (abdominal aortic aneurysm) ([address]     Followed by Dr Raymundo Sesay as of 05/13/19 3.9 x 4.1    At risk for sleep apnea 07/08/2020    during PAT assessment, MEL score 4    Cancer ([address]     r shoulder melanoma    Colon polyp     COPD     Diabetes ([address]     Diarrhea 1/22/2016    Dysphagia 4/4/2017    Emphysema lung ([address]     Eructation 7/17/2020    Exposure to TB     2/2019 cruise, probably exposure to TB, beginning TC 9/2019    GERD (gastroesophageal reflux disease)     Hypertension     Ill-defined condition     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, arms    NPH (normal pressure hydrocephalus) ([address] 2014    shunt    RA (rheumatoid arthritis) ([address]     Scarring     Sepsis ([address]     with perforated gallbladder    Stroke ([address] 2014    L arm weakness states not a stroke states it is a TIA     TIA (transient ischemic attack) 11/2018      Past Surgical History:   Procedure Laterality Date    HX APPENDECTOMY      HX CATARACT REMOVAL Bilateral     HX CHOLECYSTECTOMY  04/18/2017    at [employer], ruptured, had sepsis, appendix removed with this    HX ENDOSCOPY      HX FEMORAL BYPASS Left     stent    HX GI      PEG placed and removed    HX OPEN REDUCTION INTERNAL FIXATION Left     femur, suresh in place    HX ORTHOPAEDIC Left     Femur     HX OTHER SURGICAL      r shoulder melenoma excision    HX TONSILLECTOMY      IR PLC CATH AV SHUNT IN W PTA SI  2014    [place] COLSC FLX W/RMVL OF TUMOR POLYP LESION SNARE TQ  4/28/2011         [place] EGD TRANSORAL BIOPSY SINGLE/MULTIPLE  4/28/2011          Current Outpatient Medications   Medication Sig Dispense Refill    esomeprazole (NEXIUM) 40 mg capsule Take 1 Cap by mouth Before breakfast and dinner for 180 days. Indications: gastroesophageal reflux disease 60 Cap 5    tamsulosin (FLOMAX) 0.4 mg capsule TAKE 1 CAPSULE 30 MINUTES AFTER THE SAME MEAL DAILY 90 Cap 3    OneTouch Ultra Blue Test Strip strip TEST 1-2 TIMES A  Strip 5    metFORMIN ER (GLUCOPHAGE XR) 500 mg tablet TAKE 1 TABLET TWICE DAILY  WITH MEALS 180 Tab 3    diclofenac (VOLTAREN) 1 % gel Use to left shoulder up to 4x daily. 100 g 3    pravastatin (PRAVACHOL) 80 mg tablet TAKE ONE TABLET BY MOUTH EVERY NIGHT 90 Tab 3    dilTIAZem (CARDIZEM) 60 mg tablet TAKE 1 TABLET TWICE A  Tab 3    clopidogrel (PLAVIX) 75 mg tab Take 75 mg by mouth daily.  fluticasone (FLONASE) 50 mcg/actuation nasal spray 2 Sprays by Both Nostrils route daily as needed.  acetaminophen (TYLENOL) 500 mg tablet Take 500 mg by mouth every six (6) hours as needed for Pain.  albuterol sulfate (PROVENTIL;VENTOLIN) 2.5 mg/0.5 mL nebu nebulizer solution 2.5 mg by Nebulization route every four (4) hours as needed for Wheezing.  methotrexate (RHEUMATREX) 2.5 mg tablet Take 10 mg by mouth. Every Wednesday and Thursday      adalimumab (HUMIRA) 40 mg/0.8 mL injection 40 mg by SubCUTAneous route every fourteen (14) days.  folic acid (FOLVITE) 1 mg tablet Take 1 mg by mouth daily.  tiotropium (SPIRIVA WITH HANDIHALER) 18 mcg inhalation capsule Take 1 Cap by inhalation daily.        Allergies   Allergen Reactions    Ceclor [Cefaclor] Swelling    Contrast Agent [Iodine] Hives and Itching    Floxin [Ofloxacin] Hives    Gabapentin Other (comments)     Causes confusion    Ivp [Fd And C Blue No.1] Itching    Lansoprazole Diarrhea       Family History   Adopted: Yes     Social History     Tobacco Use    Smoking status: Former Smoker     Packs/day: 1.00     Last attempt to quit: 2000     Years since quittin.5    Smokeless tobacco: Never Used   Substance Use Topics    Alcohol use: Yes     Alcohol/week: 5.8 standard drinks     Types: 7 Shots of liquor per week       Depression Risk Factor Screening:     3 most recent PHQ Screens 3/2/2020   Little interest or pleasure in doing things Not at all   Feeling down, depressed, irritable, or hopeless Not at all   Total Score PHQ 2 0       Alcohol Risk Factor Screening (MALE > 65): Do you average more 1 drink per night or more than 7 drinks a week: No    In the past three months have you have had more than 4 drinks containing alcohol on one occasion: No      Functional Ability and Level of Safety:   Hearing: Hearing is good. Activities of Daily Living: The home contains: no safety equipment. Patient needs help with:  managing medications     Ambulation: with mild difficulty. Uses cane when leaves the house. Fall Risk:  Fall Risk Assessment, last 12 mths 2020   Able to walk? Yes   Fall in past 12 months? Yes   Fall with injury? Yes   Number of falls in past 12 months 1   Fall Risk Score 2     Abuse Screen:  Patient is not abused. Lives with wife of 64 years.         Cognitive Screening   Has your family/caregiver stated any concerns about your memory: no    Cognitive Screening: Normal - MMSE (Mini Mental Status Exam)    Patient Care Team   Patient Care Team:  Lesa Lennon DO as PCP - General (Internal Medicine)  Lesa Lennon DO as PCP - [facility] Empaneled Provider  Kiel Neumann MD (Neurosurgery)  Israel Childers MD (Pulmonary Disease)  Patricia Serna MD as Physician (Gastroenterology)  Hugh Cagle MD as Physician (Rheumatology)  Sha Giordano MD as Physician (Cardiology)  Wild Farooq MD as Physician (Neurology)    Assessment/Plan   Education and counseling provided:  Are appropriate based on today's review and evaluation  End-of-Life planning (with patient's consent)  Pneumococcal Vaccine  Influenza Vaccine  Prostate cancer screening tests (PSA, covered annually)  Screening for glaucoma    Diagnoses and all orders for this visit:    1. Essential hypertension  -     dilTIAZem IR (CARDIZEM) 60 mg tablet; Take 1 Tab by mouth two (2) times a day. 2. Medicare annual wellness visit, subsequent        Health Maintenance Due   Topic Date Due    Shingrix Vaccine Age 49> (1 of 2) 04/30/1989    Medicare Yearly Exam  05/03/2020    MICROALBUMIN Q1  05/03/2020    Lipid Screen  05/03/2020       Gail Graves, who was evaluated through a synchronous (real-time) audio-video encounter, and/or his healthcare decision maker, is aware that it is a billable service, with coverage as determined by his insurance carrier. He provided verbal consent to proceed: Yes, and patient identification was verified. It was conducted pursuant to the emergency declaration under the [address] [place] [address], [address] [place] [address] authority and the [employer] Act. A caregiver was present when appropriate. Ability to conduct physical exam was limited. I was in the office. The patient was at home.     Conception Sides III, DO

## 2020-07-28 ENCOUNTER — HOSPITAL ENCOUNTER (OUTPATIENT)
Dept: LAB | Age: 80
Discharge: HOME | End: 2020-07-28
Payer: MEDICARE

## 2020-07-28 PROCEDURE — 82043 UR ALBUMIN QUANTITATIVE: CPT

## 2020-07-28 PROCEDURE — 83036 HEMOGLOBIN GLYCOSYLATED A1C: CPT

## 2020-07-28 PROCEDURE — 80053 COMPREHEN METABOLIC PANEL: CPT

## 2020-07-28 PROCEDURE — 85025 COMPLETE CBC W/AUTO DIFF WBC: CPT

## 2020-07-28 PROCEDURE — 84443 ASSAY THYROID STIM HORMONE: CPT

## 2020-07-28 PROCEDURE — 84153 ASSAY OF PSA TOTAL: CPT

## 2020-07-28 PROCEDURE — 80061 LIPID PANEL: CPT

## 2020-07-28 PROCEDURE — 36415 COLL VENOUS BLD VENIPUNCTURE: CPT

## 2020-07-30 LAB
ALBUMIN SERPL-MCNC: 4.3 G/DL (ref 3.6–4.6)
ALBUMIN/CREAT UR: 7 MG/G CREAT (ref 0–29)
ALBUMIN/GLOB SERPL: 2 {RATIO} (ref 1.2–2.2)
ALP SERPL-CCNC: 71 IU/L (ref 39–117)
ALT SERPL-CCNC: 18 IU/L (ref 0–44)
AST SERPL-CCNC: 21 IU/L (ref 0–40)
BASOPHILS # BLD AUTO: 0 X10E3/UL (ref 0–0.2)
BASOPHILS NFR BLD AUTO: 1 %
BILIRUB SERPL-MCNC: 0.5 MG/DL (ref 0–1.2)
BUN SERPL-MCNC: 14 MG/DL (ref 8–27)
BUN/CREAT SERPL: 15 (ref 10–24)
CALCIUM SERPL-MCNC: 9.6 MG/DL (ref 8.6–10.2)
CHLORIDE SERPL-SCNC: 107 MMOL/L (ref 96–106)
CHOLEST SERPL-MCNC: 139 MG/DL (ref 100–199)
CO2 SERPL-SCNC: 21 MMOL/L (ref 20–29)
CREAT SERPL-MCNC: 0.94 MG/DL (ref 0.76–1.27)
CREAT UR-MCNC: 218 MG/DL
EOSINOPHIL # BLD AUTO: 0.2 X10E3/UL (ref 0–0.4)
EOSINOPHIL NFR BLD AUTO: 2 %
ERYTHROCYTE [DISTWIDTH] IN BLOOD BY AUTOMATED COUNT: 13.7 % (ref 11.6–15.4)
EST. AVERAGE GLUCOSE BLD GHB EST-MCNC: 134 MG/DL
GLOBULIN SER CALC-MCNC: 2.1 G/DL (ref 1.5–4.5)
GLUCOSE SERPL-MCNC: 114 MG/DL (ref 65–99)
HBA1C MFR BLD: 6.3 % (ref 4.8–5.6)
HCT VFR BLD AUTO: 39.3 % (ref 37.5–51)
HDLC SERPL-MCNC: 46 MG/DL
HGB BLD-MCNC: 13.4 G/DL (ref 13–17.7)
IMM GRANULOCYTES # BLD AUTO: 0 X10E3/UL (ref 0–0.1)
IMM GRANULOCYTES NFR BLD AUTO: 0 %
LDLC SERPL CALC-MCNC: 62 MG/DL (ref 0–99)
LYMPHOCYTES # BLD AUTO: 1.5 X10E3/UL (ref 0.7–3.1)
LYMPHOCYTES NFR BLD AUTO: 21 %
MCH RBC QN AUTO: 32 PG (ref 26.6–33)
MCHC RBC AUTO-ENTMCNC: 34.1 G/DL (ref 31.5–35.7)
MCV RBC AUTO: 94 FL (ref 79–97)
MICROALBUMIN UR-MCNC: 15 UG/ML
MONOCYTES # BLD AUTO: 0.6 X10E3/UL (ref 0.1–0.9)
MONOCYTES NFR BLD AUTO: 8 %
NEUTROPHILS # BLD AUTO: 4.7 X10E3/UL (ref 1.4–7)
NEUTROPHILS NFR BLD AUTO: 68 %
PLATELET # BLD AUTO: 149 X10E3/UL (ref 150–450)
POTASSIUM SERPL-SCNC: 4.4 MMOL/L (ref 3.5–5.2)
PROT SERPL-MCNC: 6.4 G/DL (ref 6–8.5)
PSA SERPL-MCNC: 0.9 NG/ML (ref 0–4)
RBC # BLD AUTO: 4.19 X10E6/UL (ref 4.14–5.8)
SODIUM SERPL-SCNC: 144 MMOL/L (ref 134–144)
TRIGL SERPL-MCNC: 154 MG/DL (ref 0–149)
TSH SERPL DL<=0.005 MIU/L-ACNC: 2.33 UIU/ML (ref 0.45–4.5)
VLDLC SERPL CALC-MCNC: 31 MG/DL (ref 5–40)
WBC # BLD AUTO: 7 X10E3/UL (ref 3.4–10.8)

## 2020-08-06 NOTE — PROGRESS NOTES
Name and  confirmed  Called pt regarding lab results, per Dr. Lionel Corona  Pt verbally understood.

## 2020-09-11 ENCOUNTER — HOSPITAL ENCOUNTER (OUTPATIENT)
Dept: GENERAL RADIOLOGY | Age: 80
Discharge: HOME | End: 2020-09-11
Payer: MEDICARE

## 2020-09-11 DIAGNOSIS — K21.9 ESOPHAGEAL REFLUX: ICD-10-CM

## 2020-09-11 DIAGNOSIS — R15.9 ENCOPRESIS WITHOUT CONSTIPATION AND OVERFLOW INCONTINENCE: ICD-10-CM

## 2020-09-11 DIAGNOSIS — Z86.010 PERSONAL HISTORY OF COLONIC POLYPS: ICD-10-CM

## 2020-09-11 DIAGNOSIS — R19.7 DIARRHEA OF PRESUMED INFECTIOUS ORIGIN: ICD-10-CM

## 2020-09-11 PROCEDURE — 74018 RADEX ABDOMEN 1 VIEW: CPT

## 2020-09-30 ENCOUNTER — TELEPHONE (OUTPATIENT)
Dept: INTERNAL MEDICINE CLINIC | Age: 80
End: 2020-09-30

## 2020-09-30 NOTE — TELEPHONE ENCOUNTER
Patient's wife, [place], needs a call back to discuss Plan of Care for patient that had Mini-Strokes while in the [address] coming Back to [place] today & needs to know if patient needs a Hospital F/U appt or to be admitted to Hospital here. Please call to advise.  Thank you

## 2020-10-01 NOTE — TELEPHONE ENCOUNTER
Spoke with pt's wife, Farhana Baldwin. Farhana Baldiwn stated that pt had a TIA on 9/29 while on vacation in the [facility]. ER recommended pt follow up with PCP regarding ordering a carotid doppler.    Notified Farhana Baldwin a message will be sent to Dr. Kole Adame.

## 2020-10-01 NOTE — TELEPHONE ENCOUNTER
Appointment scheduled for 10/2 @ 10 with Dr. Katiuska Harrison verbalized understanding of information discussed w/ no further questions at this time.

## 2020-10-02 ENCOUNTER — OFFICE VISIT (OUTPATIENT)
Dept: INTERNAL MEDICINE CLINIC | Age: 80
End: 2020-10-02
Attending: INTERNAL MEDICINE
Payer: MEDICARE

## 2020-10-02 ENCOUNTER — HOSPITAL ENCOUNTER (OUTPATIENT)
Dept: VASCULAR SURGERY | Age: 80
Discharge: HOME | End: 2020-10-02
Attending: INTERNAL MEDICINE
Payer: MEDICARE

## 2020-10-02 VITALS
DIASTOLIC BLOOD PRESSURE: 73 MMHG | WEIGHT: 181.2 LBS | OXYGEN SATURATION: 95 % | HEART RATE: 94 BPM | BODY MASS INDEX: 26.84 KG/M2 | SYSTOLIC BLOOD PRESSURE: 123 MMHG | RESPIRATION RATE: 15 BRPM | HEIGHT: 69 IN | TEMPERATURE: 96.4 F

## 2020-10-02 DIAGNOSIS — E11.69 HYPERLIPIDEMIA ASSOCIATED WITH TYPE 2 DIABETES MELLITUS (HCC): ICD-10-CM

## 2020-10-02 DIAGNOSIS — Z23 NEEDS FLU SHOT: ICD-10-CM

## 2020-10-02 DIAGNOSIS — I10 ESSENTIAL HYPERTENSION: ICD-10-CM

## 2020-10-02 DIAGNOSIS — G45.9 TIA (TRANSIENT ISCHEMIC ATTACK): Primary | ICD-10-CM

## 2020-10-02 DIAGNOSIS — E78.5 HYPERLIPIDEMIA ASSOCIATED WITH TYPE 2 DIABETES MELLITUS (HCC): ICD-10-CM

## 2020-10-02 DIAGNOSIS — E11.9 TYPE 2 DIABETES MELLITUS WITHOUT COMPLICATION, WITHOUT LONG-TERM CURRENT USE OF INSULIN (HCC): ICD-10-CM

## 2020-10-02 DIAGNOSIS — G45.9 TIA (TRANSIENT ISCHEMIC ATTACK): ICD-10-CM

## 2020-10-02 DIAGNOSIS — G91.2 NPH (NORMAL PRESSURE HYDROCEPHALUS) (HCC): ICD-10-CM

## 2020-10-02 DIAGNOSIS — J42 CHRONIC BRONCHITIS, UNSPECIFIED CHRONIC BRONCHITIS TYPE (HCC): ICD-10-CM

## 2020-10-02 DIAGNOSIS — R47.1 DYSARTHRIA: ICD-10-CM

## 2020-10-02 LAB
LEFT CCA DIST DIAS: 19.5 CM/S
LEFT CCA DIST SYS: 124.9 CM/S
LEFT CCA PROX DIAS: 26.1 CM/S
LEFT CCA PROX SYS: 138 CM/S
LEFT ECA DIAS: 0 CM/S
LEFT ECA SYS: 100.7 CM/S
LEFT ICA DIST DIAS: 21.7 CM/S
LEFT ICA DIST SYS: 89.8 CM/S
LEFT ICA MID DIAS: 17.3 CM/S
LEFT ICA MID SYS: 63.4 CM/S
LEFT ICA PROX DIAS: 10.8 CM/S
LEFT ICA PROX SYS: 72.2 CM/S
LEFT ICA/CCA SYS: 0.7
LEFT SUBCLAVIAN DIAS: 0 CM/S
LEFT SUBCLAVIAN SYS: 109.5 CM/S
LEFT VERTEBRAL DIAS: 13 CM/S
LEFT VERTEBRAL SYS: 70 CM/S
RIGHT CCA DIST DIAS: 14.4 CM/S
RIGHT CCA DIST SYS: 71 CM/S
RIGHT CCA PROX DIAS: 12.6 CM/S
RIGHT CCA PROX SYS: 100.3 CM/S
RIGHT ECA DIAS: 10.1 CM/S
RIGHT ECA SYS: 155.2 CM/S
RIGHT ICA DIST DIAS: 18.1 CM/S
RIGHT ICA DIST SYS: 93 CM/S
RIGHT ICA MID DIAS: 18.1 CM/S
RIGHT ICA MID SYS: 105.8 CM/S
RIGHT ICA PROX DIAS: 14.4 CM/S
RIGHT ICA PROX SYS: 80.2 CM/S
RIGHT ICA/CCA SYS: 1.5
RIGHT SUBCLAVIAN DIAS: 0 CM/S
RIGHT SUBCLAVIAN SYS: 104.2 CM/S
RIGHT VERTEBRAL DIAS: 9.7 CM/S
RIGHT VERTEBRAL SYS: 53.9 CM/S

## 2020-10-02 PROCEDURE — 3288F FALL RISK ASSESSMENT DOCD: CPT | Performed by: INTERNAL MEDICINE

## 2020-10-02 PROCEDURE — 93880 EXTRACRANIAL BILAT STUDY: CPT

## 2020-10-02 PROCEDURE — 1100F PTFALLS ASSESS-DOCD GE2>/YR: CPT | Performed by: INTERNAL MEDICINE

## 2020-10-02 PROCEDURE — G8510 SCR DEP NEG, NO PLAN REQD: HCPCS | Performed by: INTERNAL MEDICINE

## 2020-10-02 PROCEDURE — G8419 CALC BMI OUT NRM PARAM NOF/U: HCPCS | Performed by: INTERNAL MEDICINE

## 2020-10-02 PROCEDURE — 99214 OFFICE O/P EST MOD 30 MIN: CPT | Performed by: INTERNAL MEDICINE

## 2020-10-02 PROCEDURE — G8427 DOCREV CUR MEDS BY ELIG CLIN: HCPCS | Performed by: INTERNAL MEDICINE

## 2020-10-02 PROCEDURE — G8536 NO DOC ELDER MAL SCRN: HCPCS | Performed by: INTERNAL MEDICINE

## 2020-10-02 PROCEDURE — G0463 HOSPITAL OUTPT CLINIC VISIT: HCPCS | Performed by: INTERNAL MEDICINE

## 2020-10-02 PROCEDURE — 90471 IMMUNIZATION ADMIN: CPT

## 2020-10-02 PROCEDURE — 90694 VACC AIIV4 NO PRSRV 0.5ML IM: CPT

## 2020-10-02 RX ORDER — ASPIRIN 81 MG/1
81 TABLET ORAL DAILY
Qty: 90 TAB | Refills: 5
Start: 2020-10-02 | End: 2021-07-26 | Stop reason: ALTCHOICE

## 2020-10-02 NOTE — PROGRESS NOTES
Daniel Reyes is a 79 y.o. male who presents for evaluation of hospital follow up. Last seen by me July 23, 2020 in virtual visit for awv. Was in [facility] earlier this week, when on Tuesday evening, while mixing a cocktail, he developed some dysarthria, left sided facial drop, and left hand weakness. His wife took him to local hospital promptly. His symptoms lasted no more than 30 minutes, and have not returned. CT head was with no acute findings. Labs ok, and he was nsr. He had 3 tia in 2019, and at that time he was switched from 81 mg asa to plavix. This is the first episode he has had with plavix. Some thought that maybe PPI is making plavix less effective, but he has tried [employer], and they are ineffective, and he only takes nexium prn.       ROS:  Constitutional: negative for fevers, chills, anorexia and weight loss  Eyes:   negative for visual disturbance and irritation  ENT:   negative for tinnitus,sore throat,nasal congestion,ear pain,hoarseness  Respiratory:  negative for cough, hemoptysis, dyspnea,wheezing  CV:   negative for chest pain, palpitations, lower extremity edema  GI:   negative for nausea, vomiting, diarrhea, abdominal pain,melena  Genitourinary: negative for frequency, dysuria and hematuria  Musculoskel: negative for myalgias, arthralgias, back pain, muscle weakness, joint pain  Neurological:  negative for headaches, dizziness, focal weakness, numbness  Psychiatric:     Negative for depression or anxiety      Past Medical History:   Diagnosis Date    AAA (abdominal aortic aneurysm) ([facility]     Followed by Dr Vidhi Ely as of 05/13/19 3.9 x 4.1    At risk for sleep apnea 07/08/2020    during PAT assessment, MEL score 4    Cancer ([address]     r shoulder melanoma    Colon polyp     COPD     Diabetes ([address]     Diarrhea 1/22/2016    Dysphagia 4/4/2017    Emphysema lung ([address]     Eructation 7/17/2020    Exposure to TB     2/2019 cruise, probably exposure to TB, beginning TC 2019    GERD (gastroesophageal reflux disease)     Hypertension     Ill-defined condition     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, arms    NPH (normal pressure hydrocephalus) ([facility]     shunt    RA (rheumatoid arthritis) (HCC)     Scarring     Sepsis ([facility]     with perforated gallbladder    Stroke ([facility]     L arm weakness states not a stroke states it is a TIA     TIA (transient ischemic attack) 2018       Past Surgical History:   Procedure Laterality Date    HX APPENDECTOMY      HX CATARACT REMOVAL Bilateral     HX CHOLECYSTECTOMY  2017    at [address] [place] [address], ruptured, had sepsis, appendix removed with this    HX ENDOSCOPY      HX FEMORAL BYPASS Left     stent    HX GI      PEG placed and removed    HX OPEN REDUCTION INTERNAL FIXATION Left     femur, suresh in place    HX ORTHOPAEDIC Left     Femur     HX OTHER SURGICAL      r shoulder melenoma excision    HX TONSILLECTOMY      IR PLC CATH AV SHUNT IN W PTA SI      UT COLSC FLX W/RMVL OF TUMOR POLYP LESION SNARE TQ  2011         UT EGD TRANSORAL BIOPSY SINGLE/MULTIPLE  2011            Family History   Adopted: Yes       Social History     Socioeconomic History    Marital status:      Spouse name: Not on file    Number of children: Not on file    Years of education: Not on file    Highest education level: Not on file   Occupational History    Not on file   Social Needs    Financial resource strain: Not on file    Food insecurity     Worry: Not on file     Inability: Not on file   [employer] needs     Medical: Not on file     Non-medical: Not on file   Tobacco Use    Smoking status: Former Smoker     Packs/day: 1.00     Last attempt to quit: 2000     Years since quittin.7    Smokeless tobacco: Never Used   Substance and Sexual Activity    Alcohol use:  Yes     Alcohol/week: 5.8 standard drinks     Types: 7 Shots of liquor per week    Drug use: No    Sexual activity: Not Currently   Lifestyle    Physical activity Days per week: Not on file     Minutes per session: Not on file    Stress: Not on file   Relationships    Social connections     Talks on phone: Not on file     Gets together: Not on file     Attends Hindu service: Not on file     Active member of club or organization: Not on file     Attends meetings of clubs or organizations: Not on file     Relationship status: Not on file    Intimate partner violence     Fear of current or ex partner: Not on file     Emotionally abused: Not on file     Physically abused: Not on file     Forced sexual activity: Not on file   Other Topics Concern    Not on file   Social History Narrative    Not on file            Visit Vitals  /73 (BP 1 Location: Left arm, BP Patient Position: Sitting)   Pulse 94   Temp (!) 96.4 °F (35.8 °C) (Temporal)   Resp 15   Ht 5' 9\" (1.753 m)   Wt 181 lb 3.2 oz (82.2 kg)   SpO2 95% Comment: RA   BMI 26.76 kg/m²       Physical Examination:   General - Well appearing male  HEENT - PERRL, TM no erythema/opacification, normal nasal turbinates, no oropharyngeal erythema or exudate, MMM  Neck - supple, no bruits, no thyroidomegaly, no lymphadenopathy  Pulm - clear to auscultation bilaterally  Cardio - RRR, normal S1 S2, no murmur  Abd - soft, nontender, no masses, no HSM  Extrem - no edema, +2 distal pulses  Neuro-  No focal deficits, CN intact     Assessment/Plan:    1.  tia--continue plavix,  Add 81 mg asa. Check carotid duplex. 2.  Dm, type 2--well controlled with glucophage, a1c 6.3  3.  hyperlipids--LDL 62, on pravachol  4. RA--on mtx, folic acid, and humira  5.  htn--controlled with diltiazem  6.  gerd--ok to continue nexium, as other meds were not effective  7. Hx NPH with  shunt--[facility]  8. Hx latent tb--opted to not pursue any treatment. Remains asymptomatic    Flu shot given today. rtc for regular visit.         Imtiaz Ho III, DO

## 2020-10-02 NOTE — PATIENT INSTRUCTIONS
Transient Ischemic Attack: Care Instructions  Your Care Instructions     A transient ischemic attack (TIA) is when blood flow to a part of your brain is blocked for a short time. A TIA is like a stroke but usually lasts only a few minutes. A TIA does not cause lasting brain damage. Any vision problems, slurred speech, or other symptoms usually go away in 10 to 20 minutes. But they may last for up to 24 hours. TIAs are often warning signs of a stroke. Some people who have a TIA may have a stroke in the future. A stroke can cause symptoms like those of a TIA. But a stroke causes lasting damage to your brain. You can take steps to help prevent a stroke. One thing you can do is get early treatment. If you have other new symptoms, or if your symptoms do not get better, go back to the emergency room or call your doctor right away. Getting treatment right away may prevent long-term brain damage caused by a stroke. The doctor has checked you carefully, but problems can develop later. If you notice any problems or new symptoms, get medical treatment right away. Follow-up care is a key part of your treatment and safety. Be sure to make and go to all appointments, and call your doctor if you are having problems. It's also a good idea to know your test results and keep a list of the medicines you take. How can you care for yourself at home? Medicines    · Be safe with medicines. Take your medicines exactly as prescribed. Call your doctor if you think you are having a problem with your medicine.     · If you take a blood thinner, such as aspirin, be sure you get instructions about how to take your medicine safely.  Blood thinners can cause serious bleeding problems.     · Call your doctor if you are not able to take your medicines for any reason.     · Do not take any over-the-counter medicines or herbal products without talking to your doctor first.     · If you take birth control pills or hormone therapy, talk to your doctor. Ask if these treatments are right for you. Lifestyle changes    · Do not smoke. If you need help quitting, talk to your doctor about stop-smoking programs and medicines.     · Be active. If your doctor recommends it, get more exercise. Walking is a good choice. Bit by bit, increase the amount you walk every day. Try for at least 30 minutes on most days of the week. You also may want to swim, bike, or do other activities.     · Eat heart-healthy foods. These include fruits, vegetables, high-fiber foods, lean meats, beans, peas, nuts, seeds, and soy products, and foods that are low in sodium, saturated fat, and trans fat.     · Stay at a healthy weight. Lose weight if you need to.     · Limit alcohol to 2 drinks a day for men and 1 drink a day for women. Staying healthy    · Manage other health problems such as diabetes, high blood pressure, and high cholesterol.     · Get the flu vaccine every year. When should you call for help? Call 911 anytime you think you may need emergency care. For example, call if:    · You have new or worse symptoms of a stroke. These may include:  ? Sudden numbness, tingling, weakness, or loss of movement in your face, arm, or leg, especially on only one side of your body. ? Sudden vision changes. ? Sudden trouble speaking. ? Sudden confusion or trouble understanding simple statements. ? Sudden problems with walking or balance. ? A sudden, severe headache that is different from past headaches. Call 911 even if these symptoms go away in a few minutes.     · You feel like you are having another TIA. Watch closely for changes in your health, and be sure to contact your doctor if you have any problems. Where can you learn more? Go to http://www.[employer].com/  Enter I231 in the search box to learn more about \"Transient Ischemic Attack: Care Instructions. \"  Current as of: March 4, 2020               Content Version: 12.6  © 9789-8245 [employer]. Care instructions adapted under license by [employer] (which disclaims liability or warranty for this information). If you have questions about a medical condition or this instruction, always ask your healthcare professional. [address] any warranty or liability for your use of this information.

## 2020-10-05 NOTE — PROGRESS NOTES
Name and  confirmed. Called and spoke with pt's wide regarding results  Pt's wife verbally understood.

## 2021-01-28 ENCOUNTER — TELEPHONE (OUTPATIENT)
Dept: INTERNAL MEDICINE CLINIC | Age: 81
End: 2021-01-28

## 2021-01-28 NOTE — TELEPHONE ENCOUNTER
----- Message from Cheryl Penaloza sent at 1/28/2021 12:30 PM EST -----  Regarding: Dilan/ telephone  General Message/Vendor Calls    Caller's first and last name: Avery Gonzalez/ wife      Reason for call: COVID vaccine appropriateness      Callback required yes/no and why: yes to advise      Best contact number(s):[phone]       Details to clarify the request: Mrs. Ginette Gonzalez is requesting a call back for advise on the COVID vaccine appropriateness for pt, due to health status. Ms Ginette Gonzalez advised she and Pt have an opportunity to get the vaccine at [facility] tomorrow.       Message from [facility]

## 2021-03-01 ENCOUNTER — TELEPHONE (OUTPATIENT)
Dept: INTERNAL MEDICINE CLINIC | Age: 81
End: 2021-03-01

## 2021-03-01 NOTE — TELEPHONE ENCOUNTER
Called and spoke to patient's wife to get more information. Patient's wife scheduled a 6 month follow up for her  with Dr. Radha Rivera on 4/26/21.

## 2021-03-01 NOTE — TELEPHONE ENCOUNTER
----- Message from Stacy Tabor sent at 3/1/2021 11:42 AM EST -----  Regarding: Dr. Yael Ocasio first and last name: Mrs. Nataly Vines      Reason for call: Would like to know if pt needs to schedule a check up. He is doing \"fine\" just has a small cough from COPD.        Callback required yes/no and why: yes      Best contact number(s): [phone]      Message from [facility]

## 2021-04-26 ENCOUNTER — HOSPITAL ENCOUNTER (OUTPATIENT)
Dept: RADIATION THERAPY | Age: 81
Discharge: HOME | End: 2021-04-26

## 2021-06-17 ENCOUNTER — HOSPITAL ENCOUNTER (OUTPATIENT)
Dept: RADIATION THERAPY | Age: 81
Discharge: HOME | End: 2021-06-17

## 2021-07-26 ENCOUNTER — OFFICE VISIT (OUTPATIENT)
Dept: INTERNAL MEDICINE CLINIC | Age: 81
End: 2021-07-26
Payer: MEDICARE

## 2021-07-26 VITALS
BODY MASS INDEX: 28.58 KG/M2 | OXYGEN SATURATION: 96 % | SYSTOLIC BLOOD PRESSURE: 108 MMHG | DIASTOLIC BLOOD PRESSURE: 66 MMHG | HEART RATE: 83 BPM | RESPIRATION RATE: 12 BRPM | WEIGHT: 193 LBS | HEIGHT: 69 IN

## 2021-07-26 DIAGNOSIS — I71.40 AAA (ABDOMINAL AORTIC ANEURYSM) WITHOUT RUPTURE: ICD-10-CM

## 2021-07-26 DIAGNOSIS — M05.79 RHEUMATOID ARTHRITIS INVOLVING MULTIPLE SITES WITH POSITIVE RHEUMATOID FACTOR (HCC): ICD-10-CM

## 2021-07-26 DIAGNOSIS — E11.69 HYPERLIPIDEMIA ASSOCIATED WITH TYPE 2 DIABETES MELLITUS (HCC): ICD-10-CM

## 2021-07-26 DIAGNOSIS — Z22.7 TB LUNG, LATENT: ICD-10-CM

## 2021-07-26 DIAGNOSIS — Z00.00 MEDICARE ANNUAL WELLNESS VISIT, SUBSEQUENT: Primary | ICD-10-CM

## 2021-07-26 DIAGNOSIS — E78.5 HYPERLIPIDEMIA ASSOCIATED WITH TYPE 2 DIABETES MELLITUS (HCC): ICD-10-CM

## 2021-07-26 DIAGNOSIS — Z86.73 HISTORY OF TIA (TRANSIENT ISCHEMIC ATTACK): ICD-10-CM

## 2021-07-26 DIAGNOSIS — E11.9 TYPE 2 DIABETES MELLITUS WITHOUT COMPLICATION, WITHOUT LONG-TERM CURRENT USE OF INSULIN (HCC): ICD-10-CM

## 2021-07-26 DIAGNOSIS — G91.2 NPH (NORMAL PRESSURE HYDROCEPHALUS) (HCC): ICD-10-CM

## 2021-07-26 DIAGNOSIS — J42 CHRONIC BRONCHITIS, UNSPECIFIED CHRONIC BRONCHITIS TYPE (HCC): ICD-10-CM

## 2021-07-26 DIAGNOSIS — N40.0 BENIGN PROSTATIC HYPERPLASIA, UNSPECIFIED WHETHER LOWER URINARY TRACT SYMPTOMS PRESENT: ICD-10-CM

## 2021-07-26 DIAGNOSIS — I10 ESSENTIAL HYPERTENSION: ICD-10-CM

## 2021-07-26 PROCEDURE — G8427 DOCREV CUR MEDS BY ELIG CLIN: HCPCS | Performed by: INTERNAL MEDICINE

## 2021-07-26 PROCEDURE — G8536 NO DOC ELDER MAL SCRN: HCPCS | Performed by: INTERNAL MEDICINE

## 2021-07-26 PROCEDURE — G0439 PPPS, SUBSEQ VISIT: HCPCS | Performed by: INTERNAL MEDICINE

## 2021-07-26 PROCEDURE — G8419 CALC BMI OUT NRM PARAM NOF/U: HCPCS | Performed by: INTERNAL MEDICINE

## 2021-07-26 PROCEDURE — 99213 OFFICE O/P EST LOW 20 MIN: CPT | Performed by: INTERNAL MEDICINE

## 2021-07-26 PROCEDURE — 3288F FALL RISK ASSESSMENT DOCD: CPT | Performed by: INTERNAL MEDICINE

## 2021-07-26 PROCEDURE — G8510 SCR DEP NEG, NO PLAN REQD: HCPCS | Performed by: INTERNAL MEDICINE

## 2021-07-26 PROCEDURE — G0463 HOSPITAL OUTPT CLINIC VISIT: HCPCS | Performed by: INTERNAL MEDICINE

## 2021-07-26 PROCEDURE — 1100F PTFALLS ASSESS-DOCD GE2>/YR: CPT | Performed by: INTERNAL MEDICINE

## 2021-07-26 RX ORDER — ZOSTER VACCINE RECOMBINANT, ADJUVANTED 50 MCG/0.5
0.5 KIT INTRAMUSCULAR ONCE
Qty: 0.5 ML | Refills: 1 | Status: SHIPPED | OUTPATIENT
Start: 2021-07-26 | End: 2021-07-26

## 2021-07-26 RX ORDER — BLOOD SUGAR DIAGNOSTIC
STRIP MISCELLANEOUS
Qty: 100 STRIP | Refills: 5 | Status: SHIPPED | OUTPATIENT
Start: 2021-07-26

## 2021-07-26 NOTE — PROGRESS NOTES
Karli Grier is a 79 y.o. male who presents for evaluation of awv. Last seen by me oct 2, 2020. 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 He has had them resected, and is on radiation. Having more loose stools, wife asks about stopping glucophage. Saw dr Lance Wilson and failed his rectal manometry study. Memory is worsening as well. Has appt later this week with neurosx and then neurology. [facility] [place] in [place] in [place] 2021. Wife with him today.       ROS:  Constitutional: negative for fevers, chills, anorexia and weight loss  Eyes:   negative for visual disturbance and irritation  ENT:   negative for tinnitus,sore throat,nasal congestion,ear pain,hoarseness  Respiratory:  negative for cough, hemoptysis, dyspnea,wheezing  CV:   negative for chest pain, palpitations, lower extremity edema  GI:   negative for nausea, vomiting, diarrhea, abdominal pain,melena  Genitourinary: negative for frequency, dysuria and hematuria  Musculoskel: negative for myalgias, arthralgias, back pain, muscle weakness, joint pain  Neurological:  negative for headaches, dizziness, focal weakness, numbness  Psychiatric:     Negative for depression or anxiety      Past Medical History:   Diagnosis Date    AAA (abdominal aortic aneurysm) ([address]     Followed by Dr Avery Mcmullen as of 05/13/19 3.9 x 4.1    At risk for sleep apnea 07/08/2020    during PAT assessment, EML score 4    Cancer ([address]     r shoulder melanoma    Colon polyp     Contact dermatitis and eczema due to cause     COPD     Diabetes ([address]     Diarrhea 1/22/2016    Dysphagia 4/4/2017    Emphysema lung ([address]     Eructation 7/17/2020    Exposure to TB     2/2019 cruise, probably exposure to TB, beginning TC 9/2019    GERD (gastroesophageal reflux disease)     Hypertension     Ill-defined condition     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, arms    NPH (normal pressure hydrocephalus) ([address] 2014    shunt    RA (rheumatoid arthritis) (HCC)     Scarring  Sepsis [facility]     with perforated gallbladder    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 2021    Stroke [facility])     L arm weakness states not a stroke states it is a TIA     TIA (transient ischemic attack) 2018       Past Surgical History:   Procedure Laterality Date    HX APPENDECTOMY      HX CATARACT REMOVAL Bilateral     [facility]  [facility]    [facility], had sepsis, appendix removed with this    HX ENDOSCOPY      HX FEMORAL BYPASS Left     stent    HX GI      PEG placed and removed    HX OPEN REDUCTION INTERNAL FIXATION Left     femur, suresh in place    HX ORTHOPAEDIC Left     Femur     HX OTHER SURGICAL      r shoulder melenoma excision    HX TONSILLECTOMY      IR PLC CATH AV SHUNT IN W PTA SI      CA COLSC FLX W/RMVL OF TUMOR POLYP LESION SNARE TQ  2011         CA EGD TRANSORAL BIOPSY SINGLE/MULTIPLE  2011            Family History   Adopted: Yes       Social History     Socioeconomic History    Marital status:      Spouse name: Not on file    Number of children: Not on file    Years of education: Not on file    Highest education level: Not on file   Occupational History    Not on file   Tobacco Use    Smoking status: Former Smoker     Packs/day: 1.00     Quit date:      Years since quittin.5    Smokeless tobacco: Never Used   Substance and Sexual Activity    Alcohol use: Yes     Alcohol/week: 5.8 standard drinks     Types: 7 Shots of liquor per week    Drug use: No    Sexual activity: Not Currently   Other Topics Concern    Not on file   Social History Narrative    Not on file     Social Determinants of Health     Financial Resource Strain:     Difficulty of Paying Living Expenses:    Food Insecurity:     Worried About Running Out of Food in the Last Year:     [address] in the Last Year:    Transportation Needs:     Lack of Transportation (Medical):      Lack of Transportation (Non-Medical):    Physical Activity:     Days of Exercise per Week:     Minutes of Exercise per Session:    Stress:     Feeling of Stress :    Social Connections:     Frequency of Communication with Friends and Family:     Frequency of Social Gatherings with Friends and Family:     Attends [employer] Services:     Active Member of Clubs or Organizations:     Attends Club or Organization Meetings:     Marital Status:    Intimate Partner Violence:     Fear of Current or Ex-Partner:     Emotionally Abused:     Physically Abused:     Sexually Abused:             Visit Vitals  /66 (BP 1 Location: Left upper arm, BP Patient Position: Sitting)   Pulse 83   Resp 12   Ht 5' 9\" (1.753 m)   Wt 193 lb (87.5 kg)   SpO2 96%   BMI 28.50 kg/m²       Physical Examination:   General - Well appearing male. Elderly, missing large chunk of his scalp from [facility], also spot on his forehead. HEENT - PERRL, TM no erythema/opacification, normal nasal turbinates, no oropharyngeal erythema or exudate, MMM  Neck - supple, no bruits, no thyroidomegaly, no lymphadenopathy  Pulm - clear to auscultation bilaterally  Cardio - RRR, normal S1 S2, no murmur  Abd - soft, nontender, no masses, no HSM  Extrem - no edema, +2 distal pulses  Neuro-  No focal deficits, CN intact     Assessment/Plan:    1. Loose stools--failed rectal manometry study with dr Lynne Jenkins. Will stop glucophage, see if that helps  2. Dm, type 2--check a1c. Plan to switch to farxiga, stop glucophage  3.  htn--controlled with diltiazem. Might need to decrease dose  4. bph--check psa, on flomax  5.  hyperlipids--on pravachol, check flp, cmp  6. RA--on humira, mtx, folic acid  7. Copd--continue spiriva    rx given for shingrix.   rtc 6 months        Johanny Ramsay III, DO            This is the Subsequent Medicare Annual Wellness Exam, performed 12 months or more after the Initial AWV or the last Subsequent AWV    I have reviewed the patient's medical history in detail and updated the computerized patient record. Assessment/Plan   Education and counseling provided:  Are appropriate based on today's review and evaluation  End-of-Life planning (with patient's consent)  Pneumococcal Vaccine  Influenza Vaccine  Prostate cancer screening tests (PSA, covered annually)    1. Medicare annual wellness visit, subsequent       Depression Risk Factor Screening     3 most recent PHQ Screens 7/26/2021   Little interest or pleasure in doing things Not at all   Feeling down, depressed, irritable, or hopeless Not at all   Total Score PHQ 2 0       Alcohol Risk Screen    Do you average more than 1 drink per night or more than 7 drinks a week: Yes    In the past three months have you have had more than 4 drinks containing alcohol on one occasion: No        Functional Ability and Level of Safety    Hearing: Hearing is good. Activities of Daily Living: The home contains: grab bars  Patient needs help with:  transportation and managing medications      Ambulation: with mild difficulty. Uses cane when leaves the house. Fall Risk:  Fall Risk Assessment, last 12 mths 7/26/2021   Able to walk? Yes   Fall in past 12 months? 0   Do you feel unsteady? 0   Are you worried about falling 0   Number of falls in past 12 months -   Fall with injury? -      Abuse Screen:  Patient is not abused. Lives with wife of 58 years next month.        Cognitive Screening    Has your family/caregiver stated any concerns about your memory: yes - increased forgetfulness      Cognitive Screening: Normal - MMSE (Mini Mental Status Exam)    Health Maintenance Due     Health Maintenance Due   Topic Date Due    COVID-19 Vaccine (1) Never done    Shingrix Vaccine Age 50> (1 of 2) Never done    Foot Exam Q1  01/30/2021    MICROALBUMIN Q1  07/28/2021    Lipid Screen  07/28/2021       Patient Care Team   Patient Care Team:  Sandra Soto DO as PCP - General (Internal Medicine)  Sandra Soto DO as PCP - [facility] Provider  Kylie Malin MD (Neurosurgery)  Robby Delatorre MD (Pulmonary Disease)  Cynthia Phelan MD as Physician (Gastroenterology)  Traci Ramírez MD as Physician (Rheumatology)  Clarisa Raza MD as Physician (Cardiology)  Ann Mckeon MD as Physician (Neurology)    History     Patient Active Problem List   Diagnosis Code    History of benign neoplasm of colon Z86.018    GERD (gastroesophageal reflux disease) K21.9    Colon polyp K63.5    Diverticulosis of colon K57.30    Internal hemorrhoid K64.8    Schatzki's ring K22.2    Hiatal hernia K44.9    Diarrhea R19.7    Influenza J11.1    Fall W19. Severa Cobian    Dysphagia R13.10    Abnormal x-ray of abdomen R93.5    NPH (normal pressure hydrocephalus) ([facility]) G91.2    Alcohol use disorder [id number]    Hip fracture ([facility]) S72.009A    BPH (benign prostatic hyperplasia) N40.0    Rheumatoid arthritis involving multiple sites ([address] M06.9    AAA (abdominal aortic aneurysm) without rupture ([facility]) I71.4    Eructation R14.2     Past Medical History:   Diagnosis Date    AAA (abdominal aortic aneurysm) ([address]     Followed by Dr Mauricio Moeller as of 05/13/19 3.9 x 4.1    At risk for sleep apnea 07/08/2020    during PAT assessment, MEL score 4    Cancer ([address]     r shoulder melanoma    Colon polyp     Contact dermatitis and eczema due to cause     COPD     Diabetes ([address]     Diarrhea 1/22/2016    Dysphagia 4/4/2017    Emphysema lung ([address]     Eructation 7/17/2020    Exposure to TB     2/2019 cruise, probably exposure to TB, beginning TC 9/2019    GERD (gastroesophageal reflux disease)     Hypertension     Ill-defined condition     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, arms    NPH (normal pressure hydrocephalus) ([address] 2014    shunt    RA (rheumatoid arthritis) ([address]     Scarring     Sepsis ([address]     with perforated gallbladder    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 06/2021    Stroke ([address] 2014    L arm weakness states not a stroke states it is a TIA     TIA (transient ischemic attack) 11/2018      Past Surgical History:   Procedure Laterality Date    HX APPENDECTOMY      HX CATARACT REMOVAL Bilateral     HX CHOLECYSTECTOMY  [facility]18/2017    [facility], had sepsis, appendix removed with this    HX ENDOSCOPY      HX FEMORAL BYPASS Left     stent    HX GI      PEG placed and removed    HX OPEN REDUCTION INTERNAL FIXATION Left     femur, suresh in place    HX ORTHOPAEDIC Left     Femur     HX OTHER SURGICAL      r shoulder melenoma excision    HX TONSILLECTOMY      IR PLC CATH AV SHUNT IN W PTA SI  2014    NE COLSC FLX W/RMVL OF TUMOR POLYP LESION SNARE TQ  4/28/2011         NE EGD TRANSORAL BIOPSY SINGLE/MULTIPLE  4/28/2011          Current Outpatient Medications   Medication Sig Dispense Refill    OneTouch Ultra Test strip TEST 1-2 TIMES A  Strip 5    tamsulosin (FLOMAX) 0.4 mg capsule TAKE 1 CAPSULE 30 MINUTES AFTER THE SAME MEAL DAILY 90 Capsule 3    pravastatin (PRAVACHOL) 80 mg tablet TAKE ONE TABLET BY MOUTH EVERY NIGHT 90 Tab 3    dilTIAZem IR (CARDIZEM) 60 mg tablet Take 1 Tab by mouth two (2) times a day. 180 Tab 3    metFORMIN ER (GLUCOPHAGE XR) 500 mg tablet TAKE 1 TABLET TWICE DAILY  WITH MEALS 180 Tab 3    diclofenac (VOLTAREN) 1 % gel Use to left shoulder up to 4x daily. 100 g 3    clopidogrel (PLAVIX) 75 mg tab Take 75 mg by mouth daily.  fluticasone (FLONASE) 50 mcg/actuation nasal spray 2 Sprays by Both Nostrils route daily as needed.  acetaminophen (TYLENOL) 500 mg tablet Take 500 mg by mouth every six (6) hours as needed for Pain.  albuterol sulfate (PROVENTIL;VENTOLIN) 2.5 mg/0.5 mL nebu nebulizer solution 2.5 mg by Nebulization route every four (4) hours as needed for Wheezing.  methotrexate (RHEUMATREX) 2.5 mg tablet Take 10 mg by mouth. Every Wednesday and Thursday      adalimumab (HUMIRA) 40 mg/0.8 mL injection 40 mg by SubCUTAneous route every fourteen (14) days.  folic acid (FOLVITE) 1 mg tablet Take 1 mg by mouth daily.  tiotropium (SPIRIVA WITH HANDIHALER) 18 mcg inhalation capsule Take 1 Cap by inhalation daily. Allergies   Allergen Reactions    Ceclor [Cefaclor] Swelling    Contrast Agent [Iodine] Hives and Itching    Floxin [Ofloxacin] Hives    Gabapentin Other (comments)     Causes confusion    Ivp [Fd And C Blue No.1] Itching    Lansoprazole Diarrhea       Family History   Adopted: Yes     Social History     Tobacco Use    Smoking status: Former Smoker     Packs/day: 1.00     Quit date:      Years since quittin.5    Smokeless tobacco: Never Used   Substance Use Topics    Alcohol use:  Yes     Alcohol/week: 5.8 standard drinks     Types: 7 Shots of liquor per week         Julien Sings III, DO

## 2021-07-26 NOTE — PATIENT INSTRUCTIONS
Medicare Wellness Visit, Male    The best way to live healthy is to have a lifestyle where you eat a well-balanced diet, exercise regularly, limit alcohol use, and quit all forms of tobacco/nicotine, if applicable. Regular preventive services are another way to keep healthy. Preventive services (vaccines, screening tests, monitoring & exams) can help personalize your care plan, which helps you manage your own care. Screening tests can find health problems at the earliest stages, when they are easiest to treat. [facility] follows the current, evidence-based guidelines published by the [facility] ([facility]) when recommending preventive services for our patients. Because we follow these guidelines, sometimes recommendations change over time as research supports it. (For example, a prostate screening blood test is no longer routinely recommended for men with no symptoms). Of course, you and your doctor may decide to screen more often for some diseases, based on your risk and co-morbidities (chronic disease you are already diagnosed with). Preventive services for you include:  - Medicare offers their members a free annual wellness visit, which is time for you and your primary care provider to discuss and plan for your preventive service needs. Take advantage of this benefit every year!  -All adults over age 72 should receive the recommended pneumonia vaccines. Current USPSTF guidelines recommend a series of two vaccines for the best pneumonia protection.   -All adults should have a flu vaccine yearly and tetanus vaccine every 10 years.  -All adults age 48 and older should receive the shingles vaccines (series of two vaccines).        -All adults age 38-68 who are overweight should have a diabetes screening test once every three years.   -Other screening tests & preventive services for persons with diabetes include: an eye exam to screen for diabetic retinopathy, a kidney function test, a foot exam, and stricter control over your cholesterol.   -Cardiovascular screening for adults with routine risk involves an electrocardiogram (ECG) at intervals determined by the provider.   -Colorectal cancer screening should be done for adults age 54-65 with no increased risk factors for colorectal cancer. There are a number of acceptable methods of screening for this type of cancer. Each test has its own benefits and drawbacks. Discuss with your provider what is most appropriate for you during your annual wellness visit. The different tests include: colonoscopy (considered the best screening method), a fecal occult blood test, a fecal DNA test, and sigmoidoscopy.  -All adults born between [facility] should be screened once for Hepatitis C.  -An Abdominal Aortic Aneurysm (AAA) Screening is recommended for men age 73-68 who has ever smoked in their lifetime. Here is a list of your current Health Maintenance items (your personalized list of preventive services) with a due date:  Health Maintenance Due   Topic Date Due    COVID-19 Vaccine (1) Never done    Shingles Vaccine (1 of 2) Never done    Diabetic Foot Care  01/30/2021    Albumin Urine Test  07/28/2021    Cholesterol Test   07/28/2021     Medicare Wellness Visit, Male    The best way to live healthy is to have a lifestyle where you eat a well-balanced diet, exercise regularly, limit alcohol use, and quit all forms of tobacco/nicotine, if applicable. Regular preventive services are another way to keep healthy. Preventive services (vaccines, screening tests, monitoring & exams) can help personalize your care plan, which helps you manage your own care. Screening tests can find health problems at the earliest stages, when they are easiest to treat.    [facility] follows the current, evidence-based guidelines published by the [place] [facility] (USPSTF) when recommending preventive services for our patients. Because we follow these guidelines, sometimes recommendations change over time as research supports it. (For example, a prostate screening blood test is no longer routinely recommended for men with no symptoms). Of course, you and your doctor may decide to screen more often for some diseases, based on your risk and co-morbidities (chronic disease you are already diagnosed with). Preventive services for you include:  - Medicare offers their members a free annual wellness visit, which is time for you and your primary care provider to discuss and plan for your preventive service needs. Take advantage of this benefit every year!  -All adults over age 72 should receive the recommended pneumonia vaccines. Current USPSTF guidelines recommend a series of two vaccines for the best pneumonia protection.   -All adults should have a flu vaccine yearly and tetanus vaccine every 10 years.  -All adults age 48 and older should receive the shingles vaccines (series of two vaccines). -All adults age 38-68 who are overweight should have a diabetes screening test once every three years.   -Other screening tests & preventive services for persons with diabetes include: an eye exam to screen for diabetic retinopathy, a kidney function test, a foot exam, and stricter control over your cholesterol.   -Cardiovascular screening for adults with routine risk involves an electrocardiogram (ECG) at intervals determined by the provider.   -Colorectal cancer screening should be done for adults age 54-65 with no increased risk factors for colorectal cancer. There are a number of acceptable methods of screening for this type of cancer. Each test has its own benefits and drawbacks. Discuss with your provider what is most appropriate for you during your annual wellness visit.  The different tests include: colonoscopy (considered the best screening method), a fecal occult blood test, a fecal DNA test, and sigmoidoscopy.  -All adults born between [facility] should be screened once for Hepatitis C.  -An Abdominal Aortic Aneurysm (AAA) Screening is recommended for men age 73-68 who has ever smoked in their lifetime. Here is a list of your current Health Maintenance items (your personalized list of preventive services) with a due date:  Health Maintenance Due   Topic Date Due    COVID-19 Vaccine (1) Never done    Shingles Vaccine (1 of 2) Never done    Diabetic Foot Care  01/30/2021    Albumin Urine Test  07/28/2021    Cholesterol Test   07/28/2021       Get fasting labs done. Nothing to eat after MN.   Lab opens 8 am.

## 2021-07-28 ENCOUNTER — LAB ONLY (OUTPATIENT)
Dept: INTERNAL MEDICINE CLINIC | Age: 81
End: 2021-07-28

## 2021-07-28 DIAGNOSIS — E11.69 HYPERLIPIDEMIA ASSOCIATED WITH TYPE 2 DIABETES MELLITUS (HCC): ICD-10-CM

## 2021-07-28 DIAGNOSIS — E11.9 TYPE 2 DIABETES MELLITUS WITHOUT COMPLICATION, WITHOUT LONG-TERM CURRENT USE OF INSULIN (HCC): ICD-10-CM

## 2021-07-28 DIAGNOSIS — N40.0 BENIGN PROSTATIC HYPERPLASIA, UNSPECIFIED WHETHER LOWER URINARY TRACT SYMPTOMS PRESENT: ICD-10-CM

## 2021-07-28 DIAGNOSIS — I10 ESSENTIAL HYPERTENSION: ICD-10-CM

## 2021-07-28 DIAGNOSIS — E78.5 HYPERLIPIDEMIA ASSOCIATED WITH TYPE 2 DIABETES MELLITUS (HCC): ICD-10-CM

## 2021-07-28 LAB
ALBUMIN SERPL-MCNC: 3.5 G/DL (ref 3.5–5)
ALBUMIN/GLOB SERPL: 1.2 {RATIO} (ref 1.1–2.2)
ALP SERPL-CCNC: 95 U/L (ref 45–117)
ALT SERPL-CCNC: 32 U/L (ref 12–78)
ANION GAP SERPL CALC-SCNC: 8 MMOL/L (ref 5–15)
AST SERPL-CCNC: 32 U/L (ref 15–37)
BASOPHILS # BLD: 0 K/UL (ref 0–0.1)
BASOPHILS NFR BLD: 0 % (ref 0–1)
BILIRUB SERPL-MCNC: 0.5 MG/DL (ref 0.2–1)
BUN SERPL-MCNC: 11 MG/DL (ref 6–20)
BUN/CREAT SERPL: 13 (ref 12–20)
CALCIUM SERPL-MCNC: 9 MG/DL (ref 8.5–10.1)
CHLORIDE SERPL-SCNC: 110 MMOL/L (ref 97–108)
CHOLEST SERPL-MCNC: 126 MG/DL
CO2 SERPL-SCNC: 25 MMOL/L (ref 21–32)
COMMENT, HOLDF: NORMAL
CREAT SERPL-MCNC: 0.88 MG/DL (ref 0.7–1.3)
DIFFERENTIAL METHOD BLD: ABNORMAL
EOSINOPHIL # BLD: 0.1 K/UL (ref 0–0.4)
EOSINOPHIL NFR BLD: 1 % (ref 0–7)
ERYTHROCYTE [DISTWIDTH] IN BLOOD BY AUTOMATED COUNT: 14.4 % (ref 11.5–14.5)
EST. AVERAGE GLUCOSE BLD GHB EST-MCNC: 137 MG/DL
GLOBULIN SER CALC-MCNC: 3 G/DL (ref 2–4)
GLUCOSE SERPL-MCNC: 153 MG/DL (ref 65–100)
HBA1C MFR BLD: 6.4 % (ref 4–5.6)
HCT VFR BLD AUTO: 38.5 % (ref 36.6–50.3)
HDLC SERPL-MCNC: 53 MG/DL
HDLC SERPL: 2.4 {RATIO} (ref 0–5)
HGB BLD-MCNC: 13.6 G/DL (ref 12.1–17)
IMM GRANULOCYTES # BLD AUTO: 0 K/UL (ref 0–0.04)
IMM GRANULOCYTES NFR BLD AUTO: 0 % (ref 0–0.5)
LDLC SERPL CALC-MCNC: 52.6 MG/DL (ref 0–100)
LYMPHOCYTES # BLD: 1.2 K/UL (ref 0.8–3.5)
LYMPHOCYTES NFR BLD: 22 % (ref 12–49)
MCH RBC QN AUTO: 35.2 PG (ref 26–34)
MCHC RBC AUTO-ENTMCNC: 35.3 G/DL (ref 30–36.5)
MCV RBC AUTO: 99.7 FL (ref 80–99)
MONOCYTES # BLD: 0.4 K/UL (ref 0–1)
MONOCYTES NFR BLD: 7 % (ref 5–13)
NEUTS SEG # BLD: 3.8 K/UL (ref 1.8–8)
NEUTS SEG NFR BLD: 70 % (ref 32–75)
NRBC # BLD: 0 K/UL (ref 0–0.01)
NRBC BLD-RTO: 0 PER 100 WBC
PLATELET # BLD AUTO: 116 K/UL (ref 150–400)
PMV BLD AUTO: 12.2 FL (ref 8.9–12.9)
POTASSIUM SERPL-SCNC: 4.1 MMOL/L (ref 3.5–5.1)
PROT SERPL-MCNC: 6.5 G/DL (ref 6.4–8.2)
PSA SERPL-MCNC: 0.9 NG/ML (ref 0.01–4)
RBC # BLD AUTO: 3.86 M/UL (ref 4.1–5.7)
SAMPLES BEING HELD,HOLD: NORMAL
SODIUM SERPL-SCNC: 143 MMOL/L (ref 136–145)
TRIGL SERPL-MCNC: 102 MG/DL (ref ?–150)
TSH SERPL DL<=0.05 MIU/L-ACNC: 2.95 UIU/ML (ref 0.36–3.74)
VLDLC SERPL CALC-MCNC: 20.4 MG/DL
WBC # BLD AUTO: 5.5 K/UL (ref 4.1–11.1)

## 2021-07-29 NOTE — PROGRESS NOTES
Letter mailed to patient. Called, spoke to pt. Two identifiers confirmed. Pt notified of results/recommendations per Dr. Colvin Cowden. Pt verbalized understanding of information discussed w/ no further questions at this time. Labs look good.  Stop glucophage.  Rx sent in for replacement.  Hopefully farxiga.

## 2021-08-05 ENCOUNTER — HOSPITAL ENCOUNTER (OUTPATIENT)
Dept: RADIATION THERAPY | Age: 81
Discharge: HOME | End: 2021-08-05

## 2021-09-09 ENCOUNTER — HOSPITAL ENCOUNTER (OUTPATIENT)
Dept: RADIATION THERAPY | Age: 81
Discharge: HOME | End: 2021-09-09

## 2021-09-28 ENCOUNTER — TELEPHONE (OUTPATIENT)
Dept: INTERNAL MEDICINE CLINIC | Age: 81
End: 2021-09-28

## 2021-09-28 NOTE — TELEPHONE ENCOUNTER
Called patient. ID verified with Name and . Spoke with wife in regards to patient. Wife states that patient has been experiencing right sided collar bone pain that radiates to the back of patient's neck. Wife states that patient experiences pain when he rotates head and when he is lying down in certain positions. Wife states that patient has been using diclofenac gel that was previously prescribed for left shoulder and has been taking Tylenol every 4-6 hours but has not experienced any relief. Wife would like to know if this is an issue she should contact Rheumatologist or if patient needs to see an Orthopedic Specialist at this time.

## 2021-09-28 NOTE — TELEPHONE ENCOUNTER
Called patient. ID verified with Name and . Spoke with patient's wife in regards to information received. Informed wife that provider recommends patient schedule with orthopedic specialist in regards  to patients collar bone/neck pain. Wife states that she understands the information received and has no further questions or concerns at this time.

## 2021-09-28 NOTE — TELEPHONE ENCOUNTER
----- Message from Tammy Tate sent at 9/28/2021  1:53 PM EDT -----  Regarding: Dr Carolyn Diallo 1  Level 1/Escalated Issue      Caller's first and last name and relationship (if not the patient):Tabatha Gonzalez/wife      Best contact number(s):[phone]       What are the symptoms:pain on Rt side of collar bone that radiates to back neck      Transfer successful - yes/no (include outcome):no/no answer      Transfer declined - yes/no (include reason):no      Was caller advised to seek appropriate level of care - yes/no:yes      Details to clarify the request: Mrs. Aleisha Nava is requesting a call back for assistance with pain on the rt side of collar bone that radiated to the back of neck.       Message from [facility]

## 2021-10-28 ENCOUNTER — HOSPITAL ENCOUNTER (OUTPATIENT)
Dept: RADIATION THERAPY | Age: 81
Discharge: HOME | End: 2021-10-28

## 2022-01-27 ENCOUNTER — OFFICE VISIT (OUTPATIENT)
Dept: INTERNAL MEDICINE CLINIC | Age: 82
End: 2022-01-27
Payer: MEDICARE

## 2022-01-27 VITALS
TEMPERATURE: 97.9 F | BODY MASS INDEX: 27.88 KG/M2 | HEIGHT: 69 IN | RESPIRATION RATE: 20 BRPM | HEART RATE: 86 BPM | DIASTOLIC BLOOD PRESSURE: 71 MMHG | OXYGEN SATURATION: 96 % | WEIGHT: 188.2 LBS | SYSTOLIC BLOOD PRESSURE: 111 MMHG

## 2022-01-27 DIAGNOSIS — J42 CHRONIC BRONCHITIS, UNSPECIFIED CHRONIC BRONCHITIS TYPE (HCC): ICD-10-CM

## 2022-01-27 DIAGNOSIS — M05.79 RHEUMATOID ARTHRITIS INVOLVING MULTIPLE SITES WITH POSITIVE RHEUMATOID FACTOR (HCC): ICD-10-CM

## 2022-01-27 DIAGNOSIS — I71.40 AAA (ABDOMINAL AORTIC ANEURYSM) WITHOUT RUPTURE: ICD-10-CM

## 2022-01-27 DIAGNOSIS — G91.2 NPH (NORMAL PRESSURE HYDROCEPHALUS) (HCC): ICD-10-CM

## 2022-01-27 DIAGNOSIS — E78.5 HYPERLIPIDEMIA ASSOCIATED WITH TYPE 2 DIABETES MELLITUS (HCC): ICD-10-CM

## 2022-01-27 DIAGNOSIS — E11.69 HYPERLIPIDEMIA ASSOCIATED WITH TYPE 2 DIABETES MELLITUS (HCC): ICD-10-CM

## 2022-01-27 DIAGNOSIS — E11.9 TYPE 2 DIABETES MELLITUS WITHOUT COMPLICATION, WITHOUT LONG-TERM CURRENT USE OF INSULIN (HCC): Primary | ICD-10-CM

## 2022-01-27 PROCEDURE — G8510 SCR DEP NEG, NO PLAN REQD: HCPCS | Performed by: INTERNAL MEDICINE

## 2022-01-27 PROCEDURE — G0463 HOSPITAL OUTPT CLINIC VISIT: HCPCS | Performed by: INTERNAL MEDICINE

## 2022-01-27 PROCEDURE — 1101F PT FALLS ASSESS-DOCD LE1/YR: CPT | Performed by: INTERNAL MEDICINE

## 2022-01-27 PROCEDURE — G8536 NO DOC ELDER MAL SCRN: HCPCS | Performed by: INTERNAL MEDICINE

## 2022-01-27 PROCEDURE — G8427 DOCREV CUR MEDS BY ELIG CLIN: HCPCS | Performed by: INTERNAL MEDICINE

## 2022-01-27 PROCEDURE — G8419 CALC BMI OUT NRM PARAM NOF/U: HCPCS | Performed by: INTERNAL MEDICINE

## 2022-01-27 PROCEDURE — 99213 OFFICE O/P EST LOW 20 MIN: CPT | Performed by: INTERNAL MEDICINE

## 2022-01-27 RX ORDER — TETANUS TOXOID, REDUCED DIPHTHERIA TOXOID AND ACELLULAR PERTUSSIS VACCINE, ADSORBED 5; 2.5; 8; 8; 2.5 [IU]/.5ML; [IU]/.5ML; UG/.5ML; UG/.5ML; UG/.5ML
0.5 SUSPENSION INTRAMUSCULAR ONCE
Qty: 0.5 ML | Refills: 0 | Status: SHIPPED | OUTPATIENT
Start: 2022-01-27 | End: 2022-01-27

## 2022-01-27 NOTE — PROGRESS NOTES
Nina Casper is a 79 y.o. male who presents for evaluation of routine follow up. Last seen by me July 26, 2021 in [facility]. Stopped his glucophage then, switched him to [address]. His loose stools have dramatically improved. He overall feels well, and is doing fine. Wife with him today.       ROS:  Constitutional: negative for fevers, chills, anorexia and weight loss  Eyes:   negative for visual disturbance and irritation  ENT:   negative for tinnitus,sore throat,nasal congestion,ear pain,hoarseness  Respiratory:  negative for cough, hemoptysis, dyspnea,wheezing  CV:   negative for chest pain, palpitations, lower extremity edema  GI:   negative for nausea, vomiting, diarrhea, abdominal pain,melena  Genitourinary: negative for frequency, dysuria and hematuria  Musculoskel: negative for myalgias, arthralgias, back pain, muscle weakness, joint pain  Neurological:  negative for headaches, dizziness, focal weakness, numbness  Psychiatric:     Negative for depression or anxiety      Past Medical History:   Diagnosis Date    AAA (abdominal aortic aneurysm) ([address]     Followed by Dr Dalia Boone as of 05/13/19 3.9 x 4.1    At risk for sleep apnea 07/08/2020    during PAT assessment, MEL score 4    Cancer ([address]     r shoulder melanoma    Colon polyp     Contact dermatitis and eczema due to cause     COPD     Diabetes ([address]     Diarrhea 1/22/2016    Dysphagia 4/4/2017    Emphysema lung ([address]     Eructation 7/17/2020    Exposure to TB     2/2019 cruise, probably exposure to TB, beginning TC 9/2019    GERD (gastroesophageal reflux disease)     Hypertension     Ill-defined condition     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, arms    NPH (normal pressure hydrocephalus) ([address] 2014    shunt    RA (rheumatoid arthritis) ([address]     Scarring     Sepsis ([address]     with perforated gallbladder    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 06/2021    Stroke ([address] 2014    L arm weakness states not a stroke states it is a TIA     TIA (transient ischemic attack) 2018       Past Surgical History:   Procedure Laterality Date    HX APPENDECTOMY      HX CATARACT REMOVAL Bilateral     HX CHOLECYSTECTOMY  2017    at [facility], ruptured, had sepsis, appendix removed with this    HX ENDOSCOPY      HX FEMORAL BYPASS Left     stent    HX GI      PEG placed and removed    HX OPEN REDUCTION INTERNAL FIXATION Left     femur, suresh in place    HX ORTHOPAEDIC Left     Femur     HX OTHER SURGICAL      r shoulder melenoma excision    HX TONSILLECTOMY      IR PLC CATH AV SHUNT IN W PTA SI      [place] COLSC FLX W/RMVL OF TUMOR POLYP LESION SNARE TQ  2011         [place] EGD TRANSORAL BIOPSY SINGLE/MULTIPLE  2011            Family History   Adopted: Yes       Social History     Socioeconomic History    Marital status:      Spouse name: Not on file    Number of children: Not on file    Years of education: Not on file    Highest education level: Not on file   Occupational History    Not on file   Tobacco Use    Smoking status: Former Smoker     Packs/day: 1.00     Quit date:      Years since quittin.0    Smokeless tobacco: Never Used   Substance and Sexual Activity    Alcohol use: Yes     Alcohol/week: 5.8 standard drinks     Types: 7 Shots of liquor per week    Drug use: No    Sexual activity: Not Currently   Other Topics Concern    Not on file   Social History Narrative    Not on file     Social Determinants of Health     Financial Resource Strain:     Difficulty of Paying Living Expenses: Not on file   Food Insecurity:     Worried About Running Out of Food in the Last Year: Not on file    Andreea of Food in the Last Year: Not on file   Transportation Needs:     Lack of Transportation (Medical): Not on file    Lack of Transportation (Non-Medical):  Not on file   Physical Activity:     Days of Exercise per Week: Not on file    Minutes of Exercise per Session: Not on file   Stress:     Feeling of Stress : Not on file   Social Connections:     Frequency of Communication with Friends and Family: Not on file    Frequency of Social Gatherings with Friends and Family: Not on file    Attends Sikhism Services: Not on file    Active Member of Clubs or Organizations: Not on file    Attends Club or Organization Meetings: Not on file    Marital Status: Not on file   Intimate Partner Violence:     Fear of Current or Ex-Partner: Not on file    Emotionally Abused: Not on file    Physically Abused: Not on file    Sexually Abused: Not on file   Housing Stability:     Unable to Pay for Housing in the Last Year: Not on file    Number of [place] in the Last Year: Not on file    Unstable Housing in the Last Year: Not on file            Visit Vitals  /71 (BP 1 Location: Left upper arm, BP Patient Position: Sitting)   Pulse 86   Temp 97.9 °F (36.6 °C) (Temporal)   Resp 20   Ht 5' 9\" (1.753 m)   Wt 188 lb 3.2 oz (85.4 kg)   SpO2 96%   BMI 27.79 kg/m²       Physical Examination:   General - Well appearing male  HEENT - PERRL, TM no erythema/opacification, normal nasal turbinates, no oropharyngeal erythema or exudate, MMM  Neck - supple, no bruits, no thyroidomegaly, no lymphadenopathy  Pulm - clear to auscultation bilaterally  Cardio - RRR, normal S1 S2, no murmur  Abd - soft, nontender, no masses, no HSM  Extrem - no edema, +2 distal pulses  Neuro-  No focal deficits, CN intact     Assessment/Plan:    1.  abd aneurysm--due to recheck, ultrasound ordered  2.  Dm, type 2--doing well with farxiga  3. Loose stools--much improved after stopped glucopaheg  4. MCI--very stable, not on any meds currently  5. RA--continue humira, mtx, folic acid  6.  hyperlipids--on pravachol  7.  htn--continue diltiazem  8.   Copd--does well with spiriva    rx given for tdap         Gracy Midget III, DO

## 2022-01-27 NOTE — PATIENT INSTRUCTIONS

## 2022-02-05 ENCOUNTER — HOSPITAL ENCOUNTER (OUTPATIENT)
Dept: ULTRASOUND IMAGING | Age: 82
Discharge: HOME | End: 2022-02-05
Attending: INTERNAL MEDICINE
Payer: MEDICARE

## 2022-02-05 DIAGNOSIS — I71.40 AAA (ABDOMINAL AORTIC ANEURYSM) WITHOUT RUPTURE: ICD-10-CM

## 2022-02-05 PROCEDURE — 76775 US EXAM ABDO BACK WALL LIM: CPT

## 2022-02-06 DIAGNOSIS — I71.40 AAA (ABDOMINAL AORTIC ANEURYSM) WITHOUT RUPTURE: Primary | ICD-10-CM

## 2022-02-06 NOTE — PROGRESS NOTES
His distal AAA has increased in size a bit. Would be good idea to have him follow with vascular. Referral placed to dr Lauren Villanueva.

## 2022-02-07 NOTE — PROGRESS NOTES
Spoke with patient's  wife  using 2 identifiers. She was informed of recent US. She was given referral to Dr. Sophie Michel address and phone number. Mrs. Cristopher Reza is planning to call today.

## 2022-02-08 ENCOUNTER — TELEPHONE (OUTPATIENT)
Dept: INTERNAL MEDICINE CLINIC | Age: 82
End: 2022-02-08

## 2022-02-08 NOTE — TELEPHONE ENCOUNTER
----- Message from Angel Marin sent at 2/7/2022  4:56 PM EST -----  Subject: Message to Provider    QUESTIONS  Information for Provider? Pt's wife would like Danielle WarrenSerg number. ---------------------------------------------------------------------------  --------------  Vipul Henao INFO  What is the best way for the office to contact you? OK to leave message on   voicemail  Preferred Call Back Phone Number? [phone]  ---------------------------------------------------------------------------  --------------  SCRIPT ANSWERS  Relationship to Patient? Other  Representative Name? Regan Rowe  Is the Representative on the appropriate HIPAA document in Epic?  Yes

## 2022-02-17 ENCOUNTER — TRANSCRIBE ORDER (OUTPATIENT)
Dept: SCHEDULING | Age: 82
End: 2022-02-17

## 2022-02-17 DIAGNOSIS — I71.40 ABDOMINAL AORTIC ANEURYSM: Primary | ICD-10-CM

## 2022-02-23 ENCOUNTER — HOSPITAL ENCOUNTER (OUTPATIENT)
Dept: CT IMAGING | Age: 82
Discharge: HOME | End: 2022-02-23
Attending: SURGERY
Payer: MEDICARE

## 2022-02-23 DIAGNOSIS — I71.40 ABDOMINAL AORTIC ANEURYSM: ICD-10-CM

## 2022-02-23 LAB — CREAT BLD-MCNC: 1.1 MG/DL (ref 0.6–1.3)

## 2022-02-23 PROCEDURE — 74011000636 HC RX REV CODE- 636: Performed by: SURGERY

## 2022-02-23 PROCEDURE — 74174 CTA ABD&PLVS W/CONTRAST: CPT

## 2022-02-23 PROCEDURE — 82565 ASSAY OF CREATININE: CPT

## 2022-02-23 RX ADMIN — IOPAMIDOL 100 ML: 755 INJECTION, SOLUTION INTRAVENOUS at 18:24

## 2022-03-18 PROBLEM — G91.2 NPH (NORMAL PRESSURE HYDROCEPHALUS) (HCC): Status: ACTIVE | Noted: 2017-04-13

## 2022-03-18 PROBLEM — R13.10 DYSPHAGIA: Status: ACTIVE | Noted: 2017-04-04

## 2022-03-18 PROBLEM — N40.0 BPH (BENIGN PROSTATIC HYPERPLASIA): Status: ACTIVE | Noted: 2017-09-25

## 2022-03-18 PROBLEM — M06.9 RHEUMATOID ARTHRITIS INVOLVING MULTIPLE SITES (HCC): Status: ACTIVE | Noted: 2017-09-25

## 2022-03-18 PROBLEM — I71.40 AAA (ABDOMINAL AORTIC ANEURYSM) WITHOUT RUPTURE (HCC): Status: ACTIVE | Noted: 2017-09-25

## 2022-03-19 PROBLEM — R93.5 ABNORMAL X-RAY OF ABDOMEN: Status: ACTIVE | Noted: 2017-04-04

## 2022-03-19 PROBLEM — S72.009A HIP FRACTURE (HCC): Status: ACTIVE | Noted: 2017-05-11

## 2022-03-19 PROBLEM — R14.2 ERUCTATION: Status: ACTIVE | Noted: 2020-07-17

## 2022-07-23 RX ORDER — DAPAGLIFLOZIN 10 MG/1
TABLET, FILM COATED ORAL
Qty: 90 TABLET | Refills: 3 | Status: SHIPPED | OUTPATIENT
Start: 2022-07-23 | End: 2022-07-29

## 2022-07-27 NOTE — TELEPHONE ENCOUNTER
PA  submitted through cover my med for faxiga     Approvedon July 26  Your PA request has been approved.  Additional information will be provided in the approval communication          Em lpn

## 2022-07-29 ENCOUNTER — OFFICE VISIT (OUTPATIENT)
Dept: INTERNAL MEDICINE CLINIC | Age: 82
End: 2022-07-29
Payer: MEDICARE

## 2022-07-29 VITALS
TEMPERATURE: 98.3 F | BODY MASS INDEX: 28.44 KG/M2 | RESPIRATION RATE: 14 BRPM | WEIGHT: 192 LBS | SYSTOLIC BLOOD PRESSURE: 112 MMHG | DIASTOLIC BLOOD PRESSURE: 69 MMHG | HEIGHT: 69 IN | HEART RATE: 76 BPM | OXYGEN SATURATION: 97 %

## 2022-07-29 DIAGNOSIS — Z86.73 HISTORY OF TIA (TRANSIENT ISCHEMIC ATTACK): ICD-10-CM

## 2022-07-29 DIAGNOSIS — G91.2 NPH (NORMAL PRESSURE HYDROCEPHALUS) (HCC): ICD-10-CM

## 2022-07-29 DIAGNOSIS — N40.0 BENIGN PROSTATIC HYPERPLASIA, UNSPECIFIED WHETHER LOWER URINARY TRACT SYMPTOMS PRESENT: ICD-10-CM

## 2022-07-29 DIAGNOSIS — E11.69 HYPERLIPIDEMIA ASSOCIATED WITH TYPE 2 DIABETES MELLITUS (HCC): ICD-10-CM

## 2022-07-29 DIAGNOSIS — E11.9 TYPE 2 DIABETES MELLITUS WITHOUT COMPLICATION, WITHOUT LONG-TERM CURRENT USE OF INSULIN (HCC): ICD-10-CM

## 2022-07-29 DIAGNOSIS — E78.5 HYPERLIPIDEMIA ASSOCIATED WITH TYPE 2 DIABETES MELLITUS (HCC): ICD-10-CM

## 2022-07-29 DIAGNOSIS — I71.40 AAA (ABDOMINAL AORTIC ANEURYSM) WITHOUT RUPTURE: ICD-10-CM

## 2022-07-29 DIAGNOSIS — I10 ESSENTIAL HYPERTENSION: ICD-10-CM

## 2022-07-29 DIAGNOSIS — Z00.00 MEDICARE ANNUAL WELLNESS VISIT, SUBSEQUENT: Primary | ICD-10-CM

## 2022-07-29 DIAGNOSIS — M05.79 RHEUMATOID ARTHRITIS INVOLVING MULTIPLE SITES WITH POSITIVE RHEUMATOID FACTOR (HCC): ICD-10-CM

## 2022-07-29 DIAGNOSIS — J42 CHRONIC BRONCHITIS, UNSPECIFIED CHRONIC BRONCHITIS TYPE (HCC): ICD-10-CM

## 2022-07-29 PROCEDURE — G0439 PPPS, SUBSEQ VISIT: HCPCS | Performed by: INTERNAL MEDICINE

## 2022-07-29 PROCEDURE — G8427 DOCREV CUR MEDS BY ELIG CLIN: HCPCS | Performed by: INTERNAL MEDICINE

## 2022-07-29 PROCEDURE — 1101F PT FALLS ASSESS-DOCD LE1/YR: CPT | Performed by: INTERNAL MEDICINE

## 2022-07-29 PROCEDURE — G0463 HOSPITAL OUTPT CLINIC VISIT: HCPCS | Performed by: INTERNAL MEDICINE

## 2022-07-29 PROCEDURE — G8417 CALC BMI ABV UP PARAM F/U: HCPCS | Performed by: INTERNAL MEDICINE

## 2022-07-29 PROCEDURE — G8536 NO DOC ELDER MAL SCRN: HCPCS | Performed by: INTERNAL MEDICINE

## 2022-07-29 PROCEDURE — G8510 SCR DEP NEG, NO PLAN REQD: HCPCS | Performed by: INTERNAL MEDICINE

## 2022-07-29 PROCEDURE — 99213 OFFICE O/P EST LOW 20 MIN: CPT | Performed by: INTERNAL MEDICINE

## 2022-07-29 RX ORDER — METFORMIN HYDROCHLORIDE 500 MG/1
500 TABLET ORAL 2 TIMES DAILY WITH MEALS
COMMUNITY

## 2022-07-29 NOTE — PROGRESS NOTES
1. \"Have you been to the ER, urgent care clinic since your last visit? Hospitalized since your last visit? \" No    2. \"Have you seen or consulted any other health care providers outside of the [address] [place] [address] since your last visit? \" No , [facility]    3. For patients aged 39-70: Has the patient had a colonoscopy / FIT/ Cologuard? NA - based on age      If the patient is female:    4. For patients aged 41-77: Has the patient had a mammogram within the past 2 years? NA - based on age or sex      11. For patients aged 21-65: Has the patient had a pap smear?  NA - based on age or sex

## 2022-07-29 NOTE — PROGRESS NOTES
Claudia Salmeron is a 79 y.o. male who presents for evaluation of AWV. Last seen by me jan 27, 2022. He has done well since then, though his insurance won't cover Artice Jorge any longer, so he had to go back to his glucophage, which in only a few days, has upset his gi system again and he is having loose stools. Wife with check with his insurance to see what other meds are covered. He did see vascular dr Remigio Colon, regarding his 5.1 cm AAA. He has another ultrasound scheduled for a few months. Wife with him today, she is concerned that his bourbon consumption has increased some. Typically 2 drinks daily.       ROS:  Constitutional: negative for fevers, chills, anorexia and weight loss  Eyes:   negative for visual disturbance and irritation  ENT:   negative for tinnitus,sore throat,nasal congestion,ear pain,hoarseness  Respiratory:  negative for cough, hemoptysis, dyspnea,wheezing  CV:   negative for chest pain, palpitations, lower extremity edema  GI:   negative for nausea, vomiting, diarrhea, abdominal pain,melena  Genitourinary: negative for frequency, dysuria and hematuria  Musculoskel: negative for myalgias, arthralgias, back pain, muscle weakness, joint pain  Neurological:  negative for headaches, dizziness, focal weakness, numbness  Psychiatric:     Negative for depression or anxiety      Past Medical History:   Diagnosis Date    AAA (abdominal aortic aneurysm) ([facility]     Followed by Dr Bing Pennington as of 05/13/19 3.9 x 4.1    At risk for sleep apnea 07/08/2020    during PAT assessment, MEL score 4    Cancer ([address]     r shoulder melanoma    Colon polyp     Contact dermatitis and eczema due to cause     COPD     Diabetes ([facility] [address]     Diarrhea 1/22/2016    Dysphagia 4/4/2017    Emphysema lung ([facility] [address]     Eructation 7/17/2020    Exposure to TB     2/2019 cruise, probably exposure to TB, beginning TC 9/2019    GERD (gastroesophageal reflux disease)     Hypertension     Ill-defined condition     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, arms    NPH (normal pressure hydrocephalus) ([facility] 2014    shunt    RA (rheumatoid arthritis) ([facility]     Scarring     Sepsis ([facility] [address]     with perforated gallbladder    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 2021    Stroke ([facility] [address]     L arm weakness states not a stroke states it is a TIA     TIA (transient ischemic attack) 2018       Past Surgical History:   Procedure Laterality Date    HX APPENDECTOMY      HX CATARACT REMOVAL Bilateral     HX CHOLECYSTECTOMY  2017    at [facility], ruptured, had sepsis, appendix removed with this    HX ENDOSCOPY      HX FEMORAL BYPASS Left     stent    HX GI      PEG placed and removed    HX OPEN REDUCTION INTERNAL FIXATION Left     femur, suresh in place    HX ORTHOPAEDIC Left     Femur     HX OTHER SURGICAL      r shoulder melenoma excision    HX TONSILLECTOMY      IR PLC CATH AV SHUNT IN W PTA SI      ID COLSC FLX W/RMVL OF TUMOR POLYP LESION SNARE TQ  2011         ID EGD TRANSORAL BIOPSY SINGLE/MULTIPLE  2011            Family History   Adopted: Yes       Social History     Socioeconomic History    Marital status:      Spouse name: Not on file    Number of children: Not on file    Years of education: Not on file    Highest education level: Not on file   Occupational History    Not on file   Tobacco Use    Smoking status: Former     Packs/day: 1.00     Types: Cigarettes     Quit date: 2000     Years since quittin.5    Smokeless tobacco: Never   Substance and Sexual Activity    Alcohol use:  Yes     Alcohol/week: 5.8 standard drinks     Types: 7 Shots of liquor per week    Drug use: No    Sexual activity: Not Currently   Other Topics Concern    Not on file   Social History Narrative    Not on file     Social Determinants of Health     Financial Resource Strain: Low Risk     Difficulty of Paying Living Expenses: Not hard at all   Food Insecurity: No Food Insecurity    Worried About [address] in the Last Year: Never true    Ran Out of Food in the Last Year: Never true   Transportation Needs: Not on file   Physical Activity: Not on file   Stress: Not on file   Social Connections: Not on file   Intimate Partner Violence: Not on file   Housing Stability: Not on file          Visit Vitals  /69 (BP 1 Location: Left upper arm, BP Patient Position: Sitting)   Pulse 76   Temp 98.3 °F (36.8 °C) (Temporal)   Resp 14   Ht 5' 9\" (1.753 m)   Wt 192 lb (87.1 kg)   SpO2 97%   BMI 28.35 kg/m²       Physical Examination:   General - Well appearing male  HEENT - PERRL, TM no erythema/opacification, normal nasal turbinates, no oropharyngeal erythema or exudate, MMM  Neck - supple, no bruits, no thyroidomegaly, no lymphadenopathy  Pulm - clear to auscultation bilaterally  Cardio - RRR, normal S1 S2, no murmur  Abd - soft, nontender, no masses, no HSM  Extrem - no edema, +2 distal pulses  Neuro-  No focal deficits, CN intact     Assessment/Plan:     Dm, type 2--check a1c, urine micro. Had to stop farxiga due to insurance coverage, back on glucophage, and GI system is not pleased. Wife will discuss with insurance what other meds are covered, hopefully jardiance  Abd AAA--up to 5.1 cm. Seeing dr Jf London, has repeat ultrasound in a few months  Copd--continue spriva  RA--continue humira, mtx, folic acid  Hyperlipids--on pravachol, check flp, cmp  MCI--overall doing stably well  Hx tia--on plavix  Bph--continue flomax  Htn--controlled with diltiazem    Rtc 6 months        Michele Shirley III, DO              This is the Subsequent Medicare Annual Wellness Exam, performed 12 months or more after the Initial AWV or the last Subsequent AWV    I have reviewed the patient's medical history in detail and updated the computerized patient record.        Assessment/Plan   Education and counseling provided:  Are appropriate based on today's review and evaluation  End-of-Life planning (with patient's consent)  Pneumococcal Vaccine  Influenza Vaccine  Colorectal cancer screening tests    1. Medicare annual wellness visit, subsequent     Depression Risk Factor Screening     3 most recent PHQ Screens 7/29/2022   Little interest or pleasure in doing things Not at all   Feeling down, depressed, irritable, or hopeless Not at all   Total Score PHQ 2 0       Alcohol & Drug Abuse Risk Screen    Do you average more than 1 drink per night or more than 7 drinks a week: Yes    In the past three months have you have had more than 4 drinks containing alcohol on one occasion: No          Functional Ability and Level of Safety    Hearing: Hearing is good. Activities of Daily Living: The home contains: no safety equipment. Patient needs help with:  transportation and managing medications      Ambulation: with no difficulty     Fall Risk:  Fall Risk Assessment, last 12 mths 7/29/2022   Able to walk? Yes   Fall in past 12 months? 0   Do you feel unsteady? 1   Are you worried about falling 0   Number of falls in past 12 months 0   Fall with injury? -      Abuse Screen:  Patient is not abused. Glenroy Ivey with wife of 58 years.        Cognitive Screening    Has your family/caregiver stated any concerns about your memory: no     Cognitive Screening: Normal - MMSE (Mini Mental Status Exam)    Health Maintenance Due     Health Maintenance Due   Topic Date Due    Foot Exam Q1  01/30/2021    MICROALBUMIN Q1  07/28/2021    DTaP/Tdap/Td series (2 - Td or Tdap) 10/24/2021    Eye Exam Retinal or Dilated  02/11/2022    COVID-19 Vaccine (4 - Booster for Moderna series) 03/05/2022    Lipid Screen  07/28/2022       Patient Care Team   Patient Care Team:  Michelle Conner DO as PCP - General (Internal Medicine Physician)  Michelle Conner DO as PCP - [facility] Empaneled Provider  Kate Burnham MD (Neurosurgery)  Luis Carlos Gerber MD (Pulmonary Disease)  Stan Gudino MD as Physician (Gastroenterology)  Chandu Eldridge MD as Physician (Rheumatology Internal Medicine)  Marcelo Lopez Jennette Meigs, MD as Physician (Cardiovascular Disease Physician)  Barb Kong MD as Physician (Neurology)  Salmoón Carpenter MD (Ophthalmology)    History     Patient Active Problem List   Diagnosis Code    History of benign neoplasm of colon Z86.018    GERD (gastroesophageal reflux disease) K21.9    Colon polyp K63.5    Diverticulosis of colon K57.30    Internal hemorrhoid K64.8    Schatzki's ring K22.2    Hiatal hernia K44.9    Diarrhea R19.7    Influenza J11.1    Fall W19. XXXA    Dysphagia R13.10    Abnormal x-ray of abdomen R93.5    NPH (normal pressure hydrocephalus) ([facility]) G91.2    Alcohol use disorder [id number]    Hip fracture ([facility]) S72.009A    BPH (benign prostatic hyperplasia) N40.0    Rheumatoid arthritis involving multiple sites ([address] M06.9    AAA (abdominal aortic aneurysm) without rupture ([facility]) I71.4    Eructation R14.2     Past Medical History:   Diagnosis Date    AAA (abdominal aortic aneurysm) ([address]     Followed by Dr Olena Garza as of 05/13/19 3.9 x 4.1    At risk for sleep apnea 07/08/2020    during PAT assessment, MEL score 4    Cancer ([address]     r shoulder melanoma    Colon polyp     Contact dermatitis and eczema due to cause     COPD     Diabetes ([address]     Diarrhea 1/22/2016    Dysphagia 4/4/2017    Emphysema lung ([address]     Eructation 7/17/2020    Exposure to TB     2/2019 cruise, probably exposure to TB, beginning TC 9/2019    GERD (gastroesophageal reflux disease)     Hypertension     Ill-defined condition     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, arms    NPH (normal pressure hydrocephalus) ([address] 2014    shunt    RA (rheumatoid arthritis) ([address]     Scarring     Sepsis ([address]     with perforated gallbladder    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 06/2021    Stroke ([address] 2014    L arm weakness states not a stroke states it is a TIA     TIA (transient ischemic attack) 11/2018      Past Surgical History:   Procedure Laterality Date    HX APPENDECTOMY      HX CATARACT REMOVAL Bilateral     HX CHOLECYSTECTOMY  04/18/2017    at [facility], ruptured, had sepsis, appendix removed with this    HX ENDOSCOPY      HX FEMORAL BYPASS Left     stent    HX GI      PEG placed and removed    HX OPEN REDUCTION INTERNAL FIXATION Left     femur, suresh in place    HX ORTHOPAEDIC Left     Femur     HX OTHER SURGICAL      r shoulder melenoma excision    HX TONSILLECTOMY      IR PLC CATH AV SHUNT IN W PTA SI  2014    ND COLSC FLX W/RMVL OF TUMOR POLYP LESION SNARE TQ  4/28/2011         ND EGD TRANSORAL BIOPSY SINGLE/MULTIPLE  4/28/2011          Current Outpatient Medications   Medication Sig Dispense Refill    metFORMIN (GLUCOPHAGE) 500 mg tablet Take 500 mg by mouth two (2) times daily (with meals). tamsulosin (FLOMAX) 0.4 mg capsule TAKE 1 CAPSULE 30 MINUTES AFTER THE SAME MEAL DAILY 90 Capsule 3    pravastatin (PRAVACHOL) 80 mg tablet TAKE ONE TABLET BY MOUTH EVERY NIGHT 90 Tablet 3    dilTIAZem IR (CARDIZEM) 60 mg tablet TAKE ONE TABLET BY MOUTH 2 TIMES A  Tablet 3    OneTouch Ultra Test strip TEST 1-2 TIMES A  Strip 5    diclofenac (VOLTAREN) 1 % gel Use to left shoulder up to 4x daily. 100 g 3    clopidogrel (PLAVIX) 75 mg tab Take 75 mg by mouth daily. fluticasone (FLONASE) 50 mcg/actuation nasal spray 2 Sprays by Both Nostrils route daily as needed. acetaminophen (TYLENOL) 500 mg tablet Take 500 mg by mouth every six (6) hours as needed for Pain. albuterol sulfate (PROVENTIL;VENTOLIN) 2.5 mg/0.5 mL nebu nebulizer solution 2.5 mg by Nebulization route every four (4) hours as needed for Wheezing. methotrexate (RHEUMATREX) 2.5 mg tablet Take 10 mg by mouth. Every Wednesday and Thursday      adalimumab (HUMIRA) 40 mg/0.8 mL injection 40 mg by SubCUTAneous route every fourteen (14) days. folic acid (FOLVITE) 1 mg tablet Take 1 mg by mouth daily. tiotropium (SPIRIVA) 18 mcg inhalation capsule Take 1 Cap by inhalation daily. Farxiga 10 mg tab tablet Take 1 Tablet by mouth daily. (Patient not taking: Reported on 2022) 90 Tablet 3     Allergies   Allergen Reactions    Ceclor [Cefaclor] Swelling    Contrast Agent [Iodine] Hives and Itching    Floxin [Ofloxacin] Hives    Gabapentin Other (comments)     Causes confusion    Ivp [Fd And C Blue No.1] Itching    Lansoprazole Diarrhea       Family History   Adopted: Yes     Social History     Tobacco Use    Smoking status: Former     Packs/day: 1.00     Types: Cigarettes     Quit date:      Years since quittin.5    Smokeless tobacco: Never   Substance Use Topics    Alcohol use:  Yes     Alcohol/week: 5.8 standard drinks     Types: 7 Shots of liquor per week         Mesha Foreman III, DO

## 2022-07-29 NOTE — PATIENT INSTRUCTIONS
Medicare Wellness Visit, Male    The best way to live healthy is to have a lifestyle where you eat a well-balanced diet, exercise regularly, limit alcohol use, and quit all forms of tobacco/nicotine, if applicable. Regular preventive services are another way to keep healthy. Preventive services (vaccines, screening tests, monitoring & exams) can help personalize your care plan, which helps you manage your own care. Screening tests can find health problems at the earliest stages, when they are easiest to treat. [facility] follows the current, evidence-based guidelines published by the [facility] ([facility]) when recommending preventive services for our patients. Because we follow these guidelines, sometimes recommendations change over time as research supports it. (For example, a prostate screening blood test is no longer routinely recommended for men with no symptoms). Of course, you and your doctor may decide to screen more often for some diseases, based on your risk and co-morbidities (chronic disease you are already diagnosed with). Preventive services for you include:  - Medicare offers their members a free annual wellness visit, which is time for you and your primary care provider to discuss and plan for your preventive service needs. Take advantage of this benefit every year!  -All adults over age 72 should receive the recommended pneumonia vaccines. Current USPSTF guidelines recommend a series of two vaccines for the best pneumonia protection.   -All adults should have a flu vaccine yearly and tetanus vaccine every 10 years.  -All adults age 48 and older should receive the shingles vaccines (series of two vaccines).        -All adults age 38-68 who are overweight should have a diabetes screening test once every three years.   -Other screening tests & preventive services for persons with diabetes include: an eye exam to screen for diabetic retinopathy, a kidney function test, a foot exam, and stricter control over your cholesterol.   -Cardiovascular screening for adults with routine risk involves an electrocardiogram (ECG) at intervals determined by the provider.   -Colorectal cancer screening should be done for adults age 54-65 with no increased risk factors for colorectal cancer. There are a number of acceptable methods of screening for this type of cancer. Each test has its own benefits and drawbacks. Discuss with your provider what is most appropriate for you during your annual wellness visit. The different tests include: colonoscopy (considered the best screening method), a fecal occult blood test, a fecal DNA test, and sigmoidoscopy.  -All adults born between [facility] should be screened once for Hepatitis C.  -An Abdominal Aortic Aneurysm (AAA) Screening is recommended for men age 73-68 who has ever smoked in their lifetime. Here is a list of your current Health Maintenance items (your personalized list of preventive services) with a due date:  Health Maintenance Due   Topic Date Due    Diabetic Foot Care  01/30/2021    Albumin Urine Test  07/28/2021    DTaP/Tdap/Td  (2 - Td or Tdap) 10/24/2021    Eye Exam  02/11/2022    COVID-19 Vaccine (4 - Booster for Moderna series) 03/05/2022    Cholesterol Test   07/28/2022     Find out what dm meds are covered. Would ask about jardiance, as that is the most comparable to farxiga. Come back for fasting labs. Lab opens 8 am.  Nothing to eat after MN, but may drink water.

## 2022-08-11 ENCOUNTER — LAB ONLY (OUTPATIENT)
Dept: INTERNAL MEDICINE CLINIC | Age: 82
End: 2022-08-11

## 2022-08-11 DIAGNOSIS — I10 ESSENTIAL HYPERTENSION: ICD-10-CM

## 2022-08-11 DIAGNOSIS — E11.9 TYPE 2 DIABETES MELLITUS WITHOUT COMPLICATION, WITHOUT LONG-TERM CURRENT USE OF INSULIN (HCC): ICD-10-CM

## 2022-08-11 DIAGNOSIS — E11.69 HYPERLIPIDEMIA ASSOCIATED WITH TYPE 2 DIABETES MELLITUS (HCC): ICD-10-CM

## 2022-08-11 DIAGNOSIS — E78.5 HYPERLIPIDEMIA ASSOCIATED WITH TYPE 2 DIABETES MELLITUS (HCC): ICD-10-CM

## 2022-08-11 LAB
ALBUMIN SERPL-MCNC: 3.6 G/DL (ref 3.5–5)
ALBUMIN/GLOB SERPL: 1.1 {RATIO} (ref 1.1–2.2)
ALP SERPL-CCNC: 99 U/L (ref 45–117)
ALT SERPL-CCNC: 30 U/L (ref 12–78)
ANION GAP SERPL CALC-SCNC: 7 MMOL/L (ref 5–15)
AST SERPL-CCNC: 26 U/L (ref 15–37)
BASOPHILS # BLD: 0 K/UL (ref 0–0.1)
BASOPHILS NFR BLD: 0 % (ref 0–1)
BILIRUB SERPL-MCNC: 0.6 MG/DL (ref 0.2–1)
BUN SERPL-MCNC: 11 MG/DL (ref 6–20)
BUN/CREAT SERPL: 11 (ref 12–20)
CALCIUM SERPL-MCNC: 9 MG/DL (ref 8.5–10.1)
CHLORIDE SERPL-SCNC: 111 MMOL/L (ref 97–108)
CHOLEST SERPL-MCNC: 135 MG/DL
CO2 SERPL-SCNC: 25 MMOL/L (ref 21–32)
CREAT SERPL-MCNC: 0.96 MG/DL (ref 0.7–1.3)
CREAT UR-MCNC: 116 MG/DL
DIFFERENTIAL METHOD BLD: ABNORMAL
EOSINOPHIL # BLD: 0.1 K/UL (ref 0–0.4)
EOSINOPHIL NFR BLD: 2 % (ref 0–7)
ERYTHROCYTE [DISTWIDTH] IN BLOOD BY AUTOMATED COUNT: 14.4 % (ref 11.5–14.5)
EST. AVERAGE GLUCOSE BLD GHB EST-MCNC: 131 MG/DL
GLOBULIN SER CALC-MCNC: 3.2 G/DL (ref 2–4)
GLUCOSE SERPL-MCNC: 157 MG/DL (ref 65–100)
HBA1C MFR BLD: 6.2 % (ref 4–5.6)
HCT VFR BLD AUTO: 44.2 % (ref 36.6–50.3)
HDLC SERPL-MCNC: 54 MG/DL
HDLC SERPL: 2.5 {RATIO} (ref 0–5)
HGB BLD-MCNC: 14.2 G/DL (ref 12.1–17)
IMM GRANULOCYTES # BLD AUTO: 0 K/UL (ref 0–0.04)
IMM GRANULOCYTES NFR BLD AUTO: 0 % (ref 0–0.5)
LDLC SERPL CALC-MCNC: 43.4 MG/DL (ref 0–100)
LYMPHOCYTES # BLD: 1.4 K/UL (ref 0.8–3.5)
LYMPHOCYTES NFR BLD: 25 % (ref 12–49)
MCH RBC QN AUTO: 32.1 PG (ref 26–34)
MCHC RBC AUTO-ENTMCNC: 32.1 G/DL (ref 30–36.5)
MCV RBC AUTO: 100 FL (ref 80–99)
MICROALBUMIN UR-MCNC: 1.38 MG/DL
MICROALBUMIN/CREAT UR-RTO: 12 MG/G (ref 0–30)
MONOCYTES # BLD: 0.5 K/UL (ref 0–1)
MONOCYTES NFR BLD: 8 % (ref 5–13)
NEUTS SEG # BLD: 3.7 K/UL (ref 1.8–8)
NEUTS SEG NFR BLD: 65 % (ref 32–75)
NRBC # BLD: 0 K/UL (ref 0–0.01)
NRBC BLD-RTO: 0 PER 100 WBC
PLATELET # BLD AUTO: 133 K/UL (ref 150–400)
PMV BLD AUTO: 12.1 FL (ref 8.9–12.9)
POTASSIUM SERPL-SCNC: 4.1 MMOL/L (ref 3.5–5.1)
PROT SERPL-MCNC: 6.8 G/DL (ref 6.4–8.2)
RBC # BLD AUTO: 4.42 M/UL (ref 4.1–5.7)
SODIUM SERPL-SCNC: 143 MMOL/L (ref 136–145)
TRIGL SERPL-MCNC: 188 MG/DL (ref ?–150)
TSH SERPL DL<=0.05 MIU/L-ACNC: 2.95 UIU/ML (ref 0.36–3.74)
VLDLC SERPL CALC-MCNC: 37.6 MG/DL
WBC # BLD AUTO: 5.7 K/UL (ref 4.1–11.1)

## 2022-08-12 NOTE — PROGRESS NOTES
Letter mailed to patient. Labs look good. Continue same meds. Any word on replacement med for farxiga?

## 2022-09-20 ENCOUNTER — HOSPITAL ENCOUNTER (INPATIENT)
Age: 82
LOS: 3 days | Discharge: ANOTHER HOSPITAL | DRG: 100 | End: 2022-09-23
Attending: STUDENT IN AN ORGANIZED HEALTH CARE EDUCATION/TRAINING PROGRAM | Admitting: STUDENT IN AN ORGANIZED HEALTH CARE EDUCATION/TRAINING PROGRAM
Payer: MEDICARE

## 2022-09-20 ENCOUNTER — APPOINTMENT (OUTPATIENT)
Dept: CT IMAGING | Age: 82
DRG: 100 | End: 2022-09-20
Attending: STUDENT IN AN ORGANIZED HEALTH CARE EDUCATION/TRAINING PROGRAM
Payer: MEDICARE

## 2022-09-20 ENCOUNTER — APPOINTMENT (OUTPATIENT)
Dept: GENERAL RADIOLOGY | Age: 82
DRG: 100 | End: 2022-09-20
Attending: STUDENT IN AN ORGANIZED HEALTH CARE EDUCATION/TRAINING PROGRAM
Payer: MEDICARE

## 2022-09-20 DIAGNOSIS — R41.82 ALTERED MENTAL STATUS, UNSPECIFIED ALTERED MENTAL STATUS TYPE: ICD-10-CM

## 2022-09-20 DIAGNOSIS — I63.9 CEREBROVASCULAR ACCIDENT (CVA), UNSPECIFIED MECHANISM (HCC): ICD-10-CM

## 2022-09-20 DIAGNOSIS — Z51.5 PALLIATIVE CARE BY SPECIALIST: ICD-10-CM

## 2022-09-20 DIAGNOSIS — R56.9 SEIZURE (HCC): Primary | ICD-10-CM

## 2022-09-20 LAB
ALBUMIN SERPL-MCNC: 3.2 G/DL (ref 3.5–5)
ALBUMIN/GLOB SERPL: 1.1 {RATIO} (ref 1.1–2.2)
ALP SERPL-CCNC: 97 U/L (ref 45–117)
ALT SERPL-CCNC: 22 U/L (ref 12–78)
ANION GAP SERPL CALC-SCNC: 9 MMOL/L (ref 5–15)
APPEARANCE UR: CLEAR
AST SERPL-CCNC: 19 U/L (ref 15–37)
BACTERIA URNS QL MICRO: ABNORMAL /HPF
BASOPHILS # BLD: 0 K/UL (ref 0–0.1)
BASOPHILS NFR BLD: 0 % (ref 0–1)
BILIRUB SERPL-MCNC: 0.6 MG/DL (ref 0.2–1)
BILIRUB UR QL: NEGATIVE
BUN SERPL-MCNC: 14 MG/DL (ref 6–20)
BUN/CREAT SERPL: 11 (ref 12–20)
CALCIUM SERPL-MCNC: 8.4 MG/DL (ref 8.5–10.1)
CHLORIDE SERPL-SCNC: 109 MMOL/L (ref 97–108)
CO2 SERPL-SCNC: 21 MMOL/L (ref 21–32)
COLOR UR: ABNORMAL
CREAT SERPL-MCNC: 1.22 MG/DL (ref 0.7–1.3)
DIFFERENTIAL METHOD BLD: ABNORMAL
EOSINOPHIL # BLD: 0 K/UL (ref 0–0.4)
EOSINOPHIL NFR BLD: 1 % (ref 0–7)
EPITH CASTS URNS QL MICRO: ABNORMAL /LPF
ERYTHROCYTE [DISTWIDTH] IN BLOOD BY AUTOMATED COUNT: 14.4 % (ref 11.5–14.5)
GLOBULIN SER CALC-MCNC: 3 G/DL (ref 2–4)
GLUCOSE BLD STRIP.AUTO-MCNC: 200 MG/DL (ref 65–117)
GLUCOSE SERPL-MCNC: 219 MG/DL (ref 65–100)
GLUCOSE UR STRIP.AUTO-MCNC: >1000 MG/DL
HCT VFR BLD AUTO: 40.2 % (ref 36.6–50.3)
HGB BLD-MCNC: 13.1 G/DL (ref 12.1–17)
HGB UR QL STRIP: ABNORMAL
IMM GRANULOCYTES # BLD AUTO: 0 K/UL (ref 0–0.04)
IMM GRANULOCYTES NFR BLD AUTO: 1 % (ref 0–0.5)
INR PPP: 1.1 (ref 0.9–1.1)
KETONES UR QL STRIP.AUTO: NEGATIVE MG/DL
LEUKOCYTE ESTERASE UR QL STRIP.AUTO: NEGATIVE
LYMPHOCYTES # BLD: 1.1 K/UL (ref 0.8–3.5)
LYMPHOCYTES NFR BLD: 17 % (ref 12–49)
MCH RBC QN AUTO: 31.3 PG (ref 26–34)
MCHC RBC AUTO-ENTMCNC: 32.6 G/DL (ref 30–36.5)
MCV RBC AUTO: 96.2 FL (ref 80–99)
MONOCYTES # BLD: 0.5 K/UL (ref 0–1)
MONOCYTES NFR BLD: 8 % (ref 5–13)
NEUTS SEG # BLD: 4.6 K/UL (ref 1.8–8)
NEUTS SEG NFR BLD: 73 % (ref 32–75)
NITRITE UR QL STRIP.AUTO: NEGATIVE
NRBC # BLD: 0 K/UL (ref 0–0.01)
NRBC BLD-RTO: 0 PER 100 WBC
PH UR STRIP: 5.5 [PH] (ref 5–8)
PLATELET # BLD AUTO: 101 K/UL (ref 150–400)
PMV BLD AUTO: 10.7 FL (ref 8.9–12.9)
POTASSIUM SERPL-SCNC: 3.3 MMOL/L (ref 3.5–5.1)
PROT SERPL-MCNC: 6.2 G/DL (ref 6.4–8.2)
PROT UR STRIP-MCNC: 100 MG/DL
PROTHROMBIN TIME: 11.9 SEC (ref 9–11.1)
RBC # BLD AUTO: 4.18 M/UL (ref 4.1–5.7)
RBC #/AREA URNS HPF: ABNORMAL /HPF (ref 0–5)
SERVICE CMNT-IMP: ABNORMAL
SODIUM SERPL-SCNC: 139 MMOL/L (ref 136–145)
SP GR UR REFRACTOMETRY: 1.01 (ref 1–1.03)
TROPONIN-HIGH SENSITIVITY: 8 NG/L (ref 0–76)
UA: UC IF INDICATED,UAUC: ABNORMAL
UROBILINOGEN UR QL STRIP.AUTO: 0.2 EU/DL (ref 0.2–1)
WBC # BLD AUTO: 6.3 K/UL (ref 4.1–11.1)
WBC URNS QL MICRO: ABNORMAL /HPF (ref 0–4)

## 2022-09-20 PROCEDURE — 70496 CT ANGIOGRAPHY HEAD: CPT

## 2022-09-20 PROCEDURE — 85610 PROTHROMBIN TIME: CPT

## 2022-09-20 PROCEDURE — 65270000046 HC RM TELEMETRY

## 2022-09-20 PROCEDURE — 74011000636 HC RX REV CODE- 636: Performed by: STUDENT IN AN ORGANIZED HEALTH CARE EDUCATION/TRAINING PROGRAM

## 2022-09-20 PROCEDURE — 84484 ASSAY OF TROPONIN QUANT: CPT

## 2022-09-20 PROCEDURE — 96374 THER/PROPH/DIAG INJ IV PUSH: CPT

## 2022-09-20 PROCEDURE — 85025 COMPLETE CBC W/AUTO DIFF WBC: CPT

## 2022-09-20 PROCEDURE — 0042T CT CODE NEURO PERF W CBF: CPT

## 2022-09-20 PROCEDURE — 36415 COLL VENOUS BLD VENIPUNCTURE: CPT

## 2022-09-20 PROCEDURE — 70450 CT HEAD/BRAIN W/O DYE: CPT

## 2022-09-20 PROCEDURE — 71045 X-RAY EXAM CHEST 1 VIEW: CPT

## 2022-09-20 PROCEDURE — 81001 URINALYSIS AUTO W/SCOPE: CPT

## 2022-09-20 PROCEDURE — 99285 EMERGENCY DEPT VISIT HI MDM: CPT

## 2022-09-20 PROCEDURE — 4A03X5D MEASUREMENT OF ARTERIAL FLOW, INTRACRANIAL, EXTERNAL APPROACH: ICD-10-PCS | Performed by: INTERNAL MEDICINE

## 2022-09-20 PROCEDURE — 93005 ELECTROCARDIOGRAM TRACING: CPT

## 2022-09-20 PROCEDURE — 74011250637 HC RX REV CODE- 250/637: Performed by: STUDENT IN AN ORGANIZED HEALTH CARE EDUCATION/TRAINING PROGRAM

## 2022-09-20 PROCEDURE — 74011250636 HC RX REV CODE- 250/636: Performed by: STUDENT IN AN ORGANIZED HEALTH CARE EDUCATION/TRAINING PROGRAM

## 2022-09-20 PROCEDURE — 95816 EEG AWAKE AND DROWSY: CPT | Performed by: STUDENT IN AN ORGANIZED HEALTH CARE EDUCATION/TRAINING PROGRAM

## 2022-09-20 PROCEDURE — 80053 COMPREHEN METABOLIC PANEL: CPT

## 2022-09-20 PROCEDURE — 96375 TX/PRO/DX INJ NEW DRUG ADDON: CPT

## 2022-09-20 PROCEDURE — 94762 N-INVAS EAR/PLS OXIMTRY CONT: CPT

## 2022-09-20 PROCEDURE — 82962 GLUCOSE BLOOD TEST: CPT

## 2022-09-20 RX ORDER — DIAZEPAM 10 MG/2ML
10 INJECTION INTRAMUSCULAR
Status: DISCONTINUED | OUTPATIENT
Start: 2022-09-20 | End: 2022-09-23 | Stop reason: HOSPADM

## 2022-09-20 RX ORDER — ATORVASTATIN CALCIUM 40 MG/1
40 TABLET, FILM COATED ORAL
Status: DISCONTINUED | OUTPATIENT
Start: 2022-09-20 | End: 2022-09-21 | Stop reason: ALTCHOICE

## 2022-09-20 RX ORDER — HYDRALAZINE HYDROCHLORIDE 20 MG/ML
10 INJECTION INTRAMUSCULAR; INTRAVENOUS
Status: DISCONTINUED | OUTPATIENT
Start: 2022-09-20 | End: 2022-09-22

## 2022-09-20 RX ORDER — POTASSIUM CHLORIDE 20 MEQ/1
40 TABLET, EXTENDED RELEASE ORAL
Status: COMPLETED | OUTPATIENT
Start: 2022-09-20 | End: 2022-09-20

## 2022-09-20 RX ORDER — ACETAMINOPHEN 325 MG/1
650 TABLET ORAL
Status: DISCONTINUED | OUTPATIENT
Start: 2022-09-20 | End: 2022-09-23

## 2022-09-20 RX ORDER — DIPHENHYDRAMINE HYDROCHLORIDE 50 MG/ML
50 INJECTION, SOLUTION INTRAMUSCULAR; INTRAVENOUS
Status: COMPLETED | OUTPATIENT
Start: 2022-09-20 | End: 2022-09-20

## 2022-09-20 RX ORDER — LEVETIRACETAM 500 MG/5ML
1000 INJECTION, SOLUTION, CONCENTRATE INTRAVENOUS
Status: COMPLETED | OUTPATIENT
Start: 2022-09-20 | End: 2022-09-20

## 2022-09-20 RX ORDER — ACETAMINOPHEN 325 MG/1
650 TABLET ORAL
Status: DISCONTINUED | OUTPATIENT
Start: 2022-09-20 | End: 2022-09-23 | Stop reason: HOSPADM

## 2022-09-20 RX ORDER — ONDANSETRON 2 MG/ML
4 INJECTION INTRAMUSCULAR; INTRAVENOUS
Status: DISCONTINUED | OUTPATIENT
Start: 2022-09-20 | End: 2022-09-23 | Stop reason: HOSPADM

## 2022-09-20 RX ADMIN — POTASSIUM CHLORIDE 40 MEQ: 20 TABLET, EXTENDED RELEASE ORAL at 18:38

## 2022-09-20 RX ADMIN — METHYLPREDNISOLONE SODIUM SUCCINATE 125 MG: 125 INJECTION, POWDER, FOR SOLUTION INTRAMUSCULAR; INTRAVENOUS at 16:45

## 2022-09-20 RX ADMIN — IOPAMIDOL 100 ML: 755 INJECTION, SOLUTION INTRAVENOUS at 17:19

## 2022-09-20 RX ADMIN — LEVETIRACETAM 1000 MG: 100 INJECTION INTRAVENOUS at 18:40

## 2022-09-20 RX ADMIN — DIPHENHYDRAMINE HYDROCHLORIDE 50 MG: 50 INJECTION, SOLUTION INTRAMUSCULAR; INTRAVENOUS at 16:45

## 2022-09-20 NOTE — ED NOTES
Patient is now able to verbally communicate with this RN. Speech is garbled but understandable. Patient follows most commands.

## 2022-09-21 ENCOUNTER — APPOINTMENT (OUTPATIENT)
Dept: CT IMAGING | Age: 82
DRG: 100 | End: 2022-09-21
Attending: INTERNAL MEDICINE
Payer: MEDICARE

## 2022-09-21 LAB
ALBUMIN SERPL-MCNC: 3.3 G/DL (ref 3.5–5)
ALBUMIN/GLOB SERPL: 1 {RATIO} (ref 1.1–2.2)
ALP SERPL-CCNC: 102 U/L (ref 45–117)
ALT SERPL-CCNC: 41 U/L (ref 12–78)
ANION GAP SERPL CALC-SCNC: 8 MMOL/L (ref 5–15)
AST SERPL-CCNC: 54 U/L (ref 15–37)
ATRIAL RATE: 91 BPM
BILIRUB SERPL-MCNC: 0.7 MG/DL (ref 0.2–1)
BUN SERPL-MCNC: 12 MG/DL (ref 6–20)
BUN/CREAT SERPL: 13 (ref 12–20)
CALCIUM SERPL-MCNC: 9 MG/DL (ref 8.5–10.1)
CALCULATED P AXIS, ECG09: 101 DEGREES
CALCULATED R AXIS, ECG10: -73 DEGREES
CALCULATED T AXIS, ECG11: 27 DEGREES
CHLORIDE SERPL-SCNC: 113 MMOL/L (ref 97–108)
CHOLEST SERPL-MCNC: 140 MG/DL
CO2 SERPL-SCNC: 23 MMOL/L (ref 21–32)
CREAT SERPL-MCNC: 0.94 MG/DL (ref 0.7–1.3)
DIAGNOSIS, 93000: NORMAL
ERYTHROCYTE [DISTWIDTH] IN BLOOD BY AUTOMATED COUNT: 14.3 % (ref 11.5–14.5)
GLOBULIN SER CALC-MCNC: 3.2 G/DL (ref 2–4)
GLUCOSE BLD STRIP.AUTO-MCNC: 135 MG/DL (ref 65–117)
GLUCOSE BLD STRIP.AUTO-MCNC: 139 MG/DL (ref 65–117)
GLUCOSE BLD STRIP.AUTO-MCNC: 157 MG/DL (ref 65–117)
GLUCOSE BLD STRIP.AUTO-MCNC: 162 MG/DL (ref 65–117)
GLUCOSE SERPL-MCNC: 199 MG/DL (ref 65–100)
HCT VFR BLD AUTO: 42.1 % (ref 36.6–50.3)
HDLC SERPL-MCNC: 56 MG/DL
HDLC SERPL: 2.5 {RATIO} (ref 0–5)
HGB BLD-MCNC: 13.6 G/DL (ref 12.1–17)
LDLC SERPL CALC-MCNC: 64.4 MG/DL (ref 0–100)
MAGNESIUM SERPL-MCNC: 1.9 MG/DL (ref 1.6–2.4)
MCH RBC QN AUTO: 30.7 PG (ref 26–34)
MCHC RBC AUTO-ENTMCNC: 32.3 G/DL (ref 30–36.5)
MCV RBC AUTO: 95 FL (ref 80–99)
NRBC # BLD: 0 K/UL (ref 0–0.01)
NRBC BLD-RTO: 0 PER 100 WBC
P-R INTERVAL, ECG05: 242 MS
PHOSPHATE SERPL-MCNC: 3.3 MG/DL (ref 2.6–4.7)
PLATELET # BLD AUTO: 101 K/UL (ref 150–400)
PMV BLD AUTO: 11.9 FL (ref 8.9–12.9)
POTASSIUM SERPL-SCNC: 3.8 MMOL/L (ref 3.5–5.1)
PROLACTIN SERPL-MCNC: 2.9 NG/ML
PROT SERPL-MCNC: 6.5 G/DL (ref 6.4–8.2)
Q-T INTERVAL, ECG07: 392 MS
QRS DURATION, ECG06: 132 MS
QTC CALCULATION (BEZET), ECG08: 482 MS
RBC # BLD AUTO: 4.43 M/UL (ref 4.1–5.7)
SERVICE CMNT-IMP: ABNORMAL
SODIUM SERPL-SCNC: 144 MMOL/L (ref 136–145)
TRIGL SERPL-MCNC: 98 MG/DL (ref ?–150)
VENTRICULAR RATE, ECG03: 91 BPM
VLDLC SERPL CALC-MCNC: 19.6 MG/DL
WBC # BLD AUTO: 5.1 K/UL (ref 4.1–11.1)

## 2022-09-21 PROCEDURE — 74011250636 HC RX REV CODE- 250/636: Performed by: STUDENT IN AN ORGANIZED HEALTH CARE EDUCATION/TRAINING PROGRAM

## 2022-09-21 PROCEDURE — 82962 GLUCOSE BLOOD TEST: CPT

## 2022-09-21 PROCEDURE — 84146 ASSAY OF PROLACTIN: CPT

## 2022-09-21 PROCEDURE — 92522 EVALUATE SPEECH PRODUCTION: CPT

## 2022-09-21 PROCEDURE — 74011250637 HC RX REV CODE- 250/637: Performed by: INTERNAL MEDICINE

## 2022-09-21 PROCEDURE — 80053 COMPREHEN METABOLIC PANEL: CPT

## 2022-09-21 PROCEDURE — 97116 GAIT TRAINING THERAPY: CPT

## 2022-09-21 PROCEDURE — 36415 COLL VENOUS BLD VENIPUNCTURE: CPT

## 2022-09-21 PROCEDURE — 74011636637 HC RX REV CODE- 636/637: Performed by: STUDENT IN AN ORGANIZED HEALTH CARE EDUCATION/TRAINING PROGRAM

## 2022-09-21 PROCEDURE — 74011250637 HC RX REV CODE- 250/637: Performed by: STUDENT IN AN ORGANIZED HEALTH CARE EDUCATION/TRAINING PROGRAM

## 2022-09-21 PROCEDURE — 74011250636 HC RX REV CODE- 250/636: Performed by: INTERNAL MEDICINE

## 2022-09-21 PROCEDURE — 95816 EEG AWAKE AND DROWSY: CPT | Performed by: PSYCHIATRY & NEUROLOGY

## 2022-09-21 PROCEDURE — 84100 ASSAY OF PHOSPHORUS: CPT

## 2022-09-21 PROCEDURE — 97162 PT EVAL MOD COMPLEX 30 MIN: CPT

## 2022-09-21 PROCEDURE — 97166 OT EVAL MOD COMPLEX 45 MIN: CPT

## 2022-09-21 PROCEDURE — 65270000046 HC RM TELEMETRY

## 2022-09-21 PROCEDURE — 70450 CT HEAD/BRAIN W/O DYE: CPT

## 2022-09-21 PROCEDURE — 83735 ASSAY OF MAGNESIUM: CPT

## 2022-09-21 PROCEDURE — 80061 LIPID PANEL: CPT

## 2022-09-21 PROCEDURE — 92610 EVALUATE SWALLOWING FUNCTION: CPT

## 2022-09-21 PROCEDURE — 97112 NEUROMUSCULAR REEDUCATION: CPT

## 2022-09-21 PROCEDURE — 99223 1ST HOSP IP/OBS HIGH 75: CPT | Performed by: PSYCHIATRY & NEUROLOGY

## 2022-09-21 PROCEDURE — 85027 COMPLETE CBC AUTOMATED: CPT

## 2022-09-21 RX ORDER — IPRATROPIUM BROMIDE 0.5 MG/2.5ML
0.5 SOLUTION RESPIRATORY (INHALATION)
Status: DISCONTINUED | OUTPATIENT
Start: 2022-09-21 | End: 2022-09-23 | Stop reason: HOSPADM

## 2022-09-21 RX ORDER — ALBUTEROL SULFATE 0.83 MG/ML
2.5 SOLUTION RESPIRATORY (INHALATION)
Status: DISCONTINUED | OUTPATIENT
Start: 2022-09-21 | End: 2022-09-23 | Stop reason: HOSPADM

## 2022-09-21 RX ORDER — MAGNESIUM SULFATE 100 %
16 CRYSTALS MISCELLANEOUS AS NEEDED
Status: DISCONTINUED | OUTPATIENT
Start: 2022-09-21 | End: 2022-09-23 | Stop reason: HOSPADM

## 2022-09-21 RX ORDER — DILTIAZEM HYDROCHLORIDE 60 MG/1
60 TABLET, FILM COATED ORAL 2 TIMES DAILY
Status: DISCONTINUED | OUTPATIENT
Start: 2022-09-21 | End: 2022-09-23 | Stop reason: HOSPADM

## 2022-09-21 RX ORDER — TAMSULOSIN HYDROCHLORIDE 0.4 MG/1
0.4 CAPSULE ORAL DAILY
Status: DISCONTINUED | OUTPATIENT
Start: 2022-09-21 | End: 2022-09-23 | Stop reason: HOSPADM

## 2022-09-21 RX ORDER — FLUTICASONE PROPIONATE 50 MCG
2 SPRAY, SUSPENSION (ML) NASAL
Status: DISCONTINUED | OUTPATIENT
Start: 2022-09-21 | End: 2022-09-23 | Stop reason: HOSPADM

## 2022-09-21 RX ORDER — DILTIAZEM HYDROCHLORIDE 60 MG/1
60 TABLET, FILM COATED ORAL
Status: DISCONTINUED | OUTPATIENT
Start: 2022-09-21 | End: 2022-09-21

## 2022-09-21 RX ORDER — INSULIN LISPRO 100 [IU]/ML
1-8 INJECTION, SOLUTION INTRAVENOUS; SUBCUTANEOUS
Status: DISCONTINUED | OUTPATIENT
Start: 2022-09-21 | End: 2022-09-23

## 2022-09-21 RX ORDER — DICLOFENAC SODIUM 10 MG/G
4 GEL TOPICAL 4 TIMES DAILY
Status: DISCONTINUED | OUTPATIENT
Start: 2022-09-21 | End: 2022-09-23 | Stop reason: HOSPADM

## 2022-09-21 RX ORDER — PRAVASTATIN SODIUM 40 MG/1
80 TABLET ORAL DAILY
Status: DISCONTINUED | OUTPATIENT
Start: 2022-09-21 | End: 2022-09-21

## 2022-09-21 RX ORDER — METHOTREXATE 2.5 MG/1
10 TABLET ORAL
Status: DISCONTINUED | OUTPATIENT
Start: 2022-09-21 | End: 2022-09-23 | Stop reason: HOSPADM

## 2022-09-21 RX ORDER — FOLIC ACID 1 MG/1
1 TABLET ORAL DAILY
Status: DISCONTINUED | OUTPATIENT
Start: 2022-09-21 | End: 2022-09-23 | Stop reason: HOSPADM

## 2022-09-21 RX ORDER — CLOPIDOGREL BISULFATE 75 MG/1
75 TABLET ORAL DAILY
Status: DISCONTINUED | OUTPATIENT
Start: 2022-09-21 | End: 2022-09-23 | Stop reason: HOSPADM

## 2022-09-21 RX ORDER — LEVETIRACETAM 500 MG/5ML
1500 INJECTION, SOLUTION, CONCENTRATE INTRAVENOUS EVERY 12 HOURS
Status: DISCONTINUED | OUTPATIENT
Start: 2022-09-21 | End: 2022-09-22 | Stop reason: SDUPTHER

## 2022-09-21 RX ORDER — ATORVASTATIN CALCIUM 40 MG/1
40 TABLET, FILM COATED ORAL
Status: DISCONTINUED | OUTPATIENT
Start: 2022-09-21 | End: 2022-09-23 | Stop reason: HOSPADM

## 2022-09-21 RX ADMIN — LEVETIRACETAM 1500 MG: 100 INJECTION INTRAVENOUS at 13:34

## 2022-09-21 RX ADMIN — CLOPIDOGREL BISULFATE 75 MG: 75 TABLET ORAL at 10:28

## 2022-09-21 RX ADMIN — TAMSULOSIN HYDROCHLORIDE 0.4 MG: 0.4 CAPSULE ORAL at 10:28

## 2022-09-21 RX ADMIN — METHOTREXATE 10 MG: 2.5 TABLET ORAL at 10:28

## 2022-09-21 RX ADMIN — ATORVASTATIN CALCIUM 40 MG: 40 TABLET, FILM COATED ORAL at 21:38

## 2022-09-21 RX ADMIN — DILTIAZEM HYDROCHLORIDE 60 MG: 60 TABLET, FILM COATED ORAL at 17:14

## 2022-09-21 RX ADMIN — DICLOFENAC SODIUM 4 G: 10 GEL TOPICAL at 17:14

## 2022-09-21 RX ADMIN — DICLOFENAC SODIUM 4 G: 10 GEL TOPICAL at 21:41

## 2022-09-21 RX ADMIN — DILTIAZEM HYDROCHLORIDE 60 MG: 60 TABLET, FILM COATED ORAL at 08:45

## 2022-09-21 RX ADMIN — Medication 2 UNITS: at 08:45

## 2022-09-21 RX ADMIN — FOLIC ACID 1 MG: 1 TABLET ORAL at 10:28

## 2022-09-21 RX ADMIN — PRAVASTATIN SODIUM 80 MG: 40 TABLET ORAL at 10:28

## 2022-09-21 NOTE — PROCEDURES
EEG REPORT    Patient Name: Talha Bella  : 1940  Age: 79 y.o. Ordering physician: Julissa Gallagher MD   Date of EE22  EEG procedure number: [id number]  Diagnosis: Altered mental status   Interpreting physician: Geraldine Herr MD     Procedure: EEG    CLINICAL INDICATION: The patient is a 79 y.o. male who is being evaluated for baseline electro cerebral activities and to rule out seizure focus.       Current Facility-Administered Medications   Medication Dose Route Frequency    glucagon (GLUCAGEN) injection 1 mg  1 mg IntraMUSCular PRN    dextrose 10 % infusion 250 mL  250 mL IntraVENous DIALYSIS PRN    glucose chewable tablet 16 g  16 g Oral PRN    insulin lispro (HUMALOG) injection 1-8 Units  1-8 Units SubCUTAneous AC&HS    folic acid (FOLVITE) tablet 1 mg  1 mg Oral DAILY    methotrexate (RHEUMATREX) tablet 10 mg  10 mg Oral Once per day on     albuterol (PROVENTIL VENTOLIN) nebulizer solution 2.5 mg  2.5 mg Nebulization Q4H PRN    fluticasone propionate (FLONASE) 50 mcg/actuation nasal spray 2 Spray  2 Spray Both Nostrils DAILY PRN    clopidogreL (PLAVIX) tablet 75 mg  75 mg Oral DAILY    diclofenac (VOLTAREN) 1 % topical gel 4 g  4 g Topical QID    tamsulosin (FLOMAX) capsule 0.4 mg  0.4 mg Oral DAILY    ipratropium (ATROVENT) 0.02 % nebulizer solution 0.5 mg  0.5 mg Nebulization Q4H PRN    dilTIAZem IR (CARDIZEM) tablet 60 mg  60 mg Oral BID    levETIRAcetam (KEPPRA) injection 1,500 mg  1,500 mg IntraVENous Q12H    atorvastatin (LIPITOR) tablet 40 mg  40 mg Oral QHS    acetaminophen (TYLENOL) tablet 650 mg  650 mg Oral Q6H PRN    ondansetron (ZOFRAN) injection 4 mg  4 mg IntraVENous Q4H PRN    hydrALAZINE (APRESOLINE) 20 mg/mL injection 10 mg  10 mg IntraVENous Q6H PRN    diazePAM (VALIUM) injection 10 mg  10 mg IntraVENous Q4H PRN    acetaminophen (TYLENOL) tablet 650 mg  650 mg Oral Q4H PRN    Or    acetaminophen (TYLENOL) solution 650 mg  650 mg Per NG tube Q4H PRN    Or    acetaminophen (TYLENOL) suppository 650 mg  650 mg Rectal Q4H PRN           DESCRIPTION OF PROCEDURE:     This is a digitally recorded electroencephalogram  Electrodes were applied in accordance with the international 10-20 system of electrode placement. 18 channels of scalp EEG are recorded  A channel was used for EoG  Another channel was used for ECG   The data is stored digitally and reviewed in reformatted montages for optimal display  EEG  was reviewed in both bipolar and referential montages    Description of Activity: The background of this recording contains a posteriorly-located occipital alpha rhythm of 8-9 hz that attenuates with eye opening. This was seen maximally over the posterior head region and was symmetric. Throughout the recording, there were no clear areas of focal slowing nor spike or spike-and-wave discharges seen. Hyperventilation was not performed. Photic stimulation produced minimal driving response in the posterior head regions. During drowsiness, there is attenuation of the underlying  frequency and slower theta activity occurs. During the recording, the patient did not achieve stage II sleep    Clinical Interpretation: This EEG, performed during wakefulness and drowsiness/sleep, is abnormal. There is mild generalized slowing as seen in encephalopathies. There is no focal asymmetry, seizures or epileptiform discharges seen.   This EEG does not rule out the possibility of seizures or the diagnosis of epilepsy    Clinical correlation is recommended     Wiliam Gomez MD

## 2022-09-21 NOTE — PROGRESS NOTES
Problem: Falls - Risk of  Goal: *Absence of Falls  Description: Document Guteirrez Reason Fall Risk and appropriate interventions in the flowsheet.   Outcome: Progressing Towards Goal  Note: Fall Risk Interventions:

## 2022-09-21 NOTE — PROGRESS NOTES
End of Shift Note     Bedside shift change report given to Monica Bo (oncoming nurse) by Rhona Wan RN (offgoing nurse). Report included the following information     Shift worked:  days   Shift summary and any significant changes:        Code Stroke today  STAT head CT performed   MRI screening form completed and sent   MRI pending due to need to check  shunt compatibility  EEG completed today      Concerns for physician to address:  none   Zone phone for oncoming shift:  [phone]      Patient Information  Jayden Mayen  79 y.o.  9/20/2022  4:27 PM by Jeff Silverman DO.  Jayden Mayen was admitted from Home     Problem List       Patient Active Problem List     Diagnosis Date Noted    CVA (cerebral vascular accident) ([address] 09/20/2022    Type 2 diabetes mellitus 07/29/2022    Eructation 07/17/2020    BPH (benign prostatic hyperplasia) 09/25/2017    Rheumatoid arthritis involving multiple sites ([address] 09/25/2017    AAA (abdominal aortic aneurysm) without rupture ([address] 09/25/2017    Hip fracture ([address] 05/11/2017    NPH (normal pressure hydrocephalus) ([address] 04/13/2017    Alcohol use disorder 04/13/2017    Dysphagia 04/04/2017    Abnormal x-ray of abdomen 04/04/2017    Influenza 03/08/2016    Fall 03/08/2016    Diarrhea 01/22/2016    History of benign neoplasm of colon 04/28/2011    GERD (gastroesophageal reflux disease) 04/28/2011    Colon polyp 04/28/2011    Diverticulosis of colon 04/28/2011    Internal hemorrhoid 04/28/2011    Schatzki's ring 04/28/2011    Hiatal hernia 04/28/2011           Past Medical History:   Diagnosis Date    AAA (abdominal aortic aneurysm) ([address]       Followed by Dr Bisi Delong as of 05/13/19 3.9 x 4.1    At risk for sleep apnea 07/08/2020     during PAT assessment, MEL score 4    Cancer (HCC)       r shoulder melanoma    Colon polyp      Contact dermatitis and eczema due to cause      COPD      Diabetes ([address]      Diarrhea 1/22/2016    Dysphagia 4/4/2017    Emphysema lung ([address] Eructation 7/17/2020    Exposure to TB       2/2019 cruise, probably exposure to TB, beginning TC 9/2019    GERD (gastroesophageal reflux disease)      Hypertension      Ill-defined condition       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, arms    NPH (normal pressure hydrocephalus) ([facility] 2014     shunt    RA (rheumatoid arthritis) ([facility]      Scarring      Sepsis ([facility]       with perforated gallbladder    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 06/2021    Stroke ([facility] 2014     L arm weakness states not a stroke states it is a TIA     TIA (transient ischemic attack) 11/2018         Core Measures:  CVA: Yes Yes  CHF:No No  PNA:No No     Activity:  Activity Level: Up with Assistance, Bed Rest  Number times ambulated in hallways past shift: 0  Number of times OOB to chair past shift: 0     Cardiac:   Cardiac Monitoring: Yes         Access:   Current line(s): PIV   Central Line? No    PICC LINE? No      Genitourinary:   Urinary status: voiding  Urinary Catheter? No       Respiratory:   O2 Device: None (Room air)  Chronic home O2 use?: NO  Incentive spirometer at bedside: NO     GI:  Last Bowel Movement Date: 09/20/22  Current diet:  DIET NPO  Passing flatus: YES  Tolerating current diet: YES     Pain Management:   Patient states pain is manageable on current regimen: YES     Skin:  Patrick Score: 19  Interventions: float heels, increase time out of bed, and PT/OT consult    Patient Safety:  Fall Score:  Total Score: 6  Interventions: bed/chair alarm, gripper socks, pt to call before getting OOB, and stay with me (per policy)  @Rollbelt  @dexterity to release roll belt  Yes/No ( must document dexterity  here by stating Yes or No here, otherwise this is a restraint and must follow restraint documentation policy.)     DVT prophylaxis:  DVT prophylaxis Med- Yes  DVT prophylaxis SCD or CHARLETTE- No      Wounds: (If Applicable)  Wounds- No  Location      Active Consults:  IP CONSULT TO HOSPITALIST     Length of Stay:  Expected LOS: - - -  Actual LOS: 1  Discharge Plan: Yes JIMI

## 2022-09-21 NOTE — PROGRESS NOTES
Problem: Mobility Impaired (Adult and Pediatric)  Goal: *Acute Goals and Plan of Care (Insert Text)  Description:   FUNCTIONAL STATUS PRIOR TO ADMISSION: Patient was independent and active without use of DME.    HOME SUPPORT PRIOR TO ADMISSION: The patient lived with wife. Physical Therapy Goals  Initiated 9/21/2022  1. Patient will move from supine to sit and sit to supine  in bed with minimal assistance/contact guard assist within 7 day(s). 2.  Patient will transfer from bed to chair and chair to bed with minimal assistance/contact guard assist using the least restrictive device within 7 day(s). 3.  Patient will perform sit to stand with minimal assistance/contact guard assist within 7 day(s). 4.  Patient will ambulate with minimal assistance/contact guard assist for 15 feet with the least restrictive device within 7 day(s). 5.  Patient will improve Simon Balance score by 7 points within 7 days. Outcome: Not Met     PHYSICAL THERAPY EVALUATION- NEURO POPULATION  Patient: Linette Dias (1 y.o. male)  Date: 9/21/2022  Primary Diagnosis: CVA (cerebral vascular accident) [facility]) [I63.9]       Precautions:   Fall      ASSESSMENT  Based on the objective data described below, the patient presents with generalized weakness, decreased activity tolerance, impaired balance, impaired coordination, and altered gait. Pt was received in supine and cleared by nursing to mobilize. Vitals stable during session despite pt reporting dizziness. He needed increased assistance to come to the EOB. Performed MMT on the EOB and noted 5/5 strength in BLE. Pt with increase posterior lean in sitting but not loss of balance. Stood and ambulated to the chair with HHA x A and overall min A. Provided RW and noted very short shuffling steps and difficulty processing steps. He was left sitting up in the chair at the end of the session.      Current Level of Function Impacting Discharge (mobility/balance): min/mod A x 2    Functional Outcome Measure: The patient scored Total: 1/56 on the [address] which is indicative of high fall risk. Other factors to consider for discharge:      Patient will benefit from skilled therapy intervention to address the above noted impairments. PLAN :  Recommendations and Planned Interventions: bed mobility training, transfer training, gait training, therapeutic exercises, neuromuscular re-education, patient and family training/education, and therapeutic activities      Frequency/Duration: Patient will be followed by physical therapy:  4 times a week to address goals. Recommendation for discharge: (in order for the patient to meet his/her long term goals)  Therapy up to 5 days/week in SNF setting    This discharge recommendation:  Has not yet been discussed the attending provider and/or case management    IF patient discharges home will need the following DME: to be determined (TBD)         SUBJECTIVE:   Patient stated Robert Ryann was work for you all.     OBJECTIVE DATA SUMMARY:   HISTORY:    Past Medical History:   Diagnosis Date    AAA (abdominal aortic aneurysm) ([address]     Followed by Dr Jayson Abarca as of 05/13/19 3.9 x 4.1    At risk for sleep apnea 07/08/2020    during PAT assessment, MEL score 4    Cancer ([address]     r shoulder melanoma    Colon polyp     Contact dermatitis and eczema due to cause     COPD     Diabetes ([address]     Diarrhea 1/22/2016    Dysphagia 4/4/2017    Emphysema lung ([address]     Eructation 7/17/2020    Exposure to TB     2/2019 cruise, probably exposure to TB, beginning TC 9/2019    GERD (gastroesophageal reflux disease)     Hypertension     Ill-defined condition     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, arms    NPH (normal pressure hydrocephalus) ([address] 2014    shunt    RA (rheumatoid arthritis) ([address]     Scarring     Sepsis ([address]     with perforated gallbladder    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 06/2021    Stroke ([address] 2014    L arm weakness states not a stroke states it is a TIA     TIA (transient ischemic attack) 11/2018     Past Surgical History:   Procedure Laterality Date    HX APPENDECTOMY      HX CATARACT REMOVAL Bilateral     HX CHOLECYSTECTOMY  04/18/2017    at [facility], ruptured, had sepsis, appendix removed with this    HX ENDOSCOPY      HX FEMORAL BYPASS Left     stent    HX GI      PEG placed and removed    HX OPEN REDUCTION INTERNAL FIXATION Left     femur, suresh in place    HX ORTHOPAEDIC Left     Femur     HX OTHER SURGICAL      r shoulder melenoma excision    HX TONSILLECTOMY      IR PLC CATH AV SHUNT IN W PTA SI  2014    [place] COLSC FLX W/RMVL OF TUMOR POLYP LESION SNARE TQ  4/28/2011         [place] EGD TRANSORAL BIOPSY SINGLE/MULTIPLE  4/28/2011            Personal factors and/or comorbidities impacting plan of care:     Home Situation  Home Environment: Private residence  # Steps to Enter: 2  Rails to Enter: Yes  Hand Rails : Bilateral  One/Two Story Residence: One story  Living Alone: No  Support Systems: Spouse/Significant Other  Patient Expects to be Discharged to[de-identified] Home  Current DME Used/Available at Home: Grab bars, Shower chair, Cane, straight  Tub or Shower Type: Shower    EXAMINATION/PRESENTATION/DECISION MAKING:   Critical Behavior:  Neurologic State: Alert  Orientation Level: Oriented X4  Cognition: Follows commands  Safety/Judgement: Awareness of environment, Decreased insight into deficits, Fall prevention  Hearing: Auditory  Auditory Impairment: Hard of hearing, bilateral  Skin:  dressings, nursing asked to look. Per wife they were skin tears  Edema: WDL  Range Of Motion:  AROM: Within functional limits           PROM: Within functional limits           Strength:    Strength: Generally decreased, functional                    Tone & Sensation:   Tone: Normal              Sensation: Intact               Coordination:  Coordination: Generally decreased, functional  Vision:   Acuity: Impaired near vision; Impaired far vision (blurry vision in L eye)  Corrective Lenses: Reading glasses  Functional Mobility:  Bed Mobility:  Rolling: Moderate assistance;Assist x2  Supine to Sit: Moderate assistance;Assist x2     Scooting: Minimum assistance  Transfers:  Sit to Stand: Minimum assistance;Assist x2  Stand to Sit: Minimum assistance;Assist x2        Bed to Chair: Minimum assistance;Assist x2              Balance:   Sitting: Impaired  Sitting - Static: Good (unsupported)  Sitting - Dynamic: Fair (occasional)  Standing: Impaired  Standing - Static: Fair;Constant support  Standing - Dynamic : Fair;Constant support  Ambulation/Gait Training:  Distance (ft): 5 Feet (ft)  Assistive Device: Gait belt (HHA x 2)  Ambulation - Level of Assistance: Moderate assistance;Assist x2        Gait Abnormalities: Decreased step clearance;Shuffling gait        Base of Support: Widened     Speed/Nimo: Pace decreased (<100 feet/min); Shuffled  Step Length: Left shortened;Right shortened                  Functional Measure  Simon Balance Test:    Sitting to Standin  Standing Unsupported: 0  Sitting with Back Unsupported: 1  Standing to Sittin  Transfers: 0  Standing Unsupported with Eyes Closed: 0  Standing Unsupported with Feet Together: 0  Reach Forward with Outstretched Arm: 0   Object: 0  Turn to Look Over Shoulders: 0  Turn 360 Degrees: 0  Alternate Foot on Step/Stool: 0  Standing Unsupported One Foot in Front: 0  Stand on One Le  Total:          56=Maximum possible score;   0-20=High fall risk  21-40=Moderate fall risk   41-56=Low fall risk        Physical Therapy Evaluation Charge Determination   History Examination Presentation Decision-Making   HIGH Complexity :3+ comorbidities / personal factors will impact the outcome/ POC  MEDIUM Complexity : 3 Standardized tests and measures addressing body structure, function, activity limitation and / or participation in recreation  MEDIUM Complexity : Evolving with changing characteristics  Other outcome measures CORTEZ  HIGH       Based on the above components, the patient evaluation is determined to be of the following complexity level: MEDIUM    Pain Rating:  No complaints     Activity Tolerance:   Fair      After treatment patient left in no apparent distress:   Sitting in chair, Call bell within reach, Bed / chair alarm activated, and Caregiver / family present    COMMUNICATION/EDUCATION:   The patients plan of care was discussed with: Occupational therapist and Registered nurse. Still awaiting MRI    Patient and/or family was verbally educated on the BE FAST acronym for signs/symptoms of CVA and TIA. BE FAST was written on patient's communication board  for visual education and reinforcement. All questions answered with patient indicating fair understanding. Fall prevention education was provided and the patient/caregiver indicated understanding. and Patient/family agree to work toward stated goals and plan of care.     Thank you for this referral.  Oj Canales PT, DPT   Time Calculation: 27 mins

## 2022-09-21 NOTE — PROGRESS NOTES
Problem: Self Care Deficits Care Plan (Adult)  Goal: *Acute Goals and Plan of Care (Insert Text)  Description: FUNCTIONAL STATUS PRIOR TO ADMISSION: Patient was independent and active without use of DME. Patient was independent for basic and instrumental ADLs. HOME SUPPORT: The patient lived with wife. Occupational Therapy Goals  Initiated 9/21/2022   1. Patient will perform grooming in standing with supervision/set-up within 7 day(s). 2.  Patient will perform upper body dressing with supervision/set-up within 7 day(s). 3.  Patient will perform lower body dressing with minimal assistance within 7 day(s). 4.  Patient will perform toilet transfers with supervision/set-up within 7 day(s). 5.  Patient will perform all aspects of toileting with supervision/set-up within 7 day(s). 6.  Patient will participate in upper extremity therapeutic exercise/activities with supervision/set-up within 7 day(s). 7.  Patient will utilize energy conservation techniques during functional activities with no cues within 7 day(s). 8.  Patient will improve their Fugl Florez score by 5 points in prep for ADLs within 7 days. Outcome: Not Met   OCCUPATIONAL THERAPY EVALUATION  Patient: Aleene Councilman (65 y.o. male)  Date: 9/21/2022  Primary Diagnosis: CVA (cerebral vascular accident) [facility]) [I63.9]       Precautions: Fall    ASSESSMENT  Based on the objective data described below, the patient presents with decreased independence in self-care and functional mobility secondary to general weakness, impaired balance, impaired coordination, decreased insight/safety awareness, blurry vision in L eye, and decreased activity tolerance. MRI pending at time of eval. Patient is functioning below his baseline for ADLs and functional mobility, now completing ADLs with set-up/stand-by to total assist and functional mobility with min to mod assist x2 using rolling walker. Patient received semisupine in bed and agreeable for therapy. Patient completed supine > sit with mod assist x2 and and demonstrated posterior lean while EOB. Patient c/o dizziness while sitting with BP remaining stable. Patient demonstrated decreased strength in LUE (distal > proximal) and impaired coordination in BUE (L>R). Patient unable to don socks this session and required total assist for task. Patient stood with x2 person assist and transferred to recliner chair. Patient completed additional sit <> stand and took steps using rolling walker still requiring x2 person assist. Patient was left sitting in recliner chair with all needs met, VSS, chair alarmed. RN present in room at end of session. Patient would continue to benefit from skilled OT services during acute hospital stay. Recommend patient discharge to SNF rehab prior to returning home. Current Level of Function Impacting Discharge (ADLs/self-care): set-up/stand-by to total assist for self-care, min to mod assist x2 for functional mobility     Functional Outcome Measure: The patient scored Total A-D  Total A-D (Motor Function): 56/66 on the Fugl-Florez Assessment which is indicative of mild impairment in upper extremity functional status. Other factors to consider for discharge: fall risk, decreased insight     Patient will benefit from skilled therapy intervention to address the above noted impairments. PLAN :  Recommendations and Planned Interventions: self care training, functional mobility training, therapeutic exercise, balance training, therapeutic activities, endurance activities, neuromuscular re-education, patient education, home safety training, and family training/education    Frequency/Duration: Patient will be followed by occupational therapy 4 times a week to address goals.     Recommendation for discharge: (in order for the patient to meet his/her long term goals)  Therapy up to 5 days/week in SNF setting    This discharge recommendation:  Has not yet been discussed the attending provider and/or case management    IF patient discharges home will need the following DME: TBD in SNF rehab       SUBJECTIVE:   Patient stated I haven't felt this way since being a .     OBJECTIVE DATA SUMMARY:   HISTORY:   Past Medical History:   Diagnosis Date    AAA (abdominal aortic aneurysm) ([address]     Followed by Dr Fior Grady as of 05/13/19 3.9 x 4.1    At risk for sleep apnea 07/08/2020    during PAT assessment, MEL score 4    Cancer ([address]     r shoulder melanoma    Colon polyp     Contact dermatitis and eczema due to cause     COPD     Diabetes ([address]     Diarrhea 1/22/2016    Dysphagia 4/4/2017    Emphysema lung ([address]     Eructation 7/17/2020    Exposure to TB     2/2019 cruise, probably exposure to TB, beginning TC 9/2019    GERD (gastroesophageal reflux disease)     Hypertension     Ill-defined condition     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, arms    NPH (normal pressure hydrocephalus) ([address] 2014    shunt    RA (rheumatoid arthritis) ([address]     Scarring     Sepsis ([address]     with perforated gallbladder    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 06/2021    Stroke ([address] 2014    L arm weakness states not a stroke states it is a TIA     TIA (transient ischemic attack) 11/2018     Past Surgical History:   Procedure Laterality Date    HX APPENDECTOMY      HX CATARACT REMOVAL Bilateral     HX CHOLECYSTECTOMY  04/18/2017    at [facility], ruptured, had sepsis, appendix removed with this    HX ENDOSCOPY      HX FEMORAL BYPASS Left     stent    HX GI      PEG placed and removed    HX OPEN REDUCTION INTERNAL FIXATION Left     femur, suresh in place    HX ORTHOPAEDIC Left     Femur     HX OTHER SURGICAL      r shoulder melenoma excision    HX TONSILLECTOMY      IR PLC CATH AV SHUNT IN W PTA SI  2014    [facility] COLSC FLX W/RMVL OF TUMOR POLYP LESION SNARE TQ  4/28/2011         GA EGD TRANSORAL BIOPSY SINGLE/MULTIPLE  4/28/2011          Expanded or extensive additional review of patient history:     Home Situation  Home Environment: Private residence  # Steps to Enter: 2  Rails to Enter: Yes  Hand Rails : Bilateral  One/Two Story Residence: One story  Living Alone: No  Support Systems: Spouse/Significant Other  Patient Expects to be Discharged to[de-identified] Home  Current DME Used/Available at Home: Grab bars, Shower chair, Cane, straight  Tub or Shower Type: Shower    Hand dominance: Right    EXAMINATION OF PERFORMANCE DEFICITS:  Cognitive/Behavioral Status:  Neurologic State: Alert  Orientation Level: Oriented X4  Cognition: Follows commands  Perception: Cues to maintain midline in sitting  Perseveration: No perseveration noted  Safety/Judgement: Awareness of environment;Decreased insight into deficits; Fall prevention    Hearing: Auditory  Auditory Impairment: Hard of hearing, bilateral    Vision/Perceptual:    Acuity: Impaired near vision; Impaired far vision (blurry vision in L eye)    Corrective Lenses: Reading glasses    Range of Motion:  AROM: Within functional limits  PROM: Within functional limits    Strength:  Strength: Generally decreased, functional    Coordination:  Coordination: Generally decreased, functional  Fine Motor Skills-Upper: Left Impaired;Right Impaired    Gross Motor Skills-Upper: Left Intact; Right Intact    Tone & Sensation:  Tone: Normal  Sensation: Intact    Balance:  Sitting: Impaired  Sitting - Static: Good (unsupported)  Sitting - Dynamic: Fair (occasional)  Standing: Impaired  Standing - Static: Fair;Constant support  Standing - Dynamic : Fair;Constant support    Functional Mobility and Transfers for ADLs:  Bed Mobility:  Rolling: Moderate assistance;Assist x2  Supine to Sit: Moderate assistance;Assist x2  Scooting: Minimum assistance    Transfers:  Sit to Stand: Minimum assistance;Assist x2  Stand to Sit: Minimum assistance;Assist x2  Bed to Chair: Minimum assistance;Assist x2    ADL Assessment:  Feeding: Setup;Stand-by assistance  Oral Facial Hygiene/Grooming: Contact guard assistance (seated)  Bathing:  Moderate assistance  Upper Body Dressing: Minimum assistance  Lower Body Dressing: Total assistance  Toileting: Maximum assistance    ADL Intervention and task modifications:    Lower Body Dressing Assistance  Socks: Total assistance (dependent)    Cognitive Retraining  Safety/Judgement: Awareness of environment;Decreased insight into deficits; Fall prevention    Functional Measure:  Fugl-Florez Assessment of Motor Recovery after Stroke:   Upper Extremity Assessment: Yes    Reflex Activity  Flexors/Biceps/Fingers: Can be elicited  Extensors/Triceps: Can be elicited  Reflex Subtotal: 4    Volitional Movement Within Synergies  Shoulder Retraction: Full  Shoulder Elevation: Full  Shoulder Abduction (90 degrees): Full  Shoulder External Rotation: Full  Elbow Flexion: Full  Forearm Supination: Full  Shoulder Adduction/Internal Rotation: Full  Elbow Extension: Full  Forearm Pronation: Full  Subtotal: 18    Volitional Movement Mixing Synergies  Hand to Lumbar Spine: Full  Shoulder Flexion (0-90 degrees): Full  Pronation-Supination: Full  Subtotal: 6    Volitional Movement With Little or No Synergy  Shoulder Abduction (0-90 degrees): Full  Shoulder Flexion ( degrees): Full  Pronation/Supination: Full  Subtotal : 6    Normal Reflex Activity  Biceps, Triceps, Finger Flexors:  Full  Subtotal : 2    Upper Extremity Total   Upper Extremity Total: 36    Wrist  Stability at 15 Degree Dorsiflexion: Full  Repeated Dorsiflexion/ Volar Flexion: Full  Stability at 15 Degree Dorsiflexion: Full  Repeated Dorsiflexion/ Volar Flexion: Full  Circumduction: Full  Wrist Total: 10    Hand  Mass Flexion: Partial  Mass Extension: Partial  Grasp A: Partial  Grasp B: Partial  Grasp C: Partial  Grasp D: Partial  Grasp E: Partial  Hand Total: 7    Coordination/Speed  Tremor: Slight  Dysmetria: Slight  Time: 2-5s  Coordination/Speed Total : 3    Total A-D  Total A-D (Motor Function): 56/66     This is a reliable/valid measure of arm function after a neurological event. It has established value to characterize functional status and for measuring spontaneous and therapy-induced recovery; tests proximal and distal motor functions. Fugl-Florez Assessment - UE scores recorded between five and 30 days post neurologic event can be used to predict UE recovery at six months post neurologic event. Severe = 0-21 points   Moderately Severe = 22-33 points   Moderate = 34-47 points   Mild = 48-66 points  YOHAN Salazar, ELISEO Hooper, & BARBARA Nguyen (1992). Measurement of motor recovery after stroke: Outcome assessment and sample size requirements. Stroke, 23, pp. 4061-0690.   ------------------------------------------------------------------------------------------------------------------------------------------------------------------  MCID:  Stroke:   Maria De Jesus Cuellar et al, 2001; n = 171; mean age 79 (5) years; assessed within 16 (12) days of stroke, Acute Stroke)  FMA Motor Scores from Admission to Discharge   10 point increase in FMA Upper Extremity = 1.5 change in discharge FIM   10 point increase in FMA Lower Extremity = 1.9 change in discharge FIM  MDC:   Stroke:   Kiya Casey et al, 2008, n = 14, mean age = 59.9 (14.6) years, assessed on average 14 (6.5) months post stroke, Chronic Stroke)   FMA = 5.2 points for the Upper Extremity portion of the assessment     Based on the above components, the patient evaluation is determined to be of the following complexity level: MEDIUM  Pain Rating:  Patient did not c/o pain during session. Activity Tolerance:   Fair and requires rest breaks    After treatment patient left in no apparent distress:    Sitting in chair, Call bell within reach, Bed / chair alarm activated, and Caregiver / family present    COMMUNICATION/EDUCATION:   The patients plan of care was discussed with: Physical therapist and Registered nurse.      Home safety education was provided and the patient/caregiver indicated understanding., Patient/family have participated as able in goal setting and plan of care. , and Patient/family agree to work toward stated goals and plan of care. This patients plan of care is appropriate for delegation to [facility].     Thank you for this referral.  Sonido Patton OTR/L  Time Calculation: 26 mins

## 2022-09-21 NOTE — ED PROVIDER NOTES
EMERGENCY DEPARTMENT HISTORY AND PHYSICAL EXAM    Date: 9/20/2022  Patient Name: Talha Bella    History of Presenting Illness     Chief Complaint   Patient presents with    Altered mental status     Patient arrives as Level I Code stroke, immediately taken to CT via EMS and this RN. Patient had GLF following pulmonology appointment; wife describes patient turned to right side and became rigid before falling onto R side of head. Patient currently has ventricular shunt in place, with pressure decreased today from 13 to 11. Patient does not respond verbally to staff, but does spontaneously move his limbs. Less motion noted in LLE than other extremities. History Provided By: Patient's Wife, Patient's Daughter, and EMS    HPI: Talha Bella, 79 y.o. male w hx of AAA, NPH s/p shunt, TIA, DM, COPD, skin cancer presents to the ED with EMS as a stroke alert. Over the past few weeks he has been having progressively worsening lower extremity weakness. He had an appointment today with his neurosurgeon to evaluate this. His EP sound settings were changed. He also saw a pulmonologist shortly following his appointment. His left foot and pulmonologist office he had a syncopal episode where he fell down and hit the right side of his head. He is is on Plavix currently at this time. Initial reports from EMS were that he had left-sided facial droop, dysarthria, and right-sided weakness. Reports from the family was that he was looking to his right side, but the exact point of weakness was difficult to tell. In route with EMS he was not moving his right upper extremity right lower extremity. He was not responding to commands. He was significantly dysarthric. On my initial evaluation he was unable to respond to commands and was dysarthric, but he was moving all 4 extremities. LKW just prior to arrival.    There are no other complaints, changes, or physical findings at this time.     PCP: Paras Butler III, DO    No current facility-administered medications on file prior to encounter. Current Outpatient Medications on File Prior to Encounter   Medication Sig Dispense Refill    dilTIAZem IR (CARDIZEM) 60 mg tablet TAKE ONE TABLET BY MOUTH 2 TIMES A  Tablet 3    metFORMIN (GLUCOPHAGE) 500 mg tablet Take 500 mg by mouth two (2) times daily (with meals). tamsulosin (FLOMAX) 0.4 mg capsule TAKE 1 CAPSULE 30 MINUTES AFTER THE SAME MEAL DAILY 90 Capsule 3    pravastatin (PRAVACHOL) 80 mg tablet TAKE ONE TABLET BY MOUTH EVERY NIGHT 90 Tablet 3    OneTouch Ultra Test strip TEST 1-2 TIMES A  Strip 5    diclofenac (VOLTAREN) 1 % gel Use to left shoulder up to 4x daily. 100 g 3    clopidogrel (PLAVIX) 75 mg tab Take 75 mg by mouth daily. fluticasone (FLONASE) 50 mcg/actuation nasal spray 2 Sprays by Both Nostrils route daily as needed. acetaminophen (TYLENOL) 500 mg tablet Take 500 mg by mouth every six (6) hours as needed for Pain. albuterol sulfate (PROVENTIL;VENTOLIN) 2.5 mg/0.5 mL nebu nebulizer solution 2.5 mg by Nebulization route every four (4) hours as needed for Wheezing. methotrexate (RHEUMATREX) 2.5 mg tablet Take 10 mg by mouth. Every Wednesday and Thursday      adalimumab (HUMIRA) 40 mg/0.8 mL injection 40 mg by SubCUTAneous route every fourteen (14) days. folic acid (FOLVITE) 1 mg tablet Take 1 mg by mouth daily. tiotropium (SPIRIVA) 18 mcg inhalation capsule Take 1 Cap by inhalation daily.          Past History     Past Medical History:  Past Medical History:   Diagnosis Date    AAA (abdominal aortic aneurysm) ([facility]     Followed by Dr Garcia Gave as of 05/13/19 3.9 x 4.1    At risk for sleep apnea 07/08/2020    during PAT assessment, MEL score 4    Cancer (HCC)     r shoulder melanoma    Colon polyp     Contact dermatitis and eczema due to cause     COPD     Diabetes ([facility]     Diarrhea 1/22/2016    Dysphagia 4/4/2017    Emphysema lung ([facility]     Eructation 2020    Exposure to TB     2019 cruise, probably exposure to TB, beginning TC 2019    GERD (gastroesophageal reflux disease)     Hypertension     Ill-defined condition     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, arms    NPH (normal pressure hydrocephalus) ([facility]     shunt    RA (rheumatoid arthritis) ([facility]     Scarring     Sepsis ([facility]     with perforated gallbladder    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 2021    Stroke ([facility]     L arm weakness states not a stroke states it is a TIA     TIA (transient ischemic attack) 2018       Past Surgical History:  Past Surgical History:   Procedure Laterality Date    HX APPENDECTOMY      HX CATARACT REMOVAL Bilateral     HX CHOLECYSTECTOMY  2017    at [facility], ruptured, had sepsis, appendix removed with this    HX ENDOSCOPY      HX FEMORAL BYPASS Left     stent    HX GI      PEG placed and removed    HX OPEN REDUCTION INTERNAL FIXATION Left     femur, suresh in place    HX ORTHOPAEDIC Left     Femur     HX OTHER SURGICAL      r shoulder melenoma excision    HX TONSILLECTOMY      [facility] CATH AV SHUNT IN W PTA SI      [facility] COL[facility] FLX W/RMVL OF TUMOR POLYP LESION SNARE TQ  2011         [facility] EGD TRANSORAL BIOPSY SINGLE/MULTIPLE  2011            Family History:  Family History   Adopted: Yes       Social History:  Social History     Tobacco Use    Smoking status: Former     Packs/day: 1.00     Types: Cigarettes     Quit date:      Years since quittin.    Smokeless tobacco: Never   Substance Use Topics    Alcohol use: Yes     Alcohol/week: 5.8 standard drinks     Types: 7 Shots of liquor per week    Drug use: No       Allergies:   Allergies   Allergen Reactions    Ceclor [Cefaclor] Swelling    Contrast Agent [Iodine] Hives and Itching    Floxin [Ofloxacin] Hives    Gabapentin Other (comments)     Causes confusion    Ivp [Fd And C Blue No.1] Itching    Lansoprazole Diarrhea         Review of Systems   Review of Systems   Unable to perform ROS: Patient unresponsive     Physical Exam   Physical Exam  Constitutional:       Appearance: Normal appearance. HENT:      Head:      Comments: Right sided contusion of scalp, no laceration     Mouth/Throat:      Mouth: Mucous membranes are moist.   Eyes:      General: No scleral icterus. Extraocular Movements: Extraocular movements intact. Conjunctiva/sclera: Conjunctivae normal.      Pupils: Pupils are equal, round, and reactive to light. Cardiovascular:      Rate and Rhythm: Normal rate and regular rhythm. Pulmonary:      Effort: Pulmonary effort is normal.      Breath sounds: Normal breath sounds. Abdominal:      General: Abdomen is flat. There is no distension. Palpations: Abdomen is soft. Tenderness: There is no guarding or rebound. Musculoskeletal:         General: No swelling. Normal range of motion. Cervical back: Normal range of motion and neck supple. Skin:     General: Skin is warm and dry. Capillary Refill: Capillary refill takes less than 2 seconds. Neurological:      General: No focal deficit present. Mental Status: He is alert. Cranial Nerves: Dysarthria present. Comments: Not responding to commands  Dysarthric words  Moving all extremities equally   Psychiatric:         Behavior: Behavior is agitated.        Diagnostic Study Results     Labs -     Recent Results (from the past 24 hour(s))   GLUCOSE, POC    Collection Time: 09/20/22  4:28 PM   Result Value Ref Range    Glucose (POC) 200 (H) 65 - 117 mg/dL    Performed by Ariel VALENTE    CBC WITH AUTOMATED DIFF    Collection Time: 09/20/22  5:26 PM   Result Value Ref Range    WBC 6.3 4.1 - 11.1 K/uL    RBC 4.18 4.10 - 5.70 M/uL    HGB 13.1 12.1 - 17.0 g/dL    HCT 40.2 36.6 - 50.3 %    MCV 96.2 80.0 - 99.0 FL    MCH 31.3 26.0 - 34.0 PG    MCHC 32.6 30.0 - 36.5 g/dL    RDW 14.4 11.5 - 14.5 %    PLATELET 625 (L) 057 - 400 K/uL    MPV 10.7 8.9 - 12.9 FL    NRBC 0.0 0  WBC    ABSOLUTE NRBC 0.00 0.00 - 0.01 K/uL    NEUTROPHILS 73 32 - 75 %    LYMPHOCYTES 17 12 - 49 %    MONOCYTES 8 5 - 13 %    EOSINOPHILS 1 0 - 7 %    BASOPHILS 0 0 - 1 %    IMMATURE GRANULOCYTES 1 (H) 0.0 - 0.5 %    ABS. NEUTROPHILS 4.6 1.8 - 8.0 K/UL    ABS. LYMPHOCYTES 1.1 0.8 - 3.5 K/UL    ABS. MONOCYTES 0.5 0.0 - 1.0 K/UL    ABS. EOSINOPHILS 0.0 0.0 - 0.4 K/UL    ABS. BASOPHILS 0.0 0.0 - 0.1 K/UL    ABS. IMM. GRANS. 0.0 0.00 - 0.04 K/UL    DF AUTOMATED     METABOLIC PANEL, COMPREHENSIVE    Collection Time: 09/20/22  5:26 PM   Result Value Ref Range    Sodium 139 136 - 145 mmol/L    Potassium 3.3 (L) 3.5 - 5.1 mmol/L    Chloride 109 (H) 97 - 108 mmol/L    CO2 21 21 - 32 mmol/L    Anion gap 9 5 - 15 mmol/L    Glucose 219 (H) 65 - 100 mg/dL    BUN 14 6 - 20 MG/DL    Creatinine 1.22 0.70 - 1.30 MG/DL    BUN/Creatinine ratio 11 (L) 12 - 20      GFR est AA >60 >60 ml/min/1.73m2    GFR est non-AA 57 (L) >60 ml/min/1.73m2    Calcium 8.4 (L) 8.5 - 10.1 MG/DL    Bilirubin, total 0.6 0.2 - 1.0 MG/DL    ALT (SGPT) 22 12 - 78 U/L    AST (SGOT) 19 15 - 37 U/L    Alk.  phosphatase 97 45 - 117 U/L    Protein, total 6.2 (L) 6.4 - 8.2 g/dL    Albumin 3.2 (L) 3.5 - 5.0 g/dL    Globulin 3.0 2.0 - 4.0 g/dL    A-G Ratio 1.1 1.1 - 2.2     PROTHROMBIN TIME + INR    Collection Time: 09/20/22  5:26 PM   Result Value Ref Range    INR 1.1 0.9 - 1.1      Prothrombin time 11.9 (H) 9.0 - 11.1 sec   TROPONIN-HIGH SENSITIVITY    Collection Time: 09/20/22  5:26 PM   Result Value Ref Range    Troponin-High Sensitivity 8 0 - 76 ng/L   URINALYSIS W/ REFLEX CULTURE    Collection Time: 09/20/22  5:26 PM    Specimen: Urine   Result Value Ref Range    Color YELLOW/STRAW      Appearance CLEAR CLEAR      Specific gravity 1.010 1.003 - 1.030      pH (UA) 5.5 5.0 - 8.0      Protein 100 (A) NEG mg/dL    Glucose >1,000 (A) NEG mg/dL    Ketone Negative NEG mg/dL    Bilirubin Negative NEG      Blood MODERATE (A) NEG      Urobilinogen 0.2 0.2 - 1.0 EU/dL    Nitrites Negative NEG Leukocyte Esterase Negative NEG      WBC 0-4 0 - 4 /hpf    RBC 0-5 0 - 5 /hpf    Epithelial cells FEW FEW /lpf    Bacteria 1+ (A) NEG /hpf    UA:UC IF INDICATED CULTURE NOT INDICATED BY UA RESULT CNI         Radiologic Studies -   XR CHEST PORT   Final Result   Hazy interstitial opacity: pulmonary edema slightly favored over atypical   infection or atelectasis. CT CODE NEURO PERF W CBF   Final Result   CTA Head:   1. No evidence of flow-limiting stenosis or aneurysm. Scattered atherosclerotic   disease as above. CTA Neck:   1. No evidence of flow-limiting stenosis. 2. Mild stenosis (less than 50% by NASCET criteria) of the proximal bilateral   internal carotid arteries. CT Brain Perfusion:   1. Unreliable perfusion maps due to degree of motion. No obvious acute   abnormality. CTA CODE NEURO HEAD AND NECK W CONT   Final Result   CTA Head:   1. No evidence of flow-limiting stenosis or aneurysm. Scattered atherosclerotic   disease as above. CTA Neck:   1. No evidence of flow-limiting stenosis. 2. Mild stenosis (less than 50% by NASCET criteria) of the proximal bilateral   internal carotid arteries. CT Brain Perfusion:   1. Unreliable perfusion maps due to degree of motion. No obvious acute   abnormality. CT CODE NEURO HEAD WO CONTRAST   Final Result   No acute intracranial abnormality. Stable appearance of right frontal access ventricular catheter shunting. 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 No evidence of intercurrent intracranial hemorrhage. MRI BRAIN WO CONT    (Results Pending)     CT Results  (Last 48 hours)                 09/20/22 1721  CT CODE NEURO PERF W CBF Final result    Impression:  CTA Head:   1. No evidence of flow-limiting stenosis or aneurysm. Scattered atherosclerotic   disease as above. CTA Neck:   1. No evidence of flow-limiting stenosis.    2. Mild stenosis (less than 50% by NASCET criteria) of the proximal bilateral   internal carotid arteries. CT Brain Perfusion:   1. Unreliable perfusion maps due to degree of motion. No obvious acute   abnormality. Narrative:  EXAM:  CTA CODE NEURO HEAD AND NECK W CONT, CT CODE NEURO PERF W CBF       INDICATION:   Code Stroke       COMPARISON:  CT head 9/20/2022. CONTRAST:  100 mL of Isovue-370. TECHNIQUE:  Unenhanced  images were obtained to localize the volume for   acquisition. Multislice helical axial CT angiography was performed from the   aortic arch to the top of the head during uneventful rapid bolus intravenous   contrast administration. Coronal and sagittal reformations and 3D post   processing was performed. CT dose reduction was achieved through use of a   standardized protocol tailored for this examination and automatic exposure   control for dose modulation. CT brain perfusion was performed with generation of hemodynamic maps of multiple   parameters, including cerebral blood flow, cerebral blood volume, and MTT (mean   transit time). CT dose reduction was achieved through use of a standardized   protocol tailored for this examination and automatic exposure control for dose   modulation. This study was analyzed by the [address]  algorithm. FINDINGS:       CTA Head:   There is no evidence of large vessel occlusion or flow-limiting stenosis of the   intracranial internal carotid, anterior cerebral, and middle cerebral arteries. Calcification of the bilateral carotid siphons with mild stenoses. The anterior   communicating artery is patent, with small right A1 segment. There is no evidence of large vessel occlusion or flow-limiting stenosis of the   intracranial vertebral arteries, basilar artery, or posterior cerebral arteries. The posterior communicating arteries are diminutive but patent, with infundibula   at the origins bilaterally.         There is no evidence of aneurysm or vascular malformation. The dural venous   sinuses and deep cerebral venous system are patent. No evidence of abnormal   enhancement on delayed phase images. Redemonstrated right frontal approach   ventricular shunt catheter without hydrocephalus. Stable chronic bilateral   subdural hematomas without significant mass effect. CTA NECK:   NASCET method was utilized for calculating stenosis. Severe calcific atherosclerosis of the thoracic aorta. The common carotid   arteries demonstrate no significant stenosis. Calcific atherosclerosis of the   proximal right internal carotid artery with mild (less than 50%) stenosis. Calcific atherosclerosis of the proximal left internal carotid artery with mild   (less than 50%) stenosis. There is a codominant vertebrobasilar arterial system. Mild stenosis of the   bilateral vertebral artery origins from calcified plaque. Visualized soft tissues of the neck are unremarkable.  shunt catheter tubing   noted in the right neck. Moderate upper lobe centrilobular and paraseptal   emphysema. No acute fracture or aggressive osseous lesion. Multilevel   degenerative disc disease most advanced at C5-C6 and C6-C7, with scattered   severe facet arthropathy in the cervical spine. CT Brain Perfusion:   Unreliable perfusion maps due to degree of motion. There are no obvious regional   areas of elevated Tmax, decreased cerebral blood flow or blood volume. rCBF < 30% = 0 cc. Tmax > 6 seconds = 21 cc.           09/20/22 1721  CTA CODE NEURO HEAD AND NECK W CONT Final result    Impression:  CTA Head:   1. No evidence of flow-limiting stenosis or aneurysm. Scattered atherosclerotic   disease as above. CTA Neck:   1. No evidence of flow-limiting stenosis. 2. Mild stenosis (less than 50% by NASCET criteria) of the proximal bilateral   internal carotid arteries. CT Brain Perfusion:   1.  Unreliable perfusion maps due to degree of motion. No obvious acute   abnormality. Narrative:  EXAM:  CTA CODE NEURO HEAD AND NECK W CONT, CT CODE NEURO PERF W CBF       INDICATION:   Code Stroke       COMPARISON:  CT head 9/20/2022. CONTRAST:  100 mL of Isovue-370. TECHNIQUE:  Unenhanced  images were obtained to localize the volume for   acquisition. Multislice helical axial CT angiography was performed from the   aortic arch to the top of the head during uneventful rapid bolus intravenous   contrast administration. Coronal and sagittal reformations and 3D post   processing was performed. CT dose reduction was achieved through use of a   standardized protocol tailored for this examination and automatic exposure   control for dose modulation. CT brain perfusion was performed with generation of hemodynamic maps of multiple   parameters, including cerebral blood flow, cerebral blood volume, and MTT (mean   transit time). CT dose reduction was achieved through use of a standardized   protocol tailored for this examination and automatic exposure control for dose   modulation. This study was analyzed by the [address]  algorithm. FINDINGS:       CTA Head:   There is no evidence of large vessel occlusion or flow-limiting stenosis of the   intracranial internal carotid, anterior cerebral, and middle cerebral arteries. Calcification of the bilateral carotid siphons with mild stenoses. The anterior   communicating artery is patent, with small right A1 segment. There is no evidence of large vessel occlusion or flow-limiting stenosis of the   intracranial vertebral arteries, basilar artery, or posterior cerebral arteries. The posterior communicating arteries are diminutive but patent, with infundibula   at the origins bilaterally. There is no evidence of aneurysm or vascular malformation. The dural venous   sinuses and deep cerebral venous system are patent.  No evidence of abnormal   enhancement on delayed phase images. Redemonstrated right frontal approach   ventricular shunt catheter without hydrocephalus. Stable chronic bilateral   subdural hematomas without significant mass effect. CTA NECK:   NASCET method was utilized for calculating stenosis. Severe calcific atherosclerosis of the thoracic aorta. The common carotid   arteries demonstrate no significant stenosis. Calcific atherosclerosis of the   proximal right internal carotid artery with mild (less than 50%) stenosis. Calcific atherosclerosis of the proximal left internal carotid artery with mild   (less than 50%) stenosis. There is a codominant vertebrobasilar arterial system. Mild stenosis of the   bilateral vertebral artery origins from calcified plaque. Visualized soft tissues of the neck are unremarkable.  shunt catheter tubing   noted in the right neck. Moderate upper lobe centrilobular and paraseptal   emphysema. No acute fracture or aggressive osseous lesion. Multilevel   degenerative disc disease most advanced at C5-C6 and C6-C7, with scattered   severe facet arthropathy in the cervical spine. CT Brain Perfusion:   Unreliable perfusion maps due to degree of motion. There are no obvious regional   areas of elevated Tmax, decreased cerebral blood flow or blood volume. rCBF < 30% = 0 cc. Tmax > 6 seconds = 21 cc.           09/20/22 1636  CT CODE NEURO HEAD WO CONTRAST Final result    Impression:  No acute intracranial abnormality. Stable appearance of right frontal access ventricular catheter shunting. 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 No evidence of intercurrent intracranial hemorrhage. Narrative:  EXAM: CT CODE NEURO HEAD WO CONTRAST       INDICATION: Code Stroke       COMPARISON: None. CONTRAST: None. TECHNIQUE: Unenhanced CT of the head was performed using 5 mm images.  Brain and   bone windows were generated. Coronal and sagittal reformats. CT dose reduction   was achieved through use of a standardized protocol tailored for this   examination and automatic exposure control for dose modulation. FINDINGS:   Sequelae status post right frontal access ventricular catheter shunting with   catheter tip in the anterior horn right lateral ventricle, similar to prior   study. Stable appearance of diffusely prominent ventricles. Mild diffuse   parenchymal volume loss with exvacuodilatation of the extra ventricular CSF   spaces. Redemonstration of bilateral frontal subdural hypodense collections measuring up   to 0.8 cm in thickness along the right frontal lobe, unchanged and 0.6 cm along   the left frontal lobe, previously 0.9 cm. There is no significant associated   intracranial brain shift. Patchy periventricular and deep white matter   ill-defined hypodensities, nonspecific and likely microangiopathic white matter   disease. There is no evidence of acute infarct, intercurrent intracranial   hemorrhage, or mass effect. The basilar cisterns are open. No CT evidence of   acute infarct. The bone windows demonstrate no abnormalities. The visualized portions of the   paranasal sinuses and mastoid air cells are clear. Status post bilateral lense   replacement. The orbits are otherwise unremarkable. CXR Results  (Last 48 hours)                 09/20/22 1745  XR CHEST PORT Final result    Impression:  Hazy interstitial opacity: pulmonary edema slightly favored over atypical   infection or atelectasis. Narrative:  PORTABLE CHEST RADIOGRAPH/S: 9/20/2022 5:45 PM       INDICATION: Cerebrovascular accident. COMPARISON: 6/12/2017, 9/14/2016, 3/8/2016. TECHNIQUE: Portable frontal upright radiograph/s of the chest.       FINDINGS:    Hazy interstitial opacity may represent atelectasis or pulmonary edema. The   central airways are patent.  No pneumothorax or pleural effusion. The visualized   portions of a right ventriculopleural shunt are intact. Medical Decision Making   I am the first provider for this patient. I reviewed the vital signs, available nursing notes, past medical history, past surgical history, family history and social history. Vital Signs-Reviewed the patient's vital signs. Patient Vitals for the past 12 hrs:   Temp Pulse Resp BP SpO2   09/20/22 2232 97.8 °F (36.6 °C) 89 18 (!) 171/79 96 %   09/20/22 2030 -- 85 19 (!) 161/81 95 %   09/20/22 2000 -- 87 14 (!) 156/68 93 %   09/20/22 1915 -- 77 26 138/65 97 %   09/20/22 1630 98.8 °F (37.1 °C) 89 17 (!) 169/97 99 %       Records Reviewed: Nursing records and medical records reviewed      Provider Notes (Medical Decision Making):   Patient arrived as a stroke alert. Given his presentation Dr. Josep Garcia and I were concerned about possible stroke as well as seizure. Given the history we were able to obtain with relative contraindications of plavix, head trauma, progressive worsening over the past few weeks, and concern that this event was a seizure rather than a stroke we decided to avoid TPA and that risks outweighed benefits. No acute finding was seen on CT imaging. Patient was gven benadryl and methylprednisone prior to contrast administration with listed allergy to contrast, but did not develop a reaction. He will need an MRI for further evaluation of possible storke or seizure. With possibility of seizure 1g IV keppra was given. EEG was ordered and I spoke to the ceribell team about possibility of early EEG in the ED. Labs relatively unremarkable other than mild hypokalemia and glucosuria. Did have slight creatinine elevation. Patient was admitted to medicine for further evaluation. Mental status continues to improve while in the ED - supporting possible seizure with prolonged post-ictal period. ED Course:   Initial assessment performed.  The patients presenting problems have been discussed, and they are in agreement with the care plan formulated and outlined with them. I have encouraged them to ask questions as they arise throughout their visit. ED Course as of 09/21/22 0008   Tue Sep 20, 2022   1825 EKG with heart rate 91, sinus rhythm, leftward deviated axis, prolonged MI, slightly prolonged QRS at 132 with right bundle branch block that is chronic, no new ST changes. Spoke with Dr. Mohamud Bernabe with teleneuro at length. He reports that he thinks that this is more likely a seizure than a stroke, but regardless he needs to come into the hospital for further work-up with an MRI. Patient's labs show mild hypokalemia which we will repeat pleat. Will give IV Keppra. Will admit to the hospitalist service for further work-up with an MRI. Patient may need further management at [facility]. Patient's mental status has significantly improved. He is now able to say words. He he is moving all 4 extremities equally. He is intermittently agitated state with a GCS of 14 for confusion, but he has made significant improvement. Perhaps that this was a seizure with a prolonged ictal phase. [WB]      ED Course User Index  [WB] Anabell Mcmillan MD     Disposition:  Admission    Diagnosis     Clinical Impression:   1. Seizure ([address]    2. Altered mental status, unspecified altered mental status type    3. Cerebrovascular accident (CVA), unspecified mechanism ([address]        Attestations:    Gracy Leon MD    Please note that this dictation was completed with [employer], the computer voice recognition software. Quite often unanticipated grammatical, syntax, homophones, and other interpretive errors are inadvertently transcribed by the computer software. Please disregard these errors. Please excuse any errors that have escaped final proofreading. Thank you.

## 2022-09-21 NOTE — PROGRESS NOTES
Problem: Dysphagia (Adult)  Goal: *Acute Goals and Plan of Care (Insert Text)  Description: Speech Pathology Goals  Initiated 9/21/2022    1. Patient will tolerate Minced&Moist/Thin Liquids without signs of aspiration or adverse effects within 7 days. Outcome: Progressing Towards Goal     Problem: Motor Speech Impaired (Adult)  Goal: *Acute Goals and Plan of Care (Insert Text)  Description: Speech Pathology Goals  Initiated 9/21/2022    1. Patient will state motor speech intelligibility strategies, given minimal cues, with 100% accuracy within 7 days. 2. Patient will increase intelligibility to 85% at the conversation level, given minimal cues, within 7 days. Outcome: Progressing Towards Goal     SPEECH LANGUAGE PATHOLOGY BEDSIDE SWALLOW AND SPEECH EVALUATIONS  Patient: Mauricio Knox (68 y.o. male)  Date: 9/21/2022  Primary Diagnosis: CVA (cerebral vascular accident) ([facility] [I63.9]       Precautions:        ASSESSMENT :  Based on the objective data described below, the patient presents with moderate oral dysphagia and a grossly functional pharyngeal phase in addition to mild to moderate dysarthria. Note Head CT showed \"No acute intracranial abnormality. Stable appearance of right frontal access ventricular catheter shunting. Stable right and slightly decreased left hypodense subdural collections, likely chronic subdural hematoma. No evidence of intercurrent intracranial hemorrhage. \" MRI pending. Patient observed with prolonged oral holding, leading to suspected delayed posterior propulsion, with thin liquids. In addition, patient with prolonged and mildly discoordinated mastication, resulting in suspected delayed posterior propulsion. Patient observed with mild-moderate oral residue and benefited from a liquid wash to clear.  Mild belching observed after liquid wash, followed by delayed cough which was not reproducible on subsequent trials    Patient observed with mild-moderate dysarthria, characterized by imprecise articulation and blended word boundaries. Patient with decreased intelligibility at the sentence and conversation level. Patient and patient's wife educated re: motor speech intelligibility strategies. Recommend Minced&Moist/Thin Liquids with general aspiration precautions outlined below. RN educated re: SLP evaluation. Patient will benefit from skilled intervention to address the above impairments. Patients rehabilitation potential is considered to be good. PLAN :  Recommendations and Planned Interventions:  -- Minced&Moist/Thin Liquids  -- Medication Whole or Crushed in Puree  -- Sitting Upright for all PO  -- Small Bites/Sips    Frequency/Duration: Patient will be followed by speech-language pathology 3 times a week to address goals. Discharge Recommendations:  To Be Determined     SUBJECTIVE:   Patient stated I think [facility] when asked if he knew which hospital.    OBJECTIVE:     Past Medical History:   Diagnosis Date    AAA (abdominal aortic aneurysm) ([address]     Followed by Dr Shantell Cantu as of 05/13/19 3.9 x 4.1    At risk for sleep apnea 07/08/2020    during PAT assessment, MEL score 4    Cancer ([address]     r shoulder melanoma    Colon polyp     Contact dermatitis and eczema due to cause     COPD     Diabetes ([address]     Diarrhea 1/22/2016    Dysphagia 4/4/2017    Emphysema lung ([address]     Eructation 7/17/2020    Exposure to TB     2/2019 cruise, probably exposure to TB, beginning TC 9/2019    GERD (gastroesophageal reflux disease)     Hypertension     Ill-defined condition     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, arms    NPH (normal pressure hydrocephalus) ([address] 2014    shunt    RA (rheumatoid arthritis) ([address]     Scarring     Sepsis ([address]     with perforated gallbladder    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 06/2021    Stroke ([address] 2014    L arm weakness states not a stroke states it is a TIA     TIA (transient ischemic attack) 11/2018     Past Surgical History:   Procedure Laterality Date    HX APPENDECTOMY      HX CATARACT REMOVAL Bilateral     HX CHOLECYSTECTOMY  04/18/2017    at [facility], ruptured, had sepsis, appendix removed with this    HX ENDOSCOPY      HX FEMORAL BYPASS Left     stent    HX GI      PEG placed and removed    HX OPEN REDUCTION INTERNAL FIXATION Left     femur, suresh in place    HX ORTHOPAEDIC Left     Femur     HX OTHER SURGICAL      r shoulder melenoma excision    HX TONSILLECTOMY      IR PLC CATH AV SHUNT IN W PTA SI  2014    NM COLSC FLX W/RMVL OF TUMOR POLYP LESION SNARE TQ  4/28/2011         NM EGD TRANSORAL BIOPSY SINGLE/MULTIPLE  4/28/2011          Prior Level of Function/Home Situation:   Home Situation  Home Environment: Private residence  # Steps to Enter: 3  One/Two Story Residence: One story  Living Alone: No  Support Systems: Spouse/Significant Other, Child(gabbi)  Patient Expects to be Discharged to[de-identified] Home  Current DME Used/Available at Home: Monse Coins, straight, Walker, Wheelchair, Shower chair    Diet prior to admission: Regular/Thin Liquid  Current Diet:  NPO     Cognitive and Communication Status:  Neurologic State: Alert  Orientation Level: Oriented to person, Oriented to place, Disoriented to situation, Oriented to time, Other (Comment) (Oriented to month/year; [address], but thought U)  Cognition: Follows commands     Perseveration: No perseveration noted  Safety/Judgement: Decreased insight into deficits    Swallowing Evaluation:   Oral Assessment:  Oral Assessment  Labial: No impairment  Dentition: Natural;Partials (comment)  Oral Hygiene: Moist oral mucosa  Lingual: No impairment  Velum: No impairment  Mandible: No impairment    P.O. Trials:  Patient Position: Upright in bed  Vocal quality prior to P.O.: Low volume  Consistency Presented: Ice chips; Thin liquid; Solid  How Presented: Self-fed/presented;Straw;Successive swallows (Patient fed with non-dominant hand)     Bolus Acceptance: No impairment  Bolus Formation/Control: Impaired  Type of Impairment: Delayed;Mastication  Propulsion: Delayed (# of seconds); Discoordination  Oral Residue: 10-50% of bolus; Lingual        Aspiration Signs/Symptoms: Delayed cough/throat clear; Other (comment) (Significantly delayed cough following mild belching)     Effective Modifications: Alternate liquids/solids  Cues for Modifications: Minimal             NOMS:   The NOMS functional outcome measure was used to quantify this patient's level of swallowing impairment. Based on the NOMS, the patient was determined to be at level 5 for swallow function. NOMS Swallowing Levels:  Level 1 (CN): NPO  Level 2 (CM): NPO but takes consistency in therapy  Level 3 (CL): Takes less than 50% of nutrition p.o. and continues with nonoral feedings; and/or safe with mod cues; and/or max diet restriction  Level 4 (CK): Safe swallow but needs mod cues; and/or mod diet restriction; and/or still requires some nonoral feeding/supplements  Level 5 (CJ): Safe swallow with min diet restriction; and/or needs min cues  Level 6 (CI): Independent with p.o.; rare cues; usually self cues; may need to avoid some foods or needs extra time  Level 7 ([address] [place] [address]): Independent for all p.o.  RAMÓN. (2003). National Outcomes Measurement System (NOMS): Adult Speech-Language Pathology User's Guide. Speech/Language Evaluation  Motor Speech:  Oral-Motor Structure/Motor Speech  Labial: No impairment  Dentition: Natural;Partials (comment)  Oral Hygiene: Moist oral mucosa  Lingual: No impairment  Velum: No impairment  Mandible: No impairment  Dysarthric Characteristics: Blended word boundaries; Imprecise  Intelligibility: Impaired  Word Intelligibility (%): 100 %  Phrase Intelligibility (%): 100 %  Sentence Intelligibility (%): 75 %  Conversation Intelligibility (%): 75 %  Overall Impairment Severity: Mild-moderate                                                 Voice:                 Vocal Quality: Low volume                               NOMS: The NOMS functional outcome measure was used to quantify this patient's level of motor speech impairment. Based on the NOMS, the patient was determined to be at level 5 for motor speech function. NOMS Motor Speech:  Level 1 (CN):  100% unintelligible  Level 2 (CM):  Communication partner responsible for message; can do CV or automatic words w/ max cues but rarely intelligible in context  Level 3 (CL): communication partner primarily responsible for message but says CV/automatic words intelligibly; mod cues to say simple words/phrases  Level 4 (CK): In structured conversation with familiar listener can say simple words and phrases. Mod cues for simple sentences  Level 5 (CJ):  Uses simple sentences for ADLs with familiar and unfamiliar listener; min cues for complex sentences  Level 6 (CI):  Intelligible in ADLs; difficulty in voc/social activites; rare cues for complex message; uses comp strategies  Level 7 ([address] [place] [address]):  Intelligible in all activities. May occasionally use compensatory strategies. RAMÓN. (2003). National Outcomes Measurement System (NOMS): Adult Speech-Language Pathology User's Guide. Pain:  Pain Scale 1: Numeric (0 - 10)  Pain Intensity 1: 0       After treatment:   Patient left in no apparent distress in bed, Call bell within reach, Nursing notified, and Caregiver / family present    COMMUNICATION/EDUCATION:   Patient and patient's wife were educated regarding role of SLP, motor speech intelligibility strategies, and diet recommendations. They demonstrated good understanding as evidenced by verbalizing understanding. The patient's plan of care including recommendations, planned interventions, and recommended diet changes were discussed with: Registered nurse. Patient/family have participated as able in goal setting and plan of care. Patient/family agree to work toward stated goals and plan of care.     Thank you for this referral.  Sendy Barajas, SLP  Time Calculation: 17 mins

## 2022-09-21 NOTE — PROGRESS NOTES
Hospitalist Progress Note    NAME: Oliverio López   :  1940   MRN:  [id number]       Assessment / Plan:    Acute encephalopathy secondary to CVA versus TIA versus seizure, POA  Hydrocephalus status post  shunt changed recently  Chronic subdural hematoma  - Recurrent episodes as per patient's wife at bedside  - From description it sounds like absence seizure  - Code stroke was called again on the patient today, protocol was followed  - CT head on admission with no acute abnormality  - Patient follows with neurosurgery at [facility] for his  shunt  - CTA head and neck on admission with no acute abnormality  - EEG to be completed  - Neuro consultation  - We will start Keppra 1500 mg twice daily  - Seizure precautions  - Discussed with patient wife and daughter at bedside, all questions were answered  - Continue home Plavix  - Change home pravastatin to Lipitor  -Continue cardiac telemetry  - PT OT  - Speech therapy  - Chronic subdural hematoma on CT scan head, repeat CT scan tomorrow for follow-up. Code stroke assessment symptoms are 21st  - Was called on the patient  - Attended at bedside  - Patient daughter and wife at bedside  - Hemodynamically stable, blood sugar around 120  - NIH score 0  - CT head, CTA head and neck was done yesterday.   Repeat MRI when able otherwise to repeat CT scan  - Already on Plavix and statin  - Neurochecks    Hypertension  - Continue home diltiazem    Coronary artery disease  - Continue home Plavix  - Change pravastatin to Lipitor as above    Diabetes mellitus type 2  - Most recent hemoglobin A1c 6.2%  - Hold home metformin  - Correction scale with hypoglycemia protocol    AAA  - Most recent CT abdomen 2022 increasing in size to 4 cm  - No abdominal pain, hypotension, or abdominal tenderness  - Will need close follow-up as an outpatient      RA  - Continue home methotrexate    Estimated discharge date:   Barriers: Clinical improvement    Code status: Full  Prophylaxis: SCDs given his subdural hematoma  Recommended Disposition:  TBD     Subjective:     Patient was seen and examined. No acute events overnight. Discussed with RN overnight events. All patient's questions were answered. Patient's wife and daughter at bedside, all questions were answered. \"Doing fine\"      Review of Systems:  Symptom Y/N Comments  Symptom Y/N Comments   Fever/Chills n   Chest Pain n    Poor Appetite    Edema     Cough n   Abdominal Pain n    Sputum    Joint Pain     SOB/SILVER n   Pruritis/Rash     Nausea/vomit n   Tolerating PT/OT     Diarrhea    Tolerating Diet y    Constipation    Other       Could NOT obtain due to:          Objective:     VITALS:   Last 24hrs VS reviewed since prior progress note. Most recent are:  Patient Vitals for the past 24 hrs:   Temp Pulse Resp BP SpO2   09/21/22 1247 98 °F (36.7 °C) 82 20 133/62 100 %   09/21/22 1200 97.9 °F (36.6 °C) 87 20 (!) 146/65 92 %   09/21/22 0800 -- 79 -- -- --   09/21/22 0752 97.6 °F (36.4 °C) 85 18 (!) 181/87 93 %   09/21/22 0335 99 °F (37.2 °C) 87 18 (!) 166/86 92 %   09/20/22 2232 97.8 °F (36.6 °C) 89 18 (!) 171/79 96 %   09/20/22 2030 -- 85 19 (!) 161/81 95 %   09/20/22 2000 -- 87 14 (!) 156/68 93 %   09/20/22 1915 -- 77 26 138/65 97 %   09/20/22 1630 98.8 °F (37.1 °C) 89 17 (!) 169/97 99 %     No intake or output data in the 24 hours ending 09/21/22 1347     I had a face to face encounter and independently examined this patient on 9/21/2022, as outlined below:  PHYSICAL EXAM:  General: WD, WN. Alert, cooperative, no acute distress    EENT:  EOMI. Anicteric sclerae. MMM  Resp:  CTA bilaterally, no wheezing or rales. No accessory muscle use  CV:  Regular  rhythm,  No edema  GI:  Soft, Non distended, Non tender. +Bowel sounds  Neurologic:  EOMs intact. No facial asymmetry. No aphasia or slurred speech. Symmetrical strength, Sensation grossly intact. Alert and oriented X 4.     Psych:   Good insight.  Not anxious nor agitated  Skin:  No rashes. No jaundice    Reviewed most current lab test results and cultures  YES  Reviewed most current radiology test results   YES  Review and summation of old records today    NO  Reviewed patient's current orders and MAR    YES  PMH/SH reviewed - no change compared to H&P  ________________________________________________________________________  Care Plan discussed with:    Comments   Patient X    Family  x Patient's family at bedside   RN X    Care Manager X    Consultant                       X Multidiciplinary team rounds were held today with , nursing, pharmacist and clinical coordinator. Patient's plan of care was discussed; medications were reviewed and discharge planning was addressed. ________________________________________________________________________        Comments   >50% of visit spent in counseling and coordination of care X    ________________________________________________________________________  Nilsa Pruitt MD     Procedures: see electronic medical records for all procedures/Xrays and details which were not copied into this note but were reviewed prior to creation of Plan. LABS:  I reviewed today's most current labs and imaging studies.   Pertinent labs include:  Recent Labs     09/21/22  0336 09/20/22  1726   WBC 5.1 6.3   HGB 13.6 13.1   HCT 42.1 40.2   * 101*     Recent Labs     09/21/22  0336 09/20/22  1726    139   K 3.8 3.3*   * 109*   CO2 23 21   * 219*   BUN 12 14   CREA 0.94 1.22   CA 9.0 8.4*   MG 1.9  --    PHOS 3.3  --    ALB 3.3* 3.2*   TBILI 0.7 0.6   ALT 41 22   INR  --  1.1       Signed: Nilsa Pruitt MD

## 2022-09-21 NOTE — CONSULTS
Date of Consultation:  September 21, 2022    Referring Physician: Gracy Leon MD     Reason for Consultation:  Altered mental status     Chief Complaint   Patient presents with    Altered mental status     Patient arrives as Level I Code stroke, immediately taken to CT via EMS and this RN. Patient had GLF following pulmonology appointment; wife describes patient turned to right side and became rigid before falling onto R side of head. Patient currently has ventricular shunt in place, with pressure decreased today from 13 to 11. Patient does not respond verbally to staff, but does spontaneously move his limbs. Less motion noted in LLE than other extremities. History of Present Illness:   Janice Luis is a 79 y.o. male with history of diabetes, COPD, normal pressure hydrocephalus status post  shunt who is currently admitted to the hospital with progressive worsening lower extremity weakness, confusion and a fall concerning for possible seizure. History is obtained from the daughter who is at bedside. She does report that over the last 6 weeks he has been declining in terms of his ability to walk, increased confusion and some issues with urination. He was seen by his neurosurgeon at [address] [place] [address] and they adjusted his  shunt. He then went to see his pulmonologist and had an episode with a fall on the right side of his head. He was brought to the hospital for further evaluation. There was concern for possible stroke and he underwent stroke work-up including a noncontrast head CT which showed no acute changes and a CTA head and neck t which revealed no large vessel occlusion however perfusion imaging was unreadable due to motion. He was admitted to the hospital for further evaluation. Today it was noted that he had another episode that was concerning for seizure. Of my evaluation patient appeared to be close to his baseline. He did not have any focal deficits.     Past Medical History:   Diagnosis Date AAA (abdominal aortic aneurysm) ([facility]     Followed by Dr Shaun Coleman as of 19 3.9 x 4.1    At risk for sleep apnea 2020    during PAT assessment, MEL score 4    Cancer ([address]     r shoulder melanoma    Colon polyp     Contact dermatitis and eczema due to cause     COPD     Diabetes ([address]     Diarrhea 2016    Dysphagia 2017    Emphysema lung ([address]     Eructation 2020    Exposure to TB     2019 cruise, probably exposure to TB, beginning TC 2019    GERD (gastroesophageal reflux disease)     Hypertension     Ill-defined condition     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, arms    NPH (normal pressure hydrocephalus) ([address]     shunt    RA (rheumatoid arthritis) ([address]     Scarring     Sepsis ([address]     with perforated gallbladder    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 2021    Stroke ([facility] [address]     L arm weakness states not a stroke states it is a TIA     TIA (transient ischemic attack) 2018        Past Surgical History:   Procedure Laterality Date    HX APPENDECTOMY      HX CATARACT REMOVAL Bilateral     HX CHOLECYSTECTOMY  2017    at [facility], ruptured, had sepsis, appendix removed with this    HX ENDOSCOPY      HX FEMORAL BYPASS Left     stent    HX GI      PEG placed and removed    HX OPEN REDUCTION INTERNAL FIXATION Left     femur, suresh in place    HX ORTHOPAEDIC Left     Femur     HX OTHER SURGICAL      r shoulder melenoma excision    HX TONSILLECTOMY      IR PLC CATH AV SHUNT IN W PTA SI      [place] COLSC FLX W/RMVL OF TUMOR POLYP LESION SNARE TQ  2011         [place] EGD TRANSORAL BIOPSY SINGLE/MULTIPLE  2011             Family History   Adopted: Yes        Social History     Tobacco Use    Smoking status: Former     Packs/day: 1.00     Types: Cigarettes     Quit date: 2000     Years since quittin.7    Smokeless tobacco: Never   Substance Use Topics    Alcohol use:  Yes     Alcohol/week: 5.8 standard drinks     Types: 7 Shots of liquor per week        Allergies   Allergen Reactions    Ceclor [Cefaclor] Swelling    Contrast Agent [Iodine] Hives and Itching    Floxin [Ofloxacin] Hives    Gabapentin Other (comments)     Causes confusion    Ivp [Fd And C Blue No.1] Itching    Lansoprazole Diarrhea        Prior to Admission medications    Medication Sig Start Date End Date Taking? Authorizing Provider   dilTIAZem IR (CARDIZEM) 60 mg tablet TAKE ONE TABLET BY MOUTH 2 TIMES A DAY 9/7/22   Jermaine Kong III, DO   metFORMIN (GLUCOPHAGE) 500 mg tablet Take 500 mg by mouth two (2) times daily (with meals). Provider, Historical   tamsulosin (FLOMAX) 0.4 mg capsule TAKE 1 CAPSULE 30 MINUTES AFTER THE SAME MEAL DAILY 5/25/22   [facility] Radha B III, DO   pravastatin (PRAVACHOL) 80 mg tablet TAKE ONE TABLET BY MOUTH EVERY NIGHT 2/25/22   [facility] Radha RANGEL III, DO   OneTouch Ultra Test strip TEST 1-2 TIMES A DAY 7/26/21   Jermaine Kong III, DO   diclofenac (VOLTAREN) 1 % gel Use to left shoulder up to 4x daily. 3/2/20   Alan Radha RANGEL III, DO   clopidogrel (PLAVIX) 75 mg tab Take 75 mg by mouth daily. 4/22/19   Provider, Historical   fluticasone (FLONASE) 50 mcg/actuation nasal spray 2 Sprays by Both Nostrils route daily as needed. 10/6/17   Provider, Historical   acetaminophen (TYLENOL) 500 mg tablet Take 500 mg by mouth every six (6) hours as needed for Pain. Provider, Historical   albuterol sulfate (PROVENTIL;VENTOLIN) 2.5 mg/0.5 mL nebu nebulizer solution 2.5 mg by Nebulization route every four (4) hours as needed for Wheezing. Provider, Historical   methotrexate (RHEUMATREX) 2.5 mg tablet Take 10 mg by mouth. Every Wednesday and Thursday    Provider, Historical   adalimumab (HUMIRA) 40 mg/0.8 mL injection 40 mg by SubCUTAneous route every fourteen (14) days. Provider, Historical   folic acid (FOLVITE) 1 mg tablet Take 1 mg by mouth daily. Provider, Historical   tiotropium (SPIRIVA) 18 mcg inhalation capsule Take 1 Cap by inhalation daily. Provider, Historical       Review of Systems:    General, constitutional: negative  Eyes, vision: negative  Ears, nose, throat: negative  Cardiovascular, heart: negative  Respiratory: negative  Gastrointestinal: negative  Genitourinary: negative  Musculoskeletal: negative  Skin and integumentary: negative  Psychiatric: negative  Endocrine: negative  Neurological: negative, except for HPI  Hematologic/lymphatic: negative  Allergy/immunology: negative    PHYSICAL EXAMINATION:   Visit Vitals  /62   Pulse 82   Temp 98 °F (36.7 °C)   Resp 20   SpO2 100%       Physical Exam:  General:  no acute distress  Neck: no carotid bruits  Lungs: clear to auscultation  Heart:  no murmurs, regular rate and rhythm   Lower extremity: no edema    Neurological exam:  Mental Status: Awake, alert, oriented to person, place and time  Speech and Language: No dysarthria. Able to name, repeat and follow commands   Fund of knowledge was preserved    Cranial nerves: II-XII:  Pupils equal and reactive, visual fields intact by confrontation   Extraocular movements intact, no evidence of nystagmus or ptosis   Facial sensation intact   Facial movements symmetric   Hearing intact to soft rub bilaterally   Shoulder shrug symmetric and strong   Tongue protrusion full and midline without fasciculation or atrophy    Motor:   Normal tone and Bulk   Drift: No evidence of pronator drift     Strength testing:   deltoid triceps biceps Wrist ext. Wrist flex. intrinsics   Right 5 5 5 5 5 5   Left 5 5 5 5 5 5      Hip flex. Hip ext. Knee ext. Knee flex Dorsi flex Plantar flex   Right  5 5 5 5 5 5   Left  5 5 5 5 5 5       Sensory:  Intact to light touch and pinprick.   No extinction    Reflexes:   Biceps Triceps  Brachiorad Patellar Achilles Plantar Hoffmans   Right  1 1 1 1 1 Down Neg   Left 1 1 1 1 1 Down Neg      Cerebellar testing: Finger-nose-finger was intact    Gait was deferred as patient was going down for head CT    Data:     Lab Results Component Value Date/Time    Hemoglobin A1c 6.2 (H) 08/11/2022 09:25 AM    Sodium 144 09/21/2022 03:36 AM    Potassium 3.8 09/21/2022 03:36 AM    Chloride 113 (H) 09/21/2022 03:36 AM    Glucose 199 (H) 09/21/2022 03:36 AM    BUN 12 09/21/2022 03:36 AM    Creatinine 0.94 09/21/2022 03:36 AM    Calcium 9.0 09/21/2022 03:36 AM    WBC 5.1 09/21/2022 03:36 AM    HCT 42.1 09/21/2022 03:36 AM    HGB 13.6 09/21/2022 03:36 AM    PLATELET 243 (L) 01/39/8324 03:36 AM       Imaging:    Results from Hospital Encounter encounter on 06/01/15    MRI CERV SPINE WO CONT    Narrative  **Final Report**      ICD Codes / Adm. Diagnosis: 723.4  783.21 / Brachial neuritis or radiculit  Examination:  MR C SPINE WO CON  - [id number] - Jun 1 2015 12:08PM  Accession No:  [id number]  Reason:  Brachial neuritis or radiculitis NOS      REPORT:  EXAM:  MR C SPINE WO CON    INDICATION:  Neck and left upper extremity pain 723.4    COMPARISON: None    TECHNIQUE: MR imaging of the cervical spine was performed using the  following sequences: sagittal T1, T2, STIR;  axial T2, T1.    CONTRAST:  None. FINDINGS:    Cord signal is normal and the visualized portions of the brainstem and  cerebellum are normal. There is normal vertebral body height and bone  signal. There is mild reversal of cervical lordosis centered at C6. There is  no substantial listhesis. No paraspinal soft tissue mass is shown. C2-C3:  Normal disc height. Small left uncovertebral osteophytes. Mild  bilateral facet osteoarthrosis. No canal or foraminal stenosis. C3-C4:  Normal disc height. Mild to moderate diffuse disc osteophyte complex  formation with superimposed small central disc protrusion. Mild bilateral  facet osteoarthrosis. Midline AP canal dimension reduced to 8 mm with mild  cord compression. Moderate bilateral foraminal narrowing. C4-C5:  Mild disc space narrowing. Mild diffuse disc osteophyte complex  formation and superimposed small central disc protrusion.  Mild left facet  osteoarthrosis. Midline AP canal dimension reduced to 8 mm. Mild cord  compression. Mild to moderate left foraminal narrowing. C5-C6:  Mild disc space narrowing. Mild diffuse disc bulging with  predominance in left paracentral and lateral regions. No substantial facet  arthropathy. Midline AP canal dimension reduced to 8-9 mm. Mild diffuse cord  compression, greater on left. Moderate left foraminal narrowing. C6-C7:  Moderate disc space narrowing. Mild to moderate diffuse disc  osteophyte complex formation, with disc bulging accentuated in the right  lateral region. No substantial facet arthropathy. Midline AP canal dimension  reduced to 8 mm though no substantial cord compression. Moderate right and  mild left foraminal narrowing. C7-T1 and upper thoracic segments:  No substantial disc or facet  abnormality. Impression  :  Multilevel degenerative changes with canal and foraminal stenosis as above. Note left paracentral and lateral accentuated disc material at C5-6. Signing/Reading Doctor: Jonny Escalante ([id number])  Approved: SIVAKUMAR Escalante ([id number])  Jun 1 2015 12:20PM      Results from Hospital Encounter encounter on 09/20/22    CT HEAD WO CONT    Narrative  EXAM: CT HEAD WO CONT    INDICATION: Code stroke    COMPARISON: September 20. CONTRAST: None. TECHNIQUE: Unenhanced CT of the head was performed using 5 mm images. Brain and  bone windows were generated. Coronal and sagittal reformats. CT dose reduction  was achieved through use of a standardized protocol tailored for this  examination and automatic exposure control for dose modulation. FINDINGS:  Small bilateral subdural hygroma are stable, ventricles size is unchanged with  shunt in place. Periventricular white matter disease is also unchanged. Impression  No change.         IMPRESSION/RECOMMENDATIONS:  Leonardo Jain is a 79 y.o. male with history of diabetes, COPD, normal pressure hydrocephalus status post  shunt who is currently admitted to the hospital with progressive worsening lower extremity weakness, confusion and a fall concerning for possible seizure. Progressive weakness confusion and gait instability: Initial concern was for possible worsening hydrocephalus and shunt was adjusted. There is also concern for possible seizures which may have been causing his initial complaints. 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  -Agree with obtaining a routine EEG to determine if there is any evidence of epileptiform discharge  -Would also obtain MRI of the brain with and without contrast to rule out stroke and/or infection  -Given the second event, would recommend continuing with Keppra 1500mg twice a day. -Please place patient on seizure precautions with as needed Ativan available for seizures lasting greater than 3 minutes  -Based on the MRI we may need to consult neurosurgery for changing his shunt or evaluation of the fluid collection    Thank you very much for this consultation, we will continue to follow.   Please call with any additional questions    Adele Romero MD

## 2022-09-21 NOTE — PROGRESS NOTES
-Please complete MRI History and Safety Screening Form   - Patient cannot be scanned until this form is completed, including signatures, and reviewed in MRI to ensure patient is SAFE and eligible for MRI. - CALL MRI when this has been successfully completed at [phone].

## 2022-09-21 NOTE — H&P
PLEASE NOTE: I HAVE GENERATED THIS NOTE WITH THE ASSISTANCE OF VOICE-RECOGNITION TECHNOLOGY. PLEASE EXCUSE ANY SPELLING, GRAMMATICAL, AND SYNTAX ERRORS YOU MAY FIND. IF YOU NEED CLARIFICATION ON ANYTHING, PLEASE FEEL FREE TO REACH OUT TO ME.  [address] YOU        [facility]  History and Physical - Dr. Merry Lim:     79 M with pertinent medical hx of hydrocephalus s/p shunt p/w seizure like activity; daughter at bedside states that she does not think he had a seizure, he has had an episode like this in the past when he had a TIA. Nevertheless, NS thinks it was a possible seizure. VSS  Physical exam shows bruises from fall but otherwise just a demented older gentleman. Labs are relatively unremarkable with exception of glucosuria and moderate blood  Imaging relatively unremarkable    Active Problems:    CVA (cerebral vascular accident) ([facility] [address] (9/20/2022)    CAD  DM    Plan:     AMS, likely 2/2 TIA vs CVA vs Seizure: EEG, Prolactin, Neurochecks, Seizure precautions, PRN valium; Neuro consult; MRI Brain  Hydrocephalus: Recent shunt change; no acute intervention  CAD: C/w home ASA, Plavix  DM: Hold home metformin; RISS    If code status was discussed with the patient, then code status entered as per the orders  Subjective:   Primary Care Provider: Katina Lopez DO  Date of Service:  See Date Note Was Originated  Chief Complaint: AMS    82M p/w a few minutes' duration of abrupt onset, now resolved, severe, seizure like activity that was not associated with any other sx, remitted and exacerbated by nothing. Daughter at bedside states that from what was described, it sounds like a previous TIA he had. Review of Systems:  12 point ROS obtained and otherwise negative, except as per HPI and above.   Past Medical History:   Diagnosis Date    AAA (abdominal aortic aneurysm) (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, arms    NPH (normal pressure hydrocephalus) ([address] 2014    shunt    RA (rheumatoid arthritis) ([address]     Scarring     Sepsis ([address]     with perforated gallbladder    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 06/2021    Stroke ([address] 2014    L arm weakness states not a stroke states it is a TIA     TIA (transient ischemic attack) 11/2018      Past Surgical History:   Procedure Laterality Date    HX APPENDECTOMY      HX CATARACT REMOVAL Bilateral     HX CHOLECYSTECTOMY  04/18/2017    at [facility], ruptured, had sepsis, appendix removed with this    HX ENDOSCOPY      HX FEMORAL BYPASS Left     stent    HX GI      PEG placed and removed    HX OPEN REDUCTION INTERNAL FIXATION Left     femur, suresh in place    HX ORTHOPAEDIC Left     Femur     HX OTHER SURGICAL      r shoulder melenoma excision    HX TONSILLECTOMY      IR PLC CATH AV SHUNT IN W PTA SI  2014    [place] COLSC FLX W/RMVL OF TUMOR POLYP LESION SNARE TQ  4/28/2011         [place] E[place] TRANSORAL BIOPSY SINGLE/MULTIPLE  4/28/2011          Prior to Admission medications    Medication Sig Start Date End Date Taking? Authorizing Provider   dilTIAZem IR (CARDIZEM) 60 mg tablet TAKE ONE TABLET BY MOUTH 2 TIMES A DAY 9/7/22   Jermaine Kong III, DO   metFORMIN (GLUCOPHAGE) 500 mg tablet Take 500 mg by mouth two (2) times daily (with meals).     Provider, Historical   tamsulosin (FLOMAX) 0.4 mg capsule TAKE 1 CAPSULE 30 MINUTES AFTER THE SAME MEAL DAILY 5/25/22   Elma RANGEL III, DO   pravastatin (PRAVACHOL) 80 mg tablet TAKE ONE TABLET BY MOUTH EVERY NIGHT 22   Obed Garzavius B III, DO   OneTouch Ultra Test strip TEST 1-2 TIMES A DAY 21   Jermaine Kong III, DO   diclofenac (VOLTAREN) 1 % gel Use to left shoulder up to 4x daily. 3/2/20   Obed Garzavius B III, DO   clopidogrel (PLAVIX) 75 mg tab Take 75 mg by mouth daily. 19   Provider, Historical   fluticasone (FLONASE) 50 mcg/actuation nasal spray 2 Sprays by Both Nostrils route daily as needed. 10/6/17   Provider, Historical   acetaminophen (TYLENOL) 500 mg tablet Take 500 mg by mouth every six (6) hours as needed for Pain. Provider, Historical   albuterol sulfate (PROVENTIL;VENTOLIN) 2.5 mg/0.5 mL nebu nebulizer solution 2.5 mg by Nebulization route every four (4) hours as needed for Wheezing. Provider, Historical   methotrexate (RHEUMATREX) 2.5 mg tablet Take 10 mg by mouth. Every Wednesday and Thursday    Provider, Historical   adalimumab (HUMIRA) 40 mg/0.8 mL injection 40 mg by SubCUTAneous route every fourteen (14) days. Provider, Historical   folic acid (FOLVITE) 1 mg tablet Take 1 mg by mouth daily. Provider, Historical   tiotropium (SPIRIVA) 18 mcg inhalation capsule Take 1 Cap by inhalation daily. Provider, Historical     Allergies   Allergen Reactions    Ceclor [Cefaclor] Swelling    Contrast Agent [Iodine] Hives and Itching    Floxin [Ofloxacin] Hives    Gabapentin Other (comments)     Causes confusion    Ivp [Fd And C Blue No.1] Itching    Lansoprazole Diarrhea      Family History   Adopted: Yes       Social History     Socioeconomic History    Marital status:    Tobacco Use    Smoking status: Former     Packs/day: 1.00     Types: Cigarettes     Quit date: 2000     Years since quittin.7    Smokeless tobacco: Never   Substance and Sexual Activity    Alcohol use:  Yes     Alcohol/week: 5.8 standard drinks     Types: 7 Shots of liquor per week    Drug use: No    Sexual activity: Not Currently     Social Determinants of Health     Financial Resource Strain: Low Risk     Difficulty of Paying Living Expenses: Not hard at all   Food Insecurity: No Food Insecurity    Worried About Running Out of Food in the Last Year: Never true    Ran Out of Food in the Last Year: Never true      Objective:   Physical Exam:     VS as below    Const'l:          Normal body habitus, a&o, no acute distress  Head/Neck:       no cervical/head mass  Eyes:     nonicteric sclera, eom intact  ENT:      auditory acuity grossly intact either with or without device, no nasal deformity  Cardio:           reg rate reg rhythm  Pulm:     no accessory muscle use  Abd:       sntnd   Derm:     no rashes, no ulcers, no lesions  Extr:      No edema, no cyanosis, no calf tenderness, no varicosities  Neuro:    cn II-XII grossly intact  Psych:   mood could not ascertain, judgement could not ascertain    Data Review: All diagnostic labs and studies have been reviewed.   Signed By: Charles Michel DO

## 2022-09-21 NOTE — PROGRESS NOTES
RAPID RESPONSE TEAM    Overhead CODE STROKE paged to room # 3122/01 at  [phone]  RRT Arrival time  4252    Reason for Stroke Activation: LT sided weakness      Level  1  TPA candidate no  Symptom onset  1243  Last known well  1243  Stroke symptoms weakness of left arm(s), left upper leg(s), or left lower leg(s)  NIHSS complete yes- NIHSS score: 17   VAN assessment  yes - positive    Event details   Per nurse, patient was at his baseline, then all of sudden, started moaning and was confused, patient's \"dazed\" out, LT flaccid - was just weaker earlier. NIH per nurse 25 initially, 17 for me when I reassessed him several minutes later. Pupils 3 mm equal, brisk, and reactive. RT sided gaze present. Patient was lifted back to bed, more interactive now, NIH 8. Dr. Ana Mariee at bedside - cancelled Code Stroke at [phone] - clinical correlation leads us to think this could be seizures. RN was obtaining MRI screening information when this episode happened. Per family - patient has had 3-4 episodes like this prior to this admission. EEG was already completed, pending Neuro Consult. Since patient has  shunt, it will some time for MRI clear the patient for the scan. MD ordered CT Head and Keppra IV. Dr. Houston Kay was on the unit, I updated her as well. She went to evaluate the patient as well. Lab Results   Component Value Date/Time    Glucose 199 (H) 09/21/2022 03:36 AM    Glucose (POC) 157 (H) 09/21/2022 12:42 PM      Patient Vitals for the past 8 hrs:   Temp Pulse Resp BP SpO2   09/21/22 1610 99 °F (37.2 °C) 76 18 139/65 95 %   09/21/22 1600 -- 74 -- -- --   09/21/22 1252 98 °F (36.7 °C) 82 22 133/62 100 %   09/21/22 1247 98 °F (36.7 °C) 82 20 133/62 100 %   09/21/22 1200 97.9 °F (36.6 °C) 87 20 (!) 146/65 92 %          Jocelin Vargas RN  Rapid Response Team  #[id number]        CT Results  (Last 48 hours)                 09/21/22 1427  CT HEAD WO CONT Final result    Impression:  No change.        Narrative:  EXAM: CT HEAD WO CONT INDICATION: Code stroke       COMPARISON: September 20. CONTRAST: None. TECHNIQUE: Unenhanced CT of the head was performed using 5 mm images. Brain and   bone windows were generated. Coronal and sagittal reformats. CT dose reduction   was achieved through use of a standardized protocol tailored for this   examination and automatic exposure control for dose modulation. FINDINGS:   Small bilateral subdural hygroma are stable, ventricles size is unchanged with   shunt in place. Periventricular white matter disease is also unchanged. 09/20/22 1721  CT CODE NEURO PERF W CBF Final result    Impression:  CTA Head:   1. No evidence of flow-limiting stenosis or aneurysm. Scattered atherosclerotic   disease as above. CTA Neck:   1. No evidence of flow-limiting stenosis. 2. Mild stenosis (less than 50% by NASCET criteria) of the proximal bilateral   internal carotid arteries. CT Brain Perfusion:   1. Unreliable perfusion maps due to degree of motion. No obvious acute   abnormality. Narrative:  EXAM:  CTA CODE NEURO HEAD AND NECK W CONT, CT CODE NEURO PERF W CBF       INDICATION:   Code Stroke       COMPARISON:  CT head 9/20/2022. CONTRAST:  100 mL of Isovue-370. TECHNIQUE:  Unenhanced  images were obtained to localize the volume for   acquisition. Multislice helical axial CT angiography was performed from the   aortic arch to the top of the head during uneventful rapid bolus intravenous   contrast administration. Coronal and sagittal reformations and 3D post   processing was performed. CT dose reduction was achieved through use of a   standardized protocol tailored for this examination and automatic exposure   control for dose modulation. CT brain perfusion was performed with generation of hemodynamic maps of multiple   parameters, including cerebral blood flow, cerebral blood volume, and MTT (mean   transit time).  CT dose reduction was achieved through use of a standardized   protocol tailored for this examination and automatic exposure control for dose   modulation. This study was analyzed by the [address]  algorithm. FINDINGS:       CTA Head:   There is no evidence of large vessel occlusion or flow-limiting stenosis of the   intracranial internal carotid, anterior cerebral, and middle cerebral arteries. Calcification of the bilateral carotid siphons with mild stenoses. The anterior   communicating artery is patent, with small right A1 segment. There is no evidence of large vessel occlusion or flow-limiting stenosis of the   intracranial vertebral arteries, basilar artery, or posterior cerebral arteries. The posterior communicating arteries are diminutive but patent, with infundibula   at the origins bilaterally. There is no evidence of aneurysm or vascular malformation. The dural venous   sinuses and deep cerebral venous system are patent. No evidence of abnormal   enhancement on delayed phase images. Redemonstrated right frontal approach   ventricular shunt catheter without hydrocephalus. Stable chronic bilateral   subdural hematomas without significant mass effect. CTA NECK:   NASCET method was utilized for calculating stenosis. Severe calcific atherosclerosis of the thoracic aorta. The common carotid   arteries demonstrate no significant stenosis. Calcific atherosclerosis of the   proximal right internal carotid artery with mild (less than 50%) stenosis. Calcific atherosclerosis of the proximal left internal carotid artery with mild   (less than 50%) stenosis. There is a codominant vertebrobasilar arterial system. Mild stenosis of the   bilateral vertebral artery origins from calcified plaque. Visualized soft tissues of the neck are unremarkable.  shunt catheter tubing   noted in the right neck. Moderate upper lobe centrilobular and paraseptal   emphysema.  No acute fracture or aggressive osseous lesion. Multilevel   degenerative disc disease most advanced at C5-C6 and C6-C7, with scattered   severe facet arthropathy in the cervical spine. CT Brain Perfusion:   Unreliable perfusion maps due to degree of motion. There are no obvious regional   areas of elevated Tmax, decreased cerebral blood flow or blood volume. rCBF < 30% = 0 cc. Tmax > 6 seconds = 21 cc.           09/20/22 1721  CTA CODE NEURO HEAD AND NECK W CONT Final result    Impression:  CTA Head:   1. No evidence of flow-limiting stenosis or aneurysm. Scattered atherosclerotic   disease as above. CTA Neck:   1. No evidence of flow-limiting stenosis. 2. Mild stenosis (less than 50% by NASCET criteria) of the proximal bilateral   internal carotid arteries. CT Brain Perfusion:   1. Unreliable perfusion maps due to degree of motion. No obvious acute   abnormality. Narrative:  EXAM:  CTA CODE NEURO HEAD AND NECK W CONT, CT CODE NEURO PERF W CBF       INDICATION:   Code Stroke       COMPARISON:  CT head 9/20/2022. CONTRAST:  100 mL of Isovue-370. TECHNIQUE:  Unenhanced  images were obtained to localize the volume for   acquisition. Multislice helical axial CT angiography was performed from the   aortic arch to the top of the head during uneventful rapid bolus intravenous   contrast administration. Coronal and sagittal reformations and 3D post   processing was performed. CT dose reduction was achieved through use of a   standardized protocol tailored for this examination and automatic exposure   control for dose modulation. CT brain perfusion was performed with generation of hemodynamic maps of multiple   parameters, including cerebral blood flow, cerebral blood volume, and MTT (mean   transit time). CT dose reduction was achieved through use of a standardized   protocol tailored for this examination and automatic exposure control for dose   modulation.        This study was analyzed by the [employer] algorithm. FINDINGS:       CTA Head:   There is no evidence of large vessel occlusion or flow-limiting stenosis of the   intracranial internal carotid, anterior cerebral, and middle cerebral arteries. Calcification of the bilateral carotid siphons with mild stenoses. The anterior   communicating artery is patent, with small right A1 segment. There is no evidence of large vessel occlusion or flow-limiting stenosis of the   intracranial vertebral arteries, basilar artery, or posterior cerebral arteries. The posterior communicating arteries are diminutive but patent, with infundibula   at the origins bilaterally. There is no evidence of aneurysm or vascular malformation. The dural venous   sinuses and deep cerebral venous system are patent. No evidence of abnormal   enhancement on delayed phase images. Redemonstrated right frontal approach   ventricular shunt catheter without hydrocephalus. Stable chronic bilateral   subdural hematomas without significant mass effect. CTA NECK:   NASCET method was utilized for calculating stenosis. Severe calcific atherosclerosis of the thoracic aorta. The common carotid   arteries demonstrate no significant stenosis. Calcific atherosclerosis of the   proximal right internal carotid artery with mild (less than 50%) stenosis. Calcific atherosclerosis of the proximal left internal carotid artery with mild   (less than 50%) stenosis. There is a codominant vertebrobasilar arterial system. Mild stenosis of the   bilateral vertebral artery origins from calcified plaque. Visualized soft tissues of the neck are unremarkable.  shunt catheter tubing   noted in the right neck. Moderate upper lobe centrilobular and paraseptal   emphysema. No acute fracture or aggressive osseous lesion. Multilevel   degenerative disc disease most advanced at C5-C6 and C6-C7, with scattered   severe facet arthropathy in the cervical spine.        CT Brain Perfusion:   Unreliable perfusion maps due to degree of motion. There are no obvious regional   areas of elevated Tmax, decreased cerebral blood flow or blood volume. rCBF < 30% = 0 cc. Tmax > 6 seconds = 21 cc.           09/20/22 1636  CT CODE NEURO HEAD WO CONTRAST Final result    Impression:  No acute intracranial abnormality. Stable appearance of right frontal access ventricular catheter shunting. 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 No evidence of intercurrent intracranial hemorrhage. Narrative:  EXAM: CT CODE NEURO HEAD WO CONTRAST       INDICATION: Code Stroke       COMPARISON: None. CONTRAST: None. TECHNIQUE: Unenhanced CT of the head was performed using 5 mm images. Brain and   bone windows were generated. Coronal and sagittal reformats. CT dose reduction   was achieved through use of a standardized protocol tailored for this   examination and automatic exposure control for dose modulation. FINDINGS:   Sequelae status post right frontal access ventricular catheter shunting with   catheter tip in the anterior horn right lateral ventricle, similar to prior   study. Stable appearance of diffusely prominent ventricles. Mild diffuse   parenchymal volume loss with exvacuodilatation of the extra ventricular CSF   spaces. Redemonstration of bilateral frontal subdural hypodense collections measuring up   to 0.8 cm in thickness along the right frontal lobe, unchanged and 0.6 cm along   the left frontal lobe, previously 0.9 cm. There is no significant associated   intracranial brain shift. Patchy periventricular and deep white matter   ill-defined hypodensities, nonspecific and likely microangiopathic white matter   disease. There is no evidence of acute infarct, intercurrent intracranial   hemorrhage, or mass effect. The basilar cisterns are open. No CT evidence of   acute infarct.        The bone windows demonstrate no abnormalities. The visualized portions of the   paranasal sinuses and mastoid air cells are clear. Status post bilateral lense   replacement. The orbits are otherwise unremarkable.

## 2022-09-21 NOTE — PROGRESS NOTES
Occupational Therapy note:    Chart reviewed and patient currently DEANA for testing. Will defer and continue to follow.     Kathya Baltazar, OTR/L

## 2022-09-21 NOTE — PROGRESS NOTES
End of Shift Note    Bedside shift change report given to Abraham Salmeron (oncoming nurse) by Ruel Welsh RN (offgoing nurse). Report included the following information    Shift worked:  Nights   Shift summary and any significant changes:      Improvrment in alertness     Concerns for physician to address:  none   Zone phone for oncoming shift:  [phone]     Patient Information  Lexi Stevenson  79 y.o.  9/20/2022  4:27 PM by oTmmie Marx DO.  Lexi Stevenson was admitted from Home    Problem List  Patient Active Problem List    Diagnosis Date Noted    CVA (cerebral vascular accident) ([address] 09/20/2022    Type 2 diabetes mellitus 07/29/2022    Eructation 07/17/2020    BPH (benign prostatic hyperplasia) 09/25/2017    Rheumatoid arthritis involving multiple sites ([address] 09/25/2017    AAA (abdominal aortic aneurysm) without rupture ([address] 09/25/2017    Hip fracture ([address] 05/11/2017    NPH (normal pressure hydrocephalus) ([address] 04/13/2017    Alcohol use disorder 04/13/2017    Dysphagia 04/04/2017    Abnormal x-ray of abdomen 04/04/2017    Influenza 03/08/2016    Fall 03/08/2016    Diarrhea 01/22/2016    History of benign neoplasm of colon 04/28/2011    GERD (gastroesophageal reflux disease) 04/28/2011    Colon polyp 04/28/2011    Diverticulosis of colon 04/28/2011    Internal hemorrhoid 04/28/2011    Schatzki's ring 04/28/2011    Hiatal hernia 04/28/2011     Past Medical History:   Diagnosis Date    AAA (abdominal aortic aneurysm) ([address]     Followed by Dr Roxana Orourke as of 05/13/19 3.9 x 4.1    At risk for sleep apnea 07/08/2020    during PAT assessment, MEL score 4    Cancer ([address]     r shoulder melanoma    Colon polyp     Contact dermatitis and eczema due to cause     COPD     Diabetes ([address]     Diarrhea 1/22/2016    Dysphagia 4/4/2017    Emphysema lung ([address]     Eructation 7/17/2020    Exposure to TB     2/2019 cruise, probably exposure to TB, beginning TC 9/2019    GERD (gastroesophageal reflux disease)     Hypertension Ill-defined condition     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, arms    NPH (normal pressure hydrocephalus) ([facility] 2014    shunt    RA (rheumatoid arthritis) ([facility])     Scarring     Sepsis ([facility]     with perforated gallbladder    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 06/2021    Stroke ([facility] 2014    L arm weakness states not a stroke states it is a TIA     TIA (transient ischemic attack) 11/2018       Core Measures:  CVA: Yes Yes  CHF:No No  PNA:No No    Activity:  Activity Level: Up with Assistance, Bed Rest  Number times ambulated in hallways past shift: 0  Number of times OOB to chair past shift: 0    Cardiac:   Cardiac Monitoring: Yes           Access:   Current line(s): PIV   Central Line? No    PICC LINE? No     Genitourinary:   Urinary status: voiding  Urinary Catheter? No      Respiratory:   O2 Device: None (Room air)  Chronic home O2 use?: NO  Incentive spirometer at bedside: NO       GI:  Last Bowel Movement Date: 09/20/22  Current diet:  DIET NPO  Passing flatus: YES  Tolerating current diet: YES       Pain Management:   Patient states pain is manageable on current regimen: YES    Skin:  Patrick Score: 19  Interventions: float heels, increase time out of bed, and PT/OT consult    Patient Safety:  Fall Score:  Total Score: 6  Interventions: bed/chair alarm, gripper socks, pt to call before getting OOB, and stay with me (per policy)     @Rollbelt  @dexterity to release roll belt  Yes/No ( must document dexterity  here by stating Yes or No here, otherwise this is a restraint and must follow restraint documentation policy.)    DVT prophylaxis:  DVT prophylaxis Med- Yes  DVT prophylaxis SCD or CHARLETTE- No     Wounds: (If Applicable)  Wounds- No  Location     Active Consults:  IP CONSULT TO HOSPITALIST    Length of Stay:  Expected LOS: - - -  Actual LOS: 1  Discharge Plan: Yes JIMI Vo RN

## 2022-09-22 ENCOUNTER — APPOINTMENT (OUTPATIENT)
Dept: MRI IMAGING | Age: 82
DRG: 100 | End: 2022-09-22
Attending: PSYCHIATRY & NEUROLOGY
Payer: MEDICARE

## 2022-09-22 ENCOUNTER — APPOINTMENT (OUTPATIENT)
Dept: CT IMAGING | Age: 82
DRG: 100 | End: 2022-09-22
Attending: INTERNAL MEDICINE
Payer: MEDICARE

## 2022-09-22 ENCOUNTER — APPOINTMENT (OUTPATIENT)
Dept: NON INVASIVE DIAGNOSTICS | Age: 82
DRG: 100 | End: 2022-09-22
Attending: STUDENT IN AN ORGANIZED HEALTH CARE EDUCATION/TRAINING PROGRAM
Payer: MEDICARE

## 2022-09-22 LAB
ECHO AV AREA PEAK VELOCITY: 2.1 CM2
ECHO AV AREA VTI: 1.8 CM2
ECHO AV AREA/BSA PEAK VELOCITY: 1 CM2/M2
ECHO AV AREA/BSA VTI: 0.9 CM2/M2
ECHO AV MEAN GRADIENT: 3 MMHG
ECHO AV MEAN VELOCITY: 0.8 M/S
ECHO AV PEAK GRADIENT: 5 MMHG
ECHO AV PEAK VELOCITY: 1.1 M/S
ECHO AV VELOCITY RATIO: 0.73
ECHO AV VTI: 18.1 CM
ECHO LA DIAMETER INDEX: 1.38 CM/M2
ECHO LA DIAMETER: 2.8 CM
ECHO LA VOL 4C: 24 ML (ref 18–58)
ECHO LA VOLUME INDEX A4C: 12 ML/M2 (ref 16–34)
ECHO LV E' LATERAL VELOCITY: 6 CM/S
ECHO LV E' SEPTAL VELOCITY: 8 CM/S
ECHO LV FRACTIONAL SHORTENING: 37 % (ref 28–44)
ECHO LV INTERNAL DIMENSION DIASTOLE INDEX: 2.27 CM/M2
ECHO LV INTERNAL DIMENSION DIASTOLIC: 4.6 CM (ref 4.2–5.9)
ECHO LV INTERNAL DIMENSION SYSTOLIC INDEX: 1.43 CM/M2
ECHO LV INTERNAL DIMENSION SYSTOLIC: 2.9 CM
ECHO LV IVSD: 0.9 CM (ref 0.6–1)
ECHO LV MASS 2D: 137.7 G (ref 88–224)
ECHO LV MASS INDEX 2D: 67.8 G/M2 (ref 49–115)
ECHO LV POSTERIOR WALL DIASTOLIC: 0.9 CM (ref 0.6–1)
ECHO LV RELATIVE WALL THICKNESS RATIO: 0.39
ECHO LVOT AREA: 3.1 CM2
ECHO LVOT AV VTI INDEX: 0.58
ECHO LVOT DIAM: 2 CM
ECHO LVOT MEAN GRADIENT: 1 MMHG
ECHO LVOT PEAK GRADIENT: 2 MMHG
ECHO LVOT PEAK VELOCITY: 0.8 M/S
ECHO LVOT STROKE VOLUME INDEX: 16.2 ML/M2
ECHO LVOT SV: 33 ML
ECHO LVOT VTI: 10.5 CM
ECHO MV A VELOCITY: 0.83 M/S
ECHO MV AREA PHT: 4.6 CM2
ECHO MV AREA VTI: 1.6 CM2
ECHO MV E DECELERATION TIME (DT): 163.8 MS
ECHO MV E VELOCITY: 0.4 M/S
ECHO MV E/A RATIO: 0.48
ECHO MV E/E' LATERAL: 6.67
ECHO MV E/E' RATIO (AVERAGED): 5.83
ECHO MV E/E' SEPTAL: 5
ECHO MV LVOT VTI INDEX: 1.95
ECHO MV MAX VELOCITY: 1.1 M/S
ECHO MV MEAN GRADIENT: 2 MMHG
ECHO MV MEAN VELOCITY: 0.7 M/S
ECHO MV PEAK GRADIENT: 5 MMHG
ECHO MV PRESSURE HALF TIME (PHT): 47.9 MS
ECHO MV REGURGITANT PEAK GRADIENT: 74 MMHG
ECHO MV REGURGITANT PEAK VELOCITY: 4.3 M/S
ECHO MV VTI: 20.5 CM
ECHO PV MAX VELOCITY: 0.9 M/S
ECHO PV PEAK GRADIENT: 3 MMHG
ECHO RV FREE WALL PEAK S': 16 CM/S
GLUCOSE BLD STRIP.AUTO-MCNC: 162 MG/DL (ref 65–117)
GLUCOSE BLD STRIP.AUTO-MCNC: 180 MG/DL (ref 65–117)
GLUCOSE BLD STRIP.AUTO-MCNC: 191 MG/DL (ref 65–117)
GLUCOSE BLD STRIP.AUTO-MCNC: 191 MG/DL (ref 65–117)
GLUCOSE BLD STRIP.AUTO-MCNC: 201 MG/DL (ref 65–117)
SERVICE CMNT-IMP: ABNORMAL

## 2022-09-22 PROCEDURE — 99233 SBSQ HOSP IP/OBS HIGH 50: CPT | Performed by: PSYCHIATRY & NEUROLOGY

## 2022-09-22 PROCEDURE — A9576 INJ PROHANCE MULTIPACK: HCPCS | Performed by: INTERNAL MEDICINE

## 2022-09-22 PROCEDURE — 70450 CT HEAD/BRAIN W/O DYE: CPT

## 2022-09-22 PROCEDURE — 74011636637 HC RX REV CODE- 636/637: Performed by: STUDENT IN AN ORGANIZED HEALTH CARE EDUCATION/TRAINING PROGRAM

## 2022-09-22 PROCEDURE — 82962 GLUCOSE BLOOD TEST: CPT

## 2022-09-22 PROCEDURE — 74011000258 HC RX REV CODE- 258: Performed by: INTERNAL MEDICINE

## 2022-09-22 PROCEDURE — 93306 TTE W/DOPPLER COMPLETE: CPT

## 2022-09-22 PROCEDURE — 65270000046 HC RM TELEMETRY

## 2022-09-22 PROCEDURE — 74011250636 HC RX REV CODE- 250/636: Performed by: NURSE PRACTITIONER

## 2022-09-22 PROCEDURE — 74011250636 HC RX REV CODE- 250/636: Performed by: INTERNAL MEDICINE

## 2022-09-22 PROCEDURE — 99223 1ST HOSP IP/OBS HIGH 75: CPT | Performed by: NURSE PRACTITIONER

## 2022-09-22 PROCEDURE — 70553 MRI BRAIN STEM W/O & W/DYE: CPT

## 2022-09-22 RX ORDER — VANCOMYCIN 1.75 GRAM/500 ML IN 0.9 % SODIUM CHLORIDE INTRAVENOUS
1750 ONCE
Status: COMPLETED | OUTPATIENT
Start: 2022-09-22 | End: 2022-09-22

## 2022-09-22 RX ORDER — VANCOMYCIN HYDROCHLORIDE
1250
Status: DISCONTINUED | OUTPATIENT
Start: 2022-09-23 | End: 2022-09-23 | Stop reason: HOSPADM

## 2022-09-22 RX ORDER — HYDRALAZINE HYDROCHLORIDE 20 MG/ML
10 INJECTION INTRAMUSCULAR; INTRAVENOUS
Status: DISCONTINUED | OUTPATIENT
Start: 2022-09-22 | End: 2022-09-22

## 2022-09-22 RX ORDER — HYDRALAZINE HYDROCHLORIDE 20 MG/ML
10 INJECTION INTRAMUSCULAR; INTRAVENOUS
Status: DISCONTINUED | OUTPATIENT
Start: 2022-09-22 | End: 2022-09-23 | Stop reason: HOSPADM

## 2022-09-22 RX ORDER — VANCOMYCIN 1.75 GRAM/500 ML IN 0.9 % SODIUM CHLORIDE INTRAVENOUS
1750 ONCE
Status: DISCONTINUED | OUTPATIENT
Start: 2022-09-22 | End: 2022-09-22 | Stop reason: DRUGHIGH

## 2022-09-22 RX ORDER — LEVETIRACETAM 500 MG/5ML
1500 INJECTION, SOLUTION, CONCENTRATE INTRAVENOUS EVERY 12 HOURS
Status: DISCONTINUED | OUTPATIENT
Start: 2022-09-22 | End: 2022-09-23 | Stop reason: HOSPADM

## 2022-09-22 RX ADMIN — AMPICILLIN SODIUM 2 G: 2 INJECTION, POWDER, FOR SOLUTION INTRAMUSCULAR; INTRAVENOUS at 21:41

## 2022-09-22 RX ADMIN — DICLOFENAC SODIUM 4 G: 10 GEL TOPICAL at 18:05

## 2022-09-22 RX ADMIN — LEVETIRACETAM 1500 MG: 100 INJECTION, SOLUTION, CONCENTRATE INTRAVENOUS at 03:02

## 2022-09-22 RX ADMIN — CEFTRIAXONE 2 G: 2 INJECTION, POWDER, FOR SOLUTION INTRAMUSCULAR; INTRAVENOUS at 15:28

## 2022-09-22 RX ADMIN — GADOTERIDOL 20 ML: 279.3 INJECTION, SOLUTION INTRAVENOUS at 09:21

## 2022-09-22 RX ADMIN — VANCOMYCIN HYDROCHLORIDE 1750 MG: 10 INJECTION, POWDER, LYOPHILIZED, FOR SOLUTION INTRAVENOUS at 18:21

## 2022-09-22 RX ADMIN — DICLOFENAC SODIUM 4 G: 10 GEL TOPICAL at 21:58

## 2022-09-22 RX ADMIN — Medication 2 UNITS: at 18:03

## 2022-09-22 RX ADMIN — LEVETIRACETAM 1500 MG: 100 INJECTION, SOLUTION, CONCENTRATE INTRAVENOUS at 15:23

## 2022-09-22 RX ADMIN — HYDRALAZINE HYDROCHLORIDE 10 MG: 20 INJECTION INTRAMUSCULAR; INTRAVENOUS at 23:01

## 2022-09-22 NOTE — PROGRESS NOTES
CM attempted to speak with patient via room phone, continues to ring. Call placed to patient's wife, voice message left.                4:27 PM  Crissie , CRM

## 2022-09-22 NOTE — PROGRESS NOTES
Hospitalist Progress Note    NAME: Janice Luis   :  1940   MRN:  [id number]       Assessment / Plan:    Acute encephalopathy secondary to CVA versus TIA versus seizure, POA  Hydrocephalus status post  shunt changed recently  Chronic subdural hematoma  To rule out meningitis  - Recurrent episodes as per patient's wife at bedside  - From description it sounds like absence seizure  - Code stroke was called again on the patient today, protocol was followed  - CT head on admission with no acute abnormality  - Patient follows with neurosurgery at [facility] for his  shunt  - CTA head and neck on admission with no acute abnormality  - EEG completed  - Neuro following, input is appreciated. - Continue Keppra 1500 mg twice daily  - He had 1 more episode today  - Seizure precautions  - Discussed with patient wife and daughter at bedside, all questions were answered  - Discussed with neurosurgeon from [facility] Dr. Victor Hugo Chang and he feels that patient getting an excellent care here there is no need to transfer unless patient's family prefer that. Dr. Cameron Valadez input is appreciated  - Continue home Plavix  - Change home pravastatin to Lipitor  -Continue cardiac telemetry  - PT OT when able  - Speech therapy when able  - Chronic subdural hematoma on CT scan head, r repeat CT scan today with no acute abnormality changed  - MRI brain was reviewed with neurology Dr. Parrish, no acute CVA. Suspicion for hygroma and less likely meningitis  - Patient is afebrile with no leukocytosis or meningeal signs. We will start with vancomycin and ceftriaxone with ampicillin.   LP with meningitis panel  - Aspiration precautions      Hypertension  - Continue home diltiazem    Coronary artery disease  - Continue home Plavix  - Change pravastatin to Lipitor as above    Diabetes mellitus type 2  - Most recent hemoglobin A1c 6.2%  - Hold home metformin  - Correction scale with hypoglycemia protocol    AAA  - Most recent CT abdomen 2022 increasing in size to 4 cm  - No abdominal pain, hypotension, or abdominal tenderness  - Will need close follow-up as an outpatient      RA  - Continue home methotrexate    Estimated discharge date: September 26  Barriers: Clinical improvement    Code status: DNR  Prophylaxis: SCDs given his subdural hematoma  Recommended Disposition:  TBD    Advance care planning was discussed with patient, patient's wife, patient's daughter Mona Joshi and medical power of  at bedside. Plans to proceed with DNR with no aggressive measurements as. Patient's and his family wishes. Subjective:     Patient was seen and examined. No acute events overnight. Discussed with RN overnight events. All patient's questions were answered. Rapid response team was called on the patient today for similar episode of unresponsiveness for few seconds. \"Doing fine\"      Review of Systems:  Symptom Y/N Comments  Symptom Y/N Comments   Fever/Chills n   Chest Pain n    Poor Appetite    Edema     Cough n   Abdominal Pain n    Sputum    Joint Pain     SOB/SILVER n   Pruritis/Rash     Nausea/vomit n   Tolerating PT/OT     Diarrhea    Tolerating Diet y    Constipation    Other       Could NOT obtain due to:          Objective:     VITALS:   Last 24hrs VS reviewed since prior progress note.  Most recent are:  Patient Vitals for the past 24 hrs:   Temp Pulse Resp BP SpO2   09/22/22 0953 98 °F (36.7 °C) 87 18 (!) 149/82 96 %   09/22/22 0848 98.5 °F (36.9 °C) 82 18 (!) 145/76 96 %   09/22/22 0330 97.9 °F (36.6 °C) 79 18 (!) 159/78 94 %   09/21/22 2330 98.1 °F (36.7 °C) 82 18 (!) 158/78 95 %   09/21/22 1930 98.2 °F (36.8 °C) 79 18 136/63 95 %   09/21/22 1610 99 °F (37.2 °C) 76 18 139/65 95 %   09/21/22 1600 -- 74 -- -- --         Intake/Output Summary (Last 24 hours) at 9/22/2022 1528  Last data filed at 9/22/2022 0330  Gross per 24 hour   Intake 480 ml   Output --   Net 480 ml          I had a face to face encounter and independently examined this patient on 9/22/2022, as outlined below:  PHYSICAL EXAM:  General: WD, WN. Alert, cooperative, no acute distress    EENT:  EOMI. Anicteric sclerae. MMM  Resp:  CTA bilaterally, no wheezing or rales. No accessory muscle use  CV:  Regular  rhythm,  No edema  GI:  Soft, Non distended, Non tender. +Bowel sounds  Neurologic:  EOMs intact. No facial asymmetry. No aphasia or slurred speech. Symmetrical strength, Sensation grossly intact. Alert and oriented X 4.     Psych:   Good insight. Not anxious nor agitated  Skin:  No rashes. No jaundice    Reviewed most current lab test results and cultures  YES  Reviewed most current radiology test results   YES  Review and summation of old records today    NO  Reviewed patient's current orders and MAR    YES  PMH/SH reviewed - no change compared to H&P  ________________________________________________________________________  Care Plan discussed with:    Comments   Patient X    Family  x Patient's family at bedside   RN X    Care Manager X    Consultant                       X Multidiciplinary team rounds were held today with , nursing, pharmacist and clinical coordinator. Patient's plan of care was discussed; medications were reviewed and discharge planning was addressed. ________________________________________________________________________        Comments   >50% of visit spent in counseling and coordination of care X    ________________________________________________________________________  Pradeep Yeh MD     Procedures: see electronic medical records for all procedures/Xrays and details which were not copied into this note but were reviewed prior to creation of Plan. LABS:  I reviewed today's most current labs and imaging studies.   Pertinent labs include:  Recent Labs     09/21/22  0336 09/20/22  1726   WBC 5.1 6.3   HGB 13.6 13.1   HCT 42.1 40.2   * 101*       Recent Labs     09/21/22  0336 09/20/22  1726    139   K 3.8 3.3*   CL 113* 109*   CO2 23 21   * 219*   BUN 12 14   CREA 0.94 1.22   CA 9.0 8.4*   MG 1.9  --    PHOS 3.3  --    ALB 3.3* 3.2*   TBILI 0.7 0.6   ALT 41 22   INR  --  1.1         Signed: Liyah Bell MD

## 2022-09-22 NOTE — CONSULTS
Date of Consultation:  September 22, 2022    Referring Physician: Brina Faria MD     Reason for Consultation:  Altered mental status     Chief Complaint   Patient presents with    Altered mental status     Patient arrives as Level I Code stroke, immediately taken to CT via EMS and this RN. Patient had GLF following pulmonology appointment; wife describes patient turned to right side and became rigid before falling onto R side of head. Patient currently has ventricular shunt in place, with pressure decreased today from 13 to 11. Patient does not respond verbally to staff, but does spontaneously move his limbs. Less motion noted in LLE than other extremities. History of Present Illness:   Huan Quach is a 79 y.o. male with history of diabetes, COPD, normal pressure hydrocephalus status post  shunt who is currently admitted to the hospital with progressive worsening lower extremity weakness, confusion and a fall concerning for possible seizure. History is obtained from the daughter who is at bedside. She does report that over the last 6 weeks he has been declining in terms of his ability to walk, increased confusion and some issues with urination. He was seen by his neurosurgeon at [facility] and they adjusted his  shunt. He then went to see his pulmonologist and had an episode with a fall on the right side of his head. He was brought to the hospital for further evaluation. There was concern for possible stroke and he underwent stroke work-up including a noncontrast head CT which showed no acute changes and a CTA head and neck t which revealed no large vessel occlusion however perfusion imaging was unreadable due to motion. Interval history  No acute events reported overnight. This morning it appears that patient had another episode of staring that lasted approximately 1 minute. Shortly after he was confused. He does appear to be more lethargic and confused than the day prior.   He did have an MRI scan which showed bilateral subdural fluid collections. Past Medical History:   Diagnosis Date    AAA (abdominal aortic aneurysm) ([facility] [address]     Followed by Dr Sondra Cortez as of 19 3.9 x 4.1    At risk for sleep apnea 2020    during PAT assessment, MEL score 4    Cancer ([address]     r shoulder melanoma    Colon polyp     Contact dermatitis and eczema due to cause     COPD     Diabetes ([address]     Diarrhea 2016    Dysphagia 2017    Emphysema lung ([address]     Eructation 2020    Exposure to TB     2019 cruise, probably exposure to TB, beginning TC 2019    GERD (gastroesophageal reflux disease)     Hypertension     Ill-defined condition     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, arms    NPH (normal pressure hydrocephalus) ([address]     shunt    RA (rheumatoid arthritis) ([address]     Scarring     Sepsis ([address]     with perforated gallbladder    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 2021    Stroke ([address]     L arm weakness states not a stroke states it is a TIA     TIA (transient ischemic attack) 2018        Past Surgical History:   Procedure Laterality Date    HX APPENDECTOMY      HX CATARACT REMOVAL Bilateral     HX CHOLECYSTECTOMY  2017    at [place], ruptured, had sepsis, appendix removed with this    HX ENDOSCOPY      HX FEMORAL BYPASS Left     stent    HX GI      PEG placed and removed    HX OPEN REDUCTION INTERNAL FIXATION Left     femur, suresh in place    HX ORTHOPAEDIC Left     Femur     HX OTHER SURGICAL      r shoulder melenoma excision    HX TONSILLECTOMY      IR PLC CATH AV SHUNT IN W PTA SI      OH COLSC FLX W/RMVL OF TUMOR POLYP LESION SNARE TQ  2011         OH EGD TRANSORAL BIOPSY SINGLE/MULTIPLE  2011             Family History   Adopted: Yes        Social History     Tobacco Use    Smoking status: Former     Packs/day: 1.00     Types: Cigarettes     Quit date: 2000     Years since quittin.7    Smokeless tobacco: Never   Substance Use Topics    Alcohol use:  Yes Alcohol/week: 5.8 standard drinks     Types: 7 Shots of liquor per week        Allergies   Allergen Reactions    Ceclor [Cefaclor] Swelling    Contrast Agent [Iodine] Hives and Itching    Floxin [Ofloxacin] Hives    Gabapentin Other (comments)     Causes confusion    Ivp [Fd And C Blue No.1] Itching    Lansoprazole Diarrhea        Prior to Admission medications    Medication Sig Start Date End Date Taking? Authorizing Provider   dilTIAZem IR (CARDIZEM) 60 mg tablet TAKE ONE TABLET BY MOUTH 2 TIMES A DAY 9/7/22   Jermaine Kong III, DO   metFORMIN (GLUCOPHAGE) 500 mg tablet Take 500 mg by mouth two (2) times daily (with meals). Provider, Historical   tamsulosin (FLOMAX) 0.4 mg capsule TAKE 1 CAPSULE 30 MINUTES AFTER THE SAME MEAL DAILY 5/25/22   Jazzy RANGEL III, DO   pravastatin (PRAVACHOL) 80 mg tablet TAKE ONE TABLET BY MOUTH EVERY NIGHT 2/25/22   Jazzy RANGEL III, DO   OneTouch Ultra Test strip TEST 1-2 TIMES A DAY 7/26/21   Jermaine Kong III, DO   diclofenac (VOLTAREN) 1 % gel Use to left shoulder up to 4x daily. 3/2/20   Jazzy RANGEL III, DO   clopidogrel (PLAVIX) 75 mg tab Take 75 mg by mouth daily. 4/22/19   Provider, Historical   fluticasone (FLONASE) 50 mcg/actuation nasal spray 2 Sprays by Both Nostrils route daily as needed. 10/6/17   Provider, Historical   acetaminophen (TYLENOL) 500 mg tablet Take 500 mg by mouth every six (6) hours as needed for Pain. Provider, Historical   albuterol sulfate (PROVENTIL;VENTOLIN) 2.5 mg/0.5 mL nebu nebulizer solution 2.5 mg by Nebulization route every four (4) hours as needed for Wheezing. Provider, Historical   methotrexate (RHEUMATREX) 2.5 mg tablet Take 10 mg by mouth. Every Wednesday and Thursday    Provider, Historical   adalimumab (HUMIRA) 40 mg/0.8 mL injection 40 mg by SubCUTAneous route every fourteen (14) days. Provider, Historical   folic acid (FOLVITE) 1 mg tablet Take 1 mg by mouth daily.     Provider, Historical   tiotropium (SPIRIVA) 18 mcg inhalation capsule Take 1 Cap by inhalation daily. Provider, Historical       Review of Systems:    General, constitutional: negative  Eyes, vision: negative  Ears, nose, throat: negative  Cardiovascular, heart: negative  Respiratory: negative  Gastrointestinal: negative  Genitourinary: negative  Musculoskeletal: negative  Skin and integumentary: negative  Psychiatric: negative  Endocrine: negative  Neurological: negative, except for HPI  Hematologic/lymphatic: negative  Allergy/immunology: negative    PHYSICAL EXAMINATION:   Visit Vitals  BP (!) 146/84   Pulse 85   Temp 98.4 °F (36.9 °C)   Resp 18   Ht 5' 9\" (1.753 m)   Wt 192 lb 0.3 oz (87.1 kg)   SpO2 98%   BMI 28.36 kg/m²       Physical Exam:  General:  no acute distress  Neck: no carotid bruits  Lungs: clear to auscultation  Heart:  no murmurs, regular rate and rhythm   Lower extremity: no edema    Neurological exam:  Mental Status: Very drowsy was able to say his name. He was not able to answer any other orientation questions. Speech and Language: Moderate dysarthria. He had difficulty with naming repeating and following commands today. Fund of knowledge was preserved    Cranial nerves: II-XII:  Pupils equal and reactive, visual fields intact by threat  He was able to track me around the room. There was no obvious facial asymmetry. He did not protrude his trunk    Motor:   Normal tone and Bulk   Drift: No evidence of pronator drift     Strength testing:  He did not participate in formal strength testing today. He did withdraw to noxious stimulation in all 4 extremities equally.       Reflexes:   Biceps Triceps  Brachiorad Patellar Achilles Plantar Hoffmans   Right  1 1 1 1 1 Down Neg   Left 1 1 1 1 1 Down Neg      He was unable to participate in cerebellar testing    Data:     Lab Results   Component Value Date/Time    Hemoglobin A1c 6.2 (H) 08/11/2022 09:25 AM    Sodium 144 09/21/2022 03:36 AM    Potassium 3.8 09/21/2022 03:36 AM    Chloride 113 (H) 09/21/2022 03:36 AM    Glucose 199 (H) 09/21/2022 03:36 AM    BUN 12 09/21/2022 03:36 AM    Creatinine 0.94 09/21/2022 03:36 AM    Calcium 9.0 09/21/2022 03:36 AM    WBC 5.1 09/21/2022 03:36 AM    HCT 42.1 09/21/2022 03:36 AM    HGB 13.6 09/21/2022 03:36 AM    PLATELET 589 (L) 12/86/1058 03:36 AM       Imaging:    Results from Hospital Encounter encounter on 09/20/22    MRI BRAIN W WO CONT    Narrative  EXAM:  MRI BRAIN W WO CONT    INDICATION:    eval for infection? Subdural collection    COMPARISON:  9/22/2022 head CT performed earlier same date. CONTRAST: 20 ml ProHance. TECHNIQUE:  Multiplanar multisequence acquisition without and with contrast of the brain. FINDINGS:  Redemonstration of right frontal axis ventricular catheter shunting with the  catheter tip terminating in the anterior right lateral ventricle. Hardware  associated significant motion artifact precludes optimal evaluation of adjacent  brain structures. There is bilateral symmetric diffuse subdural collection which does not  completely suppress on FLAIR, measuring up to 0.6 cm in thickness along anterior  frontal lobes (image 16 of series 6). There is associated diffuse subdural  enhancement. No definite involvement of the ventricular and extraventricular CSF  spaces. Basal CSF cisterns are patent. There is no cerebellar tonsillar  herniation. Expected arterial flow-voids are present. There is no acute  infarct, parenchymal hemorrhage, or significant mass effect. Patchy  periventricular and subcortical white matter T2/FLAIR hyperintensity,  nonspecific and likely microangiopathic ischemic changes. Mild diffuse mucosal thickening of the paranasal sinuses. The mastoids are free  of fluid bilaterally. Status post bilateral lense replacement. The orbits are  otherwise unremarkable. No significant osseous or scalp lesions are identified.     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 Meningitis is not completely excluded. Please correlate with CSF analysis. No significant brain parenchymal involvement or brain abscess. No intracranial mass lesion. Results from [place] encounter on 09/20/22    CT HEAD WO CONT    Narrative  EXAM: CT HEAD WO CONT    INDICATION: f/o subdural hematoma    COMPARISON: 9/21/2022. CONTRAST: None. TECHNIQUE: Unenhanced CT of the head was performed using 5 mm images. Brain and  bone windows were generated. Coronal and sagittal reformats. CT dose reduction  was achieved through use of a standardized protocol tailored for this  examination and automatic exposure control for dose modulation. FINDINGS:  Right shunt catheter is unchanged. The ventricles and sulci are stable in size. There are slight changes small vessel disease periventricular white matter. Small chronic bilateral subdural collections are stable. The bone windows demonstrate no abnormalities. Mucosal thickening sphenoid sinus  is unchanged. .    Impression  No significant change. Small subdural low-density collections bilaterally are  stable. IMPRESSION/RECOMMENDATIONS:  Jessica Elam is a 79 y.o. male with history of diabetes, COPD, normal pressure hydrocephalus status post  shunt who is currently admitted to the hospital with progressive worsening lower extremity weakness, confusion and a fall concerning for possible seizure. Progressive weakness confusion and gait instability: Initial concern was for possible worsening hydrocephalus and shunt was adjusted. There is also concern for possible seizures which may have been causing his initial complaints.   He had an MRI of the brain which shows evidence of subdural fluid collections which could be due to subdural hygromas secondary to chronic CSF shunting versus an infectious process which is thought to be less likely  -Given these findings on the MRI, would recommend obtaining a lumbar puncture to rule out infection as a possible cause of these fluid collections again this is less likely  -Would additionally consult neurosurgery for adjustment of the shunt as this could be a cause of the subdural fluid collections    Seizure? Possibly secondary to the fluid collection noticed on his MRI  -Recommend continuing with Keppra 1500 mg twice a day. -We will obtain a 24-hour EEG to further evaluate for these events to determine if they are truly based in seizure.  -Please place patient on seizure precautions with as needed Ativan available for seizures lasting greater than 3 minutes    Thank you very much for this consultation, we will continue to follow.   Please call with any additional questions    Eduard Montemayor MD

## 2022-09-22 NOTE — PROGRESS NOTES
RAPID RESPONSE TEAM    Overhead Rapid Response paged to room # 3122/01  at  [id number]    Reason for Rapid Response:  AMS     Initial Assessment:   Upon arrival, patient had returned from MRI and had another event similar to yesterday, where he gazed out and was not responding. Currently patient is alert and can follow commands, not in acute distress however, he is not able to control his secretions at times. He has a weak cough and intact gag reflex but I would worry about him aspirating with these events. VSS - not in acute distress. Able to track movements and moves all extremities to commands, slow to respond but this is all ongoing from prior to coming into the hospital.     Dr. Nereida Barrett bedside. Code status discussed with the patient's wife. Patient Vitals for the past 4 hrs:   Temp Pulse Resp BP SpO2   09/22/22 0953 98 °F (36.7 °C) 87 18 (!) 149/82 96 %          Interventions:   -- No RRT Interventions     Outcome:   -- Patient to remain in [facility]   -- Code status address - changed to DNR  -- I asked the primary nurse to notify Neurology provider and update the provider on the event and that the MRI is completed. Please call with any questions or concerns    Darcie Sapp, RN BSN CCRN TCRN  Rapid Response Team  Ext [phone]     Recent Results (from the past 8 hour(s))   GLUCOSE, POC    Collection Time: 09/22/22  8:02 AM   Result Value Ref Range    Glucose (POC) 201 (H) 65 - 117 mg/dL    Performed by Clista Yaa PCT    GLUCOSE, POC    Collection Time: 09/22/22  9:54 AM   Result Value Ref Range    Glucose (POC) 191 (H) 65 - 117 mg/dL    Performed by Angel Conway (WILL RN)    GLUCOSE, POC    Collection Time: 09/22/22 11:17 AM   Result Value Ref Range    Glucose (POC) 191 (H) 65 - 117 mg/dL    Performed by Clista Yaa PCT       MRI BRAIN W WO CONT: Patient Communication     Released  Not seen     Study Result    Narrative & Impression   EXAM:  MRI BRAIN W WO CONT     INDICATION:    eval for infection? Subdural collection     COMPARISON:  9/22/2022 head CT performed earlier same date. CONTRAST: 20 ml ProHance. TECHNIQUE:    Multiplanar multisequence acquisition without and with contrast of the brain. FINDINGS:  Redemonstration of right frontal axis ventricular catheter shunting with the  catheter tip terminating in the anterior right lateral ventricle. Hardware  associated significant motion artifact precludes optimal evaluation of adjacent  brain structures. There is bilateral symmetric diffuse subdural collection which does not  completely suppress on FLAIR, measuring up to 0.6 cm in thickness along anterior  frontal lobes (image 16 of series 6). There is associated diffuse subdural  enhancement. No definite involvement of the ventricular and extraventricular CSF  spaces. Basal CSF cisterns are patent. There is no cerebellar tonsillar  herniation. Expected arterial flow-voids are present. There is no acute  infarct, parenchymal hemorrhage, or significant mass effect. Patchy  periventricular and subcortical white matter T2/FLAIR hyperintensity,  nonspecific and likely microangiopathic ischemic changes. Mild diffuse mucosal thickening of the paranasal sinuses. The mastoids are free  of fluid bilaterally. Status post bilateral lense replacement. The orbits are  otherwise unremarkable. No significant osseous or scalp lesions are identified. 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 Meningitis is not completely excluded. Please correlate with CSF analysis. No significant brain parenchymal involvement or brain abscess. No intracranial mass lesion.

## 2022-09-22 NOTE — CONSULTS
Pt has  shunt that is followed at [facility]. He will need to be seen at [facility] to have shunt adjusted. This is not a shunt that we place at [facility] or at [place] [id number] and therefore do not have ability to adjust it. MRI suggests infection versus over shunting. Recommend that MRI results are conveyed to [facility] neurosurgeon as shunt was just recently adjusted.

## 2022-09-22 NOTE — PROGRESS NOTES
Occupational Therapy  Chart reviewed, noted patient with RR earlier today and discussed with RN/MD who recommend to hold at this time.    Obed Gregory OTR/L

## 2022-09-22 NOTE — PROGRESS NOTES
Palliative Medicine Consult  Lamberto: [phone] ([phone])    Patient Name: Aleene Councilman  YOB: 1940    Date of Initial Consult: 9/22/2022  Reason for Consult: Care Decisions  Requesting Provider: Stephan Matute MD  Primary Care Physician: Maggie Ulrich DO     SUMMARY:   Aleene Councilman is a 79 y.o. with a past history of diabetes, COPD, normal pressure hydrocephalus status post  shunt, TIA, who was admitted on 9/20/2022 after a fall during a clinic appointment w/ concern for TIA with a diagnosis of TIA versus Seizure. Current medical issues leading to Palliative Medicine involvement include: care planning with recurrent neurological episodes of unknown etiology. HPI:  Timbo Sparks during an outpatient clinic appointment, concern for increasing disinterest to participate in life at home, concern for stroke versus seizure     Hospital Course:  9/20 - Seen in ER after fall at outpatient appointment w/ concern for stroke          - Head CT, showing hydrocephalus, this is present at baseline, cannot rule out infection  9/21 - Stroke Alert. .Head CT, unchanged           - EEG, showing generalized slowing commonly seen in encephalopathies  9/22 - Brain MRI, showing diffuse subdural collection. Could be r/t chronic CSF shunting or intracranial hypotension, not excluding meningitis at this time          - Rapid Response r/t neurological \"spell\"          - Palliative Consult     Functional Baseline:  His wife, Jaspreet Renee, states that she has noticed a decline over the past year. Uses a walker to get around. Wife has noticed shuffling of feet while walking getting worse. Social History:  Lives with wife, Kojo Vinson. The wife and daughter, Steve Huertas, serve as the patient's care takers.       PALLIATIVE DIAGNOSES:   Palliative Encounter  Goals of Care  DNR/Advanced Directives Discussion   Hydrocephalus  Debility   Neurologically Labile   Somnolence       PLAN:   Prior to meeting the patient I have extensively reviewed his chart. He has a significant history of normal pressure hydrocephalus status post  shunt. On this admission he has has multiple episodes where his eyes are open but he is unresponsive. After multiple CT's, a MRI, and EEG the cause behind these incidents is still unknown, does not appear to be r/t TIA but cannot rule out seizures or infection. Course of illness: Upon entering the room the patient was lying in bed sleeping. His wife, Elisabet Silva, was in his room and was receptive to having a conversation with me. She explained the course of her husbands illness. He began having a build up of CSF in , during this time she began to notice behavioral changes. He obtained a shunt in . During this time they moved to a smaller house with no stairs and to be closer to family. She also expressed that he previously had a fall that resulted in a lower extremity fracture. From this he had a short stay in a nursing home. Here she felt that he declined and he was discharged home before he was truly ready. Since his discharge home, in 2021, he has not improved. He use to love spending time with his grandchildren and now he doesn't seem to be happy to see them. He had also become more withdrawn and less interactive. She stated that  the patient had several outpatient clinic appointments. During one of these visits the patient fell and was transported to the ER. Since being in the hospital he has had multiple \"spells'. She described this as her  staring off and not being responsive, similar to a catatonic state. She expressed that inpatient Neurology had spoken with her, and informed her that they believe he may not recover from this. Social background: She stated her family has gone through a lot of trauma. They have 3 daughters together, two of which are  (breast cancer and a MI). Their deaths occurred around the same as the patient became sick.  She states she stays strong for her daughter that is still living and her grandchildren. She states she pulls her strength from a higher power, but that it has been hard. Goals of Care: We discussed the options to pursue aggressive medical care versus comfort care. Giving her time to think about her options. I will follow up tomorrow . Code Status: We discussed advanced directives and code status. She stated that she and her  made advanced directives. She will look for these at home and bring them into the hospital tomorrow. She stated at this point she feels he should be a DNR. I will follow up      Initial consult note routed to primary continuity provider and/or primary health care team members  Communicated plan of care with: Palliative [facility] Team     GOALS OF CARE / TREATMENT PREFERENCES:     GOALS OF CARE:   Discussion planned with wife, Mariel Hardin, tomorrow . TREATMENT PREFERENCES:   Code Status: DNR    Patient and family's personal goals include:   Leave daughter and grandchildren in a good financial position when they are       Advance Care Planning:  [x] The [facility] Interdisciplinary Team has updated the ACP Navigator with [address] and Patient Capacity      Primary Decision Maker: Heriberto Mccauley - [phone]  Advance Care Planning 2022   Patient's Healthcare Decision Maker is: Legal Next of Bakari 68   Primary Decision Maker Name -   Primary Decision Maker Relationship to Patient -   Confirm Advance Directive None   Patient Would Like to Complete Advance Directive -       As far as possible, the palliative care team has discussed with patient / health care proxy about goals of care / treatment preferences for patient.      HISTORY:     History obtained from: chart, wife Mariel Hardin)    The patient is:   [] Verbal and participatory  [x] Non-participatory due to:   Cognitive ability, drowsiness       Clinical Pain Assessment (nonverbal scale for severity on nonverbal patients):   Clinical Pain Assessment  Severity: 0          Duration: for how long has pt been experiencing pain (e.g., 2 days, 1 month, years)  Frequency: how often pain is an issue (e.g., several times per day, once every few days, constant)     FUNCTIONAL ASSESSMENT:     Palliative Performance Scale (PPS):  PPS: 10       PSYCHOSOCIAL/SPIRITUAL SCREENING:     Palliative IDT has assessed this patient for cultural preferences / practices and a referral made as appropriate to needs (Cultural Services, Patient Advocacy, Ethics, etc.)    Any spiritual / Scientology concerns:  [] Yes /  [x] No   If \"Yes\" to discuss with pastoral care during IDT     Does caregiver feel burdened by caring for their loved one:   [] Yes /  [x] No /  [] No Caregiver Present/Available [] No Caregiver [] Pt Lives at Facility  If \"Yes\" to discuss with social work during IDT    Anticipatory grief assessment:   [x] Normal  / [] Maladaptive     If \"Maladaptive\" to discuss with social work during IDT    ESAS Anxiety:      ESAS Depression: Depression: 2        REVIEW OF SYSTEMS:     Positive and pertinent negative findings in ROS are noted above in HPI. The following systems were [x] reviewed / [] unable to be reviewed as noted in HPI  Other findings are noted below. Systems: constitutional, ears/nose/mouth/throat, respiratory, gastrointestinal, genitourinary, musculoskeletal, integumentary, neurologic, psychiatric, endocrine. Positive findings noted below. Modified ESAS Completed by: provider   Fatigue: 6 Drowsiness: 8   Depression: 2 Pain: 0         Anorexia: 3 Dyspnea: 2     Constipation: No              PHYSICAL EXAM:     From RN flowsheet:  Wt Readings from Last 3 Encounters:   07/29/22 192 lb (87.1 kg)   01/27/22 188 lb 3.2 oz (85.4 kg)   07/26/21 193 lb (87.5 kg)     Blood pressure (!) 149/82, pulse 87, temperature 98 °F (36.7 °C), resp. rate 18, SpO2 96 %.     Pain Scale 1: Numeric (0 - 10)  Pain Intensity 1: 0        Constitutional: Sleeping, opens eyes to name, nonverbal during this encounter  Eyes: pupils equal, anicteric  ENMT: no nasal discharge, moist mucous membranes  Cardiovascular: regular rhythm, distal pulses intact  Respiratory: breathing not labored, symmetric  Gastrointestinal: soft non-tender, +bowel sounds  Musculoskeletal: no deformity, no tenderness to palpation  Skin: warm, dry  Neurologic: opens eyes to name, makes eye contact, moving all extremities  Psychiatric: Withdrawn, no hallucinations       HISTORY:     Active Problems:    CVA (cerebral vascular accident) ([address] (9/20/2022)    Past Medical History:   Diagnosis Date    AAA (abdominal aortic aneurysm) ([address]     Followed by Dr Patrick Crouch as of 05/13/19 3.9 x 4.1    At risk for sleep apnea 07/08/2020    during PAT assessment, MEL score 4    Cancer (HCC)     r shoulder melanoma    Colon polyp     Contact dermatitis and eczema due to cause     COPD     Diabetes ([address]     Diarrhea 1/22/2016    Dysphagia 4/4/2017    Emphysema lung ([address]     Eructation 7/17/2020    Exposure to TB     2/2019 cruise, probably exposure to TB, beginning TC 9/2019    GERD (gastroesophageal reflux disease)     Hypertension     Ill-defined condition     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, arms    NPH (normal pressure hydrocephalus) ([address] 2014    shunt    RA (rheumatoid arthritis) ([address]     Scarring     Sepsis ([address]     with perforated gallbladder    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 06/2021    Stroke ([address] 2014    L arm weakness states not a stroke states it is a TIA     TIA (transient ischemic attack) 11/2018      Past Surgical History:   Procedure Laterality Date    HX APPENDECTOMY      HX CATARACT REMOVAL Bilateral     HX CHOLECYSTECTOMY  04/18/2017    [facility], ruptured, had sepsis, appendix removed with this    HX ENDOSCOPY      HX FEMORAL BYPASS Left     stent    HX GI      PEG placed and removed    HX OPEN REDUCTION INTERNAL FIXATION Left     femur, suresh in place    HX ORTHOPAEDIC Left     Femur     HX OTHER SURGICAL      r shoulder melenoma excision    HX TONSILLECTOMY      [facility] CATH AV SHUNT IN W PTA SI      [facility] COLSC FLX W/RMVL OF TUMOR POLYP LESION SNARE TQ  2011         [facility] EGD TRANSORAL BIOPSY SINGLE/MULTIPLE  2011           Family History   Adopted: Yes      History reviewed, no pertinent family history. Social History     Tobacco Use    Smoking status: Former     Packs/day: 1.00     Types: Cigarettes     Quit date:      Years since quittin.7    Smokeless tobacco: Never   Substance Use Topics    Alcohol use:  Yes     Alcohol/week: 5.8 standard drinks     Types: 7 Shots of liquor per week     Allergies   Allergen Reactions    Ceclor [Cefaclor] Swelling    Contrast Agent [Iodine] Hives and Itching    Floxin [Ofloxacin] Hives    Gabapentin Other (comments)     Causes confusion    Ivp [Fd And C Blue No.1] Itching    Lansoprazole Diarrhea      Current Facility-Administered Medications   Medication Dose Route Frequency    levETIRAcetam (KEPPRA) injection 1,500 mg  1,500 mg IntraVENous Q12H    [START ON 2022] vancomycin (VANCOCIN) 1250 mg in  ml infusion  1,250 mg IntraVENous Q18H    cefTRIAXone (ROCEPHIN) 2 g in 0.9% sodium chloride (MBP/ADV) 50 mL MBP  2 g IntraVENous Q12H    vancomycin (VANCOCIN) 1750 mg in  ml infusion  1,750 mg IntraVENous ONCE    glucagon (GLUCAGEN) injection 1 mg  1 mg IntraMUSCular PRN    dextrose 10 % infusion 250 mL  250 mL IntraVENous DIALYSIS PRN    glucose chewable tablet 16 g  16 g Oral PRN    insulin lispro (HUMALOG) injection 1-8 Units  1-8 Units SubCUTAneous AC&HS    folic acid (FOLVITE) tablet 1 mg  1 mg Oral DAILY    methotrexate (RHEUMATREX) tablet 10 mg  10 mg Oral Once per day on     albuterol (PROVENTIL VENTOLIN) nebulizer solution 2.5 mg  2.5 mg Nebulization Q4H PRN    fluticasone propionate (FLONASE) 50 mcg/actuation nasal spray 2 Spray  2 Spray Both Nostrils DAILY PRN clopidogreL (PLAVIX) tablet 75 mg  75 mg Oral DAILY    diclofenac (VOLTAREN) 1 % topical gel 4 g  4 g Topical QID    tamsulosin (FLOMAX) capsule 0.4 mg  0.4 mg Oral DAILY    ipratropium (ATROVENT) 0.02 % nebulizer solution 0.5 mg  0.5 mg Nebulization Q4H PRN    dilTIAZem IR (CARDIZEM) tablet 60 mg  60 mg Oral BID    atorvastatin (LIPITOR) tablet 40 mg  40 mg Oral QHS    acetaminophen (TYLENOL) tablet 650 mg  650 mg Oral Q6H PRN    ondansetron (ZOFRAN) injection 4 mg  4 mg IntraVENous Q4H PRN    hydrALAZINE (APRESOLINE) 20 mg/mL injection 10 mg  10 mg IntraVENous Q6H PRN    diazePAM (VALIUM) injection 10 mg  10 mg IntraVENous Q4H PRN    acetaminophen (TYLENOL) tablet 650 mg  650 mg Oral Q4H PRN    Or    acetaminophen (TYLENOL) solution 650 mg  650 mg Per NG tube Q4H PRN    Or    acetaminophen (TYLENOL) suppository 650 mg  650 mg Rectal Q4H PRN          LAB AND IMAGING FINDINGS:     Lab Results   Component Value Date/Time    WBC 5.1 09/21/2022 03:36 AM    HGB 13.6 09/21/2022 03:36 AM    PLATELET 506 (L) 30/75/4274 03:36 AM     Lab Results   Component Value Date/Time    Sodium 144 09/21/2022 03:36 AM    Potassium 3.8 09/21/2022 03:36 AM    Chloride 113 (H) 09/21/2022 03:36 AM    CO2 23 09/21/2022 03:36 AM    BUN 12 09/21/2022 03:36 AM    Creatinine 0.94 09/21/2022 03:36 AM    Calcium 9.0 09/21/2022 03:36 AM    Magnesium 1.9 09/21/2022 03:36 AM    Phosphorus 3.3 09/21/2022 03:36 AM      Lab Results   Component Value Date/Time    Alk.  phosphatase 102 09/21/2022 03:36 AM    Protein, total 6.5 09/21/2022 03:36 AM    Albumin 3.3 (L) 09/21/2022 03:36 AM    Globulin 3.2 09/21/2022 03:36 AM     Lab Results   Component Value Date/Time    INR 1.1 09/20/2022 05:26 PM    Prothrombin time 11.9 (H) 09/20/2022 05:26 PM    aPTT 25.8 05/10/2017 09:54 PM      No results found for: IRON, FE, TIBC, IBCT, PSAT, FERR   No results found for: PH, PCO2, PO2  No components found for: [place]   Lab Results   Component Value Date/Time     03/07/2017 03:29 PM    CK - MB 1.4 03/07/2017 03:29 PM                Total time:   Counseling / coordination time, spent as noted above:   > 50% counseling / coordination?:     Prolonged service was provided for  []30 min   []75 min in face to face time in the presence of the patient, spent as noted above. Time Start:   Time End:   Note: this can only be billed with 58389 (initial) or 71154 (follow up). If multiple start / stop times, list each separately.

## 2022-09-22 NOTE — PROGRESS NOTES
End of Shift Note     Bedside shift change report given to Teodora RN (oncoming nurse) by Chris Del Rio (offgoing nurse). Report included the following information     Shift worked: night   Shift summary and any significant changes:      uneventful night, CHG bathed, call bell and phone within reach of patient       Concerns for physician to address:  none   Zone phone for oncoming shift:  [phone]      Patient Information  Joslyn Pereyra  79 y.o.  9/20/2022  4:27 PM by Hero Carmona DO.  Joslyn Pereyra was admitted from Home     Problem List       Patient Active Problem List     Diagnosis Date Noted    CVA (cerebral vascular accident) ([address] 09/20/2022    Type 2 diabetes mellitus 07/29/2022    Eructation 07/17/2020    BPH (benign prostatic hyperplasia) 09/25/2017    Rheumatoid arthritis involving multiple sites ([address] 09/25/2017    AAA (abdominal aortic aneurysm) without rupture ([address] 09/25/2017    Hip fracture ([address] 05/11/2017    NPH (normal pressure hydrocephalus) ([address] 04/13/2017    Alcohol use disorder 04/13/2017    Dysphagia 04/04/2017    Abnormal x-ray of abdomen 04/04/2017    Influenza 03/08/2016    Fall 03/08/2016    Diarrhea 01/22/2016    History of benign neoplasm of colon 04/28/2011    GERD (gastroesophageal reflux disease) 04/28/2011    Colon polyp 04/28/2011    Diverticulosis of colon 04/28/2011    Internal hemorrhoid 04/28/2011    Schatzki's ring 04/28/2011    Hiatal hernia 04/28/2011           Past Medical History:   Diagnosis Date    AAA (abdominal aortic aneurysm) ([address]       Followed by Dr Yojana Beck as of 05/13/19 3.9 x 4.1    At risk for sleep apnea 07/08/2020     during PAT assessment, MEL score 4    Cancer (HCC)       r shoulder melanoma    Colon polyp      Contact dermatitis and eczema due to cause      COPD      Diabetes ([address]      Diarrhea 1/22/2016    Dysphagia 4/4/2017    Emphysema lung ([address]      Eructation 7/17/2020    Exposure to TB       2/2019 cruise, probably exposure to TB, beginning TC 9/2019    GERD (gastroesophageal reflux disease)      Hypertension      Ill-defined condition       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, arms    NPH (normal pressure [facility]us) ([facility] 2014     shunt    RA (rheumatoid arthritis) ([facility]      Scarring      Sepsis ([facility]       with perforated gallbladder    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 06/2021    Stroke ([facility] 2014     L arm weakness states not a stroke states it is a TIA     TIA (transient ischemic attack) 11/2018         Core Measures:  CVA: Yes Yes  CHF:No No  PNA:No No     Activity:  Activity Level: Up with Assistance, Bed Rest  Number times ambulated in hallways past shift: 0  Number of times OOB to chair past shift: 0     Cardiac:   Cardiac Monitoring: Yes         Access:   Current line(s): PIV   Central Line? No    PICC LINE? No      Genitourinary:   Urinary status: voiding  Urinary Catheter? No       Respiratory:   O2 Device: None (Room air)  Chronic home O2 use?: NO  Incentive spirometer at bedside: NO     GI:  Last Bowel Movement Date: 09/20/22  Current diet:  DIET NPO  Passing flatus: YES  Tolerating current diet: YES     Pain Management:   Patient states pain is manageable on current regimen: YES     Skin:  Patrick Score: 19  Interventions: float heels, increase time out of bed, and PT/OT consult    Patient Safety:  Fall Score:  Total Score: 6  Interventions: bed/chair alarm, gripper socks, pt to call before getting OOB, and stay with me (per policy)  @Rollbelt  @dexterity to release roll belt  Yes/No ( must document dexterity  here by stating Yes or No here, otherwise this is a restraint and must follow restraint documentation policy.)     DVT prophylaxis:  DVT prophylaxis Med- Yes  DVT prophylaxis SCD or CHARLETTE- No      Wounds: (If Applicable)  Wounds- No  Location      Active Consults:  IP CONSULT TO HOSPITALIST     Length of Stay:  Expected LOS: - - -  Actual LOS: 1  Discharge Plan: Yes [id number]

## 2022-09-22 NOTE — PROGRESS NOTES
Problem: Falls - Risk of  Goal: *Absence of Falls  Description: Document Malu Jade Fall Risk and appropriate interventions in the flowsheet. Outcome: Progressing Towards Goal  Note: Fall Risk Interventions:  Mobility Interventions: Bed/chair exit alarm    Mentation Interventions: Bed/chair exit alarm    Medication Interventions: Bed/chair exit alarm    Elimination Interventions: Call light in reach, Bed/chair exit alarm    History of Falls Interventions: Bed/chair exit alarm         Problem: Patient Education: Go to Patient Education Activity  Goal: Patient/Family Education  Outcome: Progressing Towards Goal     Problem: Pressure Injury - Risk of  Goal: *Prevention of pressure injury  Description: Document Patrick Scale and appropriate interventions in the flowsheet.   Outcome: Progressing Towards Goal  Note: Pressure Injury Interventions:  Sensory Interventions: Assess changes in LOC    Moisture Interventions: Absorbent underpads    Activity Interventions: PT/OT evaluation    Mobility Interventions: PT/OT evaluation    Nutrition Interventions: Document food/fluid/supplement intake                     Problem: Patient Education: Go to Patient Education Activity  Goal: Patient/Family Education  Outcome: Progressing Towards Goal     Problem: Patient Education: Go to Patient Education Activity  Goal: Patient/Family Education  Outcome: Progressing Towards Goal     Problem: TIA/CVA Stroke: 0-24 hours  Goal: Off Pathway (Use only if patient is Off Pathway)  Outcome: Progressing Towards Goal  Goal: Activity/Safety  Outcome: Progressing Towards Goal  Goal: Consults, if ordered  Outcome: Progressing Towards Goal  Goal: Diagnostic Test/Procedures  Outcome: Progressing Towards Goal  Goal: Nutrition/Diet  Outcome: Progressing Towards Goal  Goal: Discharge Planning  Outcome: Progressing Towards Goal  Goal: Medications  Outcome: Progressing Towards Goal  Goal: Respiratory  Outcome: Progressing Towards Goal  Goal: Treatments/Interventions/Procedures  Outcome: Progressing Towards Goal  Goal: Minimize risk of bleeding post-thrombolytic infusion  Outcome: Progressing Towards Goal  Goal: Monitor for complications post-thrombolytic infusion  Outcome: Progressing Towards Goal  Goal: Psychosocial  Outcome: Progressing Towards Goal  Goal: *Hemodynamically stable  Outcome: Progressing Towards Goal  Goal: *Neurologically stable  Description: Absence of additional neurological deficits    Outcome: Progressing Towards Goal  Goal: *Verbalizes anxiety and depression are reduced or absent  Outcome: Progressing Towards Goal  Goal: *Absence of Signs of Aspiration on Current Diet  Outcome: Progressing Towards Goal  Goal: *Absence of deep venous thrombosis signs and symptoms(Stroke Metric)  Outcome: Progressing Towards Goal  Goal: *Ability to perform ADLs and demonstrates progressive mobility and function  Outcome: Progressing Towards Goal  Goal: *Stroke education started(Stroke Metric)  Outcome: Progressing Towards Goal  Goal: *Dysphagia screen performed(Stroke Metric)  Outcome: Progressing Towards Goal  Goal: *Rehab consulted(Stroke Metric)  Outcome: Progressing Towards Goal     Problem: TIA/CVA Stroke: Day 2 Until Discharge  Goal: Off Pathway (Use only if patient is Off Pathway)  Outcome: Progressing Towards Goal  Goal: Activity/Safety  Outcome: Progressing Towards Goal  Goal: Diagnostic Test/Procedures  Outcome: Progressing Towards Goal  Goal: Nutrition/Diet  Outcome: Progressing Towards Goal  Goal: Discharge Planning  Outcome: Progressing Towards Goal  Goal: Medications  Outcome: Progressing Towards Goal  Goal: Respiratory  Outcome: Progressing Towards Goal  Goal: Treatments/Interventions/Procedures  Outcome: Progressing Towards Goal  Goal: Psychosocial  Outcome: Progressing Towards Goal  Goal: *Verbalizes anxiety and depression are reduced or absent  Outcome: Progressing Towards Goal  Goal: *Absence of aspiration  Outcome: Progressing Towards Goal  Goal: *Absence of deep venous thrombosis signs and symptoms(Stroke Metric)  Outcome: Progressing Towards Goal  Goal: *Optimal pain control at patient's stated goal  Outcome: Progressing Towards Goal  Goal: *Tolerating diet  Outcome: Progressing Towards Goal  Goal: *Ability to perform ADLs and demonstrates progressive mobility and function  Outcome: Progressing Towards Goal  Goal: *Stroke education continued(Stroke Metric)  Outcome: Progressing Towards Goal

## 2022-09-22 NOTE — PROGRESS NOTES
Speech Pathology Note    Patient with RRT called. Will defer and continue to follow acutely. Thank you.     Sybil Venegas M.S., CCC-SLP

## 2022-09-22 NOTE — PROGRESS NOTES
Pharmacy Automatic Renal Dosing Protocol - Antimicrobials    Indication for Antimicrobials: CNS Infection      Current Regimen of Each Antimicrobial:  Vancomycin 1750 mg x 1 then 1250 mg Q18 (Start Date ; Day # 1)  Ceftriaxone 2 grams Q12H (Start Date , Day 1)    Previous Antimicrobial Therapy:    Vancomycin Goal Level: 400-600 mg*hour/liter per 24 hours    Vancomycin Levels  Date Dose & Interval Measured (mcg/mL) Steady State (mcg/mL)                       Date & time of next level:     Significant Cultures:     Radiology / Imaging results: (X-ray, CT scan or MRI):       Labs:  Recent Labs     22  0336 22  1726   CREA 0.94 1.22   BUN 12 14   WBC 5.1 6.3     Temp (24hrs), Av.3 °F (36.8 °C), Min:97.9 °F (36.6 °C), Max:99 °F (37.2 °C)      Paralysis, amputations, malnutrition:   Creatinine Clearance (mL/min) or Dialysis: 60    Impression/Plan:   Antibiotics as above. [facility] Daily  Will order a Vancomycin peak 2 hours post infusion, trough 1 hour before next dose. Both levels shoud be drawn after the same dose per guidelines. Pharmacy will follow daily and adjust medications as appropriate for renal function and/or serum levels. Thank you,  Sirisha Horn, [facility]      Recommended duration of therapy  http://[facility]/[place]/[facility]/Pharmacy/Clinical%20Companion/Duration%20of%20ABX%20therapy. docx    Renal Dosing  http://[facility]/[place]/[facility]/Pharmacy/Clinical%20Companion/Renal%[id number]. pdf

## 2022-09-22 NOTE — PROGRESS NOTES
Reveied patient after RRt valled for a \"spell\" after retuning from MRIMD at bedside, VSS, no further action at this time, waiting for MRI results.

## 2022-09-23 ENCOUNTER — APPOINTMENT (OUTPATIENT)
Dept: GENERAL RADIOLOGY | Age: 82
DRG: 100 | End: 2022-09-23
Attending: NURSE PRACTITIONER
Payer: MEDICARE

## 2022-09-23 ENCOUNTER — APPOINTMENT (OUTPATIENT)
Dept: CT IMAGING | Age: 82
DRG: 100 | End: 2022-09-23
Attending: STUDENT IN AN ORGANIZED HEALTH CARE EDUCATION/TRAINING PROGRAM
Payer: MEDICARE

## 2022-09-23 ENCOUNTER — APPOINTMENT (OUTPATIENT)
Dept: GENERAL RADIOLOGY | Age: 82
DRG: 100 | End: 2022-09-23
Attending: INTERNAL MEDICINE
Payer: MEDICARE

## 2022-09-23 VITALS
RESPIRATION RATE: 15 BRPM | HEIGHT: 69 IN | TEMPERATURE: 97.3 F | DIASTOLIC BLOOD PRESSURE: 78 MMHG | WEIGHT: 192.02 LBS | SYSTOLIC BLOOD PRESSURE: 126 MMHG | BODY MASS INDEX: 28.44 KG/M2 | OXYGEN SATURATION: 99 % | HEART RATE: 92 BPM

## 2022-09-23 LAB
ANION GAP SERPL CALC-SCNC: 12 MMOL/L (ref 5–15)
ARTERIAL PATENCY WRIST A: POSITIVE
BASE DEFICIT BLD-SCNC: 10.2 MMOL/L
BASE DEFICIT BLD-SCNC: 6.9 MMOL/L
BASE DEFICIT BLD-SCNC: 7.5 MMOL/L
BDY SITE: ABNORMAL
BNP SERPL-MCNC: 1863 PG/ML
BUN SERPL-MCNC: 34 MG/DL (ref 6–20)
BUN/CREAT SERPL: 28 (ref 12–20)
CALCIUM SERPL-MCNC: 8.8 MG/DL (ref 8.5–10.1)
CALCULATED R AXIS, ECG10: -75 DEGREES
CALCULATED T AXIS, ECG11: 6 DEGREES
CHLORIDE SERPL-SCNC: 111 MMOL/L (ref 97–108)
CO2 SERPL-SCNC: 19 MMOL/L (ref 21–32)
COVID-19 RAPID TEST, COVR: NOT DETECTED
CREAT SERPL-MCNC: 1.22 MG/DL (ref 0.7–1.3)
DIAGNOSIS, 93000: NORMAL
ERYTHROCYTE [DISTWIDTH] IN BLOOD BY AUTOMATED COUNT: 15.5 % (ref 11.5–14.5)
EST. AVERAGE GLUCOSE BLD GHB EST-MCNC: 137 MG/DL
GAS FLOW.O2 O2 DELIVERY SYS: ABNORMAL L/MIN
GAS FLOW.O2 SETTING OXYMISER: 16 BPM
GLUCOSE BLD STRIP.AUTO-MCNC: 237 MG/DL (ref 65–117)
GLUCOSE BLD STRIP.AUTO-MCNC: 241 MG/DL (ref 65–117)
GLUCOSE SERPL-MCNC: 235 MG/DL (ref 65–100)
HBA1C MFR BLD: 6.4 % (ref 4–5.6)
HCO3 BLD-SCNC: 17.2 MMOL/L (ref 22–26)
HCO3 BLD-SCNC: 18 MMOL/L (ref 22–26)
HCO3 BLD-SCNC: 19.7 MMOL/L (ref 22–26)
HCT VFR BLD AUTO: 52.7 % (ref 36.6–50.3)
HGB BLD-MCNC: 16.8 G/DL (ref 12.1–17)
MCH RBC QN AUTO: 30.8 PG (ref 26–34)
MCHC RBC AUTO-ENTMCNC: 31.9 G/DL (ref 30–36.5)
MCV RBC AUTO: 96.7 FL (ref 80–99)
NRBC # BLD: 0 K/UL (ref 0–0.01)
NRBC BLD-RTO: 0 PER 100 WBC
O2/TOTAL GAS SETTING VFR VENT: 100 %
O2/TOTAL GAS SETTING VFR VENT: 100 %
PCO2 BLD: 33.2 MMHG (ref 35–45)
PCO2 BLD: 34.5 MMHG (ref 35–45)
PCO2 BLD: 56.7 MMHG (ref 35–45)
PEEP RESPIRATORY: 5 CMH2O
PH BLD: 7.15 [PH] (ref 7.35–7.45)
PH BLD: 7.32 [PH] (ref 7.35–7.45)
PH BLD: 7.33 [PH] (ref 7.35–7.45)
PLATELET # BLD AUTO: 104 K/UL (ref 150–400)
PO2 BLD: 113 MMHG (ref 80–100)
PO2 BLD: 179 MMHG (ref 80–100)
PO2 BLD: 404 MMHG (ref 80–100)
POTASSIUM SERPL-SCNC: 4.6 MMOL/L (ref 3.5–5.1)
Q-T INTERVAL, ECG07: 346 MS
QRS DURATION, ECG06: 116 MS
QTC CALCULATION (BEZET), ECG08: 484 MS
RBC # BLD AUTO: 5.45 M/UL (ref 4.1–5.7)
SAO2 % BLD: 98.1 % (ref 92–97)
SAO2 % BLD: 99.5 % (ref 92–97)
SAO2 % BLD: 99.9 % (ref 92–97)
SERVICE CMNT-IMP: ABNORMAL
SODIUM SERPL-SCNC: 142 MMOL/L (ref 136–145)
SOURCE, COVRS: NORMAL
SPECIMEN TYPE: ABNORMAL
VENTILATION MODE VENT: ABNORMAL
VENTRICULAR RATE, ECG03: 118 BPM
VT SETTING VENT: 450 ML
WBC # BLD AUTO: 29.3 K/UL (ref 4.1–11.1)

## 2022-09-23 PROCEDURE — 95706 EEG WO VID 2-12HR INTMT MNTR: CPT | Performed by: STUDENT IN AN ORGANIZED HEALTH CARE EDUCATION/TRAINING PROGRAM

## 2022-09-23 PROCEDURE — 74011250636 HC RX REV CODE- 250/636: Performed by: NURSE PRACTITIONER

## 2022-09-23 PROCEDURE — 94002 VENT MGMT INPAT INIT DAY: CPT

## 2022-09-23 PROCEDURE — 74011250637 HC RX REV CODE- 250/637: Performed by: NURSE PRACTITIONER

## 2022-09-23 PROCEDURE — 85027 COMPLETE CBC AUTOMATED: CPT

## 2022-09-23 PROCEDURE — 80048 BASIC METABOLIC PNL TOTAL CA: CPT

## 2022-09-23 PROCEDURE — 83880 ASSAY OF NATRIURETIC PEPTIDE: CPT

## 2022-09-23 PROCEDURE — 0BH17EZ INSERTION OF ENDOTRACHEAL AIRWAY INTO TRACHEA, VIA NATURAL OR ARTIFICIAL OPENING: ICD-10-PCS | Performed by: HOSPITALIST

## 2022-09-23 PROCEDURE — 74011250637 HC RX REV CODE- 250/637: Performed by: STUDENT IN AN ORGANIZED HEALTH CARE EDUCATION/TRAINING PROGRAM

## 2022-09-23 PROCEDURE — 36600 WITHDRAWAL OF ARTERIAL BLOOD: CPT

## 2022-09-23 PROCEDURE — 74011000258 HC RX REV CODE- 258: Performed by: NURSE PRACTITIONER

## 2022-09-23 PROCEDURE — 74011250636 HC RX REV CODE- 250/636: Performed by: INTERNAL MEDICINE

## 2022-09-23 PROCEDURE — 93005 ELECTROCARDIOGRAM TRACING: CPT

## 2022-09-23 PROCEDURE — 71045 X-RAY EXAM CHEST 1 VIEW: CPT

## 2022-09-23 PROCEDURE — 74011000258 HC RX REV CODE- 258: Performed by: INTERNAL MEDICINE

## 2022-09-23 PROCEDURE — 87070 CULTURE OTHR SPECIMN AEROBIC: CPT

## 2022-09-23 PROCEDURE — 74011636637 HC RX REV CODE- 636/637: Performed by: NURSE PRACTITIONER

## 2022-09-23 PROCEDURE — 82803 BLOOD GASES ANY COMBINATION: CPT

## 2022-09-23 PROCEDURE — 74011000250 HC RX REV CODE- 250: Performed by: NURSE PRACTITIONER

## 2022-09-23 PROCEDURE — 83036 HEMOGLOBIN GLYCOSYLATED A1C: CPT

## 2022-09-23 PROCEDURE — 74011250636 HC RX REV CODE- 250/636: Performed by: HOSPITALIST

## 2022-09-23 PROCEDURE — 31500 INSERT EMERGENCY AIRWAY: CPT

## 2022-09-23 PROCEDURE — 74011000258 HC RX REV CODE- 258: Performed by: HOSPITALIST

## 2022-09-23 PROCEDURE — 74018 RADEX ABDOMEN 1 VIEW: CPT

## 2022-09-23 PROCEDURE — 82962 GLUCOSE BLOOD TEST: CPT

## 2022-09-23 PROCEDURE — 95816 EEG AWAKE AND DROWSY: CPT | Performed by: PSYCHIATRY & NEUROLOGY

## 2022-09-23 PROCEDURE — 70450 CT HEAD/BRAIN W/O DYE: CPT

## 2022-09-23 PROCEDURE — 87635 SARS-COV-2 COVID-19 AMP PRB: CPT

## 2022-09-23 PROCEDURE — 5A1935Z RESPIRATORY VENTILATION, LESS THAN 24 CONSECUTIVE HOURS: ICD-10-PCS | Performed by: HOSPITALIST

## 2022-09-23 PROCEDURE — 36415 COLL VENOUS BLD VENIPUNCTURE: CPT

## 2022-09-23 RX ORDER — DEXMEDETOMIDINE HYDROCHLORIDE 4 UG/ML
.1-1.5 INJECTION, SOLUTION INTRAVENOUS
Status: DISCONTINUED | OUTPATIENT
Start: 2022-09-23 | End: 2022-09-23 | Stop reason: HOSPADM

## 2022-09-23 RX ORDER — ROCURONIUM BROMIDE 10 MG/ML
50 INJECTION, SOLUTION INTRAVENOUS
Status: COMPLETED | OUTPATIENT
Start: 2022-09-23 | End: 2022-09-23

## 2022-09-23 RX ORDER — MIDAZOLAM HYDROCHLORIDE 1 MG/ML
INJECTION, SOLUTION INTRAMUSCULAR; INTRAVENOUS
Status: DISCONTINUED
Start: 2022-09-23 | End: 2022-09-23 | Stop reason: HOSPADM

## 2022-09-23 RX ORDER — FAMOTIDINE 20 MG/1
20 TABLET, FILM COATED ORAL 2 TIMES DAILY
Status: DISCONTINUED | OUTPATIENT
Start: 2022-09-23 | End: 2022-09-23 | Stop reason: HOSPADM

## 2022-09-23 RX ORDER — MIDAZOLAM HYDROCHLORIDE 1 MG/ML
2 INJECTION, SOLUTION INTRAMUSCULAR; INTRAVENOUS
Status: COMPLETED | OUTPATIENT
Start: 2022-09-23 | End: 2022-09-23

## 2022-09-23 RX ORDER — ETOMIDATE 2 MG/ML
30 INJECTION INTRAVENOUS ONCE
Status: COMPLETED | OUTPATIENT
Start: 2022-09-23 | End: 2022-09-23

## 2022-09-23 RX ORDER — FUROSEMIDE 10 MG/ML
40 INJECTION INTRAMUSCULAR; INTRAVENOUS ONCE
Status: COMPLETED | OUTPATIENT
Start: 2022-09-23 | End: 2022-09-23

## 2022-09-23 RX ORDER — INSULIN LISPRO 100 [IU]/ML
1-8 INJECTION, SOLUTION INTRAVENOUS; SUBCUTANEOUS EVERY 6 HOURS
Status: DISCONTINUED | OUTPATIENT
Start: 2022-09-23 | End: 2022-09-23 | Stop reason: HOSPADM

## 2022-09-23 RX ORDER — DILTIAZEM HYDROCHLORIDE 5 MG/ML
5 INJECTION INTRAVENOUS ONCE
Status: COMPLETED | OUTPATIENT
Start: 2022-09-23 | End: 2022-09-23

## 2022-09-23 RX ORDER — IPRATROPIUM BROMIDE AND ALBUTEROL SULFATE 2.5; .5 MG/3ML; MG/3ML
3 SOLUTION RESPIRATORY (INHALATION)
Status: DISCONTINUED | OUTPATIENT
Start: 2022-09-23 | End: 2022-09-23

## 2022-09-23 RX ORDER — SODIUM CHLORIDE, SODIUM LACTATE, POTASSIUM CHLORIDE, CALCIUM CHLORIDE 600; 310; 30; 20 MG/100ML; MG/100ML; MG/100ML; MG/100ML
50 INJECTION, SOLUTION INTRAVENOUS CONTINUOUS
Status: DISCONTINUED | OUTPATIENT
Start: 2022-09-23 | End: 2022-09-23 | Stop reason: HOSPADM

## 2022-09-23 RX ADMIN — ETOMIDATE 30 MG: 2 INJECTION INTRAVENOUS at 05:36

## 2022-09-23 RX ADMIN — CEFEPIME 2 G: 2 INJECTION, POWDER, FOR SOLUTION INTRAVENOUS at 10:35

## 2022-09-23 RX ADMIN — SODIUM CHLORIDE, POTASSIUM CHLORIDE, SODIUM LACTATE AND CALCIUM CHLORIDE 50 ML/HR: 600; 310; 30; 20 INJECTION, SOLUTION INTRAVENOUS at 09:42

## 2022-09-23 RX ADMIN — FOLIC ACID 1 MG: 1 TABLET ORAL at 09:56

## 2022-09-23 RX ADMIN — CEFTRIAXONE 2 G: 2 INJECTION, POWDER, FOR SOLUTION INTRAMUSCULAR; INTRAVENOUS at 02:59

## 2022-09-23 RX ADMIN — SODIUM CHLORIDE 0.4 MCG/KG/HR: 9 INJECTION, SOLUTION INTRAVENOUS at 06:15

## 2022-09-23 RX ADMIN — Medication 50 MG: at 05:37

## 2022-09-23 RX ADMIN — AMPICILLIN SODIUM 2 G: 2 INJECTION, POWDER, FOR SOLUTION INTRAMUSCULAR; INTRAVENOUS at 09:57

## 2022-09-23 RX ADMIN — MIDAZOLAM 2 MG: 1 INJECTION INTRAMUSCULAR; INTRAVENOUS at 06:02

## 2022-09-23 RX ADMIN — IPRATROPIUM BROMIDE AND ALBUTEROL SULFATE 3 ML: .5; 3 SOLUTION RESPIRATORY (INHALATION) at 06:09

## 2022-09-23 RX ADMIN — LEVETIRACETAM 1500 MG: 100 INJECTION, SOLUTION, CONCENTRATE INTRAVENOUS at 09:39

## 2022-09-23 RX ADMIN — FUROSEMIDE 40 MG: 10 INJECTION, SOLUTION INTRAVENOUS at 03:33

## 2022-09-23 RX ADMIN — AMPICILLIN SODIUM 2 G: 2 INJECTION, POWDER, FOR SOLUTION INTRAMUSCULAR; INTRAVENOUS at 05:12

## 2022-09-23 RX ADMIN — Medication 3 UNITS: at 12:15

## 2022-09-23 RX ADMIN — VANCOMYCIN HYDROCHLORIDE 1250 MG: 10 INJECTION, POWDER, LYOPHILIZED, FOR SOLUTION INTRAVENOUS at 12:41

## 2022-09-23 RX ADMIN — FAMOTIDINE 20 MG: 20 TABLET, FILM COATED ORAL at 09:55

## 2022-09-23 RX ADMIN — DILTIAZEM HYDROCHLORIDE 5 MG: 5 INJECTION, SOLUTION INTRAVENOUS at 04:06

## 2022-09-23 NOTE — CONSULTS
CRITICAL CARE NOTE      Name: Joslyn Pereyra   : 1940   MRN: [id number]   Date: 2022      REASON FOR ICU ADMISSION:  Acute respiratory Failure     PRINCIPAL ICU DIAGNOSIS   Acute respiratory failure  Acute encephalopathy in setting of possible seizure  Subdural fluid collections 2/2 subdural hygromas d/t chronic CSF shunting vs infectious    BRIEF PATIENT SUMMARY   80 y/o male history of DM. COPD and normal pressure hydrocephalus s/p shunt (recently adjusted at [facility]) admitted  for concern for possible seizure after p/w progressive lower extremity weakness, confusion and fall. Course complicated by acute respiratory distress and inability to protect airway requiring emergent intubation.      COMPREHENSIVE ASSESSMENT & PLAN:SYSTEM BASED     24 HOUR EVENTS:   Intubated d/t acutely obtunded not protecting airway  LP to r/o meningitis- pending  Will need further d/w neurology and neurosurgery at [facility] on acute decompensation and further management    NEUROLOGICAL: Hx of Hydrocephalus s/p  shunt, chronic subdural hematoma   Acute encephalopathy in setting of possible seizure  Subdural fluid collections 2/2 subdural hygromas d/t chronic CSF shunting vs infectious    Neurology consulted  CT/CTA head on admission with no acute findings  MRI brain w/ evidence of subdural fluid collections possibly d/t subdural hygromas 2/2 chronic CXR shunting vs infections process  EEG-  w/ mild generalized slowing, no focal seizure or epileptiform discharges  Keppra 1500 mg BID, Seizure precautions, PRN Ativan for seizure activity > 3 minutes  LP study to r/o meningitis- pending  Per chart review case was d/w Neurosurgeon Dr. Paz Hay at [facility], no transfer indicated at that time, will need further discussion with acute decompensation    PULMONOLOGY: Hx of COPD   Acute Respiratory Failure requiring mechanical ventilation (-current)  CXR- no acute findings  Scheduled bronchodilators  Goal oxygen saturation >92 %  CARDIOVASCULAR:   Sinus Tachycardia on telemetry  TTE this admission LVEF 55-60 %, grade 1 diastolic dysfunction  Phenylephrine for goal Map > 65, likely sedation related    GASTROINTESTINAL   NPO  Famotidine for GI prophylaxis     RENAL/ELECTROLYTE/FLUIDS:   Bun/Creatine- stable  Goal urine ouput > 0.5 cc/kg/hr    ENDOCRINE: Hx of T2Dm   Blood Sugar Goal 120-180   Glucose Checks, SSI    HEMATOLOGY/ONCOLOGY:   H/H Stable  SCDs for DVT prophylaxis    INFECTIOUS DISEASE:   Afebrile, normal WBC  LP studies- pending to r/o meningitis    ANTIBIOTICS TO DATE:  Vancomycin (9/22-current)  Ceftriaxone (9/22-current)  Ampicillin (9/22- current)    CULTURES TO DATE:  LP studies- pending    ICU DAILY CHECKLIST     Code Status:Partial- DNR, ok with intubation  DVT Prophylaxis:SCDs  T/L/D: Tubes: ETT and Orogastric Tube  Lines: Peripheral IV  Drains: None  SUP: Famotidine  Diet: NPO  Activity Level:Bedrest  ABCDEF Bundle/Checklist Completed:Yes  Disposition: Stay in ICU  Multidisciplinary Rounds Completed:  Pending  Goals of Care Discussion/Palliative: Pending  Patient/Family Updated: Yes      HOSPITAL COURSE/DAILY EVENT LOG       SUBJECTIVE   Review of Systems   Unable to perform ROS: Intubated      OBJECTIVE     Labs and Data: Reviewed 09/23/22  Medications: Reviewed 09/23/22  Imaging: Reviewed 09/23/22    Physical Exam  Vitals and nursing note reviewed. Constitutional:       Appearance: He is ill-appearing. HENT:      Head: Normocephalic and atraumatic. Nose: Nose normal.      Mouth/Throat:      Mouth: Mucous membranes are dry. Eyes:      Pupils: Pupils are equal, round, and reactive to light. Cardiovascular:      Rate and Rhythm: Tachycardia present. Pulses: Normal pulses. Heart sounds: Normal heart sounds. Pulmonary:      Breath sounds: Rhonchi present. Abdominal:      General: Abdomen is flat. Bowel sounds are normal.      Palpations: Abdomen is soft.    Musculoskeletal:         General: Normal range of motion. Cervical back: Normal range of motion and neck supple. Right lower leg: Edema present. Left lower leg: No edema. Skin:     General: Skin is warm and dry. Neurological:      Comments: Sedated on MV, withdraws x 4 to tactile stimuli   Psychiatric:      Comments: On MV unable to assess        Visit Vitals  /67   Pulse (!) 109   Temp 97.9 °F (36.6 °C)   Resp 28   Ht 5' 9\" (1.753 m)   Wt 87.1 kg (192 lb 0.3 oz)   SpO2 93%   BMI 28.36 kg/m²    O2 Flow Rate (L/min): 50 l/min O2 Device: Hi flow nasal cannula Temp (24hrs), Av.9 °F (36.6 °C), Min:97.4 °F (36.3 °C), Max:98.5 °F (36.9 °C)           Intake/Output:   No intake or output data in the 24 hours ending 22 0552    Imaging    22    ECHO ADULT COMPLETE 2022    Interpretation Summary    Left Ventricle: Normal left ventricular systolic function with a visually estimated EF of 55 - 60%. Left ventricle size is normal. Normal wall thickness. Grade I diastolic dysfunction with normal LAP. Aortic Valve: Mildly thickened cusp. Mildly calcified cusp. Mitral Valve: Mildly thickened leaflet. Mild annular calcification of the mitral valve. Technical qualifiers: Echo study was technically difficult, limited due to patient's condition, a technically difficult Doppler study and limited due to patient tolerance. Signed by: Rudolph Lei MD on 2022  5:53 PM         CRITICAL CARE DOCUMENTATION  I had a face to face encounter with the patient, reviewed and interpreted patient data including clinical events, labs, images, vital signs, I/O's, and examined patient.   I have discussed the case and the plan and management of the patient's care with the consulting services, the bedside nurses and the respiratory therapist.      NOTE OF PERSONAL INVOLVEMENT IN CARE   This patient has a high probability of imminent, clinically significant deterioration, which requires the highest level of preparedness to intervene urgently. I participated in the decision-making and personally managed or directed the management of the following life and organ supporting interventions that required my frequent assessment to treat or prevent imminent deterioration. I personally spent 50 minutes of critical care time. This is time spent at this critically ill patient's bedside actively involved in patient care as well as the coordination of care. This does not include any procedural time which has been billed separately.     Gary Powell NP   Critical Care Medicine  [facility]

## 2022-09-23 NOTE — PROGRESS NOTES
Problem: Falls - Risk of  Goal: *Absence of Falls  Description: Document Bart Limon Fall Risk and appropriate interventions in the flowsheet.   Outcome: Progressing Towards Goal  Note: Fall Risk Interventions:  Mobility Interventions: Assess mobility with egress test, Bed/chair exit alarm, Communicate number of staff needed for ambulation/transfer, Mechanical lift, Patient to call before getting OOB, Strengthening exercises (ROM-active/passive), Utilize walker, cane, or other assistive device, Utilize gait belt for transfers/ambulation    Mentation Interventions: Adequate sleep, hydration, pain control, Bed/chair exit alarm, Door open when patient unattended, Evaluate medications/consider consulting pharmacy, Eyeglasses and hearing aids, Familiar objects from home, Family/sitter at bedside, Gait belt with transfers/ambulation, HELP ([address]) if available, Increase mobility, More frequent rounding, Reorient patient, Room close to nurse's station, Self-releasing belt, Toileting rounds, Update white board    Medication Interventions: Assess postural VS orthostatic hypotension, Bed/chair exit alarm, Evaluate medications/consider consulting pharmacy, Patient to call before getting OOB, Teach patient to arise slowly, Utilize gait belt for transfers/ambulation    Elimination Interventions: Bed/chair exit alarm, Call light in reach, Patient to call for help with toileting needs, Stay With Me (per policy), Toileting schedule/hourly rounds    History of Falls Interventions: Bed/chair exit alarm, Consult care management for discharge planning, Door open when patient unattended, Evaluate medications/consider consulting pharmacy, Investigate reason for fall, Room close to nurse's station, Utilize gait belt for transfer/ambulation, Assess for delayed presentation/identification of injury for 48 hrs (comment for end date), Vital signs minimum Q4HRs X 24 hrs (comment for end date)         Problem: Non-Violent Restraints  Goal: Removal from restraints as soon as assessed to be safe  Outcome: Not Progressing Towards Goal  Goal: No harm/injury to patient while restraints in use  Outcome: Progressing Towards Goal  Goal: Patient's dignity will be maintained  Outcome: Progressing Towards Goal  Goal: Patient Interventions  Outcome: Progressing Towards Goal     Problem: Non-Violent Restraints  Goal: Removal from restraints as soon as assessed to be safe  Outcome: Not Progressing Towards Goal  Goal: No harm/injury to patient while restraints in use  Outcome: Progressing Towards Goal  Goal: Patient's dignity will be maintained  Outcome: Progressing Towards Goal  Goal: Patient Interventions  Outcome: Progressing Towards Goal

## 2022-09-23 NOTE — PROGRESS NOTES
Responded to Rapid Response due to change in mental status. Patient was hypoxic and having difficulty with secretions. Patient was placed on a non-rebreather and was Nasotracheally suctioned per NP. His oxygen levels stabilized and an ABG was obtained. Will continue to monitor.

## 2022-09-23 NOTE — PROGRESS NOTES
3055 per family request the [address] twas held per the family after speaking to the DR.    0552 Pt status changed from DNR to no compressions while speaking to the DR. Pt was growing   very tired. Pt was transferred to the unit.      0907 report was given to [facility]

## 2022-09-23 NOTE — INTERDISCIPLINARY ROUNDS
Interdisciplinary team rounds were held 9/23/2022 with the following team members:Care Management, Diabetes Treatment Specialist, Nursing, Nutrition, Pharmacy, Physician, and Respiratory Therapy. Plan of care discussed. See clinical pathway and/or care plan for interventions and desired outcomes. Goals of the Day: Leaving patient intubated for today. Family prefers [facility] to take over patient when bed is available.

## 2022-09-23 NOTE — PROCEDURES
[facility]      Procedure Note - Intubation:   Performed by Mayelin Lundberg NP . Staff Anesthesiologist/Intensivist    Diagnosis: Acute respiratory failure  Insertion Date: 9/23/ 22 Time:0535   Obtained Consent? yes; informed (Wife)  Procedure Location:  ICU. Immediately prior to the procedure, the patient was reevaluated and found suitable for the planned procedure and any planned medications. Immediately prior to the procedure a time out was called to verify the correct patient, procedure, equipment, staff, and marking as appropriate. Medications given were etomidate and rocuronium (Zemuron). A number 7.5 cuffed   ETT was placed to 26 cm at the teeth. Placement was evaluated by noting bilateral, symmetric breath sounds and chest x-ray visualization pending    Attempts required: 1. Complications: none. RSI was used. .  The procedure was tolerated well.

## 2022-09-23 NOTE — PROGRESS NOTES
Speech Pathology Note    Chart reviewed. Note patient with RRT overnight. Patient now transferred into CCU and emergently intubated. Patient not appropriate for PO trials or motor speech treatment at this time. Will defer and follow up as patient is appropriate. Thank you.     Queta Mullins M.S., CCC-SLP

## 2022-09-23 NOTE — PROGRESS NOTES
Transition of Care Plan:    RUR:  15%  Disposition:  acute transfer to [facility] for Neurosurgery  Follow up appointments:  TBD  DME needed:  TBD  Transportation at Discharge: [address] or means to access home:         Medicare Letter:  n/a  Is patient a  and connected with the [place]? If yes, was [employer] transfer form completed and [facility] notified? Caregiver Contact:  Sherif Liceaing [phone]  Discharge Caregiver contacted prior to discharge? Care Conference needed?:      Pt is an 80 yo male admitted from home after GLF in which he hit his head in MD office parking lot. Overnight pt experienced resp failure and required intubation. There was concern for possible CVA vs TIA vs seizure. Pt has  shunt placed at [facility] for NPH and is followed by Dr. Nicole Vasquez. Family requested transfer to [facility] for Neurosurgery and pt was accepted. [facility] obtained pt info for transport from unit RNs and will arrange critical care transport. Transfer will require EMTALA documentation. PCS on chart for crew. Griffin Swenson is at bedside and is communicating with other family members. Care Management Interventions  PCP Verified by CM: Yes  Palliative Care Criteria Met (RRAT>21 & CHF Dx)?: No  Mode of Transport at Discharge: ALS  Transition of Care Consult (CM Consult):  Other  Discharge Durable Medical Equipment: No  Physical Therapy Consult: Yes  Occupational Therapy Consult: Yes  Speech Therapy Consult: Yes  Support Systems: Spouse/Significant Other, Child(gabbi)  Discharge Location  Patient Expects to be Discharged to[de-identified] Transferred to higher level of care     BRITT Rivas

## 2022-09-23 NOTE — PROGRESS NOTES
RAPID RESPONSE TEAM    Overhead rapid response paged to room # 2532/01  at  3400    Reason for rapid response:  Respiratory distress    Initial assessment:     Upon my arrival, patient appears to be in respiratory distress. Respirations are labored with accessory muscle use and tachypnea- RR 30, spo2 83% on RA. RT at bedside performing deep suction, NRB placed on patient, spo2 improved to 92%. While deep suction performed, no cough or gag noted. Patient is unable to manage oral secretions, frequent suctioning performed. Per primary RN patients neuro status has significantly decline over past 24 hrs. Patient is now non-interactive, attempts to communicated by grunting, unable to follow commands, pupils equal and reactive, tracks, visual fields intact to threat, no facial asymmetry, no focal deficit, weakness in all extremities. HR also noted at 130, Afib on monitor. Dr. Checo Dominguez at bedside- orders received for EKG and Stat head CT WO contrast- No indication for code stroke per MD     0340- patient transported to CT and transferred to PCU room # [id number] [phone] [id number]- Patients respiratory status appears to be further declining, RR 41 and guppy breathing. patients granddaughter and wife at bedside. NP Jose Pizano paged to bedside to discuss goals of care. 12- NP Liv at bedside, patients family states they would like to revoke DNR. Code status changed to partial, no compressions.     9084- ICU consulted, patient accepted and transfer for emergent intubation    Patient Vitals for the past 4 hrs:   Temp Pulse Resp BP SpO2   09/23/22 0540 -- (!) 115 16 -- 98 %   09/23/22 0521 -- (!) 109 28 111/67 93 %   09/23/22 0518 -- (!) 108 (!) 40 -- 94 %   09/23/22 0515 -- (!) 110 (!) 41 111/67 93 %   09/23/22 0503 -- (!) 109 (!) 41 -- 95 %   09/23/22 0436 97.9 °F (36.6 °C) (!) 113 (!) 42 131/86 96 %   09/23/22 0418 -- -- -- -- 95 %   09/23/22 0333 -- (!) 119 24 (!) 144/94 98 %   09/23/22 0330 -- -- -- -- 96 %   09/23/22 0313 97.4 °F (36.3 °C) (!) 119 23 (!) 152/85 (!) 85 %          Interventions:     - Chest xray   - Lasix 40 mg IV  - Cardizem 5 mg IV  - EKG  - CT head WO contrast   - Pro BNP  - ICU consult     Outcome:     Discussed with BOZENA Peck at bedside to update neurosurgery with patient status. pt transferred to ICU 2532/01 for emergent intubation.     Please call with any questions or concerns    Puneet Cuello RN  Rapid Response Team  Ext [phone]     Recent Results (from the past 8 hour(s))   GLUCOSE, POC    Collection Time: 09/22/22 10:12 PM   Result Value Ref Range    Glucose (POC) 180 (H) 65 - 117 mg/dL    Performed by Linda Trejo (WILL RN)    METABOLIC PANEL, BASIC    Collection Time: 09/23/22 12:42 AM   Result Value Ref Range    Sodium 142 136 - 145 mmol/L    Potassium 4.6 3.5 - 5.1 mmol/L    Chloride 111 (H) 97 - 108 mmol/L    CO2 19 (L) 21 - 32 mmol/L    Anion gap 12 5 - 15 mmol/L    Glucose 235 (H) 65 - 100 mg/dL    BUN 34 (H) 6 - 20 MG/DL    Creatinine 1.22 0.70 - 1.30 MG/DL    BUN/Creatinine ratio 28 (H) 12 - 20      GFR est AA >60 >60 ml/min/1.73m2    GFR est non-AA 57 (L) >60 ml/min/1.73m2    Calcium 8.8 8.5 - 10.1 MG/DL   GLUCOSE, POC    Collection Time: 09/23/22  3:16 AM   Result Value Ref Range    Glucose (POC) 237 (H) 65 - 117 mg/dL    Performed by Linda Trejo (WILL RN)    NT-PRO BNP    Collection Time: 09/23/22  3:20 AM   Result Value Ref Range    NT pro-BNP 1,863 (H) <450 PG/ML   POC G3 - PUL    Collection Time: 09/23/22  3:34 AM   Result Value Ref Range    FIO2 (POC) 100 %    pH (POC) 7.32 (L) 7.35 - 7.45      pCO2 (POC) 33.2 (L) 35.0 - 45.0 MMHG    pO2 (POC) 179 (H) 80 - 100 MMHG    HCO3 (POC) 17.2 (L) 22 - 26 MMOL/L    sO2 (POC) 99.5 (H) 92 - 97 %    Base deficit (POC) 7.5 mmol/L    Site RIGHT RADIAL      Device: Non rebreather      Allens test (POC) Positive      Specimen type (POC) ARTERIAL

## 2022-09-23 NOTE — PROGRESS NOTES
PT note:     Chart reviewed and noted patient transferred into CCU, just intubated and sedated this morning. Will defer and follow up on Monday if medically appropriate.      Catrachito Stanford, PT, DPT

## 2022-09-23 NOTE — PROGRESS NOTES
0700 Bedside report received by Gal Iniguez    0981 MD Arely Marie at bedside w/ family answering questions    [address] team interdisciplinary rounding on patient. Orders to hold Plavix for possible procedure and Sputum culture sent to lab. Family wants patient transferred to [facility] d/t patient's hx of Shunt placement and it's the patient's Neurosurgeon's workplace. Both MDs are aware. 1100 Neurology at bedside. Spoke w/ Pharmacy about redness on patient's palms. They are aware and assessing medication for any possible cause. May be from when patient fell before being admitted. 1200 Upon reassessment of patient, patient has low urine output. MD Arely Marie made aware. Orders to continue IVF were instructed. Dawood called to inform RN that [facility] ETA is [phone] This RN gave report to Elana Pandey [facility].     [phone] [facility] is at bedside. EMTALA sheet completed.      1540 Patient leaving room w/ [facility] for transportation to [facility]

## 2022-09-23 NOTE — PROGRESS NOTES
9123 Patient arrived to room [phone]. Getting ready for intubation. 0540 Intubated with 7.5 at 26 at the teeth. Started on Precedex at 0.4 mcg/kg/hr. 200 of bony given for hypotension. 9351 Upper/Lower Dentures gave to the wife.    0700 Bedside report given to Elsi/HANNAH Mora.

## 2022-09-23 NOTE — PROGRESS NOTES
Rapid Response Team Note    Called by RN at 3:11 AM to see patient for increasing lethargy, WOB stat. Upon entering the room the patient was complaining of unresponsive except to painful stimuli. Events as described by RN caring for patient noted. Visit Vitals  BP (!) 172/83 (BP 1 Location: Left upper arm)   Pulse (!) 103   Temp 98 °F (36.7 °C)   Resp 21   Ht 5' 9\" (1.753 m)   Wt 87.1 kg (192 lb 0.3 oz)   SpO2 93%   BMI 28.36 kg/m²       Gen: lethargic, in moderate distress  HEENT: WNL  Chest: symmetrical, scattered ronchi  Abd: soft, non distended  Neuro: lethargic  Ext: no spontaneous movement    Pertinent Recent Labs and Xrays:  Recent Labs     09/21/22  0336 09/20/22  1726   WBC 5.1 6.3   HGB 13.6 13.1   HCT 42.1 40.2   * 101*     Recent Labs     09/23/22  0042 09/21/22  0336    144   K 4.6 3.8   * 113*   CO2 19* 23   BUN 34* 12   CREA 1.22 0.94   * 199*   CA 8.8 9.0   MG  --  1.9   PHOS  --  3.3     Recent Labs     09/20/22  1726   INR 1.1      No results for input(s): PH, PCO2, PO2 in the last 72 hours. No results for input(s): PHI, PO2I, PCO2I in the last 72 hours. No results for input(s): CPK, CKNDX, TROIQ in the last 72 hours. No lab exists for component: CPKMB  Lab Results   Component Value Date/Time    Glucose (POC) 180 (H) 09/22/2022 10:12 PM    Glucose (POC) 162 (H) 09/22/2022 06:02 PM    Glucose (POC) 191 (H) 09/22/2022 11:17 AM    Glucose (POC) 191 (H) 09/22/2022 09:54 AM    Glucose (POC) 201 (H) 09/22/2022 08:02 AM           A/P: Progressive lethargy  Acute respiratory distress with hypoxia  Afib with RVR  - chest X-ray  - Lasix 40 mg IV x 1 dose  - Cardizem 5 mg IV x 1 dose  - CT head WO contrast  - pro BNP (4,604)  - transfer to stepdown      Media Older, NP  Critical care time unrelated to prior notes: 35m minutes    0500: Notified of patient's worsening WOB and persistent lethargy. Spoke with grand daughter and wife at bedside.  Aware of the possibility that patient may turn for the worse anytime and needs additional airway support as patient is unable to.  Code stratus changed to partial (okay with intubation and shock, no to compression)    - Intensivist consultation ordered  - Transfer to ICU for airway management

## 2022-09-23 NOTE — PROGRESS NOTES
RAPID RESPONSE NOTE:    BACKGROUND/ SITUATION:  21-year-old admitted with acute encephalopathy with known hydrocephalus status post  shunt recently changed. MRI demonstrating subdural fluid collection possibly representing hygroma. Neurology following with concerns for seizures. FINDINGS:  On arrival to the room patient in apparent respiratory distress with respiratory rates greater than 30 rhonchorous breathing. RT at bedside performing deep suctioning yielding clear white watery sputum. Patient noninteractive, which granddaughter and nursing report is relatively rapid and changed from baseline over the last 24-48 hours. Markedly diminished gag reflex. No preceding convulsive behavior. No lateralizing neurologic deficits appreciated. Sluggish pupillary response and diminished corneal reflex. Noted patient is DNR and granddaughter confirmed that he would not desire intubation and ventilation. Patient placed to nonrebreather with improvement in saturations from 80s to mid 90s. Patient noted to be tachycardic in 120s requested EKG.     ASSESSMENT/INTERVENTION/ RESPONSE  Transfer to stepdown unit  Continue nonrebreather and transition to heated high flow-patient not candidate for BiPAP given inability to protect airway  Confirmed DNR status including DNI  ABG ordered  CXR ordered awaiting imaging  I have concerns that he suffered aspiration event  Repeat CT head given reported shunt malfunction and apparent acute change in neurologic exam  Stat spot EEG ordered, no behavior to suggest ongoing seizures but will need to rule out absence seizure's versus nonconvulsive status  Patient already on [address] with neurology following  EKG demonstrates A. fib with RVR-5 mg IV diltiazem push ordered by ANNALISA we will follow-up and repeat as needed      Signed: Alexsander Estrada DO  9/23/2022 4:06 AM

## 2022-09-23 NOTE — CONSULTS
Date of Consultation:  September 23, 2022    Reason for Consultation:  Altered mental status     CC: AMS     History of Present Illness:   Humaira Manjarrez is a 79 y.o. male with history of diabetes, COPD, normal pressure hydrocephalus status post  shunt who is currently admitted to the hospital with progressive worsening lower extremity weakness, confusion and a fall concerning for possible seizure. History is obtained from the daughter who is at bedside. She does report that over the last 6 weeks he has been declining in terms of his ability to walk, increased confusion and some issues with urination. He was seen by his neurosurgeon at [facility] and they adjusted his  shunt. He then went to see his pulmonologist and had an episode with a fall on the right side of his head. He was brought to the hospital for further evaluation. There was concern for possible stroke and he underwent stroke work-up including a noncontrast head CT which showed no acute changes and a CTA head and neck t which revealed no large vessel occlusion however perfusion imaging was unreadable due to motion. Interval history  Overnight patient became more unresponsive with increased work of breathing. His neurological status worsened and he required intubation and transferred to the ICU. Nursing did report that he had developed redness of his palms bilaterally.      Past Medical History:   Diagnosis Date    AAA (abdominal aortic aneurysm) ([facility] [address]     Followed by Dr Harris Schilder as of 05/13/19 3.9 x 4.1    At risk for sleep apnea 07/08/2020    during PAT assessment, MEL score 4    Cancer (HCC)     r shoulder melanoma    Colon polyp     Contact dermatitis and eczema due to cause     COPD     Diabetes ([address]     Diarrhea 1/22/2016    Dysphagia 4/4/2017    Emphysema lung ([address]     Eructation 7/17/2020    Exposure to TB     2/2019 cruise, probably exposure to TB, beginning TC 9/2019    GERD (gastroesophageal reflux disease)     Hypertension Ill-defined condition     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, arms    NPH (normal pressure hydrocephalus) ([facility] [address]     shunt    RA (rheumatoid arthritis) ([facility] [address]     Scarring     Sepsis ([facility] [address]     with perforated gallbladder    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 2021    Stroke ([facility] [address]     L arm weakness states not a stroke states it is a TIA     TIA (transient ischemic attack) 2018        Past Surgical History:   Procedure Laterality Date    HX APPENDECTOMY      HX CATARACT REMOVAL Bilateral     HX CHOLECYSTECTOMY  2017    at [facility], ruptured, had sepsis, appendix removed with this    HX ENDOSCOPY      HX FEMORAL BYPASS Left     stent    HX GI      PEG placed and removed    HX OPEN REDUCTION INTERNAL FIXATION Left     femur, suresh in place    HX ORTHOPAEDIC Left     Femur     HX OTHER SURGICAL      r shoulder melenoma excision    HX TONSILLECTOMY      IR PLC CATH AV SHUNT IN W PTA SI      MD COLSC FLX W/RMVL OF TUMOR POLYP LESION SNARE TQ  2011         MD EGD TRANSORAL BIOPSY SINGLE/MULTIPLE  2011             Family History   Adopted: Yes        Social History     Tobacco Use    Smoking status: Former     Packs/day: 1.00     Types: Cigarettes     Quit date:      Years since quittin.    Smokeless tobacco: Never   Substance Use Topics    Alcohol use: Yes     Alcohol/week: 5.8 standard drinks     Types: 7 Shots of liquor per week        Allergies   Allergen Reactions    Ceclor [Cefaclor] Swelling    Contrast Agent [Iodine] Hives and Itching    Floxin [Ofloxacin] Hives    Gabapentin Other (comments)     Causes confusion    Ivp [Fd And C Blue No.1] Itching    Lansoprazole Diarrhea        Prior to Admission medications    Medication Sig Start Date End Date Taking? Authorizing Provider   dilTIAZem IR (CARDIZEM) 60 mg tablet TAKE ONE TABLET BY MOUTH 2 TIMES A DAY 22   Jermaine Kong III, DO   metFORMIN (GLUCOPHAGE) 500 mg tablet Take 500 mg by mouth two (2) times daily (with meals). Provider, Historical   tamsulosin (FLOMAX) 0.4 mg capsule TAKE 1 CAPSULE 30 MINUTES AFTER THE SAME MEAL DAILY 5/25/22   Ingrid RANGEL III, DO   pravastatin (PRAVACHOL) 80 mg tablet TAKE ONE TABLET BY MOUTH EVERY NIGHT 2/25/22   Ingrid RANGEL III, DO   OneTouch Ultra Test strip TEST 1-2 TIMES A DAY 7/26/21   Jermaine Kong III, DO   diclofenac (VOLTAREN) 1 % gel Use to left shoulder up to 4x daily. 3/2/20   Ingrid RANGEL III, DO   clopidogrel (PLAVIX) 75 mg tab Take 75 mg by mouth daily. 4/22/19   Provider, Historical   fluticasone (FLONASE) 50 mcg/actuation nasal spray 2 Sprays by Both Nostrils route daily as needed. 10/6/17   Provider, Historical   acetaminophen (TYLENOL) 500 mg tablet Take 500 mg by mouth every six (6) hours as needed for Pain. Provider, Historical   albuterol sulfate (PROVENTIL;VENTOLIN) 2.5 mg/0.5 mL nebu nebulizer solution 2.5 mg by Nebulization route every four (4) hours as needed for Wheezing. Provider, Historical   methotrexate (RHEUMATREX) 2.5 mg tablet Take 10 mg by mouth. Every Wednesday and Thursday    Provider, Historical   adalimumab (HUMIRA) 40 mg/0.8 mL injection 40 mg by SubCUTAneous route every fourteen (14) days. Provider, Historical   folic acid (FOLVITE) 1 mg tablet Take 1 mg by mouth daily. Provider, Historical   tiotropium (SPIRIVA) 18 mcg inhalation capsule Take 1 Cap by inhalation daily.     Provider, Historical       Review of Systems:    General, constitutional: negative  Eyes, vision: negative  Ears, nose, throat: negative  Cardiovascular, heart: negative  Respiratory: negative  Gastrointestinal: negative  Genitourinary: negative  Musculoskeletal: negative  Skin and integumentary: negative  Psychiatric: negative  Endocrine: negative  Neurological: negative, except for HPI  Hematologic/lymphatic: negative  Allergy/immunology: negative    PHYSICAL EXAMINATION:   Visit Vitals  /69   Pulse 85   Temp 97.3 °F (36.3 °C)   Resp 20 Ht 5' 9\" (1.753 m)   Wt 192 lb 0.3 oz (87.1 kg)   SpO2 97%   BMI 28.36 kg/m²     General:  intubated, no sedation   Neck: no carotid bruits  Lungs: clear to auscultation  Heart:  no murmurs, regular rate and rhythm   Lower extremity: no edema    Neuro exam:   Mental status: Opens eyes to deep noxious stimuli. He did not follow any commands. Pupils: 2 mm and sluggish to react  Unable to do funduscopic exam due to patient participation   Corneal reflex: Intact  Occulocephalic reflex: Intact  Gag/Cough reflex: Coughs when suctioned    Motor/Sensory:    No spontaneous movement noted in the upper or lower extremities   Does not withdraw to noxious stimuli in the upper or lower extremities     Tone: decreased tone    Reflexes: Depressed throughout   Plantar response: mute bilaterally     Cerebellar testing:  unable to perform due to patient participation   Gait: unable to assess due to cognitive status      Data:     Lab Results   Component Value Date/Time    Hemoglobin A1c 6.4 (H) 09/23/2022 12:42 AM    Sodium 142 09/23/2022 12:42 AM    Potassium 4.6 09/23/2022 12:42 AM    Chloride 111 (H) 09/23/2022 12:42 AM    Glucose 235 (H) 09/23/2022 12:42 AM    BUN 34 (H) 09/23/2022 12:42 AM    Creatinine 1.22 09/23/2022 12:42 AM    Calcium 8.8 09/23/2022 12:42 AM    WBC 29.3 (H) 09/23/2022 10:44 AM    HCT 52.7 (H) 09/23/2022 10:44 AM    HGB 16.8 09/23/2022 10:44 AM    PLATELET 216 (L) 96/55/9051 10:44 AM       Imaging:    Results from Hospital Encounter encounter on 09/20/22    MRI BRAIN W WO CONT    Narrative  EXAM:  MRI BRAIN W WO CONT    INDICATION:    eval for infection? Subdural collection    COMPARISON:  9/22/2022 head CT performed earlier same date. CONTRAST: 20 ml ProHance. TECHNIQUE:  Multiplanar multisequence acquisition without and with contrast of the brain.     FINDINGS:  Redemonstration of right frontal axis ventricular catheter shunting with the  catheter tip terminating in the anterior right lateral ventricle. Hardware  associated significant motion artifact precludes optimal evaluation of adjacent  brain structures. There is bilateral symmetric diffuse subdural collection which does not  completely suppress on FLAIR, measuring up to 0.6 cm in thickness along anterior  frontal lobes (image 16 of series 6). There is associated diffuse subdural  enhancement. No definite involvement of the ventricular and extraventricular CSF  spaces. Basal CSF cisterns are patent. There is no cerebellar tonsillar  herniation. Expected arterial flow-voids are present. There is no acute  infarct, parenchymal hemorrhage, or significant mass effect. Patchy  periventricular and subcortical white matter T2/FLAIR hyperintensity,  nonspecific and likely microangiopathic ischemic changes. Mild diffuse mucosal thickening of the paranasal sinuses. The mastoids are free  of fluid bilaterally. Status post bilateral lense replacement. The orbits are  otherwise unremarkable. No significant osseous or scalp lesions are identified. 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 Meningitis is not completely excluded. Please correlate with CSF analysis. No significant brain parenchymal involvement or brain abscess. No intracranial mass lesion. Results from [place] encounter on 09/20/22    CT HEAD WO CONT    Narrative  EXAM: CT HEAD WO CONT    INDICATION: AMS    COMPARISON: 9/22/2022. CONTRAST: None. TECHNIQUE: Unenhanced CT of the head was performed using 5 mm images. Brain and  bone windows were generated. Coronal and sagittal reformats. CT dose reduction  was achieved through use of a standardized protocol tailored for this  examination and automatic exposure control for dose modulation.     FINDINGS:  The ventricles and sulci are stable in size, shape and configuration. Right  frontal ventriculostomy catheter is stable in position. There is unchanged  periventricular white matter disease. Diffuse bilateral low-attenuation subdural  collections are unchanged. There is no intracranial hemorrhage or mass effect. The basilar cisterns are open. No CT evidence of acute infarct. The bone windows demonstrate no abnormalities. Fluid is again seen in the left  sphenoid sinus. Impression  No significant change. IMPRESSION/RECOMMENDATIONS:  Greer Lanier is a 79 y.o. male with history of diabetes, COPD, normal pressure hydrocephalus status post  shunt who is currently admitted to the hospital with progressive worsening lower extremity weakness, confusion and a fall concerning for possible seizure. His hospital course has been complicated by respiratory failure requiring intubation and progressive encephalopathy    Progressive encephalopathy initial concern was for possible worsening hydrocephalus and shunt was adjusted. There is also concern for possible seizures which may have been causing his initial complaints given subdural fluid collections were noted on his initial imaging. He had an MRI of the brain which shows evidence of subdural fluid collections which could be due to subdural hygromas secondary to chronic CSF shunting versus an infectious process  -Given these findings on the MRI, would recommend obtaining a lumbar puncture to rule out infection as a possible cause of these fluid collections  -Patient is being transferred to [address] [place] [address] for further evaluation of shunt. Seizure? Possibly secondary to the fluid collection noticed on his MRI  -Recommend continuing with Keppra 1500 mg twice a day.   -We will obtain a 24-hour EEG to further evaluate for these events to determine if they are truly based in seizure.  -Please place patient on seizure precautions with as needed Ativan available for seizures lasting greater than 3 minutes    Thank you very much for this consultation, we will continue to follow. Please call with any additional questions    Tanja Orta MD    30 minutes was spent providing medical care of this critically ill patient reviewing records, obtaining additional history from family, examining patient , discussing with collaborating physicians and nursing, and discussing treatment plans.

## 2022-09-23 NOTE — PROCEDURES
EEG REPORT    Patient Name: Demetra Toribio  : 1940  Age: 79 y.o. Ordering physician: John Valladares MD   Date of EE22  EEG procedure number: [id number]  Diagnosis: Altered mental status   Interpreting physician: Grace Watson MD     Procedure: EEG    CLINICAL INDICATION: The patient is a 79 y.o. male who is being evaluated for baseline electro cerebral activities and to rule out seizure focus.       Current Facility-Administered Medications   Medication Dose Route Frequency    midazolam (VERSED) 1 mg/mL injection        dexmedeTOMidine in 0.9 % NaCl (PRECEDEX) 400 mcg/100 mL (4 mcg/mL) infusion soln  0.1-1.5 mcg/kg/hr IntraVENous TITRATE    insulin lispro (HUMALOG) injection 1-8 Units  1-8 Units SubCUTAneous Q6H    PHENYLephrine (LINUS-SYNEPHRINE) 30 mg in 0.9% sodium chloride 250 mL infusion   mcg/min IntraVENous TITRATE    famotidine (PEPCID) tablet 20 mg  20 mg Per OG Tube BID    lactated Ringers infusion  50 mL/hr IntraVENous CONTINUOUS    cefepime (MAXIPIME) 2 g in 0.9% sodium chloride (MBP/ADV) 100 mL MBP  2 g IntraVENous Q8H    levETIRAcetam (KEPPRA) injection 1,500 mg  1,500 mg IntraVENous Q12H    vancomycin (VANCOCIN) 1250 mg in  ml infusion  1,250 mg IntraVENous Q18H    ampicillin (OMNIPEN) 2 g in 0.9% sodium chloride (MBP/ADV) 100 mL MBP  2 g IntraVENous Q6H    hydrALAZINE (APRESOLINE) 20 mg/mL injection 10 mg  10 mg IntraVENous Q4H PRN    glucagon (GLUCAGEN) injection 1 mg  1 mg IntraMUSCular PRN    dextrose 10 % infusion 250 mL  250 mL IntraVENous DIALYSIS PRN    glucose chewable tablet 16 g  16 g Oral PRN    folic acid (FOLVITE) tablet 1 mg  1 mg Oral DAILY    [Held by provider] methotrexate (RHEUMATREX) tablet 10 mg  10 mg Oral Once per day on     albuterol (PROVENTIL VENTOLIN) nebulizer solution 2.5 mg  2.5 mg Nebulization Q4H PRN    fluticasone propionate (FLONASE) 50 mcg/actuation nasal spray 2 Spray  2 Spray Both Nostrils DAILY PRN    [Held by provider] clopidogreL (PLAVIX) tablet 75 mg  75 mg Oral DAILY    diclofenac (VOLTAREN) 1 % topical gel 4 g  4 g Topical QID    [Held by provider] tamsulosin (FLOMAX) capsule 0.4 mg  0.4 mg Oral DAILY    ipratropium (ATROVENT) 0.02 % nebulizer solution 0.5 mg  0.5 mg Nebulization Q4H PRN    [Held by provider] dilTIAZem IR (CARDIZEM) tablet 60 mg  60 mg Oral BID    atorvastatin (LIPITOR) tablet 40 mg  40 mg Oral QHS    ondansetron (ZOFRAN) injection 4 mg  4 mg IntraVENous Q4H PRN    diazePAM (VALIUM) injection 10 mg  10 mg IntraVENous Q4H PRN    acetaminophen (TYLENOL) tablet 650 mg  650 mg Oral Q4H PRN    Or    acetaminophen (TYLENOL) solution 650 mg  650 mg Per NG tube Q4H PRN    Or    acetaminophen (TYLENOL) suppository 650 mg  650 mg Rectal Q4H PRN           DESCRIPTION OF PROCEDURE:     This is a digitally recorded electroencephalogram  Electrodes were applied in accordance with the international 10-20 system of electrode placement. 18 channels of scalp EEG are recorded  A channel was used for EoG  Another channel was used for ECG   The data is stored digitally and reviewed in reformatted montages for optimal display  EEG  was reviewed in both bipolar and referential montages    Description of Activity: The background of this recording contains no well-formed alpha activity. There is mixed diffuse delta and theta noted throughout the recording. Throughout the recording, there were no clear areas of focal slowing nor spike or spike-and-wave discharges seen. Hyperventilation was not performed. Photic stimulation produced no response in the posterior head regions. During the recording, the patient did not achieve stage II sleep    Clinical Interpretation: This EEG, performed during wakefulness and drowsiness/sleep, is abnormal. There is moderate to severe generalized slowing as seen in encephalopathies. There is no focal asymmetry, seizures or epileptiform discharges seen.   This EEG does not rule out the possibility seizures or the diagnosis of epilepsy.   Clinical correlation is recommended     Pancho Pereyra MD

## 2022-09-23 NOTE — DISCHARGE SUMMARY
80 y/o male history of DM. COPD and normal pressure hydrocephalus s/p shunt (recently adjusted at [facility]) admitted 9/20 for concern for possible seizure after p/w progressive lower extremity weakness, confusion and fall. Course complicated by acute respiratory distress and inability to protect airway requiring emergent intubation today morning. Patient was also started on empiric vanc and rocephin for suspected meningitis and keppra for suspected seizure. LP was not performed, patient is on plavix. Neurosurgery was consulted for opinion on possible shunt malfunction and recommended transfer to [facility]. Family also wanted the patient be transferred to [facility] if possible. We talked to  [facility] who was in agreement and arrangements are being made to transfer to [address] for further care. Patient is currently intubated and sedated with precedex gtt. Unresponsive. He is on 50% Fio2. Hemodynamically stable. Plan was discussed in detail with family on bedside.

## 2022-09-25 LAB
BACTERIA SPEC CULT: ABNORMAL
BACTERIA SPEC CULT: ABNORMAL
GRAM STN SPEC: ABNORMAL
SERVICE CMNT-IMP: ABNORMAL

## 2023-01-10 ENCOUNTER — TELEPHONE (OUTPATIENT)
Dept: INTERNAL MEDICINE CLINIC | Age: 83
End: 2023-01-10

## 2023-01-10 NOTE — TELEPHONE ENCOUNTER
Jermaine Kong III, DO  You 48 minutes ago (1:49 PM)     SC  I never received any autopsy results. Called, no answer left message to call office back.

## 2023-01-10 NOTE — TELEPHONE ENCOUNTER
Spouse, Anil Wallace (HIPAA) returned call   Two pt identifiers confirmed. Condolences offered  Spouse notified that ME report has not been received.  She states she will contact their office to have it sent

## 2023-01-10 NOTE — TELEPHONE ENCOUNTER
Wife, Sarika Trejo states that pt passed the end of October. She states the autopsy was sent to Dr. Danielle Mcfadden and she has never head anything. Pt's wife would like a call as to what the findings were on the autopsy. 2 = A lot of assistance

## 2024-04-30 NOTE — INTERDISCIPLINARY ROUNDS
Bedside interdisciplinary rounds were held today to discuss patient plan of care and outcomes. The following members were present: Physician, Nurse Practitioner, Nurse, Clinical Care Leader, Pharmacy, Physical Therapy, and Case Management. Plan:  Plan for [facility], possibly this PM, with watkins in place for retention/hematuria. Hello -    I'm covering for Dr. Huynh today    Here are my comments about the recent results: your hemoglobin A1c (3 month diabetes test) looks great at 6.2.    Please let us know if you have any questions or concerns.    Regards,  Nupur Felix PA-C

## 2024-09-09 NOTE — PROGRESS NOTES
Occupational Therapy  Pt was transferred to CCU overnight post RRT called. Pt  was recently intubated and sedated this morning. Will defer at this time and follow up on Monday as appropriate. 81

## (undated) DEVICE — 3M™ TEGADERM™ TRANSPARENT FILM DRESSING FRAME STYLE, 1624W, 2-3/8 IN X 2-3/4 IN (6 CM X 7 CM), 100/CT 4CT/CASE: Brand: 3M™ TEGADERM™

## (undated) DEVICE — NEEDLE SPNL L4.75IN OD25GA QNCKE TYP SPINOCAN

## (undated) DEVICE — SYR 5ML 1/5 GRAD LL NSAF LF --

## (undated) DEVICE — SYR 10ML LUER LOK 1/5ML GRAD --

## (undated) DEVICE — POLYLINED TOWEL: Brand: CONVERTORS

## (undated) DEVICE — Device

## (undated) DEVICE — STERILE POLYISOPRENE POWDER-FREE SURGICAL GLOVES: Brand: PROTEXIS

## (undated) DEVICE — REM POLYHESIVE ADULT PATIENT RETURN ELECTRODE: Brand: VALLEYLAB

## (undated) DEVICE — TOWEL SURG W17XL27IN STD BLU COT NONFENESTRATED PREWASHED

## (undated) DEVICE — NDL SPNE QNCKE 25GX3.5IN LF --

## (undated) DEVICE — SET EXTN PRIMING 0.59ML 8.5IN 1.55LB PRSS RATE MINIBOR PUR

## (undated) DEVICE — 3M™ MEDIPORE™ H SOFT CLOTH SURGICAL TAPE 2864, 4 INCH X 10 YARD (10CM X 9,14M), 12 ROLLS/CASE: Brand: 3M™ MEDIPORE™

## (undated) DEVICE — HANDLE LT SNAP ON ULT DURABLE LENS FOR TRUMPF ALC DISPOSABLE

## (undated) DEVICE — NON-ADHERENT STRIPS,OIL EMULSION: Brand: CURITY

## (undated) DEVICE — MARKER,SKIN,WI/RULER AND LABELS: Brand: MEDLINE

## (undated) DEVICE — (D)PREP SKN CHLRAPRP APPL 26ML -- CONVERT TO ITEM 371833

## (undated) DEVICE — ADULT SPO2 SENSOR: Brand: NELLCOR

## (undated) DEVICE — SUTURE VCRL SZ 0 L27IN ABSRB UD L36MM CP-1 1/2 CIR REV CUT J267H

## (undated) DEVICE — SPONGE LAP 18X18IN STRL -- 5/PK

## (undated) DEVICE — BASIN EMESIS 500CC ROSE 250/CS 60/PLT: Brand: MEDEGEN MEDICAL PRODUCTS, LLC

## (undated) DEVICE — 3M™ COBAN™ NL STERILE NON-LATEX SELF-ADHERENT WRAP, 2086S, 6 IN X 5 YD (15 CM X 4,5 M), 12 ROLLS/CASE: Brand: 3M™ COBAN™

## (undated) DEVICE — DRAPE SURG W41XL74IN CLR FULL SZ C ARM 3 ADH POLY STRP E

## (undated) DEVICE — BASIC PACK: Brand: CONVERTORS

## (undated) DEVICE — SOLUTION LACTATED RINGERS INJECTION USP

## (undated) DEVICE — GAUZE SPONGES,12 PLY: Brand: CURITY

## (undated) DEVICE — ENDO CARRY-ON PROCEDURE KIT INCLUDES ENZYMATIC SPONGE, GAUZE, BIOHAZARD LABEL, TRAY, LUBRICANT, DIRTY SCOPE LABEL, WATER LABEL, TRAY, DRAWSTRING PAD, AND DEFENDO 4-PIECE KIT.: Brand: ENDO CARRY-ON PROCEDURE KIT

## (undated) DEVICE — CONTINU-FLO SOLUTION SET, 2 INJECTION SITES, MALE LUER LOCK ADAPTER WITH RETRACTABLE COLLAR, LARGE BORE STOPCOCK WITH ROTATING MALE LUER LOCK EXTENSION SET, 2 INJECTION SITES, MALE LUER LOCK ADAPTER WITH RETRACTABLE COLLAR: Brand: INTERLINK/CONTINU-FLO

## (undated) DEVICE — DRAPE,U/ SHT,SPLIT,PLAS,STERIL: Brand: MEDLINE

## (undated) DEVICE — NDL SPNE QNCKE 22GX5IN LF BLK --

## (undated) DEVICE — NEEDLE HYPO 25GA L1.5IN BVL ORIENTED ECLIPSE

## (undated) DEVICE — SYRINGE 50ML E/T

## (undated) DEVICE — MEDI-VAC YANK SUCT HNDL W/TPRD BULBOUS TIP: Brand: CARDINAL HEALTH

## (undated) DEVICE — PADDING CST 4INX4YD --

## (undated) DEVICE — KENDALL DL ECG CABLE AND LEAD WIRE SYSTEM, 3-LEAD, SINGLE PATIENT USE: Brand: KENDALL

## (undated) DEVICE — KENDALL SCD EXPRESS SLEEVES, KNEE LENGTH, MEDIUM: Brand: KENDALL SCD

## (undated) DEVICE — DRAPE SHT 3 QTR PROXIMA 53X77 --

## (undated) DEVICE — HOOK LOCK LATEX FREE ELASTIC BANDAGE 6INX5YD

## (undated) DEVICE — SOLUTION IRRIG 1000ML H2O STRL BLT

## (undated) DEVICE — SOLIDIFIER MEDC 1200ML -- CONVERT TO 356117

## (undated) DEVICE — MEDI-VAC SUCTION HIGH CAPACITY: Brand: CARDINAL HEALTH

## (undated) DEVICE — SUTURE VCRL SZ 2-0 L27IN ABSRB VLT L40MM CT 1/2 CIR J351H

## (undated) DEVICE — WATERPROOF, BACTERIA PROOF DRESSING WITH ABSORBENT SEE THROUGH PAD: Brand: OPSITE POST-OP VISIBLE 15X10CM CTN 20

## (undated) DEVICE — 3.2MM GUIDE WIRE 400MM

## (undated) DEVICE — 1200 GUARD II KIT W/5MM TUBE W/O VAC TUBE: Brand: GUARDIAN

## (undated) DEVICE — NEEDLE HYPO 18GA L1.5IN PNK S STL HUB POLYPR SHLD REG BVL

## (undated) DEVICE — FORCEPS BX L160CM DIA8MM GRSP DISECT CUP TIP NONLOCKING ROT

## (undated) DEVICE — SLIM BODY SKIN STAPLER: Brand: APPOSE ULC

## (undated) DEVICE — ROCKER SWITCH PENCIL BLADE ELECTRODE, HOLSTER: Brand: EDGE

## (undated) DEVICE — SYR ASSEMB INFL BLLN 60ML --

## (undated) DEVICE — CATHETER IV 22GA L1IN TEF FEP STR HUB INTROCAN SFTY

## (undated) DEVICE — FILTER IV 5UM L1.75IN FLX STRW FOR FLD ASPIR FR GLS AMP

## (undated) DEVICE — BLOCK BITE ENDOSCP AD 21 MM W/ DIL BLU LF DISP

## (undated) DEVICE — KIT,1200CC CANISTER,3/16"X6' TUBING: Brand: MEDLINE INDUSTRIES, INC.

## (undated) DEVICE — ROD RMR L950MM DIA2.5MM W/ EXTN BALL TIP

## (undated) DEVICE — PREP SKN PREVAIL 40ML APPL --

## (undated) DEVICE — COVER,TABLE,60X90,STERILE: Brand: MEDLINE

## (undated) DEVICE — ABDOMINAL PAD: Brand: DERMACEA

## (undated) DEVICE — SOLUTION IV 1000ML 0.9% SOD CHL

## (undated) DEVICE — (D)SYR 10ML 1/5ML GRAD NSAF -- PKGING CHANGE USE ITEM 338027

## (undated) DEVICE — SURGICAL PROCEDURE PACK BASIN MAJ SET CUST NO CAUT

## (undated) DEVICE — WATERPROOF, BACTERIA PROOF DRESSING WITH ABSORBENT SEE THROUGH PAD: Brand: OPSITE POST-OP VISIBLE 10X8CM CTN 20

## (undated) DEVICE — 6619 2 PTNT ISO SYS INCISE AREA&LT;(&GT;&&LT;)&GT;P: Brand: STERI-DRAPE™ IOBAN™ 2

## (undated) DEVICE — INSTA-GARD PROCEDURE MASK, YELLOW: Brand: CONVERTORS

## (undated) DEVICE — ESOPHAGEAL BALLOON DILATATION CATHETER: Brand: CRE FIXED WIRE